# Patient Record
Sex: FEMALE | Race: WHITE | Employment: FULL TIME | URBAN - METROPOLITAN AREA
[De-identification: names, ages, dates, MRNs, and addresses within clinical notes are randomized per-mention and may not be internally consistent; named-entity substitution may affect disease eponyms.]

---

## 2017-12-13 ENCOUNTER — OFFICE VISIT (OUTPATIENT)
Dept: INTERNAL MEDICINE CLINIC | Facility: CLINIC | Age: 52
End: 2017-12-13

## 2017-12-13 ENCOUNTER — HOSPITAL ENCOUNTER (OUTPATIENT)
Dept: ULTRASOUND IMAGING | Age: 52
Discharge: HOME | End: 2017-12-13
Attending: INTERNAL MEDICINE
Payer: COMMERCIAL

## 2017-12-13 ENCOUNTER — LAB ENCOUNTER (OUTPATIENT)
Dept: LAB | Age: 52
End: 2017-12-13
Attending: INTERNAL MEDICINE
Payer: COMMERCIAL

## 2017-12-13 ENCOUNTER — TELEPHONE (OUTPATIENT)
Dept: GASTROENTEROLOGY | Facility: CLINIC | Age: 52
End: 2017-12-13

## 2017-12-13 VITALS
TEMPERATURE: 98 F | HEIGHT: 63 IN | OXYGEN SATURATION: 97 % | SYSTOLIC BLOOD PRESSURE: 134 MMHG | WEIGHT: 170.81 LBS | HEART RATE: 85 BPM | DIASTOLIC BLOOD PRESSURE: 85 MMHG | BODY MASS INDEX: 30.27 KG/M2 | RESPIRATION RATE: 20 BRPM

## 2017-12-13 DIAGNOSIS — Z12.11 ENCOUNTER FOR SCREENING COLONOSCOPY: ICD-10-CM

## 2017-12-13 DIAGNOSIS — K21.9 GASTROESOPHAGEAL REFLUX DISEASE, ESOPHAGITIS PRESENCE NOT SPECIFIED: Primary | ICD-10-CM

## 2017-12-13 DIAGNOSIS — R10.13 EPIGASTRIC ABDOMINAL PAIN: ICD-10-CM

## 2017-12-13 DIAGNOSIS — R10.9 ABDOMINAL PAIN, UNSPECIFIED ABDOMINAL LOCATION: ICD-10-CM

## 2017-12-13 DIAGNOSIS — R10.13 DYSPEPSIA: ICD-10-CM

## 2017-12-13 DIAGNOSIS — Z00.00 PHYSICAL EXAM: ICD-10-CM

## 2017-12-13 DIAGNOSIS — Z12.11 COLON CANCER SCREENING: ICD-10-CM

## 2017-12-13 DIAGNOSIS — Z12.31 SCREENING MAMMOGRAM, ENCOUNTER FOR: ICD-10-CM

## 2017-12-13 PROCEDURE — 80053 COMPREHEN METABOLIC PANEL: CPT

## 2017-12-13 PROCEDURE — 99396 PREV VISIT EST AGE 40-64: CPT | Performed by: INTERNAL MEDICINE

## 2017-12-13 PROCEDURE — 83690 ASSAY OF LIPASE: CPT

## 2017-12-13 PROCEDURE — 85025 COMPLETE CBC W/AUTO DIFF WBC: CPT

## 2017-12-13 PROCEDURE — 80061 LIPID PANEL: CPT

## 2017-12-13 PROCEDURE — 99213 OFFICE O/P EST LOW 20 MIN: CPT | Performed by: INTERNAL MEDICINE

## 2017-12-13 PROCEDURE — 36415 COLL VENOUS BLD VENIPUNCTURE: CPT

## 2017-12-13 PROCEDURE — 76705 ECHO EXAM OF ABDOMEN: CPT | Performed by: INTERNAL MEDICINE

## 2017-12-13 PROCEDURE — 82150 ASSAY OF AMYLASE: CPT

## 2017-12-13 PROCEDURE — 99212 OFFICE O/P EST SF 10 MIN: CPT | Performed by: INTERNAL MEDICINE

## 2017-12-13 PROCEDURE — 81015 MICROSCOPIC EXAM OF URINE: CPT

## 2017-12-13 PROCEDURE — 84443 ASSAY THYROID STIM HORMONE: CPT

## 2017-12-13 RX ORDER — OMEPRAZOLE 40 MG/1
40 CAPSULE, DELAYED RELEASE ORAL DAILY
Qty: 90 CAPSULE | Refills: 0 | Status: SHIPPED | OUTPATIENT
Start: 2017-12-13 | End: 2018-01-12

## 2017-12-13 NOTE — TELEPHONE ENCOUNTER
Pt calling to schedule CLN. Pt is aware of at least 72hr call back time.  Please call thank you [phone]

## 2017-12-13 NOTE — TELEPHONE ENCOUNTER
GI RNs–    It looks like we discussed but did not perform EGD examination when I saw her in 2015? If I have not previously performed EGD examination, we should proceed with both exams please.     Agree with abdominal ultrasound exam.    Agree with vale

## 2017-12-13 NOTE — TELEPHONE ENCOUNTER
Left detailed message for pt with below recommendations    Routed to surgery schedulers to contact pt to schedule both.

## 2017-12-13 NOTE — TELEPHONE ENCOUNTER
Last seen by Dr Ni Means 10/29/2015 for upper GI symptoms    Now 50years old and requesting screening colonoscopy    Saw PCP yesterday with ongoing GERD symptoms    I called patient for an update.   She was doing well prior to last week or so, was not taking

## 2017-12-13 NOTE — PROGRESS NOTES
HPI:    Patient ID: Yoselin Snowden is a 50year old female.   Patient patient presents today for heartburns some diarrhea - that  Resolved and  Some stomach pain  and also physical exam, states doing well otherwise, denies chest pain, shortness of breath, d Psychiatric/Behavioral: Negative for confusion. The patient is not nervous/anxious. Current Outpatient Prescriptions:  Omeprazole 40 MG Oral Capsule Delayed Release Take 1 capsule (40 mg total) by mouth daily.  Take 30-60 minutes before breakf Blood pressure 134/85, pulse 85, temperature 97.9 °F (36.6 °C), temperature source Oral, resp. rate 20, height 5' 3\" (1.6 m), weight 170 lb 12.8 oz (77.5 kg), SpO2 97 %, not currently breastfeeding.              ASSESSMENT/PLAN:   Gastroesophageal reflux d Omeprazole 40 MG Oral Capsule Delayed Release 90 capsule 0      Sig: Take 1 capsule (40 mg total) by mouth daily.  Take 30-60 minutes before breakfast           Imaging & Referrals:  GASTRO - INTERNAL  US ABDOMEN COMPLETE (CPT=76700)  CJ SCREENING BILAT (

## 2017-12-13 NOTE — TELEPHONE ENCOUNTER
Scheduled for:  Colonoscopy - 85488 & EGD - 02457  Provider Name:  Dr. Judi Jenkins  Date:  1/10/18  Location:  [facility]  Sedation:  MAC  Time:  1:45 pm (pt is aware to arrive at 12:45 pm)  Prep:  Suprep, Prep instructions were given to pt over the phone, pt ve

## 2017-12-13 NOTE — TELEPHONE ENCOUNTER
CBLM to schedule procedure. Please transfer to [facility] at ext [phone] or [phone] for scheduling. Or please transfer to [place] in GI if unavailable.

## 2017-12-14 ENCOUNTER — TELEPHONE (OUTPATIENT)
Dept: INTERNAL MEDICINE CLINIC | Facility: CLINIC | Age: 52
End: 2017-12-14

## 2017-12-15 NOTE — TELEPHONE ENCOUNTER
Please call patient     blood test results on all normal including   pancreatic enzymes liver and kidney function  Urine is normal  Sugars normal blood count is normal  Only triglycerides mildly elevated otherwise LDL is normal range-patient to lose weight

## 2017-12-15 NOTE — TELEPHONE ENCOUNTER
correction  -elevated sugar  - advice pt also  To decrease  Sugar /carb in diet         F/u  Next  Week in  Office

## 2017-12-15 NOTE — TELEPHONE ENCOUNTER
Spoke with patient and relayed US results and labs from 12/13/17. Patient requesting EV review and response, as she is still experiencing intermittent right back pain. Patient states Omeprazole has improved her GERD symptoms.

## 2017-12-16 NOTE — TELEPHONE ENCOUNTER
Advised patient on Dr. Juan Steinberg information and recommendations. Patient verbalized understanding.  Discussed reducing sugar and simple carbohydrates in the diet and beginning regular exercise, such as walking (indoor) or elliptical or  treadmill, neil

## 2017-12-26 ENCOUNTER — HOSPITAL ENCOUNTER (OUTPATIENT)
Dept: MAMMOGRAPHY | Age: 52
Discharge: HOME | End: 2017-12-26
Attending: INTERNAL MEDICINE
Payer: COMMERCIAL

## 2017-12-26 DIAGNOSIS — Z12.31 SCREENING MAMMOGRAM, ENCOUNTER FOR: ICD-10-CM

## 2017-12-26 PROCEDURE — 77067 SCR MAMMO BI INCL CAD: CPT | Performed by: INTERNAL MEDICINE

## 2018-01-10 ENCOUNTER — ANESTHESIA EVENT (OUTPATIENT)
Dept: ENDOSCOPY | Age: 53
End: 2018-01-10
Payer: COMMERCIAL

## 2018-01-10 ENCOUNTER — HOSPITAL ENCOUNTER (OUTPATIENT)
Age: 53
Setting detail: HOSPITAL OUTPATIENT SURGERY
Discharge: HOME | End: 2018-01-10
Attending: INTERNAL MEDICINE | Admitting: INTERNAL MEDICINE
Payer: COMMERCIAL

## 2018-01-10 ENCOUNTER — ANESTHESIA (OUTPATIENT)
Dept: ENDOSCOPY | Age: 53
End: 2018-01-10
Payer: COMMERCIAL

## 2018-01-10 ENCOUNTER — SURGERY (OUTPATIENT)
Age: 53
End: 2018-01-10

## 2018-01-10 VITALS
DIASTOLIC BLOOD PRESSURE: 90 MMHG | OXYGEN SATURATION: 99 % | WEIGHT: 160 LBS | SYSTOLIC BLOOD PRESSURE: 134 MMHG | RESPIRATION RATE: 11 BRPM | HEIGHT: 63 IN | HEART RATE: 78 BPM | BODY MASS INDEX: 28.35 KG/M2

## 2018-01-10 DIAGNOSIS — K21.9 GASTROESOPHAGEAL REFLUX DISEASE, ESOPHAGITIS PRESENCE NOT SPECIFIED: ICD-10-CM

## 2018-01-10 DIAGNOSIS — R10.13 DYSPEPSIA: ICD-10-CM

## 2018-01-10 DIAGNOSIS — Z12.11 COLON CANCER SCREENING: ICD-10-CM

## 2018-01-10 DIAGNOSIS — R10.9 ABDOMINAL PAIN, UNSPECIFIED ABDOMINAL LOCATION: ICD-10-CM

## 2018-01-10 PROCEDURE — 45385 COLONOSCOPY W/LESION REMOVAL: CPT | Performed by: INTERNAL MEDICINE

## 2018-01-10 PROCEDURE — 43251 EGD REMOVE LESION SNARE: CPT | Performed by: INTERNAL MEDICINE

## 2018-01-10 RX ORDER — SODIUM CHLORIDE, SODIUM LACTATE, POTASSIUM CHLORIDE, CALCIUM CHLORIDE 600; 310; 30; 20 MG/100ML; MG/100ML; MG/100ML; MG/100ML
INJECTION, SOLUTION INTRAVENOUS CONTINUOUS PRN
Status: DISCONTINUED | OUTPATIENT
Start: 2018-01-10 | End: 2018-01-10 | Stop reason: SURG

## 2018-01-10 RX ORDER — SODIUM CHLORIDE 9 MG/ML
INJECTION, SOLUTION INTRAVENOUS CONTINUOUS
Status: CANCELLED | OUTPATIENT
Start: 2018-01-10

## 2018-01-10 RX ORDER — MIDAZOLAM HYDROCHLORIDE 1 MG/ML
1 INJECTION INTRAMUSCULAR; INTRAVENOUS EVERY 5 MIN PRN
Status: DISCONTINUED | OUTPATIENT
Start: 2018-01-10 | End: 2018-01-10

## 2018-01-10 RX ORDER — SODIUM CHLORIDE, SODIUM LACTATE, POTASSIUM CHLORIDE, CALCIUM CHLORIDE 600; 310; 30; 20 MG/100ML; MG/100ML; MG/100ML; MG/100ML
INJECTION, SOLUTION INTRAVENOUS CONTINUOUS
Status: CANCELLED | OUTPATIENT
Start: 2018-01-10

## 2018-01-10 RX ORDER — SODIUM CHLORIDE 0.9 % (FLUSH) 0.9 %
10 SYRINGE (ML) INJECTION AS NEEDED
Status: CANCELLED | OUTPATIENT
Start: 2018-01-10

## 2018-01-10 RX ORDER — NALOXONE HYDROCHLORIDE 0.4 MG/ML
80 INJECTION, SOLUTION INTRAMUSCULAR; INTRAVENOUS; SUBCUTANEOUS AS NEEDED
Status: CANCELLED | OUTPATIENT
Start: 2018-01-10 | End: 2018-01-10

## 2018-01-10 RX ADMIN — SODIUM CHLORIDE, SODIUM LACTATE, POTASSIUM CHLORIDE, CALCIUM CHLORIDE: 600; 310; 30; 20 INJECTION, SOLUTION INTRAVENOUS at 13:45:00

## 2018-01-10 RX ADMIN — SODIUM CHLORIDE, SODIUM LACTATE, POTASSIUM CHLORIDE, CALCIUM CHLORIDE: 600; 310; 30; 20 INJECTION, SOLUTION INTRAVENOUS at 14:05:00

## 2018-01-10 NOTE — ANESTHESIA PREPROCEDURE EVALUATION
Anesthesia PreOp Note    HPI:     Fouzia Polanco is a 50year old female who presents for preoperative consultation requested by: Zain Raphael MD    Date of Surgery: 1/10/2018    Procedure(s):  COLONOSCOPY  ESOPHAGOGASTRODUODENOSCOPY (EGD)  In Grandmother      Cause of death   • Heart Disease Father      CAD   • Diabetes Father      Diabetes/CRF/CAD cause of death   • Other [OTHER] Father      CRF   • Heart Disease Mother      CAD   • Cancer Mother      Lung cancer and CAD cause of death   • Hea ability. The patient desires the anesthetic management as planned.   Talat Machado  1/10/2018 1:45 PM

## 2018-01-10 NOTE — ANESTHESIA POSTPROCEDURE EVALUATION
Patient:  Molly Bejarano    Procedure Summary     Date:  01/10/18 Room / Location:  [facility] ENDOSCOPY 01 / [facility] ENDO    Anesthesia Start:  8280 Anesthesia Stop:      Procedures:       COLONOSCOPY (N/A )      ESOPHAGOGASTRODUODENOSCOPY (EGD) (N/A ) Diagnosis:

## 2018-01-10 NOTE — ANESTHESIA PREPROCEDURE EVALUATION
Anesthesia PreOp Note    HPI:     Beth Schultz is a 50year old female who presents for preoperative consultation requested by: Aníbal Mchugh MD    Date of Surgery: 1/10/2018    Procedure(s):  COLONOSCOPY  ESOPHAGOGASTRODUODENOSCOPY (EGD)  In Grandmother      Cause of death   • Heart Disease Father      CAD   • Diabetes Father      Diabetes/CRF/CAD cause of death   • Other [OTHER] Father      CRF   • Heart Disease Mother      CAD   • Cancer Mother      Lung cancer and CAD cause of death   • Hea regular  Rate: normal    Neuro/Psych - negative ROS     GI/Hepatic/Renal    (+) GERD,     Endo/Other - negative ROS   Abdominal  - normal exam             Anesthesia Plan:   ASA:  1  Plan:   MAC  Post-op Pain Management: IV analgesics  Informed Consent Kushal

## 2018-01-10 NOTE — H&P
History & Physical Examination    Patient Name: Jacoby Ji  MRN: [id number]  [facility]: [id number]  YOB: 1965    Diagnosis: GERD symptoms with dysphagia, dyspepsia, screening colonoscopy examination    Present Illness: As above; GERD symptoms g Maternal Aunt 82        Smoking status: Former Smoker     Smokeless tobacco: Never Used    Alcohol use Yes  0.0 oz/week     Comment: Occasionally       SYSTEM Check if Review is Normal Check if Physical Exam is Normal If not normal, please explain:   HEENT

## 2018-01-10 NOTE — OPERATIVE REPORT
EGD AND COLONOSCOPY PROCEDURE REPORTS:    DATE OF PROCEDURE:  1/10/2018    PCP: Samuel Shore MD     PREOPERATIVE DIAGNOSIS:  GERD symptoms with dysphagia, dyspepsia, screening colonoscopy examination     POSTOPERATIVE DIAGNOSIS:  See impression. rectum. The quality of the prep was good. COLONOSCOPY FINDINGS:  · Sessile 6 mm polyp removed from the mid transverse colon by cold snare polypectomy. · Small internal hemorrhoids.

## 2018-01-25 ENCOUNTER — TELEPHONE (OUTPATIENT)
Dept: GASTROENTEROLOGY | Facility: CLINIC | Age: 53
End: 2018-01-25

## 2018-01-25 NOTE — TELEPHONE ENCOUNTER
I mailed out colonoscopy/EGD results letter to pt  Updated health maintenance  Entered into 5 yr recall  Recall colon in 5 years per. Colon done 1/10/18  GI RNs - 1.  Please print and mail this letter to patient; 2. Recall for colonoscopy exam in 5 years

## 2018-04-20 ENCOUNTER — TELEPHONE (OUTPATIENT)
Dept: INTERNAL MEDICINE CLINIC | Facility: CLINIC | Age: 53
End: 2018-04-20

## 2018-04-20 NOTE — TELEPHONE ENCOUNTER
Pt stts she need to be seen for her PAP   Pt stts she has her period just about daily and every time she makes her appt she has to cxl  Pt would like to talk to the pcp before she make her appt   Please advise

## 2018-04-20 NOTE — TELEPHONE ENCOUNTER
Pt  To schedule apt within  1  Month  -  If  Heavy    Bleed cano   Do pap  -  Very  Minor   spotting -   Will can  Still try -    Please  Schedule  Apt for pt

## 2018-04-20 NOTE — TELEPHONE ENCOUNTER
Please advise to the communication below. Thank you. Kymberly Marquez Please respond to pool: EM IM LMB LPN/CMA

## 2018-04-25 ENCOUNTER — OFFICE VISIT (OUTPATIENT)
Dept: INTERNAL MEDICINE CLINIC | Facility: CLINIC | Age: 53
End: 2018-04-25

## 2018-04-25 VITALS
HEART RATE: 68 BPM | RESPIRATION RATE: 20 BRPM | DIASTOLIC BLOOD PRESSURE: 72 MMHG | TEMPERATURE: 98 F | HEIGHT: 63 IN | SYSTOLIC BLOOD PRESSURE: 117 MMHG | BODY MASS INDEX: 27.94 KG/M2 | WEIGHT: 157.69 LBS

## 2018-04-25 DIAGNOSIS — Z82.49 FAMILY HISTORY OF EARLY CAD: ICD-10-CM

## 2018-04-25 DIAGNOSIS — N93.9 MENSTRUAL BLEEDING PROBLEM: ICD-10-CM

## 2018-04-25 DIAGNOSIS — R07.2 PRECORDIAL CHEST PAIN: ICD-10-CM

## 2018-04-25 DIAGNOSIS — Z00.00 PHYSICAL EXAM: ICD-10-CM

## 2018-04-25 DIAGNOSIS — Z01.419 ENCOUNTER FOR ANNUAL ROUTINE GYNECOLOGICAL EXAMINATION: Primary | ICD-10-CM

## 2018-04-25 PROCEDURE — 99396 PREV VISIT EST AGE 40-64: CPT | Performed by: INTERNAL MEDICINE

## 2018-04-25 PROCEDURE — 99212 OFFICE O/P EST SF 10 MIN: CPT | Performed by: INTERNAL MEDICINE

## 2018-04-25 PROCEDURE — 99213 OFFICE O/P EST LOW 20 MIN: CPT | Performed by: INTERNAL MEDICINE

## 2018-04-25 NOTE — PROGRESS NOTES
HPI:    Patient ID: Christofer Richardson is a 50year old female. Menstrual Problem   The patient's primary symptoms include vaginal bleeding (spating  for last  month   every  days  has period  ).  The patient's pertinent negatives include no genital itching, [Prochlor*      Sulfa Antibiotics       Hives   PHYSICAL EXAM:   Physical Exam   Constitutional: She appears well-developed and well-nourished. No distress. Obese    Neck: No JVD present. No thyromegaly present.    Pulmonary/Chest: Right breast exhibits n diagnosis)  Pap smear  Breast   Exam  , SBE  Education    sc Mammogram   -   Completed     Menstrual bleeding problem  labs   , Us  Pelvis  Pt educaiton   Stop  Soy   Food       Family history of early cad  Hx Precordial chest pain  Reordered  Tests  from

## 2018-05-14 ENCOUNTER — LAB ENCOUNTER (OUTPATIENT)
Dept: LAB | Facility: HOSPITAL | Age: 53
End: 2018-05-14
Attending: INTERNAL MEDICINE
Payer: COMMERCIAL

## 2018-05-14 DIAGNOSIS — Z82.49 FAMILY HISTORY OF EARLY CAD: ICD-10-CM

## 2018-05-14 DIAGNOSIS — R07.2 PRECORDIAL CHEST PAIN: ICD-10-CM

## 2018-05-14 DIAGNOSIS — Z00.00 PHYSICAL EXAM: ICD-10-CM

## 2018-05-14 DIAGNOSIS — N93.9 MENSTRUAL BLEEDING PROBLEM: ICD-10-CM

## 2018-05-14 PROCEDURE — 36415 COLL VENOUS BLD VENIPUNCTURE: CPT

## 2018-05-14 PROCEDURE — 80053 COMPREHEN METABOLIC PANEL: CPT

## 2018-05-14 PROCEDURE — 85025 COMPLETE CBC W/AUTO DIFF WBC: CPT

## 2018-05-14 PROCEDURE — 93005 ELECTROCARDIOGRAM TRACING: CPT

## 2018-05-14 PROCEDURE — 93010 ELECTROCARDIOGRAM REPORT: CPT | Performed by: INTERNAL MEDICINE

## 2018-05-19 ENCOUNTER — APPOINTMENT (OUTPATIENT)
Dept: LAB | Facility: HOSPITAL | Age: 53
End: 2018-05-19
Attending: INTERNAL MEDICINE
Payer: COMMERCIAL

## 2018-05-19 ENCOUNTER — HOSPITAL ENCOUNTER (OUTPATIENT)
Dept: CV DIAGNOSTICS | Facility: HOSPITAL | Age: 53
Discharge: HOME | End: 2018-05-19
Attending: INTERNAL MEDICINE
Payer: COMMERCIAL

## 2018-05-19 DIAGNOSIS — Z00.00 PHYSICAL EXAM: ICD-10-CM

## 2018-05-19 DIAGNOSIS — N93.9 MENSTRUAL BLEEDING PROBLEM: ICD-10-CM

## 2018-05-19 PROCEDURE — 36415 COLL VENOUS BLD VENIPUNCTURE: CPT

## 2018-05-19 PROCEDURE — 80061 LIPID PANEL: CPT

## 2018-05-29 ENCOUNTER — HOSPITAL ENCOUNTER (OUTPATIENT)
Dept: CV DIAGNOSTICS | Facility: HOSPITAL | Age: 53
Discharge: HOME | End: 2018-05-29
Attending: INTERNAL MEDICINE
Payer: COMMERCIAL

## 2018-05-29 DIAGNOSIS — Z82.49 FAMILY HISTORY OF EARLY CAD: ICD-10-CM

## 2018-05-29 DIAGNOSIS — R07.2 PRECORDIAL CHEST PAIN: ICD-10-CM

## 2018-05-29 PROCEDURE — 93306 TTE W/DOPPLER COMPLETE: CPT | Performed by: INTERNAL MEDICINE

## 2018-06-06 ENCOUNTER — OFFICE VISIT (OUTPATIENT)
Dept: INTERNAL MEDICINE CLINIC | Facility: CLINIC | Age: 53
End: 2018-06-06

## 2018-06-06 VITALS
HEIGHT: 63 IN | SYSTOLIC BLOOD PRESSURE: 122 MMHG | DIASTOLIC BLOOD PRESSURE: 79 MMHG | RESPIRATION RATE: 18 BRPM | BODY MASS INDEX: 27 KG/M2 | TEMPERATURE: 99 F | WEIGHT: 152.38 LBS | HEART RATE: 68 BPM

## 2018-06-06 DIAGNOSIS — Z82.49 FAMILY HISTORY OF EARLY CAD: ICD-10-CM

## 2018-06-06 DIAGNOSIS — H26.9 CATARACT OF BOTH EYES, UNSPECIFIED CATARACT TYPE: ICD-10-CM

## 2018-06-06 DIAGNOSIS — R00.2 PALPITATIONS: ICD-10-CM

## 2018-06-06 DIAGNOSIS — E78.00 PURE HYPERCHOLESTEROLEMIA: Primary | ICD-10-CM

## 2018-06-06 DIAGNOSIS — N93.9 ABNORMAL UTERINE BLEEDING (AUB): ICD-10-CM

## 2018-06-06 DIAGNOSIS — K21.9 GASTROESOPHAGEAL REFLUX DISEASE, ESOPHAGITIS PRESENCE NOT SPECIFIED: ICD-10-CM

## 2018-06-06 PROCEDURE — 99212 OFFICE O/P EST SF 10 MIN: CPT | Performed by: INTERNAL MEDICINE

## 2018-06-06 PROCEDURE — 99214 OFFICE O/P EST MOD 30 MIN: CPT | Performed by: INTERNAL MEDICINE

## 2018-06-06 NOTE — PROGRESS NOTES
HPI:    Patient ID: Beth Schultz is a 50year old female. Hyperlipidemia   This is a chronic (pt  staet   doing  well  ,  no complains   today  ) problem. The current episode started more than 1 year ago.  She has no history of chronic renal disease, h membrane, external ear and ear canal normal.   Left Ear: Tympanic membrane, external ear and ear canal normal.   Nose: Nose normal. Right sinus exhibits no maxillary sinus tenderness and no frontal sinus tenderness.  Left sinus exhibits no maxillary sinus t Exercise  3   X  Per  week  For  1  Hr   swiming  And   Weight  Lifting , squats  , planks  Yoga      Gastroesophageal reflux disease, esophagitis presence not specified  Patient advised to avoid acidic food, spicy food, coffee and caffeinated drinks and

## 2018-07-11 ENCOUNTER — OFFICE VISIT (OUTPATIENT)
Dept: CARDIOLOGY CLINIC | Facility: CLINIC | Age: 53
End: 2018-07-11

## 2018-07-11 VITALS
DIASTOLIC BLOOD PRESSURE: 70 MMHG | WEIGHT: 152 LBS | RESPIRATION RATE: 18 BRPM | HEART RATE: 68 BPM | SYSTOLIC BLOOD PRESSURE: 120 MMHG | BODY MASS INDEX: 27 KG/M2

## 2018-07-11 DIAGNOSIS — Z82.49 FAMILY HISTORY OF EARLY CAD: ICD-10-CM

## 2018-07-11 DIAGNOSIS — K21.00 GASTROESOPHAGEAL REFLUX DISEASE WITH ESOPHAGITIS: ICD-10-CM

## 2018-07-11 DIAGNOSIS — R07.9 CHEST PAIN AT REST: Primary | ICD-10-CM
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
## 2018-07-11 DIAGNOSIS — N93.9 ABNORMAL UTERINE BLEEDING (AUB): ICD-10-CM

## 2018-07-11 PROCEDURE — 99244 OFF/OP CNSLTJ NEW/EST MOD 40: CPT | Performed by: INTERNAL MEDICINE

## 2018-07-11 PROCEDURE — 99212 OFFICE O/P EST SF 10 MIN: CPT | Performed by: INTERNAL MEDICINE

## 2018-07-11 NOTE — PROGRESS NOTES
Crow Bravo is a 50year old female. HPI:   Patient is here for a new patient appointment. She is here because of family history of early coronary artery disease. Her brother passed away at age of 39 with massive MI. Both her parents had bypasses. tenderness  EXTREMITIES: no cyanosis, clubbing or edema      Assessment   ASSESSMENT AND PLAN:     1. Chest pain at rest  Chest pain which is atypical with significant family history.   Proceed with stress echocardiogram.  - CARD ECHO STRESS ECHO W CONTRAST

## 2018-07-11 NOTE — PATIENT INSTRUCTIONS
Proceed with stress echocardiogram within the next few days. Consider doing a calcium score prior to next visit. Also proceed with echo with bubble study as ordered. Follow-up with me in 4 weeks or sooner if needed.

## 2018-07-12 ENCOUNTER — HOSPITAL ENCOUNTER (OUTPATIENT)
Dept: CV DIAGNOSTICS | Facility: HOSPITAL | Age: 53
Discharge: HOME | End: 2018-07-12
Attending: INTERNAL MEDICINE
Payer: COMMERCIAL

## 2018-07-12 ENCOUNTER — TELEPHONE (OUTPATIENT)
Dept: CARDIOLOGY CLINIC | Facility: CLINIC | Age: 53
End: 2018-07-12
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
## 2018-07-12 PROCEDURE — 93308 TTE F-UP OR LMTD: CPT | Performed by: INTERNAL MEDICINE

## 2018-07-14 ENCOUNTER — HOSPITAL ENCOUNTER (OUTPATIENT)
Dept: CT IMAGING | Facility: HOSPITAL | Age: 53
Discharge: HOME | End: 2018-07-14
Attending: INTERNAL MEDICINE
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
## 2018-07-14 DIAGNOSIS — K21.00 GASTROESOPHAGEAL REFLUX DISEASE WITH ESOPHAGITIS: ICD-10-CM

## 2018-07-17 ENCOUNTER — TELEPHONE (OUTPATIENT)
Dept: CARDIOLOGY CLINIC | Facility: CLINIC | Age: 53
End: 2018-07-17

## 2018-07-17 NOTE — TELEPHONE ENCOUNTER
Call was placed to [facility] for prior authorization for 2D Echo, Stress Echo. Spoke to [place] and was informed that no prior [facility]. is needed. Ref.  # [id number]

## 2018-07-23 ENCOUNTER — HOSPITAL ENCOUNTER (OUTPATIENT)
Dept: CV DIAGNOSTICS | Facility: HOSPITAL | Age: 53
Discharge: HOME | End: 2018-07-23
Attending: INTERNAL MEDICINE
Payer: COMMERCIAL

## 2018-07-23 DIAGNOSIS — R07.9 CHEST PAIN AT REST: ICD-10-CM

## 2018-07-23 PROCEDURE — 93016 CV STRESS TEST SUPVJ ONLY: CPT | Performed by: INTERNAL MEDICINE

## 2018-07-23 PROCEDURE — 93018 CV STRESS TEST I&R ONLY: CPT | Performed by: INTERNAL MEDICINE

## 2018-07-23 PROCEDURE — 93350 STRESS TTE ONLY: CPT | Performed by: INTERNAL MEDICINE

## 2018-07-23 PROCEDURE — 93017 CV STRESS TEST TRACING ONLY: CPT | Performed by: INTERNAL MEDICINE

## 2018-12-27 ENCOUNTER — APPOINTMENT (OUTPATIENT)
Dept: LAB | Facility: HOSPITAL | Age: 53
End: 2018-12-27
Attending: CLINICAL NURSE SPECIALIST
Payer: COMMERCIAL

## 2018-12-27 ENCOUNTER — OFFICE VISIT (OUTPATIENT)
Dept: OBGYN CLINIC | Facility: CLINIC | Age: 53
End: 2018-12-27
Payer: COMMERCIAL

## 2018-12-27 VITALS
DIASTOLIC BLOOD PRESSURE: 84 MMHG | BODY MASS INDEX: 29 KG/M2 | SYSTOLIC BLOOD PRESSURE: 125 MMHG | HEART RATE: 71 BPM | WEIGHT: 166.19 LBS

## 2018-12-27 DIAGNOSIS — N88.9 CERVICAL LESION: ICD-10-CM

## 2018-12-27 DIAGNOSIS — R30.0 BURNING WITH URINATION: Primary | ICD-10-CM

## 2018-12-27 DIAGNOSIS — N89.8 VAGINAL ODOR: ICD-10-CM

## 2018-12-27 DIAGNOSIS — R30.0 BURNING WITH URINATION: ICD-10-CM

## 2018-12-27 PROCEDURE — 81003 URINALYSIS AUTO W/O SCOPE: CPT

## 2018-12-27 PROCEDURE — 87086 URINE CULTURE/COLONY COUNT: CPT

## 2018-12-27 PROCEDURE — 99213 OFFICE O/P EST LOW 20 MIN: CPT | Performed by: CLINICAL NURSE SPECIALIST

## 2018-12-27 NOTE — PROGRESS NOTES
Sam Chao is a 48year old female  No LMP recorded. Patient is perimenopausal. Patient presents with:  Gyn Problem: burning when urinating, pain on the sides at times  Last seen in 2016.  Was scheduled for ablation with Joaquin George but never returned ca death   • Other (Other) Father         CRF   • Heart Disease Mother         CAD   • Cancer Mother         Lung cancer and CAD cause of death   • Heart Disease Brother 39        Premature CAD   • Breast Cancer Maternal Aunt 80     Social History    Tobacco Bottle, Rfl: 0    ALLERGIES:    Aminoglycosides           Compazine [Prochlor*      Sulfa Antibiotics       HIVES      Review of Systems:  Constitutional:  Denies fatigue, night sweats, hot flashes  Cardiovascular:  denies chest pain or palpitations  Respi schedule pelvic US  RTO to see Physician to have cervical lesion evaluated

## 2018-12-28 ENCOUNTER — TELEPHONE (OUTPATIENT)
Dept: OBGYN CLINIC | Facility: CLINIC | Age: 53
End: 2018-12-28

## 2018-12-28 NOTE — TELEPHONE ENCOUNTER
Pt saw MAF on 12/27/18. Pt states that she was informed that she may have a cervical lesion. Pt states that she is having an ultrasound tomorrow. Pt states that she was to return to see physician to have cervical lesion evaluated.   Pt made an appt with

## 2018-12-29 ENCOUNTER — HOSPITAL ENCOUNTER (OUTPATIENT)
Dept: ULTRASOUND IMAGING | Age: 53
Discharge: HOME | End: 2018-12-29
Attending: INTERNAL MEDICINE
Payer: COMMERCIAL

## 2018-12-29 DIAGNOSIS — N93.9 MENSTRUAL BLEEDING PROBLEM: ICD-10-CM

## 2018-12-29 PROCEDURE — 76856 US EXAM PELVIC COMPLETE: CPT | Performed by: INTERNAL MEDICINE

## 2018-12-29 PROCEDURE — 76830 TRANSVAGINAL US NON-OB: CPT | Performed by: INTERNAL MEDICINE

## 2019-01-02 ENCOUNTER — OFFICE VISIT (OUTPATIENT)
Dept: OBGYN CLINIC | Facility: CLINIC | Age: 54
End: 2019-01-02
Payer: COMMERCIAL

## 2019-01-02 VITALS — HEART RATE: 71 BPM | SYSTOLIC BLOOD PRESSURE: 123 MMHG | DIASTOLIC BLOOD PRESSURE: 80 MMHG

## 2019-01-02 DIAGNOSIS — N84.1 MUCOUS POLYP OF CERVIX: Primary | ICD-10-CM

## 2019-01-02 DIAGNOSIS — N83.201 RIGHT OVARIAN CYST: ICD-10-CM

## 2019-01-02 PROCEDURE — 99213 OFFICE O/P EST LOW 20 MIN: CPT | Performed by: OBSTETRICS & GYNECOLOGY

## 2019-01-02 PROCEDURE — 57500 BIOPSY OF CERVIX: CPT | Performed by: OBSTETRICS & GYNECOLOGY

## 2019-01-02 NOTE — H&P
HPI:  The patient is a 49 yo F sent by [facility] for evaluation of cervical polyp. Patient saw [facility] on 12/27/18 for vaginal dc and dysuria. On exam was noted to have prolapsing polyp. Patient currently not sexually active. LMP April 2018.   Moderate flow--no l 1996    Diagnostic for endometriosis       SOCIAL HISTORY:  Social History    Socioeconomic History      Marital status:       Spouse name: Not on file      Number of children: Not on file      Years of education: Not on file      Highest education file.    ALLERGIES:    Aminoglycosides           Compazine [Prochlor*      Sulfa Antibiotics       HIVES      Review of Systems:  Constitutional:  Denies fevers and chills   Cardiovascular:  denies chest pain or palpitations  Respiratory:  denies shortness

## 2019-03-12 ENCOUNTER — MED REC SCAN ONLY (OUTPATIENT)
Dept: INTERNAL MEDICINE CLINIC | Facility: CLINIC | Age: 54
End: 2019-03-12

## 2019-03-13 ENCOUNTER — NURSE TRIAGE (OUTPATIENT)
Dept: OTHER | Age: 54
End: 2019-03-13

## 2019-03-13 ENCOUNTER — OFFICE VISIT (OUTPATIENT)
Dept: INTERNAL MEDICINE CLINIC | Facility: CLINIC | Age: 54
End: 2019-03-13
Payer: COMMERCIAL

## 2019-03-13 VITALS
RESPIRATION RATE: 20 BRPM | TEMPERATURE: 99 F | HEIGHT: 63 IN | OXYGEN SATURATION: 97 % | BODY MASS INDEX: 29.57 KG/M2 | WEIGHT: 166.88 LBS | SYSTOLIC BLOOD PRESSURE: 112 MMHG | HEART RATE: 78 BPM | DIASTOLIC BLOOD PRESSURE: 75 MMHG

## 2019-03-13 DIAGNOSIS — R05.9 COUGH: Primary | ICD-10-CM

## 2019-03-13 DIAGNOSIS — K21.9 GASTROESOPHAGEAL REFLUX DISEASE WITHOUT ESOPHAGITIS: ICD-10-CM

## 2019-03-13 DIAGNOSIS — J30.9 ALLERGIC RHINITIS, UNSPECIFIED SEASONALITY, UNSPECIFIED TRIGGER: ICD-10-CM

## 2019-03-13 PROCEDURE — 99214 OFFICE O/P EST MOD 30 MIN: CPT | Performed by: INTERNAL MEDICINE

## 2019-03-13 PROCEDURE — 99212 OFFICE O/P EST SF 10 MIN: CPT | Performed by: INTERNAL MEDICINE

## 2019-03-13 RX ORDER — LEVOCETIRIZINE DIHYDROCHLORIDE 5 MG/1
5 TABLET, FILM COATED ORAL EVERY EVENING
Qty: 30 TABLET | Refills: 1 | Status: SHIPPED | OUTPATIENT
Start: 2019-03-13 | End: 2020-01-02 | Stop reason: ALTCHOICE

## 2019-03-13 RX ORDER — FLUTICASONE PROPIONATE 50 MCG
2 SPRAY, SUSPENSION (ML) NASAL DAILY
Qty: 1 BOTTLE | Refills: 0 | Status: SHIPPED | OUTPATIENT
Start: 2019-03-13 | End: 2020-03-07

## 2019-03-13 RX ORDER — OMEPRAZOLE 40 MG/1
40 CAPSULE, DELAYED RELEASE ORAL DAILY
Qty: 30 CAPSULE | Refills: 1 | Status: SHIPPED | OUTPATIENT
Start: 2019-03-13 | End: 2019-04-11

## 2019-03-13 RX ORDER — AMOXICILLIN AND CLAVULANATE POTASSIUM 875; 125 MG/1; MG/1
1 TABLET, FILM COATED ORAL 2 TIMES DAILY
Refills: 0 | COMMUNITY
Start: 2019-03-10 | End: 2019-04-09 | Stop reason: ALTCHOICE

## 2019-03-13 RX ORDER — SACCHAROMYCES BOULARDII 250 MG
250 CAPSULE ORAL 2 TIMES DAILY
Qty: 20 CAPSULE | Refills: 0 | Status: SHIPPED | OUTPATIENT
Start: 2019-03-13 | End: 2020-08-24 | Stop reason: ALTCHOICE

## 2019-03-13 NOTE — TELEPHONE ENCOUNTER
Action Requested: Summary for Provider     []  Critical Lab, Recommendations Needed  [] Need Additional Advice  []   FYI    []   Need Orders  [] Need Medications Sent to Pharmacy  []  Other     SUMMARY:    Appointment made for today 3/13/19    The patient

## 2019-03-13 NOTE — PROGRESS NOTES
HPI:    Patient ID: Junito Richardson is a 48year old female. Patient presents with:  Cough: Pt state that she has been sick off an on for the past 3 weeks and now  for  last  week -she has a cough with chest pain and cold.  Pt states that she thhink that sh is not nervous/anxious. Current Outpatient Medications:  Amoxicillin-Pot Clavulanate 875-125 MG Oral Tab Take 1 tablet by mouth 2 (two) times daily.    Disp:  Rfl: 0   Levocetirizine Dihydrochloride (XYZAL) 5 MG Oral Tab Take 1 tablet (5 mg to thyromegaly present. Cardiovascular: Normal rate, regular rhythm and normal heart sounds. No murmur heard. Pulmonary/Chest: Effort normal and breath sounds normal. No respiratory distress. She has no wheezes. She has no rales. Abdominal: Soft.  She e avoid wearing  tight clothes   Advised to elevate the head of the bed   Avoid eating at least 3 hours before bedtime   Counseling on ideal weight/BMI  Take PPIs qd in the morning 30-60 minutes before breakfast always on empty stomach Side effects and direc

## 2019-03-18 ENCOUNTER — HOSPITAL ENCOUNTER (OUTPATIENT)
Dept: GENERAL RADIOLOGY | Age: 54
Discharge: HOME | End: 2019-03-18
Attending: INTERNAL MEDICINE
Payer: COMMERCIAL

## 2019-03-18 ENCOUNTER — NURSE TRIAGE (OUTPATIENT)
Dept: INTERNAL MEDICINE CLINIC | Facility: CLINIC | Age: 54
End: 2019-03-18

## 2019-03-18 ENCOUNTER — OFFICE VISIT (OUTPATIENT)
Dept: INTERNAL MEDICINE CLINIC | Facility: CLINIC | Age: 54
End: 2019-03-18
Payer: COMMERCIAL

## 2019-03-18 VITALS
WEIGHT: 171 LBS | DIASTOLIC BLOOD PRESSURE: 85 MMHG | HEART RATE: 73 BPM | RESPIRATION RATE: 18 BRPM | SYSTOLIC BLOOD PRESSURE: 140 MMHG | TEMPERATURE: 98 F | OXYGEN SATURATION: 96 % | BODY MASS INDEX: 30 KG/M2

## 2019-03-18 DIAGNOSIS — R07.81 PLEURITIC CHEST PAIN: ICD-10-CM

## 2019-03-18 DIAGNOSIS — R07.81 PLEURITIC CHEST PAIN: Primary | ICD-10-CM

## 2019-03-18 PROCEDURE — 71046 X-RAY EXAM CHEST 2 VIEWS: CPT | Performed by: INTERNAL MEDICINE

## 2019-03-18 PROCEDURE — 99214 OFFICE O/P EST MOD 30 MIN: CPT | Performed by: INTERNAL MEDICINE

## 2019-03-18 PROCEDURE — 99212 OFFICE O/P EST SF 10 MIN: CPT | Performed by: INTERNAL MEDICINE

## 2019-03-18 PROCEDURE — 71100 X-RAY EXAM RIBS UNI 2 VIEWS: CPT | Performed by: INTERNAL MEDICINE

## 2019-03-18 RX ORDER — BENZONATATE 200 MG/1
200 CAPSULE ORAL 3 TIMES DAILY PRN
Qty: 30 CAPSULE | Refills: 0 | Status: SHIPPED | OUTPATIENT
Start: 2019-03-18 | End: 2019-12-06 | Stop reason: ALTCHOICE

## 2019-03-18 RX ORDER — HYDROCODONE BITARTRATE AND ACETAMINOPHEN 5; 325 MG/1; MG/1
1 TABLET ORAL EVERY 6 HOURS PRN
Qty: 20 TABLET | Refills: 0 | Status: SHIPPED | OUTPATIENT
Start: 2019-03-18 | End: 2019-12-06 | Stop reason: ALTCHOICE

## 2019-03-18 NOTE — TELEPHONE ENCOUNTER
Action Requested: Summary for Provider     []  Critical Lab, Recommendations Needed  [x] Need Additional Advice  []   FYI    []   Need Orders  [] Need Medications Sent to Pharmacy  []  Other     SUMMARY: Dr Lee Castillo for Dr Alissa Aviles, patient seen in office

## 2019-03-18 NOTE — PROGRESS NOTES
HPI:    Patient ID: Molly Bejarano is a 48year old female.     HPI  Patient reports that about a week ago she developed cold symptoms nasal congestion with cough, initially she had several episodes of vomiting, she was seen by Dr. Rosamaria Talavera and treated f capsule Rfl: 1   saccharomyces boulardii (FLORASTOR) 250 MG Oral Cap Take 1 capsule (250 mg total) by mouth 2 (two) times daily.  Disp: 20 capsule Rfl: 0     Allergies:  Aminoglycosides           Compazine [Prochlor*      Sulfa Antibiotics       HIVES   BP cough suppression, Cheratussin AC 5 mL at bedtime as needed, patient to report in 7-10 days if not improving  No orders of the defined types were placed in this encounter.       Meds This Visit:  Requested Prescriptions     Signed Prescriptions Disp Refills

## 2019-03-18 NOTE — TELEPHONE ENCOUNTER
Advised patient on Dr. Mac Dodge information and recommendations. Patient verbalized understanding. Scheduled to see Dr Lee Castillo today 3/18/19 at 11:40 am [place].

## 2019-04-09 ENCOUNTER — OFFICE VISIT (OUTPATIENT)
Dept: INTERNAL MEDICINE CLINIC | Facility: CLINIC | Age: 54
End: 2019-04-09
Payer: COMMERCIAL

## 2019-04-09 ENCOUNTER — LAB ENCOUNTER (OUTPATIENT)
Dept: LAB | Age: 54
End: 2019-04-09
Attending: INTERNAL MEDICINE
Payer: COMMERCIAL

## 2019-04-09 VITALS
TEMPERATURE: 99 F | HEART RATE: 67 BPM | SYSTOLIC BLOOD PRESSURE: 125 MMHG | BODY MASS INDEX: 30.05 KG/M2 | HEIGHT: 63 IN | RESPIRATION RATE: 18 BRPM | WEIGHT: 169.63 LBS | DIASTOLIC BLOOD PRESSURE: 79 MMHG

## 2019-04-09 DIAGNOSIS — Z00.00 PE (PHYSICAL EXAM), ROUTINE: Primary | ICD-10-CM

## 2019-04-09 DIAGNOSIS — J30.9 ALLERGIC RHINITIS, UNSPECIFIED SEASONALITY, UNSPECIFIED TRIGGER: ICD-10-CM

## 2019-04-09 DIAGNOSIS — M54.6 RIGHT-SIDED THORACIC BACK PAIN, UNSPECIFIED CHRONICITY: ICD-10-CM

## 2019-04-09 DIAGNOSIS — E66.9 CLASS 1 OBESITY WITHOUT SERIOUS COMORBIDITY WITH BODY MASS INDEX (BMI) OF 30.0 TO 30.9 IN ADULT, UNSPECIFIED OBESITY TYPE: ICD-10-CM

## 2019-04-09 DIAGNOSIS — Z12.31 SCREENING MAMMOGRAM, ENCOUNTER FOR: ICD-10-CM

## 2019-04-09 DIAGNOSIS — Z00.00 PE (PHYSICAL EXAM), ROUTINE: ICD-10-CM

## 2019-04-09 DIAGNOSIS — K21.9 GASTROESOPHAGEAL REFLUX DISEASE WITHOUT ESOPHAGITIS: ICD-10-CM

## 2019-04-09 DIAGNOSIS — L98.9 SKIN LESIONS: ICD-10-CM

## 2019-04-09 PROCEDURE — 99213 OFFICE O/P EST LOW 20 MIN: CPT | Performed by: INTERNAL MEDICINE

## 2019-04-09 PROCEDURE — 84443 ASSAY THYROID STIM HORMONE: CPT

## 2019-04-09 PROCEDURE — 80061 LIPID PANEL: CPT

## 2019-04-09 PROCEDURE — 99212 OFFICE O/P EST SF 10 MIN: CPT | Performed by: INTERNAL MEDICINE

## 2019-04-09 PROCEDURE — 99396 PREV VISIT EST AGE 40-64: CPT | Performed by: INTERNAL MEDICINE

## 2019-04-09 PROCEDURE — 81001 URINALYSIS AUTO W/SCOPE: CPT

## 2019-04-09 PROCEDURE — 80053 COMPREHEN METABOLIC PANEL: CPT

## 2019-04-09 PROCEDURE — 85025 COMPLETE CBC W/AUTO DIFF WBC: CPT

## 2019-04-09 PROCEDURE — 36415 COLL VENOUS BLD VENIPUNCTURE: CPT

## 2019-04-09 RX ORDER — TOPIRAMATE 25 MG/1
25 TABLET ORAL 2 TIMES DAILY
Qty: 60 TABLET | Refills: 0 | Status: SHIPPED | OUTPATIENT
Start: 2019-04-09 | End: 2020-02-02

## 2019-04-09 NOTE — PROGRESS NOTES
HPI:    Patient ID: Lizette Crews is a 48year old female.   Patient presents with:  Physical: Pt is presenting today for a physical  Patient presents today for physical exam, states doing well otherwise, denies chest pain, shortness of breath, dyspnea on Gastrointestinal: Negative for abdominal pain, anal bleeding, blood in stool, constipation, diarrhea, nausea and vomiting. Genitourinary: Negative for dysuria and frequency.    Musculoskeletal: Positive for back pain (mid  backradiating   to left abdomen Left Ear: Hearing, tympanic membrane, external ear and ear canal normal.   Nose: Nose normal. Right sinus exhibits no maxillary sinus tenderness and no frontal sinus tenderness.  Left sinus exhibits no maxillary sinus tenderness and no frontal sinus tendern ASSESSMENT/PLAN:     1.  PE (physical exam), routine  Maintain a healthy low saturated fat and sugar diet, keep good hydration  Maintain a regular exercise/walking as tolerated   Complete labs as ordered, preventative health maintenance testing discuss Reduce salt, fat, sweets and pure sugar in diet   Include in your diet:  fruits, vegetables, nuts, whole grains, low-fat diet (chicken, turkey, and fish)  Exercise/walk regularly as tolerated  The risks and benefits of the treatment plan have been discusse

## 2019-04-11 DIAGNOSIS — R82.90 URINE FINDINGS ABNORMAL: Primary | ICD-10-CM

## 2019-04-12 RX ORDER — OMEPRAZOLE 40 MG/1
CAPSULE, DELAYED RELEASE ORAL
Qty: 90 CAPSULE | Refills: 1 | Status: SHIPPED | OUTPATIENT
Start: 2019-04-12 | End: 2019-05-15

## 2019-04-22 ENCOUNTER — TELEPHONE (OUTPATIENT)
Dept: OTHER | Age: 54
End: 2019-04-22

## 2019-04-22 NOTE — TELEPHONE ENCOUNTER
LMTCB  Transfer to triage    Written by Everton Adams MD on 4/13/2019  1:58 PM   Dear Sofie Contreras,     Your blood test results are within normal limits/stable except mildly elevated sugars and cholesterol LDL   I recommend you to decrease sugars and carbs i

## 2019-04-24 NOTE — TELEPHONE ENCOUNTER
LMTCB=transfer to triage, 3rd attempt, will send NO PHONE RESPONSE :LETTER today. MyChart message sent.

## 2019-04-25 NOTE — TELEPHONE ENCOUNTER
I sent a lab letter in the mail with the information below for the patient has not looked at [place].

## 2019-05-13 ENCOUNTER — OFFICE VISIT (OUTPATIENT)
Dept: DERMATOLOGY CLINIC | Facility: CLINIC | Age: 54
End: 2019-05-13
Payer: COMMERCIAL

## 2019-05-13 DIAGNOSIS — D23.5 BENIGN NEOPLASM OF SKIN OF TRUNK, EXCEPT SCROTUM: ICD-10-CM

## 2019-05-13 DIAGNOSIS — D23.70 BENIGN NEOPLASM OF SKIN OF LOWER LIMB, INCLUDING HIP, UNSPECIFIED LATERALITY: ICD-10-CM

## 2019-05-13 DIAGNOSIS — L82.0 INFLAMED SEBORRHEIC KERATOSIS: ICD-10-CM

## 2019-05-13 DIAGNOSIS — D23.4 BENIGN NEOPLASM OF SCALP AND SKIN OF NECK: ICD-10-CM

## 2019-05-13 DIAGNOSIS — D23.60 BENIGN NEOPLASM OF SKIN OF UPPER LIMB, INCLUDING SHOULDER, UNSPECIFIED LATERALITY: ICD-10-CM

## 2019-05-13 DIAGNOSIS — L71.9 ROSACEA: ICD-10-CM

## 2019-05-13 DIAGNOSIS — D22.9 MULTIPLE NEVI: Primary | ICD-10-CM

## 2019-05-13 DIAGNOSIS — D23.30 BENIGN NEOPLASM OF SKIN OF FACE: ICD-10-CM

## 2019-05-13 PROCEDURE — 99212 OFFICE O/P EST SF 10 MIN: CPT | Performed by: DERMATOLOGY

## 2019-05-13 PROCEDURE — 99203 OFFICE O/P NEW LOW 30 MIN: CPT | Performed by: DERMATOLOGY

## 2019-05-13 RX ORDER — OMEPRAZOLE 40 MG/1
CAPSULE, DELAYED RELEASE ORAL
Qty: 30 CAPSULE | Refills: 0 | OUTPATIENT
Start: 2019-05-13

## 2019-05-13 NOTE — TELEPHONE ENCOUNTER
Refill request receivd for omeprazole 40mg. Per med module script apptoved on 4/12/19 for 6 month supply. Duplicate request denied at this time.     Requested Prescriptions   Pending Prescriptions Disp Refills   • OMEPRAZOLE 40 MG Oral Capsule Delayed Relea

## 2019-05-15 RX ORDER — OMEPRAZOLE 40 MG/1
CAPSULE, DELAYED RELEASE ORAL
Qty: 90 CAPSULE | Refills: 1 | Status: SHIPPED | OUTPATIENT
Start: 2019-05-15 | End: 2019-11-08

## 2019-05-15 NOTE — TELEPHONE ENCOUNTER
Refill passed per [address] protocol.   Refill Protocol Appointment Criteria  · Appointment scheduled in the past 12 months or in the next 3 months  Recent Outpatient Visits            2 days ago     [facility] SPECIALTY \[facility]\"" - [facility]

## 2019-05-15 NOTE — TELEPHONE ENCOUNTER
Last filled 4/12/2019 90/1 refill, called pharmacy and cancelled that rx (pt had not used it yet and pharmacist Dennise Braun advised they sent to us in error) and will use rx sent today.

## 2019-05-26 NOTE — PROGRESS NOTES
Zion Guzman is a 48year old female. HPI:     CC:  Patient presents with:  Full Skin Exam: LOV 9/4/15(Pedro) New pt. pt presenting today with full body skin check. pt concerend about lesion to rt side of head. pt states lesion has changed in color. mouth every 6 (six) hours as needed for Pain. Disp: 20 tablet Rfl: 0   Levocetirizine Dihydrochloride (XYZAL) 5 MG Oral Tab Take 1 tablet (5 mg total) by mouth every evening.  For 2  Weeks than as needed Disp: 30 tablet Rfl: 1   Fluticasone Propionate 50 [facility] Medical: Not on file        Non-medical: Not on file    Tobacco Use      Smoking status: Former Smoker      Smokeless tobacco: Never Used    Substance and Sexual Activity      Alcohol use:  Yes        Alcohol/week: 0.0 oz        Comment: Occasionally Diabetes/CRF/CAD cause of death   • Other (Other) Father         CRF   • Heart Disease Mother         CAD   • Cancer Mother         Lung cancer and CAD cause of death   • Heart Disease Brother 39        Premature CAD   • Breast Cancer Maternal Aunt 80 papules scattered, cherry-red vascular papules scattered. See map today's date for lesions noted . Otherwise remarkable for lesions as noted on map.   See details of examination  See Assessment /Plan for additional history and physical exam also: recognition software. Please contact me regarding any confusion resulting from errors in recognition.

## 2019-06-27 ENCOUNTER — OFFICE VISIT (OUTPATIENT)
Dept: PODIATRY CLINIC | Facility: CLINIC | Age: 54
End: 2019-06-27
Payer: COMMERCIAL

## 2019-06-27 DIAGNOSIS — M72.2 PLANTAR FASCIITIS OF LEFT FOOT: Primary | ICD-10-CM

## 2019-06-27 PROCEDURE — 99203 OFFICE O/P NEW LOW 30 MIN: CPT | Performed by: PODIATRIST

## 2019-06-27 NOTE — PROGRESS NOTES
HPI:    Patient ID: Kym Shah is a 48year old female. Pleasant 51-year-old female presents as a new patient to me and states that she is self-referred. The reason for this visit is left heel pain.   She has had some frustrations with both feet over There is no clinical evidence of edema nor erythema. Pain is caused by direct palpation of the fascial insertion. She has a moderately high but flexible arch position. She has put an insole in her shoes that is made some difference.   Is my impression th

## 2019-08-02 ENCOUNTER — TELEPHONE (OUTPATIENT)
Dept: PODIATRY CLINIC | Facility: CLINIC | Age: 54
End: 2019-08-02

## 2019-08-02 NOTE — TELEPHONE ENCOUNTER
Pt called stating pt had appt on 6-27-19. Viewed info in [facility]. Pt is requesting note. Doctor wanted pt need to wear gym shoes with inserta for 2 weeks. Put note on mychart.   Call pt to advise

## 2019-08-03 ENCOUNTER — HOSPITAL ENCOUNTER (OUTPATIENT)
Dept: MAMMOGRAPHY | Age: 54
Discharge: HOME | End: 2019-08-03
Attending: INTERNAL MEDICINE
Payer: COMMERCIAL

## 2019-08-03 DIAGNOSIS — Z12.31 SCREENING MAMMOGRAM, ENCOUNTER FOR: ICD-10-CM

## 2019-08-03 PROCEDURE — 77067 SCR MAMMO BI INCL CAD: CPT | Performed by: INTERNAL MEDICINE

## 2019-08-03 PROCEDURE — 77063 BREAST TOMOSYNTHESIS BI: CPT | Performed by: INTERNAL MEDICINE

## 2019-08-06 ENCOUNTER — OFFICE VISIT (OUTPATIENT)
Dept: PODIATRY CLINIC | Facility: CLINIC | Age: 54
End: 2019-08-06
Payer: COMMERCIAL

## 2019-08-06 DIAGNOSIS — M72.2 PLANTAR FASCIITIS OF LEFT FOOT: Primary | ICD-10-CM

## 2019-08-06 PROCEDURE — 99213 OFFICE O/P EST LOW 20 MIN: CPT | Performed by: PODIATRIST

## 2019-08-06 RX ORDER — MELOXICAM 15 MG/1
15 TABLET ORAL DAILY
Qty: 30 TABLET | Refills: 1 | Status: SHIPPED | OUTPATIENT
Start: 2019-08-06 | End: 2019-08-06

## 2019-08-06 RX ORDER — MELOXICAM 15 MG/1
TABLET ORAL
Qty: 90 TABLET | Refills: 1 | Status: SHIPPED | OUTPATIENT
Start: 2019-08-06 | End: 2019-12-06 | Stop reason: ALTCHOICE

## 2019-08-06 NOTE — PROGRESS NOTES
HPI:    Patient ID: Lula Robertson is a 48year old female. This 82-year-old female presents for follow-up in reference to left heel pain. She states that the right heel is bothering her as well.   She has benefited from support and is struggling to take although really not significant today. I again found it necessary to remind this patient of the etiology, progression, and options of treatment. Today I modified both insoles and added more medial arch control.   I encouraged shoes at all times meaning no

## 2019-08-13 ENCOUNTER — TELEPHONE (OUTPATIENT)
Dept: OTHER | Age: 54
End: 2019-08-13

## 2019-08-13 NOTE — TELEPHONE ENCOUNTER
A letter was sent in the mail to the patient. Attempted to contact the patient by phone but the phone went to a busy signal.    The patient does not look at [facility].     Written by Evelio Membreno MD on 8/5/2019  8:19 AM   Dear Hoa Pritchett

## 2019-10-19 RX ORDER — OMEPRAZOLE 40 MG/1
CAPSULE, DELAYED RELEASE ORAL
Qty: 30 CAPSULE | Refills: 0 | OUTPATIENT
Start: 2019-10-19

## 2019-11-08 RX ORDER — OMEPRAZOLE 40 MG/1
CAPSULE, DELAYED RELEASE ORAL
Qty: 90 CAPSULE | Refills: 0 | Status: SHIPPED | OUTPATIENT
Start: 2019-11-08 | End: 2020-02-02

## 2019-11-08 RX ORDER — OMEPRAZOLE 40 MG/1
CAPSULE, DELAYED RELEASE ORAL
Qty: 30 CAPSULE | Refills: 0 | Status: SHIPPED | OUTPATIENT
Start: 2019-11-08 | End: 2019-11-08

## 2019-12-03 ENCOUNTER — TELEPHONE (OUTPATIENT)
Dept: INTERNAL MEDICINE CLINIC | Facility: CLINIC | Age: 54
End: 2019-12-03

## 2019-12-03 ENCOUNTER — APPOINTMENT (OUTPATIENT)
Dept: LAB | Facility: HOSPITAL | Age: 54
End: 2019-12-03
Attending: INTERNAL MEDICINE
Payer: COMMERCIAL

## 2019-12-03 DIAGNOSIS — R73.9 ELEVATED BLOOD SUGAR LEVEL: ICD-10-CM

## 2019-12-03 DIAGNOSIS — E78.00 PURE HYPERCHOLESTEROLEMIA: Primary | ICD-10-CM

## 2019-12-03 DIAGNOSIS — E78.00 PURE HYPERCHOLESTEROLEMIA: ICD-10-CM

## 2019-12-03 LAB
BACTERIA UR QL AUTO: NEGATIVE /HPF
BILIRUB UR QL: NEGATIVE
CLARITY UR: CLEAR
COLOR UR: YELLOW
GLUCOSE UR-MCNC: NEGATIVE MG/DL
HGB UR QL STRIP.AUTO: NEGATIVE
KETONES UR-MCNC: NEGATIVE MG/DL
NITRITE UR QL STRIP.AUTO: NEGATIVE
PH UR: 5 [PH] (ref 5–8)
PROT UR-MCNC: NEGATIVE MG/DL
RBC #/AREA URNS AUTO: 1 /HPF
SP GR UR STRIP: 1.02 (ref 1–1.03)
UROBILINOGEN UR STRIP-ACNC: <2
WBC #/AREA URNS AUTO: 2 /HPF

## 2019-12-03 PROCEDURE — 36415 COLL VENOUS BLD VENIPUNCTURE: CPT

## 2019-12-03 PROCEDURE — 83036 HEMOGLOBIN GLYCOSYLATED A1C: CPT

## 2019-12-03 PROCEDURE — 81001 URINALYSIS AUTO W/SCOPE: CPT | Performed by: INTERNAL MEDICINE

## 2019-12-03 PROCEDURE — 80053 COMPREHEN METABOLIC PANEL: CPT

## 2019-12-03 PROCEDURE — 80061 LIPID PANEL: CPT

## 2019-12-06 ENCOUNTER — OFFICE VISIT (OUTPATIENT)
Dept: INTERNAL MEDICINE CLINIC | Facility: CLINIC | Age: 54
End: 2019-12-06
Payer: COMMERCIAL

## 2019-12-06 ENCOUNTER — APPOINTMENT (OUTPATIENT)
Dept: LAB | Age: 54
End: 2019-12-06
Attending: INTERNAL MEDICINE
Payer: COMMERCIAL

## 2019-12-06 VITALS
WEIGHT: 170 LBS | RESPIRATION RATE: 18 BRPM | SYSTOLIC BLOOD PRESSURE: 137 MMHG | BODY MASS INDEX: 30.12 KG/M2 | HEART RATE: 72 BPM | TEMPERATURE: 99 F | HEIGHT: 63 IN | DIASTOLIC BLOOD PRESSURE: 84 MMHG

## 2019-12-06 DIAGNOSIS — E78.00 PURE HYPERCHOLESTEROLEMIA: ICD-10-CM

## 2019-12-06 DIAGNOSIS — E66.9 OBESITY (BMI 30-39.9): Primary | ICD-10-CM

## 2019-12-06 DIAGNOSIS — M25.50 ARTHRALGIA, UNSPECIFIED JOINT: ICD-10-CM

## 2019-12-06 DIAGNOSIS — K21.9 GASTROESOPHAGEAL REFLUX DISEASE WITHOUT ESOPHAGITIS: ICD-10-CM

## 2019-12-06 DIAGNOSIS — R73.9 ELEVATED BLOOD SUGAR: ICD-10-CM

## 2019-12-06 PROCEDURE — 86431 RHEUMATOID FACTOR QUANT: CPT

## 2019-12-06 PROCEDURE — 84550 ASSAY OF BLOOD/URIC ACID: CPT

## 2019-12-06 PROCEDURE — 86141 C-REACTIVE PROTEIN HS: CPT

## 2019-12-06 PROCEDURE — 85652 RBC SED RATE AUTOMATED: CPT

## 2019-12-06 PROCEDURE — 99214 OFFICE O/P EST MOD 30 MIN: CPT | Performed by: INTERNAL MEDICINE

## 2019-12-06 PROCEDURE — 86038 ANTINUCLEAR ANTIBODIES: CPT

## 2019-12-06 PROCEDURE — 36415 COLL VENOUS BLD VENIPUNCTURE: CPT

## 2019-12-06 PROCEDURE — 86140 C-REACTIVE PROTEIN: CPT

## 2019-12-06 PROCEDURE — 86200 CCP ANTIBODY: CPT

## 2019-12-06 NOTE — PROGRESS NOTES
HPI:    Patient ID: Junito Richardson is a 48year old female. Patient presents with:  Blood Sugar: Pt state that she is f/u with elevated sugar levels and had recent test through [facility] to compare      Blood Sugar   This is a new problem.  The current episode two puffs in each nostril once daily for 1-2 weeks then as needed 1 Bottle 0   • topiramate (TOPAMAX) 25 MG Oral Tab Take 1 tablet (25 mg total) by mouth 2 (two) times daily.  (Patient not taking: Reported on 12/6/2019 ) 60 tablet 0   • saccharomyces boular dry.   Psychiatric: She has a normal mood and affect. Her behavior is normal.   Nursing note and vitals reviewed. Blood pressure 137/84, pulse 72, temperature 98.6 °F (37 °C), temperature source Oral, resp.  rate 18, height 5' 3\" (1.6 m), weight 170 l family history of rheumatoid arthritis-grandmother  Orders Placed This Encounter      Comp Metabolic Panel (14) [E]      Lipid Panel [E]      Hemoglobin A1C [E]      Microalb/Creat Ratio, Random Urine      ACOSTA, Direct Screen [E]      C-RP/High Sensitivity

## 2020-01-02 ENCOUNTER — OFFICE VISIT (OUTPATIENT)
Dept: INTERNAL MEDICINE CLINIC | Facility: CLINIC | Age: 55
End: 2020-01-02
Payer: COMMERCIAL

## 2020-01-02 VITALS
TEMPERATURE: 98 F | HEIGHT: 63 IN | SYSTOLIC BLOOD PRESSURE: 123 MMHG | DIASTOLIC BLOOD PRESSURE: 82 MMHG | OXYGEN SATURATION: 95 % | WEIGHT: 161.63 LBS | HEART RATE: 66 BPM | RESPIRATION RATE: 20 BRPM | BODY MASS INDEX: 28.64 KG/M2

## 2020-01-02 DIAGNOSIS — R73.9 ELEVATED BLOOD SUGAR: Primary | ICD-10-CM

## 2020-01-02 PROCEDURE — 99214 OFFICE O/P EST MOD 30 MIN: CPT | Performed by: INTERNAL MEDICINE

## 2020-01-02 RX ORDER — AZITHROMYCIN 250 MG/1
TABLET, FILM COATED ORAL
Qty: 6 TABLET | Refills: 0 | Status: SHIPPED | OUTPATIENT
Start: 2020-01-02 | End: 2020-02-17 | Stop reason: ALTCHOICE

## 2020-01-02 RX ORDER — FEXOFENADINE HCL 180 MG/1
180 TABLET ORAL DAILY
Qty: 30 TABLET | Refills: 0 | Status: SHIPPED | OUTPATIENT
Start: 2020-01-02 | End: 2020-02-10

## 2020-01-02 NOTE — PROGRESS NOTES
HPI:    Patient ID: Christofer Richardson is a 48year old female.   Patient presents with:  Joint Pain: Pt is f/u from last visit and that she is having a lot of joint pain  Cough: Pt state that she has a cough since before [place]   Patient in office today for Positive for arthralgias (fingers ,  feet  ,  elbows knees -   stifness in  am  15-20 min    worse  but    last   thrue  out  the   day ) and joint pain. Skin: Negative for pallor. Neurological: Negative for dizziness and headaches.    Psychiatric/Behav canal normal.   Left Ear: Tympanic membrane, external ear and ear canal normal.   Nose: Nose normal. Right sinus exhibits no maxillary sinus tenderness and no frontal sinus tenderness.  Left sinus exhibits no maxillary sinus tenderness and no frontal sinus the patient  Pt  loosing  Weight - with  better  diet   Referred to Dr Lakia Rose      Elevated blood sugar-prediabetic range  Keep sugars glycemic control to prevent complications of DM2  Keep 1800 therese ADA- Diabetic diet   diet/exercise   Pt verbalized unders by mouth daily. Take 1 tablet once daily for 1-2 weeks and then as needed.        Imaging & Referrals:  DIETITIAN EDUCATION INITIAL, DIET (INTERNAL)       PB#[id number]

## 2020-01-21 ENCOUNTER — OFFICE VISIT (OUTPATIENT)
Dept: SURGERY | Facility: CLINIC | Age: 55
End: 2020-01-21
Payer: COMMERCIAL

## 2020-01-21 ENCOUNTER — APPOINTMENT (OUTPATIENT)
Dept: LAB | Facility: HOSPITAL | Age: 55
End: 2020-01-21
Attending: INTERNAL MEDICINE
Payer: COMMERCIAL

## 2020-01-21 VITALS
BODY MASS INDEX: 27.46 KG/M2 | WEIGHT: 155 LBS | DIASTOLIC BLOOD PRESSURE: 80 MMHG | SYSTOLIC BLOOD PRESSURE: 137 MMHG | OXYGEN SATURATION: 96 % | HEART RATE: 96 BPM | HEIGHT: 63 IN

## 2020-01-21 DIAGNOSIS — K21.9 GASTROESOPHAGEAL REFLUX DISEASE WITHOUT ESOPHAGITIS: Primary | ICD-10-CM

## 2020-01-21 DIAGNOSIS — E66.3 OVERWEIGHT (BMI 25.0-29.9): ICD-10-CM

## 2020-01-21 DIAGNOSIS — K21.9 GASTROESOPHAGEAL REFLUX DISEASE WITHOUT ESOPHAGITIS: ICD-10-CM

## 2020-01-21 DIAGNOSIS — E11.9 TYPE 2 DIABETES MELLITUS WITHOUT COMPLICATION, WITHOUT LONG-TERM CURRENT USE OF INSULIN (HCC): ICD-10-CM

## 2020-01-21 DIAGNOSIS — M25.50 POLYARTHRALGIA: ICD-10-CM

## 2020-01-21 DIAGNOSIS — E55.9 VITAMIN D DEFICIENCY: ICD-10-CM

## 2020-01-21 DIAGNOSIS — R63.5 WEIGHT GAIN: ICD-10-CM

## 2020-01-21 LAB — VIT B12 SERPL-MCNC: 558 PG/ML (ref 193–986)

## 2020-01-21 PROCEDURE — 82306 VITAMIN D 25 HYDROXY: CPT

## 2020-01-21 PROCEDURE — 82397 CHEMILUMINESCENT ASSAY: CPT

## 2020-01-21 PROCEDURE — 82607 VITAMIN B-12: CPT

## 2020-01-21 PROCEDURE — 82085 ASSAY OF ALDOLASE: CPT

## 2020-01-21 PROCEDURE — 99204 OFFICE O/P NEW MOD 45 MIN: CPT | Performed by: INTERNAL MEDICINE

## 2020-01-21 PROCEDURE — 36415 COLL VENOUS BLD VENIPUNCTURE: CPT

## 2020-01-21 NOTE — PROGRESS NOTES
[facility] Consultation Note       Patient:   Arie Srivastava  :      10/7/1965  MRN:      [id number]    Referring Provider: Dr. Mario Manley       Chief Complaint:  Patient presents with:  Consult  Weight Management      SUBJECTIVE then one daily until completed for 5 days.  6 tablet 0   • Blood Glucose Monitoring Suppl (ONETOUCH ULTRA MINI) w/Device Does not apply Kit Check blood sugars two times daily 1 kit 0   • ONETOUCH ULTRASOFT LANCETS Does not apply Misc Check blood sugars twic file        Active member of club or organization: Not on file        Attends meetings of clubs or organizations: Not on file        Relationship status: Not on file      Intimate partner violence:        Fear of current or ex partner: Not on file        E Aunt 82           Typical Dietary Intake:  Breakfast AM Snack Lunch PM [employer] protein with two shots of coffee, bullet proof coffee Blue berries Leann, meat, chicken, pickles, water Sugar free, fruit Chicken, potatoes, salads, peppers, pork organomegaly  Extremities: extremities normal, atraumatic, no cyanosis or edema  Pulses: 2+ and symmetric  Skin: Skin color, texture, turgor normal. No rashes or lesions  Neurologic: Grossly normal    ASSESSMENT     GERD:  The patient states her acid reflu

## 2020-01-22 LAB — 25(OH)D3 SERPL-MCNC: 28.3 NG/ML (ref 30–100)

## 2020-01-23 ENCOUNTER — OFFICE VISIT (OUTPATIENT)
Dept: RHEUMATOLOGY | Facility: CLINIC | Age: 55
End: 2020-01-23
Payer: COMMERCIAL

## 2020-01-23 ENCOUNTER — HOSPITAL ENCOUNTER (OUTPATIENT)
Dept: GENERAL RADIOLOGY | Age: 55
Discharge: HOME | End: 2020-01-23
Attending: INTERNAL MEDICINE
Payer: COMMERCIAL

## 2020-01-23 ENCOUNTER — APPOINTMENT (OUTPATIENT)
Dept: LAB | Age: 55
End: 2020-01-23
Attending: INTERNAL MEDICINE
Payer: COMMERCIAL

## 2020-01-23 VITALS
HEART RATE: 86 BPM | SYSTOLIC BLOOD PRESSURE: 137 MMHG | WEIGHT: 156.19 LBS | HEIGHT: 63 IN | DIASTOLIC BLOOD PRESSURE: 78 MMHG | BODY MASS INDEX: 27.68 KG/M2

## 2020-01-23 DIAGNOSIS — M54.89 INFLAMMATORY BACK PAIN: ICD-10-CM

## 2020-01-23 DIAGNOSIS — M25.50 POLYARTHRALGIA: ICD-10-CM

## 2020-01-23 DIAGNOSIS — M25.50 POLYARTHRALGIA: Primary | ICD-10-CM

## 2020-01-23 DIAGNOSIS — M79.10 MYALGIA: ICD-10-CM

## 2020-01-23 LAB
CK SERPL-CCNC: 46 U/L (ref 26–192)
ERYTHROCYTE [SEDIMENTATION RATE] IN BLOOD: 29 MM/HR (ref 0–30)
HBV CORE AB SERPL QL IA: NONREACTIVE
HBV SURFACE AG SER-ACNC: <0.1 [IU]/L
HBV SURFACE AG SERPL QL IA: NONREACTIVE
HCV AB SERPL QL IA: NONREACTIVE
LEPTIN: 21 NG/ML

## 2020-01-23 PROCEDURE — 85652 RBC SED RATE AUTOMATED: CPT

## 2020-01-23 PROCEDURE — 73130 X-RAY EXAM OF HAND: CPT | Performed by: INTERNAL MEDICINE

## 2020-01-23 PROCEDURE — 99244 OFF/OP CNSLTJ NEW/EST MOD 40: CPT | Performed by: INTERNAL MEDICINE

## 2020-01-23 PROCEDURE — 86812 HLA TYPING A B OR C: CPT

## 2020-01-23 PROCEDURE — 86803 HEPATITIS C AB TEST: CPT

## 2020-01-23 PROCEDURE — 36415 COLL VENOUS BLD VENIPUNCTURE: CPT

## 2020-01-23 PROCEDURE — 87340 HEPATITIS B SURFACE AG IA: CPT | Performed by: INTERNAL MEDICINE

## 2020-01-23 PROCEDURE — 82550 ASSAY OF CK (CPK): CPT

## 2020-01-23 PROCEDURE — 72202 X-RAY EXAM SI JOINTS 3/> VWS: CPT | Performed by: INTERNAL MEDICINE

## 2020-01-23 PROCEDURE — 86704 HEP B CORE ANTIBODY TOTAL: CPT

## 2020-01-23 NOTE — PATIENT INSTRUCTIONS
1. Check labs   2. Check xrays   3. Return to clinic in 2 weeks   4.  Take over the counter advil - 200mg 2 times a day - with food -

## 2020-01-23 NOTE — PROGRESS NOTES
Des Fuentes is a 48year old female who presents for Patient presents with:  Hip Pain  Knee Pain  Elbow Pain  Back Pain: lower  . HPI:     I had the pleasure of seeing Des Fuentes on 1/23/2020 for evaluation. She is referred by Dr. Peewee Espinoza. 250 MG Oral Tab Take two tablets by mouth today, then one daily until completed for 5 days.  (Patient not taking: Reported on 1/23/2020 ) 6 tablet 0   • Blood Glucose Monitoring Suppl (ONETOUCH ULTRA MINI) w/Device Does not apply Kit Check blood sugars two Premature CAD   • Breast Cancer Maternal Aunt 80      Social History:  Social History    Tobacco Use      Smoking status: Former Smoker      Smokeless tobacco: Never Used    Alcohol use:  Yes      Alcohol/week: 0.0 standard drinks      Comment: Corrinne Corti pleasant, no acute distress, no CAD, no neck tendnerness, good ROM,   No rashes  CVS: RRR, no murmurs  RS: CTAB, no crackles, no rhonchi  ABD: Soft Non tender, no HSM felt, BS positive  Joint exam:   No tendnress in mcps or pips - no sywnoviits   No tendre 10.0 - 20.0 19.4     CALCIUM      8.5 - 10.1 mg/dL 9.5     CALCULATED OSMOLALITY      275 - 295 mOsm/kg 294     eGFR NON-AFR.  AMERICAN      >=60 67     eGFR       >=60 77     ALT (SGPT)      13 - 56 U/L 25     AST (SGOT)      15 - 37 U/L 10 B12      193 - 986 pg/mL 558      3/18/2019 - chest xray   CONCLUSION: Normal examination. ASSESSMENT AND PLAN:   Vi Cheema is a 48year old female who presents for Patient presents with:  Hip Pain  Knee Pain  Elbow Pain  Back Pain: lower      1.  P

## 2020-01-24 LAB
ALDOLASE, SERUM: 5.2 U/L
HLA-B27: NEGATIVE

## 2020-02-03 RX ORDER — OMEPRAZOLE 40 MG/1
CAPSULE, DELAYED RELEASE ORAL
Qty: 90 CAPSULE | Refills: 1 | Status: SHIPPED | OUTPATIENT
Start: 2020-02-03 | End: 2020-08-21

## 2020-02-03 RX ORDER — TOPIRAMATE 25 MG/1
25 TABLET ORAL 2 TIMES DAILY
Qty: 60 TABLET | Refills: 0 | Status: SHIPPED | OUTPATIENT
Start: 2020-02-03 | End: 2020-03-13

## 2020-02-04 RX ORDER — TOPIRAMATE 25 MG/1
TABLET ORAL
Qty: 180 TABLET | Refills: 0 | OUTPATIENT
Start: 2020-02-04

## 2020-02-04 NOTE — TELEPHONE ENCOUNTER
Refill Protocol Appointment Criteria  · Appointment scheduled in the past 6 months or in the next 3 months  Recent Outpatient Visits            1 week ago Tucker Mauricio MD    Office Visit    2

## 2020-02-04 NOTE — TELEPHONE ENCOUNTER
Refill passed per [facility], [facility] protocol.   Refill Protocol Appointment Criteria  · Appointment scheduled in the past 6 months or in the next 3 months  Recent Outpatient Visits            1 week ago 1500 [facility], [address] [place] [address] Vamsi Edwards

## 2020-02-04 NOTE — TELEPHONE ENCOUNTER
Refill passed per [facility], [facility] protocol.   Refill Protocol Appointment Criteria  · Appointment scheduled in the past 12 months or in the next 3 months  Recent Outpatient Visits            1 week ago 1500 [facility], [address] [place] [address] Vamsi Edwards

## 2020-02-10 RX ORDER — FEXOFENADINE HCL 180 MG/1
180 TABLET ORAL DAILY
Qty: 30 TABLET | Refills: 1 | Status: SHIPPED | OUTPATIENT
Start: 2020-02-10 | End: 2020-04-22

## 2020-02-11 NOTE — TELEPHONE ENCOUNTER
Refill passed per [facility], [facility] protocol.     Refill Protocol Appointment Criteria  · Appointment scheduled in the past 12 months or in the next 3 months  Recent Outpatient Visits            2 weeks ago 1500 [facility], [address] [place] [address]

## 2020-02-15 RX ORDER — FEXOFENADINE HCL 180 MG/1
180 TABLET ORAL DAILY
Qty: 30 TABLET | Refills: 1 | OUTPATIENT
Start: 2020-02-15

## 2020-02-15 NOTE — TELEPHONE ENCOUNTER
Duplicate request, previously addressed. Requested Prescriptions   Pending Prescriptions Disp Refills   • Fexofenadine HCl (ALLEGRA ALLERGY) 180 MG Oral Tab 30 tablet 1     Sig: Take 1 tablet (180 mg total) by mouth daily.  Take 1 tablet once daily for

## 2020-02-17 ENCOUNTER — OFFICE VISIT (OUTPATIENT)
Dept: RHEUMATOLOGY | Facility: CLINIC | Age: 55
End: 2020-02-17
Payer: COMMERCIAL

## 2020-02-17 ENCOUNTER — APPOINTMENT (OUTPATIENT)
Dept: LAB | Facility: HOSPITAL | Age: 55
End: 2020-02-17
Attending: INTERNAL MEDICINE
Payer: COMMERCIAL

## 2020-02-17 VITALS
BODY MASS INDEX: 27.64 KG/M2 | RESPIRATION RATE: 16 BRPM | HEIGHT: 63 IN | HEART RATE: 69 BPM | WEIGHT: 156 LBS | DIASTOLIC BLOOD PRESSURE: 77 MMHG | SYSTOLIC BLOOD PRESSURE: 125 MMHG

## 2020-02-17 DIAGNOSIS — M54.89 INFLAMMATORY BACK PAIN: ICD-10-CM

## 2020-02-17 DIAGNOSIS — M25.50 POLYARTHRALGIA: Primary | ICD-10-CM

## 2020-02-17 DIAGNOSIS — M25.50 POLYARTHRALGIA: ICD-10-CM

## 2020-02-17 LAB — B BURGDOR IGG+IGM SER QL: NEGATIVE

## 2020-02-17 PROCEDURE — 36415 COLL VENOUS BLD VENIPUNCTURE: CPT

## 2020-02-17 PROCEDURE — 86618 LYME DISEASE ANTIBODY: CPT

## 2020-02-17 PROCEDURE — 99244 OFF/OP CNSLTJ NEW/EST MOD 40: CPT | Performed by: INTERNAL MEDICINE

## 2020-02-17 NOTE — PROGRESS NOTES
Becki Hameed is a 48year old female who presents for Patient presents with: Follow - Up: Polyarthralgia  Lab Results: lyme disease test  .   HPI:     I had the pleasure of seeing Becki Hameed on 1/23/2020 for evaluation.      She is referred by Dr. Christina Lancaster Medications   Medication Sig Dispense Refill   • Fexofenadine HCl (ALLEGRA ALLERGY) 180 MG Oral Tab Take 1 tablet (180 mg total) by mouth daily. Take 1 tablet once daily for 1-2 weeks and then as needed.  30 tablet 1   • topiramate (TOPAMAX) 25 MG Oral Tab Cancer Paternal Grandmother         Cause of death   • Heart Disease Father         CAD   • Diabetes Father         Diabetes/CRF/CAD cause of death   • Other (Other) Father         CRF   • Heart Disease Mother         CAD   • Cancer Mother         Lung can DM  Joint/Muscluskeltal: see HPI,   All other ROS are negative.      EXAM:   /77 (BP Location: Left arm, Patient Position: Sitting, Cuff Size: adult)   Pulse 69   Resp 16   Ht 5' 3\" (1.6 m)   Wt 156 lb (70.8 kg)   BMI 27.63 kg/m²   HEENT: Clear oroph Glucose      70 - 99 mg/dL 134 (H)     Sodium      136 - 145 mmol/L 140     Potassium      3.5 - 5.1 mmol/L 4.0     Chloride      98 - 112 mmol/L 108     Carbon Dioxide, Total      21.0 - 32.0 mmol/L 27.0     ANION GAP      0 - 18 mmol/L 5     BUN      7 Negative  Negative   HSCRP      <3.00 mg/L  6.09 (H)   URIC ACID      2.6 - 6.0 mg/dL  5.5   RHEUMATOID FACTOR      <15 IU/mL  <10   SED RATE      0 - 30 mm/Hr  42 (H)   C-REACTIVE PROTEIN      <0.30 mg/dL  0.65 (H)   C-Citrullinated Peptide IgG AB      0.

## 2020-02-17 NOTE — PATIENT INSTRUCTIONS
1. Check labs   2. Infectious disease -can help interpret   3.  Return to clinic in 6 months- 12 months -

## 2020-03-05 ENCOUNTER — APPOINTMENT (OUTPATIENT)
Dept: LAB | Facility: HOSPITAL | Age: 55
End: 2020-03-05
Attending: INTERNAL MEDICINE
Payer: COMMERCIAL

## 2020-03-05 DIAGNOSIS — E78.00 PURE HYPERCHOLESTEROLEMIA: ICD-10-CM

## 2020-03-05 DIAGNOSIS — R73.9 ELEVATED BLOOD SUGAR: ICD-10-CM

## 2020-03-05 LAB
ALBUMIN SERPL-MCNC: 4 G/DL (ref 3.4–5)
ALBUMIN/GLOB SERPL: 1.1 {RATIO} (ref 1–2)
ALP LIVER SERPL-CCNC: 100 U/L (ref 41–108)
ALT SERPL-CCNC: 23 U/L (ref 13–56)
ANION GAP SERPL CALC-SCNC: 5 MMOL/L (ref 0–18)
AST SERPL-CCNC: 10 U/L (ref 15–37)
BILIRUB SERPL-MCNC: 0.3 MG/DL (ref 0.1–2)
BUN BLD-MCNC: 26 MG/DL (ref 7–18)
BUN/CREAT SERPL: 25 (ref 10–20)
CALCIUM BLD-MCNC: 9.6 MG/DL (ref 8.5–10.1)
CHLORIDE SERPL-SCNC: 112 MMOL/L (ref 98–112)
CHOLEST SMN-MCNC: 197 MG/DL (ref ?–200)
CO2 SERPL-SCNC: 28 MMOL/L (ref 21–32)
CREAT BLD-MCNC: 1.04 MG/DL (ref 0.55–1.02)
CREAT UR-SCNC: 130 MG/DL
EST. AVERAGE GLUCOSE BLD GHB EST-MCNC: 123 MG/DL (ref 68–126)
GLOBULIN PLAS-MCNC: 3.5 G/DL (ref 2.8–4.4)
GLUCOSE BLD-MCNC: 120 MG/DL (ref 70–99)
HBA1C MFR BLD HPLC: 5.9 % (ref ?–5.7)
HDLC SERPL-MCNC: 53 MG/DL (ref 40–59)
LDLC SERPL CALC-MCNC: 121 MG/DL (ref ?–100)
M PROTEIN MFR SERPL ELPH: 7.5 G/DL (ref 6.4–8.2)
MICROALBUMIN UR-MCNC: 0.94 MG/DL
MICROALBUMIN/CREAT 24H UR-RTO: 7.2 UG/MG (ref ?–30)
NONHDLC SERPL-MCNC: 144 MG/DL (ref ?–130)
OSMOLALITY SERPL CALC.SUM OF ELEC: 306 MOSM/KG (ref 275–295)
PATIENT FASTING Y/N/NP: YES
PATIENT FASTING Y/N/NP: YES
POTASSIUM SERPL-SCNC: 4.3 MMOL/L (ref 3.5–5.1)
SODIUM SERPL-SCNC: 145 MMOL/L (ref 136–145)
TRIGL SERPL-MCNC: 115 MG/DL (ref 30–149)
VLDLC SERPL CALC-MCNC: 23 MG/DL (ref 0–30)

## 2020-03-05 PROCEDURE — 83036 HEMOGLOBIN GLYCOSYLATED A1C: CPT

## 2020-03-05 PROCEDURE — 36415 COLL VENOUS BLD VENIPUNCTURE: CPT

## 2020-03-05 PROCEDURE — 82570 ASSAY OF URINE CREATININE: CPT

## 2020-03-05 PROCEDURE — 80053 COMPREHEN METABOLIC PANEL: CPT

## 2020-03-05 PROCEDURE — 80061 LIPID PANEL: CPT

## 2020-03-05 PROCEDURE — 82043 UR ALBUMIN QUANTITATIVE: CPT

## 2020-03-13 ENCOUNTER — OFFICE VISIT (OUTPATIENT)
Dept: SURGERY | Facility: CLINIC | Age: 55
End: 2020-03-13
Payer: COMMERCIAL

## 2020-03-13 VITALS
DIASTOLIC BLOOD PRESSURE: 75 MMHG | HEIGHT: 63 IN | WEIGHT: 155 LBS | SYSTOLIC BLOOD PRESSURE: 125 MMHG | HEART RATE: 80 BPM | BODY MASS INDEX: 27.46 KG/M2

## 2020-03-13 DIAGNOSIS — K21.9 GASTROESOPHAGEAL REFLUX DISEASE WITHOUT ESOPHAGITIS: ICD-10-CM

## 2020-03-13 DIAGNOSIS — E11.9 TYPE 2 DIABETES MELLITUS WITHOUT COMPLICATION, WITHOUT LONG-TERM CURRENT USE OF INSULIN (HCC): Primary | ICD-10-CM

## 2020-03-13 DIAGNOSIS — R53.82 CHRONIC FATIGUE: ICD-10-CM

## 2020-03-13 DIAGNOSIS — E66.3 OVERWEIGHT FOR HEIGHT: ICD-10-CM

## 2020-03-13 PROCEDURE — 99214 OFFICE O/P EST MOD 30 MIN: CPT | Performed by: INTERNAL MEDICINE

## 2020-03-13 RX ORDER — TOPIRAMATE 25 MG/1
25 TABLET ORAL 2 TIMES DAILY
Qty: 60 TABLET | Refills: 2 | Status: SHIPPED | OUTPATIENT
Start: 2020-03-13 | End: 2020-08-28

## 2020-03-13 NOTE — PROGRESS NOTES
[address]  Dept: [phone]       Patient:   Christofer Richardson  :      10/7/1965  MRN:      [id number]    Chief Complaint:  Patient presen each 3   • Glucose Blood (ONETOUCH ULTRA BLUE) In Vitro Strip Test twice a day 200 strip 3   • saccharomyces boulardii (FLORASTOR) 250 MG Oral Cap Take 1 capsule (250 mg total) by mouth 2 (two) times daily.  (Patient not taking: Reported on 1/23/2020 ) 20 c No        Occupational Exposure: Not Asked        Hobby Hazards: Not Asked        Sleep Concern: Not Asked        Stress Concern: Not Asked        Weight Concern: Not Asked        Special Diet: Not Asked        Back Care: Not Asked        Exercise: Not Ask day:  24  · Drinking between meals only:  yes  · Toughest challenge:  Exercise     Nutritional Goals  Limit carbohydrates to 100 gms per day, Eat 100-200 calories within 1 hour of waking  and Eat 3-4 cups of fresh fruits or vegetables daily    Behavior Mod ulcers. GERD:  The patient states her acid reflux has been well controlled with her current medication. No symptoms of heartburn or indigestion.     Encounter Diagnosis(ses):   Type 2 diabetes mellitus without complication, without long-term current use

## 2020-03-14 ENCOUNTER — PATIENT MESSAGE (OUTPATIENT)
Dept: INTERNAL MEDICINE CLINIC | Facility: CLINIC | Age: 55
End: 2020-03-14

## 2020-03-14 DIAGNOSIS — E78.00 PURE HYPERCHOLESTEROLEMIA: ICD-10-CM

## 2020-03-14 DIAGNOSIS — R73.9 ELEVATED BLOOD SUGAR: Primary | ICD-10-CM

## 2020-03-14 NOTE — TELEPHONE ENCOUNTER
Please see My Chart message and advise. Thank you. Please advise as last was 3/5/20.   When should pt retest?  Lab pended

## 2020-03-14 NOTE — TELEPHONE ENCOUNTER
From: Zion Guzman  To: Fabby Fernandez MD  Sent: 3/14/2020 8:06 AM CDT  Subject: Test Results Question    Please place a lab order for me to check my a1c again on June 22, 2020  My goal is to reduce my a1c below the prediabetic min (3 points)  I fol

## 2020-04-22 RX ORDER — FEXOFENADINE HCL 180 MG/1
180 TABLET ORAL DAILY
Qty: 30 TABLET | Refills: 1 | Status: SHIPPED | OUTPATIENT
Start: 2020-04-22 | End: 2020-05-16

## 2020-05-07 ENCOUNTER — TELEMEDICINE (OUTPATIENT)
Dept: SURGERY | Facility: CLINIC | Age: 55
End: 2020-05-07

## 2020-05-07 VITALS — WEIGHT: 144 LBS | BODY MASS INDEX: 26 KG/M2

## 2020-05-07 DIAGNOSIS — R73.03 PREDIABETES: Primary | ICD-10-CM

## 2020-05-07 DIAGNOSIS — Z51.81 ENCOUNTER FOR THERAPEUTIC DRUG MONITORING: ICD-10-CM

## 2020-05-07 DIAGNOSIS — E66.3 OVERWEIGHT FOR HEIGHT: ICD-10-CM

## 2020-05-07 DIAGNOSIS — K21.9 GASTROESOPHAGEAL REFLUX DISEASE WITHOUT ESOPHAGITIS: ICD-10-CM

## 2020-05-07 PROCEDURE — 99214 OFFICE O/P EST MOD 30 MIN: CPT | Performed by: NURSE PRACTITIONER

## 2020-05-07 NOTE — PROGRESS NOTES
Virtual Video/Telephone Check-In    Junito Debra verbally consents to a Virtual Video/Telephone Check-In visit on 05/07/20.     Patient understands and accepts financial responsibility for any deductible, co-insurance and/or co-pays associated with this s carbohydrates, refined grains, flour, sugar. RTC with Dr. Luis Alberto Velasquez as planned. Visit Code: 12585.       LESVIA Small

## 2020-05-07 NOTE — PATIENT INSTRUCTIONS
Take out splenda. Aim for 64 oz of water/day. Goals:   Nutrition:  Create a more structured high fiber ketogenic/low glycemic index dietary pattern.   Keysha Fenton    Aim for 40 grams of fiber a day-   1 cup of cooked physically active. Aim for 150 minutes of moderate/vigorous activity per week. -Aim to sleep 7-8 hours per night.    -Stress less. Meditate.   -Connect with others, loneliness can contribute to disease.   Meditation:  5 minutes at night  \"I am\" (breathe

## 2020-06-28 ENCOUNTER — APPOINTMENT (OUTPATIENT)
Dept: LAB | Facility: HOSPITAL | Age: 55
End: 2020-06-28
Attending: INTERNAL MEDICINE
Payer: COMMERCIAL

## 2020-06-28 DIAGNOSIS — R73.9 ELEVATED BLOOD SUGAR: ICD-10-CM

## 2020-06-28 DIAGNOSIS — E78.00 PURE HYPERCHOLESTEROLEMIA: ICD-10-CM

## 2020-06-28 LAB
ALBUMIN SERPL-MCNC: 4 G/DL (ref 3.4–5)
ALBUMIN/GLOB SERPL: 1.1 {RATIO} (ref 1–2)
ALP LIVER SERPL-CCNC: 92 U/L (ref 41–108)
ALT SERPL-CCNC: 20 U/L (ref 13–56)
ANION GAP SERPL CALC-SCNC: 5 MMOL/L (ref 0–18)
AST SERPL-CCNC: 12 U/L (ref 15–37)
BILIRUB SERPL-MCNC: 0.4 MG/DL (ref 0.1–2)
BUN BLD-MCNC: 30 MG/DL (ref 7–18)
BUN/CREAT SERPL: 28 (ref 10–20)
CALCIUM BLD-MCNC: 9.3 MG/DL (ref 8.5–10.1)
CHLORIDE SERPL-SCNC: 111 MMOL/L (ref 98–112)
CHOLEST SMN-MCNC: 203 MG/DL (ref ?–200)
CO2 SERPL-SCNC: 28 MMOL/L (ref 21–32)
CREAT BLD-MCNC: 1.07 MG/DL (ref 0.55–1.02)
EST. AVERAGE GLUCOSE BLD GHB EST-MCNC: 120 MG/DL (ref 68–126)
GLOBULIN PLAS-MCNC: 3.7 G/DL (ref 2.8–4.4)
GLUCOSE BLD-MCNC: 99 MG/DL (ref 70–99)
HBA1C MFR BLD HPLC: 5.8 % (ref ?–5.7)
HDLC SERPL-MCNC: 59 MG/DL (ref 40–59)
LDLC SERPL CALC-MCNC: 119 MG/DL (ref ?–100)
M PROTEIN MFR SERPL ELPH: 7.7 G/DL (ref 6.4–8.2)
NONHDLC SERPL-MCNC: 144 MG/DL (ref ?–130)
OSMOLALITY SERPL CALC.SUM OF ELEC: 304 MOSM/KG (ref 275–295)
PATIENT FASTING Y/N/NP: YES
PATIENT FASTING Y/N/NP: YES
POTASSIUM SERPL-SCNC: 4.8 MMOL/L (ref 3.5–5.1)
SODIUM SERPL-SCNC: 144 MMOL/L (ref 136–145)
TRIGL SERPL-MCNC: 126 MG/DL (ref 30–149)
VLDLC SERPL CALC-MCNC: 25 MG/DL (ref 0–30)

## 2020-06-28 PROCEDURE — 83036 HEMOGLOBIN GLYCOSYLATED A1C: CPT

## 2020-06-28 PROCEDURE — 36415 COLL VENOUS BLD VENIPUNCTURE: CPT

## 2020-06-28 PROCEDURE — 80061 LIPID PANEL: CPT

## 2020-06-28 PROCEDURE — 80053 COMPREHEN METABOLIC PANEL: CPT

## 2020-07-26 RX ORDER — LORATADINE 10 MG/1
TABLET ORAL
Qty: 30 TABLET | Refills: 1 | Status: SHIPPED | OUTPATIENT
Start: 2020-07-26 | End: 2020-10-05

## 2020-08-03 ENCOUNTER — OFFICE VISIT (OUTPATIENT)
Dept: SURGERY | Facility: CLINIC | Age: 55
End: 2020-08-03
Payer: COMMERCIAL

## 2020-08-03 VITALS
SYSTOLIC BLOOD PRESSURE: 130 MMHG | BODY MASS INDEX: 25.02 KG/M2 | HEART RATE: 74 BPM | DIASTOLIC BLOOD PRESSURE: 80 MMHG | WEIGHT: 141.19 LBS | OXYGEN SATURATION: 98 % | HEIGHT: 63 IN

## 2020-08-03 DIAGNOSIS — R73.03 PREDIABETES: ICD-10-CM

## 2020-08-03 DIAGNOSIS — E66.3 OVERWEIGHT FOR HEIGHT: ICD-10-CM

## 2020-08-03 DIAGNOSIS — Z51.81 ENCOUNTER FOR THERAPEUTIC DRUG MONITORING: ICD-10-CM

## 2020-08-03 DIAGNOSIS — E11.9 TYPE 2 DIABETES MELLITUS WITHOUT COMPLICATION, WITHOUT LONG-TERM CURRENT USE OF INSULIN (HCC): Primary | ICD-10-CM

## 2020-08-03 DIAGNOSIS — K21.9 GASTROESOPHAGEAL REFLUX DISEASE WITHOUT ESOPHAGITIS: ICD-10-CM

## 2020-08-03 PROCEDURE — 3079F DIAST BP 80-89 MM HG: CPT | Performed by: INTERNAL MEDICINE

## 2020-08-03 PROCEDURE — 3075F SYST BP GE 130 - 139MM HG: CPT | Performed by: INTERNAL MEDICINE

## 2020-08-03 PROCEDURE — 99214 OFFICE O/P EST MOD 30 MIN: CPT | Performed by: INTERNAL MEDICINE

## 2020-08-03 PROCEDURE — 3008F BODY MASS INDEX DOCD: CPT | Performed by: INTERNAL MEDICINE

## 2020-08-03 NOTE — PROGRESS NOTES
[address]  Dept: [phone]       Patient:   Wynette Felty  :      10/7/1965  MRN:      [id number]    Chief Complaint:  Patient presen Kit Check blood sugars two times daily 1 kit 0   • ONETOUCH ULTRASOFT LANCETS Does not apply Misc Check blood sugars twice a day 200 each 3   • Glucose Blood (ONETOUCH ULTRA BLUE) In Vitro Strip Test twice a day 200 strip 3   • saccharomyces boulardii (KERRY file    Other Topics      Concerns:         Service: Not Asked        Blood Transfusions: Not Asked        Caffeine Concern: No        Occupational Exposure: Not Asked        Hobby Hazards: Not Asked        Sleep Concern: Not Asked        Stress Co meal:  20  · # of snacks per day: 1 Type of snacks:  fruit  · Amount of soda consumption per day:    · Amount of water (in ounces) per day:  24  · Drinking between meals only:  yes  · Toughest challenge:  Exercise     Nutritional Goals  Limit carbohydrates 110-130's. The patient denies any episodes of hypoglycemia since her last clinic visit. she denies any lower extremity skin breakdown or foot ulcers. GERD:  The patient states her acid reflux has been well controlled with her current medication.   No s

## 2020-08-20 ENCOUNTER — TELEPHONE (OUTPATIENT)
Dept: INTERNAL MEDICINE CLINIC | Facility: CLINIC | Age: 55
End: 2020-08-20

## 2020-08-20 ENCOUNTER — E-VISIT (OUTPATIENT)
Dept: FAMILY MEDICINE CLINIC | Facility: CLINIC | Age: 55
End: 2020-08-20

## 2020-08-20 DIAGNOSIS — K21.9 GASTROESOPHAGEAL REFLUX DISEASE, ESOPHAGITIS PRESENCE NOT SPECIFIED: Primary | ICD-10-CM

## 2020-08-20 NOTE — TELEPHONE ENCOUNTER
Left message to call back, transfer to triage    Patient had E-visit [facility] today for GERD

## 2020-08-20 NOTE — PATIENT INSTRUCTIONS
Lifestyle Changes for Controlling GERD  When you have GERD, stomach acid feels as if it’s backing up toward your mouth. Making lifestyle changes can often improve your symptoms. This is true if you take medicine to control your GERD or not.  Talk with you © 5560-9743 [address]. [address] [facility] [place], [facility] [address]. All rights reserved. This information is not intended as a substitute for professional medical care. Always follow your healthcare professional's instructions.

## 2020-08-20 NOTE — TELEPHONE ENCOUNTER
[facility]     Appointment For: Jacoby Ji ([id number])   Visit Type: Florestine Primer (0767)      8/24/2020   10:20 AM  20 mins. Taniya Garcia MD    [facility]      Patient Comments:   acid reflux heart burn, cough; clear thr

## 2020-08-20 NOTE — PROGRESS NOTES
Patient requested an E-Visit. After reviewing symptoms and history, it was determined that patient under care for GI physician and to contact their office for guidance. NO CHARGES ASSESSED FOR THIS VISIT. Please see E-Visit for further information.

## 2020-08-21 ENCOUNTER — NURSE TRIAGE (OUTPATIENT)
Dept: INTERNAL MEDICINE CLINIC | Facility: CLINIC | Age: 55
End: 2020-08-21

## 2020-08-21 RX ORDER — OMEPRAZOLE 40 MG/1
40 CAPSULE, DELAYED RELEASE ORAL 2 TIMES DAILY
Qty: 180 CAPSULE | Refills: 1 | Status: SHIPPED | OUTPATIENT
Start: 2020-08-21 | End: 2020-09-21

## 2020-08-21 NOTE — TELEPHONE ENCOUNTER
Refill sent to the pharmacy    If the burning is caused by the acid heartburns the omeprazole will help the most should be better in few days    Patient to avoid acidy , citric food also alcohol. . Likely it is due to something she is eating maybe not even

## 2020-08-21 NOTE — TELEPHONE ENCOUNTER
Advised patient of Dr. Asif Paniagua note. Patient verbalized understanding    Advised patient to call back on Monday if she has sore throat and cough still so office visit can be changed to a virtual visit per Dr. Jeremi Butterfield.

## 2020-08-21 NOTE — TELEPHONE ENCOUNTER
Advised patient of Dr. Hedrick Share note. Patient verbalized understanding  Patient states she needs a refill of her omeprazole. Patient also wants  to know what she can do to help soothe her throat. Patient states her throat is burning.      Dr. King Danger

## 2020-08-21 NOTE — TELEPHONE ENCOUNTER
I recommend patient to take omeprazole 40 mg twice daily 30- 60 minutes before breakfast and  also at bedtime. Elevate head of the bed    Avoid any ibuprofen Aleve Advil aspirin-based medications. .  Should have small frequent meals  Avoid big meals  Kobe

## 2020-08-21 NOTE — TELEPHONE ENCOUNTER
Action Requested: Summary for Provider     []  Critical Lab, Recommendations Needed  [x] Need Additional Advice  []   FYI    []   Need Orders  [] Need Medications Sent to Pharmacy  []  Other     SUMMARY: Patient calling with complaint of reflux and heartbu

## 2020-08-22 NOTE — TELEPHONE ENCOUNTER
Postponed to Monday 8/24/20. Follow-up nurse to confirm if the patient still has a sore throat and cough.  Patient has an appointment on 8/24/20 at 10:20am.

## 2020-08-24 ENCOUNTER — OFFICE VISIT (OUTPATIENT)
Dept: INTERNAL MEDICINE CLINIC | Facility: CLINIC | Age: 55
End: 2020-08-24
Payer: COMMERCIAL

## 2020-08-24 ENCOUNTER — HOSPITAL ENCOUNTER (OUTPATIENT)
Dept: GENERAL RADIOLOGY | Age: 55
Discharge: HOME | End: 2020-08-24
Attending: INTERNAL MEDICINE
Payer: COMMERCIAL

## 2020-08-24 VITALS
WEIGHT: 139.63 LBS | BODY MASS INDEX: 25.05 KG/M2 | SYSTOLIC BLOOD PRESSURE: 120 MMHG | DIASTOLIC BLOOD PRESSURE: 82 MMHG | TEMPERATURE: 99 F | HEART RATE: 76 BPM | HEIGHT: 62.5 IN

## 2020-08-24 DIAGNOSIS — S69.92XA INJURY OF FINGER OF LEFT HAND, INITIAL ENCOUNTER: Primary | ICD-10-CM

## 2020-08-24 DIAGNOSIS — S69.92XA INJURY OF FINGER OF LEFT HAND, INITIAL ENCOUNTER: ICD-10-CM

## 2020-08-24 DIAGNOSIS — K21.9 GASTROESOPHAGEAL REFLUX DISEASE, ESOPHAGITIS PRESENCE NOT SPECIFIED: ICD-10-CM

## 2020-08-24 DIAGNOSIS — R73.03 PREDIABETES: ICD-10-CM

## 2020-08-24 DIAGNOSIS — J30.89 ALLERGIC RHINITIS DUE TO OTHER ALLERGIC TRIGGER, UNSPECIFIED SEASONALITY: ICD-10-CM

## 2020-08-24 PROCEDURE — 73140 X-RAY EXAM OF FINGER(S): CPT | Performed by: INTERNAL MEDICINE

## 2020-08-24 PROCEDURE — 99214 OFFICE O/P EST MOD 30 MIN: CPT | Performed by: INTERNAL MEDICINE

## 2020-08-24 PROCEDURE — 3074F SYST BP LT 130 MM HG: CPT | Performed by: INTERNAL MEDICINE

## 2020-08-24 PROCEDURE — 3079F DIAST BP 80-89 MM HG: CPT | Performed by: INTERNAL MEDICINE

## 2020-08-24 PROCEDURE — 3008F BODY MASS INDEX DOCD: CPT | Performed by: INTERNAL MEDICINE

## 2020-08-24 NOTE — PROGRESS NOTES
HPI:    Patient ID: Jb Singer is a 48year old female. Patient presents with:  Heartburn: Is taking omeprazole and it helps, also stopped eating late at night. Takes it every AM. Started drinking coffee again and heartburn getting worse.    Sore Throa patient is not nervous/anxious. Current Outpatient Medications   Medication Sig Dispense Refill   • Omeprazole 40 MG Oral Capsule Delayed Release Take 1 capsule (40 mg total) by mouth 2 (two) times a day.  Take one capsule by mouth twice daily 3 regular rhythm and normal heart sounds. No murmur heard. Pulmonary/Chest: Effort normal and breath sounds normal. No respiratory distress. She has no wheezes. She has no rales. Abdominal: Soft. She exhibits no mass. There is no hepatosplenomegaly.  The diet exercise  Continue present management      Gastroesophageal reflux disease without esophagitis  Patient advised to avoid spicy food, coffee-especially coffee patient states she drinks at least 6 coffee per day, tea, caffeinated drinks, alcohol, acidic

## 2020-08-24 NOTE — TELEPHONE ENCOUNTER
Patient feels better today ,calling to see if she still needs to keep today's appt.  She increased omeprazole to BID for the last 2 days, less burning in the throat, throat feels dry , she is hoarse, post nasal drip, she is concerned about Néstor's esophagu

## 2020-08-25 ENCOUNTER — OFFICE VISIT (OUTPATIENT)
Dept: ORTHOPEDICS CLINIC | Facility: CLINIC | Age: 55
End: 2020-08-25
Payer: COMMERCIAL

## 2020-08-25 DIAGNOSIS — S62.655A NONDISPLACED FRACTURE OF MIDDLE PHALANX OF LEFT RING FINGER, INITIAL ENCOUNTER FOR CLOSED FRACTURE: Primary | ICD-10-CM

## 2020-08-25 PROCEDURE — 99243 OFF/OP CNSLTJ NEW/EST LOW 30: CPT | Performed by: ORTHOPAEDIC SURGERY

## 2020-08-25 PROCEDURE — 26740 TREAT FINGER FRACTURE EACH: CPT | Performed by: ORTHOPAEDIC SURGERY

## 2020-08-25 NOTE — H&P
NURSING INTAKE COMMENTS: Patient presents with:  Consult: Stts that she fractured her left 4th finger while giving a bath to her dog on Saturday. Seen by PCP yesterday where an xray was completed. C/o pain but no numbness or tingling.       HPI: This 48 y taking differently: Take 25 mg by mouth daily.  ) 60 tablet 2   • Blood Glucose Monitoring Suppl (ONETOUCH ULTRA MINI) w/Device Does not apply Kit Check blood sugars two times daily 1 kit 0   • ONETOUCH ULTRASOFT LANCETS Does not apply Misc Check blood sug for this visit.   General appearance: alert and oriented, not in acute distress  Head and neck: Atraumatic, normocephalic, PERRLA  Respiratory: Regular, unlabored breathing  GI: Abdomen soft and nondistended  Vascular: 2+ and symmetric pulses  Skin: intact WBC 5.8 04/09/2019    HGB 12.9 04/09/2019    .0 04/09/2019      Lab Results   Component Value Date    GLU 99 06/28/2020    BUN 30 (H) 06/28/2020    CREATSERUM 1.07 (H) 06/28/2020    GFRNAA 60 06/28/2020    GFRAA 69 06/28/2020        Assessment and P

## 2020-08-28 RX ORDER — TOPIRAMATE 25 MG/1
25 TABLET ORAL DAILY
Qty: 90 TABLET | Refills: 1 | Status: SHIPPED | OUTPATIENT
Start: 2020-08-28 | End: 2020-11-26

## 2020-09-17 NOTE — PROGRESS NOTES
[address] [facility] [address] Gastroenterology                                                                                                               Reason for consult: acid reflux    Requesting physician or provider: Priti You Readings from Last 6 Encounters:  09/21/20 : 137 lb (62.1 kg)  08/24/20 : 139 lb 9.6 oz (63.3 kg)  08/03/20 : 141 lb 3.2 oz (64 kg)  05/07/20 : 144 lb (65.3 kg)  03/13/20 : 155 lb (70.3 kg)  02/17/20 : 156 lb (70.8 kg)       History, Medications, Allergies mouth daily.  90 tablet 1   • LORATADINE 10 MG Oral Tab TAKE 1 TABLET BY MOUTH DAILY FOR 3 TO 4 WEEKS, THEN AS NEEDED (Patient taking differently: Take by mouth every other day.  ) 30 tablet 1   • Blood Glucose Monitoring Suppl (ONETOUCH ULTRA MINI) w/Devic weeks.  · Healthy stomach, 2 benign gastric polyps removed today by cold snare polypectomy as above. · Single small benign colon polyp on a good prep colonoscopy examination. RECOMMENDATIONS:  · Followup above gastric polyp pathology results.   · Luis Danielo A complete set of results can be found in Results Review.      Component   Ref Range & Units 4/9/19  9:06 AM   TSH   0.358 - 3.740 mIU/mL 1.690        ASSESSMENT/PLAN:   Arie Srivastava is a 48year old year-old female with history of endometriosis, anemia, tablet (40 mg total) by mouth 2 (two) times daily. Imaging & Referrals:  ENT - INTERNAL    Sherice Rivera, APRN   9/21/2020        This note was partially prepared using [employer] voice recognition dictation software.  As a result, errors ma

## 2020-09-21 ENCOUNTER — OFFICE VISIT (OUTPATIENT)
Dept: GASTROENTEROLOGY | Facility: CLINIC | Age: 55
End: 2020-09-21
Payer: COMMERCIAL

## 2020-09-21 VITALS
BODY MASS INDEX: 24.58 KG/M2 | SYSTOLIC BLOOD PRESSURE: 116 MMHG | DIASTOLIC BLOOD PRESSURE: 69 MMHG | HEIGHT: 62.5 IN | HEART RATE: 76 BPM | WEIGHT: 137 LBS

## 2020-09-21 DIAGNOSIS — K59.00 CONSTIPATION, UNSPECIFIED CONSTIPATION TYPE: ICD-10-CM

## 2020-09-21 DIAGNOSIS — Z86.010 HISTORY OF COLON POLYPS: ICD-10-CM

## 2020-09-21 DIAGNOSIS — K21.9 GASTROESOPHAGEAL REFLUX DISEASE, ESOPHAGITIS PRESENCE NOT SPECIFIED: ICD-10-CM

## 2020-09-21 DIAGNOSIS — R68.89 THROAT CLEARING: ICD-10-CM

## 2020-09-21 DIAGNOSIS — R49.0 HOARSENESS OF VOICE: Primary | ICD-10-CM

## 2020-09-21 PROCEDURE — 3078F DIAST BP <80 MM HG: CPT | Performed by: NURSE PRACTITIONER

## 2020-09-21 PROCEDURE — 99244 OFF/OP CNSLTJ NEW/EST MOD 40: CPT | Performed by: NURSE PRACTITIONER

## 2020-09-21 PROCEDURE — 3074F SYST BP LT 130 MM HG: CPT | Performed by: NURSE PRACTITIONER

## 2020-09-21 PROCEDURE — 3008F BODY MASS INDEX DOCD: CPT | Performed by: NURSE PRACTITIONER

## 2020-09-21 RX ORDER — PANTOPRAZOLE SODIUM 40 MG/1
40 TABLET, DELAYED RELEASE ORAL 2 TIMES DAILY
Qty: 60 TABLET | Refills: 1 | Status: SHIPPED | OUTPATIENT
Start: 2020-09-21 | End: 2021-03-08

## 2020-09-21 NOTE — PATIENT INSTRUCTIONS
-miralax once daily with squatty potty  2-3 liters of water/daily  -30 g dietary fiber/daily  -30 min physical activity/daily  -see ent  -stop omeprazole and switch to pantoprazole 40 mg twice daily  -repeat colonoscopy 2023 unless otherwise indicated    R

## 2020-09-22 ENCOUNTER — OFFICE VISIT (OUTPATIENT)
Dept: OTOLARYNGOLOGY | Facility: CLINIC | Age: 55
End: 2020-09-22
Payer: COMMERCIAL

## 2020-09-22 VITALS
TEMPERATURE: 98 F | WEIGHT: 137 LBS | DIASTOLIC BLOOD PRESSURE: 80 MMHG | HEIGHT: 62.5 IN | SYSTOLIC BLOOD PRESSURE: 130 MMHG | BODY MASS INDEX: 24.58 KG/M2

## 2020-09-22 DIAGNOSIS — R49.0 HOARSENESS: Primary | ICD-10-CM

## 2020-09-22 PROCEDURE — 3008F BODY MASS INDEX DOCD: CPT | Performed by: OTOLARYNGOLOGY

## 2020-09-22 PROCEDURE — 3079F DIAST BP 80-89 MM HG: CPT | Performed by: OTOLARYNGOLOGY

## 2020-09-22 PROCEDURE — 99243 OFF/OP CNSLTJ NEW/EST LOW 30: CPT | Performed by: OTOLARYNGOLOGY

## 2020-09-22 PROCEDURE — 3075F SYST BP GE 130 - 139MM HG: CPT | Performed by: OTOLARYNGOLOGY

## 2020-09-22 PROCEDURE — 31575 DIAGNOSTIC LARYNGOSCOPY: CPT | Performed by: OTOLARYNGOLOGY

## 2020-09-23 NOTE — PROGRESS NOTES
Javi Zuniga is a 48year old female.  Patient presents with:  Throat Problem: Patient Presents with Throat discomfort, Hoarseness, dryness in throat, per pt has the need to clear throat, concern if allergy medication is causing symptoms   Ear Problem: fe No       REVIEW OF SYSTEMS:   GENERAL HEALTH: feels well otherwise  GENERAL : denies fever, chills, sweats, weight loss, weight gain  SKIN: denies any unusual skin lesions or rashes  RESPIRATORY: denies shortness of breath with exertion  NEURO: denies head performed. The flexible fiberoptic laryngoscope was placed into the nose or mouth and advanced  into the interior of the larynx. A thorough examination of the interior of the larynx was performed. Findings were as follows.        Hypopharynx/Larynx:  Epig

## 2020-09-25 RX ORDER — PANTOPRAZOLE SODIUM 40 MG/1
40 TABLET, DELAYED RELEASE ORAL
Qty: 180 TABLET | Refills: 3 | Status: SHIPPED | OUTPATIENT
Start: 2020-09-25 | End: 2021-12-08

## 2020-09-25 NOTE — TELEPHONE ENCOUNTER
Current Outpatient Medications   Medication Sig Dispense Refill   • Pantoprazole Sodium 40 MG Oral Tab EC Take 1 tablet (40 mg total) by mouth 2 (two) times daily.  60 tablet 1     90 days

## 2020-09-25 NOTE — TELEPHONE ENCOUNTER
CI APRN-    Last OV-9/21/2020  Last refill on pantoprazole BID-9/21/2020    Pt is requesting a 90-day supply at a time. If appropriate, please review and sign pended order, thank you.

## 2020-10-02 DIAGNOSIS — Z12.31 SCREENING MAMMOGRAM, ENCOUNTER FOR: Primary | ICD-10-CM

## 2020-10-02 NOTE — TELEPHONE ENCOUNTER
Current Outpatient Medications:     •  LORATADINE 10 MG Oral Tab, TAKE 1 TABLET BY MOUTH DAILY FOR 3 TO 4 WEEKS, THEN AS NEEDED (Patient taking differently: Take by mouth every other day.  ), Disp: 30 tablet, Rfl: 1

## 2020-10-05 NOTE — TELEPHONE ENCOUNTER
-  Please see message below. LOV 8-24-20,  Medication last filled 7- #30, 1 refill.   I have pended medication for your approval.    Please advise

## 2020-10-06 ENCOUNTER — HOSPITAL ENCOUNTER (OUTPATIENT)
Dept: GENERAL RADIOLOGY | Facility: HOSPITAL | Age: 55
Discharge: HOME | End: 2020-10-06
Attending: ORTHOPAEDIC SURGERY
Payer: COMMERCIAL

## 2020-10-06 ENCOUNTER — TELEPHONE (OUTPATIENT)
Dept: INTERNAL MEDICINE CLINIC | Facility: CLINIC | Age: 55
End: 2020-10-06

## 2020-10-06 ENCOUNTER — OFFICE VISIT (OUTPATIENT)
Dept: ORTHOPEDICS CLINIC | Facility: CLINIC | Age: 55
End: 2020-10-06
Payer: COMMERCIAL

## 2020-10-06 VITALS — BODY MASS INDEX: 24.58 KG/M2 | HEIGHT: 62.5 IN | WEIGHT: 137 LBS

## 2020-10-06 DIAGNOSIS — Z47.89 ORTHOPEDIC AFTERCARE: ICD-10-CM

## 2020-10-06 DIAGNOSIS — S62.655D: Primary | ICD-10-CM

## 2020-10-06 DIAGNOSIS — Z12.31 SCREENING MAMMOGRAM, ENCOUNTER FOR: Primary | ICD-10-CM

## 2020-10-06 PROCEDURE — 73140 X-RAY EXAM OF FINGER(S): CPT | Performed by: ORTHOPAEDIC SURGERY

## 2020-10-06 PROCEDURE — 3008F BODY MASS INDEX DOCD: CPT | Performed by: ORTHOPAEDIC SURGERY

## 2020-10-06 PROCEDURE — 99024 POSTOP FOLLOW-UP VISIT: CPT | Performed by: ORTHOPAEDIC SURGERY

## 2020-10-06 RX ORDER — LORATADINE 10 MG/1
10 TABLET ORAL EVERY OTHER DAY
Qty: 90 TABLET | Refills: 0 | Status: SHIPPED | OUTPATIENT
Start: 2020-10-06 | End: 2020-12-28

## 2020-10-06 NOTE — PROGRESS NOTES
NURSING INTAKE COMMENTS: Patient presents with: Follow - Up: pt in for f/u on fracture on 4th finger, pain at a 1/10 today      HPI: This 48year old female presents today with complaints of follow-up left ring finger injury.   Patient says her recovery to day 200 each 3   • Glucose Blood (ONETOUCH ULTRA BLUE) In Vitro Strip Test twice a day 200 strip 3       Aminoglycosides         UNKNOWN  Compazine [Prochlor*      Sulfa Antibiotics       HIVES  Family History   Problem Relation Age of Onset   • Heart Pleas Leming 5.8 04/09/2019    HGB 12.9 04/09/2019    .0 04/09/2019      Lab Results   Component Value Date    GLU 99 06/28/2020    BUN 30 (H) 06/28/2020    CREATSERUM 1.07 (H) 06/28/2020    GFRNAA 60 06/28/2020    GFRAA 69 06/28/2020        Assessment and Plan:

## 2020-10-06 NOTE — TELEPHONE ENCOUNTER
Sangeeta Awad pt is calling to know the status of her refill request. Pt stated that she is out. Pt wants to know if you could give her a #90days supply or is there a reason you only give her #30? Pt stated that she takes it for her allergies.      Also pt

## 2020-10-06 NOTE — TELEPHONE ENCOUNTER
Pt was called and informed her medication was send to the pharmacy and that her order was in the system pt verbalized understanding.  Thanks

## 2020-10-21 ENCOUNTER — TELEPHONE (OUTPATIENT)
Dept: GASTROENTEROLOGY | Facility: CLINIC | Age: 55
End: 2020-10-21

## 2020-10-21 NOTE — TELEPHONE ENCOUNTER
•  Pantoprazole Sodium 40 MG Oral Tab EC, Take 1 tablet (40 mg total) by mouth 2 (two) times daily before meals. , Disp: 180 tablet, Rfl: 3

## 2020-10-22 NOTE — TELEPHONE ENCOUNTER
I spoke to Jessica Stout at [facility]. There is still one refill from previous pantoprazole rx, with #60 to take one BID. She will fill that rx.  Patient's plan does not allow 3 months at a time, so next time will just use new rx and only give #60 at a time for the

## 2020-10-26 ENCOUNTER — OFFICE VISIT (OUTPATIENT)
Dept: SURGERY | Facility: CLINIC | Age: 55
End: 2020-10-26
Payer: COMMERCIAL

## 2020-10-26 ENCOUNTER — HOSPITAL ENCOUNTER (OUTPATIENT)
Dept: GENERAL RADIOLOGY | Facility: HOSPITAL | Age: 55
Discharge: HOME | End: 2020-10-26
Attending: ORTHOPAEDIC SURGERY
Payer: COMMERCIAL

## 2020-10-26 ENCOUNTER — OFFICE VISIT (OUTPATIENT)
Dept: ORTHOPEDICS CLINIC | Facility: CLINIC | Age: 55
End: 2020-10-26
Payer: COMMERCIAL

## 2020-10-26 VITALS
HEART RATE: 73 BPM | HEIGHT: 63 IN | WEIGHT: 136.19 LBS | DIASTOLIC BLOOD PRESSURE: 73 MMHG | SYSTOLIC BLOOD PRESSURE: 124 MMHG | BODY MASS INDEX: 24.13 KG/M2

## 2020-10-26 VITALS — WEIGHT: 135 LBS | BODY MASS INDEX: 23.92 KG/M2 | HEIGHT: 63 IN

## 2020-10-26 DIAGNOSIS — M53.3 CHRONIC RIGHT SI JOINT PAIN: Primary | ICD-10-CM

## 2020-10-26 DIAGNOSIS — E11.9 TYPE 2 DIABETES MELLITUS WITHOUT COMPLICATION, WITHOUT LONG-TERM CURRENT USE OF INSULIN (HCC): Primary | ICD-10-CM

## 2020-10-26 DIAGNOSIS — Z51.81 ENCOUNTER FOR THERAPEUTIC DRUG MONITORING: ICD-10-CM

## 2020-10-26 DIAGNOSIS — G89.29 CHRONIC RIGHT SI JOINT PAIN: Primary | ICD-10-CM

## 2020-10-26 DIAGNOSIS — R52 PAIN: ICD-10-CM

## 2020-10-26 DIAGNOSIS — K21.9 GASTROESOPHAGEAL REFLUX DISEASE WITHOUT ESOPHAGITIS: ICD-10-CM

## 2020-10-26 PROCEDURE — 3008F BODY MASS INDEX DOCD: CPT | Performed by: ORTHOPAEDIC SURGERY

## 2020-10-26 PROCEDURE — 3008F BODY MASS INDEX DOCD: CPT | Performed by: INTERNAL MEDICINE

## 2020-10-26 PROCEDURE — 99024 POSTOP FOLLOW-UP VISIT: CPT | Performed by: ORTHOPAEDIC SURGERY

## 2020-10-26 PROCEDURE — 3078F DIAST BP <80 MM HG: CPT | Performed by: INTERNAL MEDICINE

## 2020-10-26 PROCEDURE — 72100 X-RAY EXAM L-S SPINE 2/3 VWS: CPT | Performed by: ORTHOPAEDIC SURGERY

## 2020-10-26 PROCEDURE — 99214 OFFICE O/P EST MOD 30 MIN: CPT | Performed by: INTERNAL MEDICINE

## 2020-10-26 PROCEDURE — 3074F SYST BP LT 130 MM HG: CPT | Performed by: INTERNAL MEDICINE

## 2020-10-26 RX ORDER — MELOXICAM 15 MG/1
15 TABLET ORAL DAILY
Qty: 30 TABLET | Refills: 0 | Status: SHIPPED | OUTPATIENT
Start: 2020-10-26

## 2020-10-26 RX ORDER — MELOXICAM 15 MG/1
TABLET ORAL
Qty: 90 TABLET | Refills: 0 | OUTPATIENT
Start: 2020-10-26

## 2020-10-26 NOTE — PROGRESS NOTES
NURSING INTAKE COMMENTS: Patient presents with:  Back Pain: c/o low back pain and pain and right hip pain since beginning of the year. Did see chiropractor and was told pelvis was tilted. Has also tried PT once weekly.       HPI: This 50year old female pre mouth 2 (two) times daily. 60 tablet 1   • topiramate 25 MG Oral Tab Take 1 tablet (25 mg total) by mouth daily.  90 tablet 1   • Blood Glucose Monitoring Suppl (ONETOUCH ULTRA MINI) w/Device Does not apply Kit Check blood sugars two times daily 1 kit 0   • sensation intact to light touch and motor strength intact grossly  Musculoskeletal: Lumbar back exam demonstrates no warmth, no erythema, no palpable step-off or defect and no midline tenderness palpation.   There is tenderness palpation over the right SI j PHYSICAL THERAPY - INTERNAL    Pain  -     XR LUMBAR SPINE (MIN 2 VIEWS) (CPT=91100); Future    Other orders  -     Meloxicam 15 MG Oral Tab; Take 1 tablet (15 mg total) by mouth daily.         Assessment: Right sided buttock and low back pain most cons

## 2020-10-26 NOTE — PROGRESS NOTES
[address]  Dept: [phone]       Patient:   Sofy Carias  :      10/7/1965  MRN:      [id number]    Chief Complaint:  Patient presen Does not apply Kit Check blood sugars two times daily 1 kit 0   • ONETOUCH ULTRASOFT LANCETS Does not apply Misc Check blood sugars twice a day 200 each 3   • Glucose Blood (ONETOUCH ULTRA BLUE) In Vitro Strip Test twice a day 200 strip 3     Allergies:  A Exposure: Not Asked        Hobby Hazards: Not Asked        Sleep Concern: Not Asked        Stress Concern: Not Asked        Weight Concern: Not Asked        Special Diet: Not Asked        Back Care: Not Asked        Exercise: Not Asked        Bike Helmet: between meals only:  yes  · Toughest challenge:  Exercise     Nutritional Goals  Limit carbohydrates to 100 gms per day, Eat 100-200 calories within 1 hour of waking  and Eat 3-4 cups of fresh fruits or vegetables daily    Behavior Modifications Reviewed a patient states her acid reflux has been well controlled with her current medication. No symptoms of heartburn or indigestion.     Encounter Diagnosis(ses):   Type 2 diabetes mellitus without complication, without long-term current use of insulin (hcc)  (pr

## 2020-10-28 ENCOUNTER — TELEMEDICINE (OUTPATIENT)
Dept: TELEHEALTH | Age: 55
End: 2020-10-28

## 2020-10-28 ENCOUNTER — PATIENT MESSAGE (OUTPATIENT)
Dept: FAMILY MEDICINE CLINIC | Facility: CLINIC | Age: 55
End: 2020-10-28

## 2020-10-28 DIAGNOSIS — Z20.822 ENCOUNTER BY TELEHEALTH FOR SUSPECTED COVID-19: Primary | ICD-10-CM

## 2020-10-28 PROCEDURE — 99998 NO SHOW: CPT | Performed by: NURSE PRACTITIONER

## 2020-10-28 PROCEDURE — 99213 OFFICE O/P EST LOW 20 MIN: CPT | Performed by: NURSE PRACTITIONER

## 2020-10-29 ENCOUNTER — APPOINTMENT (OUTPATIENT)
Dept: LAB | Facility: HOSPITAL | Age: 55
End: 2020-10-29
Attending: NURSE PRACTITIONER
Payer: COMMERCIAL

## 2020-10-29 DIAGNOSIS — Z20.822 ENCOUNTER BY TELEHEALTH FOR SUSPECTED COVID-19: ICD-10-CM

## 2020-10-29 NOTE — TELEPHONE ENCOUNTER
From: LESVIA Holt  To: Molly Bejarano  Sent: 10/28/2020 10:59 AM CDT  Subject: Video Visit    Alejandro Hamm can reach me by sending a message to me up until I sign off at 8:00pm.    Please let me know if you are still waiting to be scheduled fo

## 2020-11-25 ENCOUNTER — TELEMEDICINE (OUTPATIENT)
Dept: TELEHEALTH | Age: 55
End: 2020-11-25

## 2020-11-25 ENCOUNTER — APPOINTMENT (OUTPATIENT)
Dept: LAB | Facility: HOSPITAL | Age: 55
End: 2020-11-25
Attending: NURSE PRACTITIONER
Payer: COMMERCIAL

## 2020-11-25 DIAGNOSIS — Z20.822 SUSPECTED COVID-19 VIRUS INFECTION: ICD-10-CM

## 2020-11-25 DIAGNOSIS — Z20.822 SUSPECTED COVID-19 VIRUS INFECTION: Primary | ICD-10-CM

## 2020-11-25 PROCEDURE — 99213 OFFICE O/P EST LOW 20 MIN: CPT | Performed by: NURSE PRACTITIONER

## 2020-11-25 NOTE — PROGRESS NOTES
This is a telemedicine visit with live, interactive video and audio. Patient understands and accepts financial responsibility for any deductible, co-insurance and/or co-pays associated with this service.     SUBJECTIVE  \"I need a Covid test\"    HISTOR day. (Patient taking differently: Take 10 mg by mouth daily.  ) 90 tablet 0   • Pantoprazole Sodium 40 MG Oral Tab EC Take 1 tablet (40 mg total) by mouth 2 (two) times daily before meals.  180 tablet 3   • Pantoprazole Sodium 40 MG Oral Tab EC Take 1 table

## 2020-11-25 NOTE — PATIENT INSTRUCTIONS
Dear Dalton Millard    Thank you for your e-visit. All e-visits are answered by nurse practitioners and physician assistants from the walk-in clinic. The COVID-19 can cause mild to severe symptoms. Typically with fever, cough, and shortness of breath.  Our per day for sore throat    May consider OTC Cepacol as needed and directed on packing for sore throat    May consider OTC tylenol as needed and directed on packaging for pain or fever relief.     May consider OTC guaifenesin as needed and directed on packag

## 2020-12-28 NOTE — TELEPHONE ENCOUNTER
New Rx required. Current Outpatient Medications:   •  loratadine 10 MG Oral Tab, Take 1 tablet (10 mg total) by mouth every other day.  (Patient taking differently: Take 10 mg by mouth daily.  ), Disp: 90 tablet, Rfl: 0

## 2020-12-29 RX ORDER — LORATADINE 10 MG/1
10 TABLET ORAL EVERY OTHER DAY
Qty: 90 TABLET | Refills: 0 | Status: SHIPPED | OUTPATIENT
Start: 2020-12-29 | End: 2021-01-26

## 2021-01-25 ENCOUNTER — PATIENT MESSAGE (OUTPATIENT)
Dept: INTERNAL MEDICINE CLINIC | Facility: CLINIC | Age: 56
End: 2021-01-25

## 2021-01-26 NOTE — TELEPHONE ENCOUNTER
Dr Peewee Espinoza    Please see patient's email and advise. Script created. Requested Prescriptions     Pending Prescriptions Disp Refills   • loratadine 10 MG Oral Tab 90 tablet 0     Sig: Take 1 tablet (10 mg total) by mouth daily.      Recent Visits  Marin

## 2021-01-26 NOTE — TELEPHONE ENCOUNTER
From: Rachana Kern  To: Emma Kirby MD  Sent: 1/25/2021 8:49 PM CST  Subject: Prescription Question    Please rectify a problem with my current prescription LORATADINE 10 mg. I take the following prescription EVERYDAY.  However, the prescription is

## 2021-01-27 RX ORDER — LORATADINE 10 MG/1
10 TABLET ORAL DAILY
Qty: 90 TABLET | Refills: 1 | Status: SHIPPED | OUTPATIENT
Start: 2021-01-27 | End: 2021-10-05

## 2021-01-30 ENCOUNTER — HOSPITAL ENCOUNTER (OUTPATIENT)
Dept: MAMMOGRAPHY | Facility: HOSPITAL | Age: 56
Discharge: HOME | End: 2021-01-30
Attending: INTERNAL MEDICINE
Payer: COMMERCIAL

## 2021-01-30 DIAGNOSIS — Z12.31 SCREENING MAMMOGRAM, ENCOUNTER FOR: ICD-10-CM

## 2021-01-30 PROCEDURE — 77067 SCR MAMMO BI INCL CAD: CPT | Performed by: INTERNAL MEDICINE

## 2021-01-30 PROCEDURE — 77063 BREAST TOMOSYNTHESIS BI: CPT | Performed by: INTERNAL MEDICINE

## 2021-01-31 NOTE — PROGRESS NOTES
Patient did not enter the Video Visit, so I called her. Pt with COVID samanda managing well at home. Requests COVID testing. COVID test ordered. Patient to await call from [employer]. COVID self-care and quarantine recommendations sent.

## 2021-02-15 NOTE — PROGRESS NOTES
Spoke with patient (verified name and ), advised Dr Eze Crockett note and verbalized understanding.     Written by Evelio Membreno MD on 2021 11:07 AM  Dear Tres Schmitz,     Your breast mammogram is normal /stable, recommend routine f/u mammogram in 1

## 2021-03-08 ENCOUNTER — OFFICE VISIT (OUTPATIENT)
Dept: INTERNAL MEDICINE CLINIC | Facility: CLINIC | Age: 56
End: 2021-03-08
Payer: COMMERCIAL

## 2021-03-08 ENCOUNTER — OFFICE VISIT (OUTPATIENT)
Dept: OBGYN CLINIC | Facility: CLINIC | Age: 56
End: 2021-03-08
Payer: COMMERCIAL

## 2021-03-08 VITALS
BODY MASS INDEX: 25.34 KG/M2 | HEART RATE: 75 BPM | DIASTOLIC BLOOD PRESSURE: 83 MMHG | WEIGHT: 143 LBS | SYSTOLIC BLOOD PRESSURE: 122 MMHG | HEIGHT: 63 IN

## 2021-03-08 VITALS
BODY MASS INDEX: 25 KG/M2 | DIASTOLIC BLOOD PRESSURE: 82 MMHG | WEIGHT: 140.19 LBS | SYSTOLIC BLOOD PRESSURE: 113 MMHG | HEART RATE: 72 BPM

## 2021-03-08 DIAGNOSIS — E78.00 PURE HYPERCHOLESTEROLEMIA: ICD-10-CM

## 2021-03-08 DIAGNOSIS — N89.8 VAGINAL IRRITATION: Primary | ICD-10-CM

## 2021-03-08 DIAGNOSIS — R10.11 RUQ PAIN: Primary | ICD-10-CM

## 2021-03-08 DIAGNOSIS — R73.03 PREDIABETES: ICD-10-CM

## 2021-03-08 PROCEDURE — 3074F SYST BP LT 130 MM HG: CPT | Performed by: OBSTETRICS & GYNECOLOGY

## 2021-03-08 PROCEDURE — 99213 OFFICE O/P EST LOW 20 MIN: CPT | Performed by: OBSTETRICS & GYNECOLOGY

## 2021-03-08 PROCEDURE — 99214 OFFICE O/P EST MOD 30 MIN: CPT | Performed by: INTERNAL MEDICINE

## 2021-03-08 PROCEDURE — 3079F DIAST BP 80-89 MM HG: CPT | Performed by: INTERNAL MEDICINE

## 2021-03-08 PROCEDURE — 3079F DIAST BP 80-89 MM HG: CPT | Performed by: OBSTETRICS & GYNECOLOGY

## 2021-03-08 PROCEDURE — 3074F SYST BP LT 130 MM HG: CPT | Performed by: INTERNAL MEDICINE

## 2021-03-08 PROCEDURE — 3008F BODY MASS INDEX DOCD: CPT | Performed by: INTERNAL MEDICINE

## 2021-03-08 RX ORDER — TOPIRAMATE 25 MG/1
25 TABLET ORAL DAILY
COMMUNITY
Start: 2020-12-26 | End: 2021-03-31

## 2021-03-08 RX ORDER — CONJUGATED ESTROGENS 0.62 MG/G
CREAM VAGINAL
Qty: 42.5 G | Refills: 0 | Status: SHIPPED | OUTPATIENT
Start: 2021-03-08

## 2021-03-08 NOTE — PROGRESS NOTES
HPI/Subjective:   Patient ID: Christofer Richardson is a 50year old female. Patient presents with:  Pain: C/o pain in her stomach, right area of back .   Stts she's gone to PT and a chiropractor with no relief      HPI  Patient in office today for pains only wit (PREMARIN) 0.625 MG/GM Vaginal Cream Apply twice weekly to vagina for vaginal burning 42.5 g 0   • loratadine 10 MG Oral Tab Take 1 tablet (10 mg total) by mouth daily. 90 tablet 1   • Meloxicam 15 MG Oral Tab Take 1 tablet (15 mg total) by mouth daily.  27 left CVA tenderness. Comments: No hepatomegaly, no splenomegaly   Musculoskeletal:      Cervical back: Neck supple. Right lower leg: No edema. Left lower leg: No edema. Lymphadenopathy:      Cervical: No cervical adenopathy.    Skin:     Ge

## 2021-03-09 ENCOUNTER — LAB ENCOUNTER (OUTPATIENT)
Dept: LAB | Facility: HOSPITAL | Age: 56
End: 2021-03-09
Attending: INTERNAL MEDICINE
Payer: COMMERCIAL

## 2021-03-09 ENCOUNTER — PATIENT MESSAGE (OUTPATIENT)
Dept: OBGYN CLINIC | Facility: CLINIC | Age: 56
End: 2021-03-09

## 2021-03-09 ENCOUNTER — PATIENT MESSAGE (OUTPATIENT)
Dept: INTERNAL MEDICINE CLINIC | Facility: CLINIC | Age: 56
End: 2021-03-09

## 2021-03-09 DIAGNOSIS — E78.00 PURE HYPERCHOLESTEROLEMIA: ICD-10-CM

## 2021-03-09 DIAGNOSIS — R73.03 PREDIABETES: ICD-10-CM

## 2021-03-09 DIAGNOSIS — R10.11 RUQ PAIN: ICD-10-CM

## 2021-03-09 LAB
ALBUMIN SERPL-MCNC: 4 G/DL (ref 3.4–5)
ALBUMIN/GLOB SERPL: 1.3 {RATIO} (ref 1–2)
ALP LIVER SERPL-CCNC: 77 U/L
ALT SERPL-CCNC: 18 U/L
ANION GAP SERPL CALC-SCNC: 4 MMOL/L (ref 0–18)
AST SERPL-CCNC: 8 U/L (ref 15–37)
BASOPHILS # BLD AUTO: 0.04 X10(3) UL (ref 0–0.2)
BASOPHILS NFR BLD AUTO: 0.6 %
BILIRUB SERPL-MCNC: 0.6 MG/DL (ref 0.1–2)
BILIRUB UR QL: NEGATIVE
BUN BLD-MCNC: 23 MG/DL (ref 7–18)
BUN/CREAT SERPL: 24.5 (ref 10–20)
CALCIUM BLD-MCNC: 9 MG/DL (ref 8.5–10.1)
CHLORIDE SERPL-SCNC: 110 MMOL/L (ref 98–112)
CHOLEST SMN-MCNC: 206 MG/DL (ref ?–200)
CO2 SERPL-SCNC: 28 MMOL/L (ref 21–32)
COLOR UR: YELLOW
CREAT BLD-MCNC: 0.94 MG/DL
CREAT UR-SCNC: 97.8 MG/DL
DEPRECATED RDW RBC AUTO: 39.4 FL (ref 35.1–46.3)
EOSINOPHIL # BLD AUTO: 0.17 X10(3) UL (ref 0–0.7)
EOSINOPHIL NFR BLD AUTO: 2.7 %
ERYTHROCYTE [DISTWIDTH] IN BLOOD BY AUTOMATED COUNT: 11.9 % (ref 11–15)
EST. AVERAGE GLUCOSE BLD GHB EST-MCNC: 114 MG/DL (ref 68–126)
GLOBULIN PLAS-MCNC: 3.2 G/DL (ref 2.8–4.4)
GLUCOSE BLD-MCNC: 95 MG/DL (ref 70–99)
GLUCOSE UR-MCNC: NEGATIVE MG/DL
HBA1C MFR BLD HPLC: 5.6 % (ref ?–5.7)
HCT VFR BLD AUTO: 39.3 %
HDLC SERPL-MCNC: 68 MG/DL (ref 40–59)
HGB BLD-MCNC: 12.9 G/DL
HGB UR QL STRIP.AUTO: NEGATIVE
IMM GRANULOCYTES # BLD AUTO: 0.01 X10(3) UL (ref 0–1)
IMM GRANULOCYTES NFR BLD: 0.2 %
KETONES UR-MCNC: NEGATIVE MG/DL
LDLC SERPL CALC-MCNC: 121 MG/DL (ref ?–100)
LIPASE SERPL-CCNC: 145 U/L (ref 73–393)
LYMPHOCYTES # BLD AUTO: 2.55 X10(3) UL (ref 1–4)
LYMPHOCYTES NFR BLD AUTO: 41.1 %
M PROTEIN MFR SERPL ELPH: 7.2 G/DL (ref 6.4–8.2)
MCH RBC QN AUTO: 29.7 PG (ref 26–34)
MCHC RBC AUTO-ENTMCNC: 32.8 G/DL (ref 31–37)
MCV RBC AUTO: 90.6 FL
MICROALBUMIN UR-MCNC: 0.85 MG/DL
MICROALBUMIN/CREAT 24H UR-RTO: 8.7 UG/MG (ref ?–30)
MONOCYTES # BLD AUTO: 0.49 X10(3) UL (ref 0.1–1)
MONOCYTES NFR BLD AUTO: 7.9 %
NEUTROPHILS # BLD AUTO: 2.94 X10 (3) UL (ref 1.5–7.7)
NEUTROPHILS # BLD AUTO: 2.94 X10(3) UL (ref 1.5–7.7)
NEUTROPHILS NFR BLD AUTO: 47.5 %
NITRITE UR QL STRIP.AUTO: NEGATIVE
NONHDLC SERPL-MCNC: 138 MG/DL (ref ?–130)
OSMOLALITY SERPL CALC.SUM OF ELEC: 297 MOSM/KG (ref 275–295)
PATIENT FASTING Y/N/NP: YES
PATIENT FASTING Y/N/NP: YES
PH UR: 7 [PH] (ref 5–8)
PLATELET # BLD AUTO: 230 10(3)UL (ref 150–450)
POTASSIUM SERPL-SCNC: 4.1 MMOL/L (ref 3.5–5.1)
PROT UR-MCNC: NEGATIVE MG/DL
RBC # BLD AUTO: 4.34 X10(6)UL
SODIUM SERPL-SCNC: 142 MMOL/L (ref 136–145)
SP GR UR STRIP: 1.02 (ref 1–1.03)
TRIGL SERPL-MCNC: 84 MG/DL (ref 30–149)
TSI SER-ACNC: 1.73 MIU/ML (ref 0.36–3.74)
UROBILINOGEN UR STRIP-ACNC: <2
VLDLC SERPL CALC-MCNC: 17 MG/DL (ref 0–30)
WBC # BLD AUTO: 6.2 X10(3) UL (ref 4–11)

## 2021-03-09 PROCEDURE — 84443 ASSAY THYROID STIM HORMONE: CPT

## 2021-03-09 PROCEDURE — 3044F HG A1C LEVEL LT 7.0%: CPT | Performed by: PHYSICIAN ASSISTANT

## 2021-03-09 PROCEDURE — 36415 COLL VENOUS BLD VENIPUNCTURE: CPT

## 2021-03-09 PROCEDURE — 3061F NEG MICROALBUMINURIA REV: CPT | Performed by: PHYSICIAN ASSISTANT

## 2021-03-09 PROCEDURE — 83690 ASSAY OF LIPASE: CPT

## 2021-03-09 PROCEDURE — 85025 COMPLETE CBC W/AUTO DIFF WBC: CPT

## 2021-03-09 PROCEDURE — 82043 UR ALBUMIN QUANTITATIVE: CPT

## 2021-03-09 PROCEDURE — 80061 LIPID PANEL: CPT

## 2021-03-09 PROCEDURE — 82570 ASSAY OF URINE CREATININE: CPT

## 2021-03-09 PROCEDURE — 83036 HEMOGLOBIN GLYCOSYLATED A1C: CPT

## 2021-03-09 PROCEDURE — 80053 COMPREHEN METABOLIC PANEL: CPT

## 2021-03-09 PROCEDURE — 81001 URINALYSIS AUTO W/SCOPE: CPT | Performed by: INTERNAL MEDICINE

## 2021-03-09 NOTE — TELEPHONE ENCOUNTER
From: Rachana Pass  To: Ryley Stein DO  Sent: 3/9/2021 7:54 AM CST  Subject: Prescription Question    I am not clear on how much Premarin to put in the applicator for each dose.   The applicator has   0.5  1.0  1.5  2.0  I chose 0.5, please confirm

## 2021-03-09 NOTE — TELEPHONE ENCOUNTER
Pts premarin was prescribed as 0.625mg/gm. Should pt just fill applicator between the 0.5 and 1.0 min?

## 2021-03-09 NOTE — TELEPHONE ENCOUNTER
Dr Phillip Boyce, see patient's message and comment:  Please reply to pool: EM RN TRIAGE      From: [facility]  To: Chan Haddad MD  Sent: 3/9/2021  7:50 AM CST  Subject: Non-Urgent Medical Question    Ultrasound of my abdomen is scheduled for [facility]

## 2021-03-10 LAB
GENITAL VAGINOSIS SCREEN: NEGATIVE
TRICHOMONAS SCREEN: NEGATIVE

## 2021-03-21 NOTE — H&P
HPI:  The patient is a 49 YO F c/o vaginal dryess and burning sensation in vagina. Hurts to be intimate. No dc but occasional odor. Has used water based lube and crisco w/o success. Monogamous. No menses > 1 year. NO PMB. No PCB.        LPS: 4/25/18 control/protection: Condom    Other Topics      Concerns:         Service: Not Asked        Blood Transfusions: Not Asked        Caffeine Concern: No        Occupational Exposure: Not Asked        Hobby Hazards: Not Asked        Sleep Concern: Not Mother         CAD   • Cancer Mother         Lung cancer and CAD cause of death   • Heart Disease Brother 39        Premature CAD   • Breast Cancer Maternal Aunt 80       MEDICATIONS:    Current Outpatient Medications:   •  topiramate 25 MG Oral Tab, Take lesions  Urethral Meatus:  normal in size, location, without lesions and prolapse  Bladder:  No fullness, masses or tenderness  Vagina:  Normal appearance without lesions, no abnormal discharge  Cervix:  Normal appearing  Perineum: normal    Assessment/Kushal

## 2021-03-26 ENCOUNTER — HOSPITAL ENCOUNTER (OUTPATIENT)
Dept: ULTRASOUND IMAGING | Age: 56
Discharge: HOME | End: 2021-03-26
Attending: INTERNAL MEDICINE
Payer: COMMERCIAL

## 2021-03-26 DIAGNOSIS — R10.11 RUQ PAIN: ICD-10-CM

## 2021-03-26 PROCEDURE — 76700 US EXAM ABDOM COMPLETE: CPT | Performed by: INTERNAL MEDICINE

## 2021-03-29 ENCOUNTER — PATIENT MESSAGE (OUTPATIENT)
Dept: INTERNAL MEDICINE CLINIC | Facility: CLINIC | Age: 56
End: 2021-03-29

## 2021-03-29 RX ORDER — TOPIRAMATE 25 MG/1
TABLET ORAL
Qty: 90 TABLET | Refills: 0 | OUTPATIENT
Start: 2021-03-29

## 2021-03-30 NOTE — TELEPHONE ENCOUNTER
From: Roger Shahid  To: Sun Coley MD  Sent: 3/29/2021 11:01 PM CDT  Subject: Prescription Question    Why am I unable to refill Topiramate?

## 2021-03-31 ENCOUNTER — PATIENT MESSAGE (OUTPATIENT)
Dept: INTERNAL MEDICINE CLINIC | Facility: CLINIC | Age: 56
End: 2021-03-31

## 2021-03-31 RX ORDER — TOPIRAMATE 25 MG/1
25 TABLET ORAL DAILY
Qty: 90 TABLET | Refills: 1 | Status: SHIPPED | OUTPATIENT
Start: 2021-03-31 | End: 2021-10-05

## 2021-03-31 NOTE — TELEPHONE ENCOUNTER
Dr. Frankey Bowl, please advise, I do see former refills were given by you as pt reports. Pleas review pended med.

## 2021-04-01 ENCOUNTER — PATIENT MESSAGE (OUTPATIENT)
Dept: INTERNAL MEDICINE CLINIC | Facility: CLINIC | Age: 56
End: 2021-04-01

## 2021-04-01 NOTE — TELEPHONE ENCOUNTER
From: Lincoln Islas  To: Hannah Blunt MD  Sent: 3/31/2021 9:42 PM CDT  Subject: Prescription Question    Dr Howard Pendleton   Please refill the prescription topiramate that you originally prescribed for me.   I am no longer under Dr. Mike chou and he

## 2021-04-01 NOTE — TELEPHONE ENCOUNTER
Meshfire message with recommendation sent to pt.        Future Appointments   Date Time Provider Abiola Varner   4/26/2021  8:20 AM DO LORA Mcclain [facility]

## 2021-04-02 NOTE — TELEPHONE ENCOUNTER
Secoo message with recommendation sent to pt.        Future Appointments   Date Time Provider Abiola Varner   4/26/2021  8:20 AM DO LORA Gonzalez [facility]

## 2021-04-02 NOTE — TELEPHONE ENCOUNTER
From: Des Fuentes  To: Alida Stauffer MD  Sent: 4/1/2021 6:02 PM CDT  Subject: Prescription Question    Hello,     Thank you for refilling my prescription. I do not see any further instructions regarding future refills.      With only having 1 refill

## 2021-04-09 RX ORDER — BLOOD SUGAR DIAGNOSTIC
STRIP MISCELLANEOUS
Qty: 200 EACH | Refills: 0 | Status: SHIPPED | OUTPATIENT
Start: 2021-04-09 | End: 2021-08-03

## 2021-04-09 NOTE — TELEPHONE ENCOUNTER
REFILL REQUEST ON THE FOLLOWING.     Current Outpatient Medications   Medication Sig Dispense Refill   •       •       •       •       •       •       •       • Glucose Blood (ONETOUCH ULTRA BLUE) In Vitro Strip Test twice a day 200 strip 3

## 2021-04-09 NOTE — TELEPHONE ENCOUNTER
Dr Teodora Sotelo, pharmacy requesting 90 day supply of Meloxicam for pt. Pt seen today.  Please sign if you approve 90 day supply
no

## 2021-08-03 ENCOUNTER — OFFICE VISIT (OUTPATIENT)
Dept: FAMILY MEDICINE CLINIC | Facility: CLINIC | Age: 56
End: 2021-08-03
Payer: COMMERCIAL

## 2021-08-03 VITALS
RESPIRATION RATE: 16 BRPM | HEIGHT: 63 IN | TEMPERATURE: 98 F | WEIGHT: 140 LBS | DIASTOLIC BLOOD PRESSURE: 64 MMHG | HEART RATE: 72 BPM | OXYGEN SATURATION: 97 % | BODY MASS INDEX: 24.8 KG/M2 | SYSTOLIC BLOOD PRESSURE: 108 MMHG

## 2021-08-03 DIAGNOSIS — E11.9 DIET-CONTROLLED DIABETES MELLITUS (HCC): ICD-10-CM

## 2021-08-03 DIAGNOSIS — Z11.52 ENCOUNTER FOR SCREENING FOR COVID-19: ICD-10-CM

## 2021-08-03 DIAGNOSIS — J01.00 ACUTE NON-RECURRENT MAXILLARY SINUSITIS: Primary | ICD-10-CM

## 2021-08-03 PROCEDURE — 3078F DIAST BP <80 MM HG: CPT | Performed by: PHYSICIAN ASSISTANT

## 2021-08-03 PROCEDURE — 3074F SYST BP LT 130 MM HG: CPT | Performed by: PHYSICIAN ASSISTANT

## 2021-08-03 PROCEDURE — 3008F BODY MASS INDEX DOCD: CPT | Performed by: PHYSICIAN ASSISTANT

## 2021-08-03 PROCEDURE — 99213 OFFICE O/P EST LOW 20 MIN: CPT | Performed by: PHYSICIAN ASSISTANT

## 2021-08-03 RX ORDER — AMOXICILLIN AND CLAVULANATE POTASSIUM 875; 125 MG/1; MG/1
1 TABLET, FILM COATED ORAL 2 TIMES DAILY
Qty: 14 TABLET | Refills: 0 | Status: SHIPPED | OUTPATIENT
Start: 2021-08-03 | End: 2021-08-10

## 2021-08-03 NOTE — PROGRESS NOTES
CHIEF COMPLAINT:   Patient presents with:  Sinus Problem: since Friday, cough, congestion, sinus pressure, dental pain      HPI:   Roger Shahid is a 50year old female who presents with symptoms as described below for 4-5 days.    Sx onset 7/30    • Fever (two) times daily before meals.  180 tablet 3   • Blood Glucose Monitoring Suppl (ONETOUCH ULTRA MINI) w/Device Does not apply Kit Check blood sugars two times daily 1 kit 0      Past Medical History:   Diagnosis Date   • Anemia     recently dx-taking FeSO4 Supple, non-tender  LUNGS: clear to auscultation bilaterally, no wheezes or rhonchi. Breathing is non labored.  Speaking in full sentences without hesitation  CARDIO: RRR without murmur  GI: active BS's x4,no masses, hepatosplenomegaly, mild diffuse tendern SARS CoV2 virus. The treatment works by infusion of synthesized antibodies that bind to the SARS CoV2 virus, and prevents the virus from entering your cells to replicate.  The treatment has been shown in preliminary studies to significantly reduce the need approved, you will be offered available appointment times as soon as possible. A referral nor an appointment guarantees treatment.  Your condition will be assessed first by a nurse or provider during your visit to determine if treatment is possible or i death. While information so far suggests that most  COVID-19 illness is mild, serious illness can occur and may cause some of your other medical conditions to become worse.  People of all ages with severe, long-lasting (chronic) medical conditions like hear casirivimab and imdevimab, given together as a single intravenous infusion (through a vein). • You will receive one dose of REGEN-COV by intravenous infusion. The infusion will take 20 to 52 minutes or longer.  Your healthcare provider will determine the d SARS-CoV-2. Similarly, REGEN-COV may reduce your body's immune response to a vaccine for SARS-CoV-2. Specific studies have not been conducted to address these possible risks. Talk to your healthcare provider if you have any questions.     WHAT OTHER TREATME declaration that circumstances exist to justify the emergency use of drugs and biological products during the COVID-19 pandemic. REGEN-COV has not undergone the same type of review as an FDA-approved or cleared product.  The FDA may issue an EUA when cer water. Or  the shower and direct the warm spray on your face. Using a vaporizer along with a menthol rub at night may also help soothe symptoms.   · An expectorant with guaifenesin may help thin nasal mucus and help your sinuses drain fluids.  If yo fluid draining from your nose or into your throat  · Facial pain or headache that gets worse  · Stiff neck  · Abnormal drowsiness or confusion  · Swelling of your forehead or eyelids  · Vision problems, such as blurred or double vision  · Fever of 100. 4ºF

## 2021-08-03 NOTE — PATIENT INSTRUCTIONS
1. Have you been to the ER, urgent care clinic since your last visit? Hospitalized since your last visit? No    2. Have you seen or consulted any other health care providers outside of the [address] [place] [address] since your last visit? Include any pap smears or colon screening.  No ••••••••Based on the clinical information, you are being considered for a COVID-19 monoclonal antibody treatment called REGEN-COV (casirivimab with imdevimab), which is an antibody product designed specifically targeted towards the SARS CoV2 virus.  The tangela you will need to call the [facility] AS SOON AS POSSIBLE at [phone] during opening hours (M-F 8a-8p, -Sun 8a-4p) to speak with the triage nurse to review eligibility and schedule the treatment session.      Once approved, you will be off called a coronavirus. People can get COVID-19 through contact with another person who has the virus. COVID-19 illnesses have ranged from very mild (including some with no reported symptoms) to severe, including illness resulting in death.  While informat serious illnesses  Are taking any medications (prescription, over-the-counter, vitamins, and herbal products)    HOW WILL I RECEIVE REGEN-COV (casirivimab with imdevimab)?   • REGEN-COV consists of two investigational medicines, casirivimab and imdevimab, g may happen. REGEN-COV is still being studied so it is possible that all of the risks are not known at this time. It is possible that REGEN-COV could interfere with your body's own ability to fight off a future infection of SARS-CoV-2.  Similarly, REGEN-C USE AUTHORIZATION (EUA)? The [place] FDA has made REGEN-COV (casirivimab with imdevimab) available under an emergency access mechanism called an EUA.  The EUA is supported by a [place] of Health and Human Service (HHS) declaration that circumstance water, hot tea, and other liquids, as directed by the healthcare provider. This may help thin nasal mucus. It also may help your sinuses drain fluids. · Heat may help soothe painful parts of your face. Use a towel soaked in hot water.  Or  the show smoke. This can make symptoms worse. Follow-up care  Follow up with your healthcare provider if you are not better in 1 week.    When to get medical advice  Call your healthcare provider if any of these occur:   · Green or yellow fluid draining from your

## 2021-08-04 LAB — SARS-COV-2 RNA RESP QL NAA+PROBE: NOT DETECTED

## 2021-09-13 ENCOUNTER — TELEMEDICINE (OUTPATIENT)
Dept: INTERNAL MEDICINE CLINIC | Facility: CLINIC | Age: 56
End: 2021-09-13
Payer: COMMERCIAL

## 2021-09-13 ENCOUNTER — TELEMEDICINE (OUTPATIENT)
Dept: TELEHEALTH | Age: 56
End: 2021-09-13
Payer: COMMERCIAL

## 2021-09-13 DIAGNOSIS — Z02.9 ADMINISTRATIVE ENCOUNTER: Primary | ICD-10-CM

## 2021-09-13 DIAGNOSIS — M54.50 CHRONIC RIGHT-SIDED LOW BACK PAIN, UNSPECIFIED WHETHER SCIATICA PRESENT: Primary | ICD-10-CM

## 2021-09-13 DIAGNOSIS — G89.29 CHRONIC RIGHT-SIDED LOW BACK PAIN, UNSPECIFIED WHETHER SCIATICA PRESENT: Primary | ICD-10-CM

## 2021-09-13 PROCEDURE — 99213 OFFICE O/P EST LOW 20 MIN: CPT | Performed by: INTERNAL MEDICINE

## 2021-09-13 PROCEDURE — 99499 UNLISTED E&M SERVICE: CPT | Performed by: NURSE PRACTITIONER

## 2021-09-13 NOTE — PROGRESS NOTES
Patient visited with Dr Nilesh Chery on [address] [facility].   No Charges assessed by this practitioners

## 2021-09-13 NOTE — PROGRESS NOTES
Telemedicine Note    Virtual Video Check-In    Jacoby Ji verbally consents to a Virtual Video Check-In service on 09/13/21.  Patient understands and accepts financial responsibility for any deductible, co-insurance and/or co-pays associated with this s Cancer Mother         Lung cancer and CAD cause of death   • Heart Disease Brother 39        Premature CAD   • Breast Cancer Maternal Aunt 80   •    • Patient Active Problem List:  •    Allergic rhinitis  •    Pharyngitis  •    Fatigue  •    Anemia  •    F dizziness  PSYCHE: negative for depression or anxiety symptoms    ALL/ASTHMA: negative for allergy symptoms     Physical Exam  Patient alert and oriented x3  Patient does not appear in any respiratory distress during the conversation  No labored breathing acute distress.    Joslyn Corley MD

## 2021-09-28 ENCOUNTER — TELEPHONE (OUTPATIENT)
Dept: NEUROLOGY | Facility: CLINIC | Age: 56
End: 2021-09-28

## 2021-09-28 ENCOUNTER — OFFICE VISIT (OUTPATIENT)
Dept: PHYSICAL MEDICINE AND REHAB | Facility: CLINIC | Age: 56
End: 2021-09-28
Payer: COMMERCIAL

## 2021-09-28 VITALS — HEIGHT: 63 IN | BODY MASS INDEX: 24.8 KG/M2 | WEIGHT: 140 LBS

## 2021-09-28 DIAGNOSIS — M54.16 CHRONIC RIGHT-SIDED LUMBAR RADICULOPATHY: ICD-10-CM

## 2021-09-28 DIAGNOSIS — M47.816 FACET ARTHROPATHY, LUMBAR: Primary | ICD-10-CM

## 2021-09-28 PROCEDURE — 99244 OFF/OP CNSLTJ NEW/EST MOD 40: CPT | Performed by: PHYSICAL MEDICINE & REHABILITATION

## 2021-09-28 PROCEDURE — 3008F BODY MASS INDEX DOCD: CPT | Performed by: PHYSICAL MEDICINE & REHABILITATION

## 2021-09-28 RX ORDER — MELOXICAM 15 MG/1
15 TABLET ORAL DAILY
Qty: 14 TABLET | Refills: 0 | Status: SHIPPED | OUTPATIENT
Start: 2021-09-28 | End: 2021-10-12

## 2021-09-28 NOTE — PATIENT INSTRUCTIONS
-Start physical therapy and home exercises  -Mobic daily for the next 7-10 days and then as needed  -Ice/Heat as tolerated  -Please stop the medication if you have any side effects and call the office if you have any questions or concerns  -MRI of the lumb

## 2021-09-28 NOTE — TELEPHONE ENCOUNTER
[employer] for authorization of approval for MRI L-spine wo cpt code 57465. Authorization # [id number] effective 09/28/2021 - 11/26/2021. Pt. informed. Transferred call to scheduling for appt.

## 2021-09-29 NOTE — PROGRESS NOTES
[address] [facility]  NEW PATIENT EVALUATION    Consultation as a request of Dr. Frankey Bowl    Chief Complaint: back pain.     HISTORY OF PRESENT ILLNESS:   Patient presents with:  Low Back Pain: Patient presents toda per NG         PAST SURGICAL HISTORY:     Past Surgical History:   Procedure Laterality Date   • BIOPSY OF UTERUS LINING  6/2011    neg embx   • COLONOSCOPY N/A 1/10/2018    Procedure: COLONOSCOPY;  Surgeon: Landy Velasquez MD;  Location: [address] [place] [address] Alcohol use:  Yes      Alcohol/week: 0.0 standard drinks      Comment: rare    Drug use: No         REVIEW OF SYSTEMS:   Patient-reported ROS  Constitutional  Sleep Disturbance: admits  Chills: denies  Fever: denies  Weight Gain: denies  Weight Loss: denies swelling, or obvious deformity. Their iliac crest and shoulder heights are symmetrical.     Palpation: Non tender to palpation of the spinous process.  TTP of right lower lumbar paraspinal muscles, and non tender SI joint  ROM: FAROM but pain with extensio lumbar    2. Chronic right-sided lumbar radiculopathy        Yoselin Snowden is a pleasant 60-year-old female who presents today for evaluation of chronic right-sided low back pain with radiation of the right buttock and right lumbar radiculopathy.   I belie

## 2021-10-05 RX ORDER — TOPIRAMATE 25 MG/1
TABLET ORAL
Qty: 90 TABLET | Refills: 1 | Status: SHIPPED | OUTPATIENT
Start: 2021-10-05

## 2021-10-05 RX ORDER — LORATADINE 10 MG/1
TABLET ORAL
Qty: 90 TABLET | Refills: 1 | Status: SHIPPED | OUTPATIENT
Start: 2021-10-05

## 2021-10-13 ENCOUNTER — HOSPITAL ENCOUNTER (OUTPATIENT)
Dept: MRI IMAGING | Age: 56
Discharge: HOME | End: 2021-10-13
Attending: PHYSICAL MEDICINE & REHABILITATION
Payer: COMMERCIAL

## 2021-10-13 ENCOUNTER — TELEPHONE (OUTPATIENT)
Dept: PHYSICAL MEDICINE AND REHAB | Facility: CLINIC | Age: 56
End: 2021-10-13

## 2021-10-13 DIAGNOSIS — M54.16 CHRONIC RIGHT-SIDED LUMBAR RADICULOPATHY: ICD-10-CM

## 2021-10-13 DIAGNOSIS — M47.816 FACET ARTHROPATHY, LUMBAR: ICD-10-CM

## 2021-10-13 PROCEDURE — 72148 MRI LUMBAR SPINE W/O DYE: CPT | Performed by: PHYSICAL MEDICINE & REHABILITATION

## 2021-10-13 NOTE — TELEPHONE ENCOUNTER
DO CIRO Cool  MRI shows 2 disc herniations at L4-5 and L5-S1.  Please have her follow-up with a video visit today or tomorrow           Left detailed message to r/s appt earlier if she'd like

## 2021-10-15 ENCOUNTER — TELEMEDICINE (OUTPATIENT)
Dept: PHYSICAL MEDICINE AND REHAB | Facility: CLINIC | Age: 56
End: 2021-10-15
Payer: COMMERCIAL

## 2021-10-15 ENCOUNTER — TELEPHONE (OUTPATIENT)
Dept: NEUROLOGY | Facility: CLINIC | Age: 56
End: 2021-10-15

## 2021-10-15 DIAGNOSIS — M54.16 RIGHT LUMBAR RADICULOPATHY: ICD-10-CM

## 2021-10-15 DIAGNOSIS — M47.816 FACET ARTHROPATHY, LUMBAR: Primary | ICD-10-CM

## 2021-10-15 PROCEDURE — 99214 OFFICE O/P EST MOD 30 MIN: CPT | Performed by: PHYSICAL MEDICINE & REHABILITATION

## 2021-10-15 NOTE — PROGRESS NOTES
[address]    Telemedicine Visit - Follow Up Evaluation    Telehealth Verbal Consent   I conducted a telehealth visit with Vi Cheema today, 10/15/21, which was completed using two-way, real-time interac occurred recently. She has completed PT and continues her home exercises but did not notice any significant improvement with this. She did take Mobic medication but has not noted any significant improvement.   Pain is still worsened with increased activit • Heart Attack Brother         MI-Cause of death   • Colon Cancer Paternal Grandmother         Cause of death   • Heart Disease Father         CAD   • Diabetes Father         Diabetes/CRF/CAD cause of death   • Other (Other) Father         CRF   • Heart 03/09/2021    AST 8 (L) 03/09/2021    ALT 18 03/09/2021    ALKPHOS 68 05/29/2015    BILT 0.6 03/09/2021    TP 7.2 03/09/2021    ALB 4.0 03/09/2021    GLOBULIN 3.2 03/09/2021    AGRATIO 1.3 05/29/2015     03/09/2021    K 4.1 03/09/2021     03/09 this plan, we will consider an epidural injection in the near future. I have also recommend that she start dedicated PT program after the facet joint injections to see if we can have further improvement.     Follow-up:  For injection    We discussed that a

## 2021-10-15 NOTE — TELEPHONE ENCOUNTER
[employer] for authorization of approval for Right L3-4, L4-5 and L5-S1 Facet joint injection under fluoroscopy cpt codes 05539-GK, 88249-FC, 82084-IJ. New Authorization # [id number] effective 10/18/2021 - 11/16/2021. Will inform  Nursing.

## 2021-10-15 NOTE — TELEPHONE ENCOUNTER
[employer] for authorization of approval for Right L4-5 and L5-S1 Facet joint injection under fluoroscopy cpt codes 05516-MH, 05927-PK. Authorization # [id number] effective 10/18/2021 - 11/16/2021. Will inform Nursing.

## 2021-10-19 NOTE — TELEPHONE ENCOUNTER
Patient has been scheduled for Right L3-4, L4-5 and L5-S1 Facet joint injection under fluoroscopy on 10/25/21 at the [facility] with Dr. Cherylene League.   -Anesthesia type:  Local.  -If receiving MAC or IVC sedation patient will need to get COVID tested 3 days prior even

## 2021-10-25 ENCOUNTER — PATIENT MESSAGE (OUTPATIENT)
Dept: INTERNAL MEDICINE CLINIC | Facility: CLINIC | Age: 56
End: 2021-10-25

## 2021-10-25 ENCOUNTER — NURSE TRIAGE (OUTPATIENT)
Dept: INTERNAL MEDICINE CLINIC | Facility: CLINIC | Age: 56
End: 2021-10-25

## 2021-10-25 ENCOUNTER — OFFICE VISIT (OUTPATIENT)
Dept: SURGERY | Facility: CLINIC | Age: 56
End: 2021-10-25

## 2021-10-25 DIAGNOSIS — R73.03 PREDIABETES: ICD-10-CM

## 2021-10-25 DIAGNOSIS — K21.9 GASTROESOPHAGEAL REFLUX DISEASE, UNSPECIFIED WHETHER ESOPHAGITIS PRESENT: ICD-10-CM

## 2021-10-25 DIAGNOSIS — Z82.49 FAMILY HISTORY OF EARLY CAD: ICD-10-CM

## 2021-10-25 DIAGNOSIS — E11.9 TYPE 2 DIABETES MELLITUS WITHOUT COMPLICATION, WITHOUT LONG-TERM CURRENT USE OF INSULIN (HCC): Primary | ICD-10-CM

## 2021-10-25 PROCEDURE — 64494 INJ PARAVERT F JNT L/S 2 LEV: CPT | Performed by: PHYSICAL MEDICINE & REHABILITATION

## 2021-10-25 PROCEDURE — 64495 INJ PARAVERT F JNT L/S 3 LEV: CPT | Performed by: PHYSICAL MEDICINE & REHABILITATION

## 2021-10-25 PROCEDURE — 64493 INJ PARAVERT F JNT L/S 1 LEV: CPT | Performed by: PHYSICAL MEDICINE & REHABILITATION

## 2021-10-25 NOTE — TELEPHONE ENCOUNTER
see acute encounter 10/25/21. From: Magdalena Berg  To: Chan Haddad MD  Sent: 10/25/2021  9:36 AM CDT  Subject: Anxiety/blood pressure     My anxiety needs to be addressed.  I am finding myself irritable more often and anxious and it’s concerni

## 2021-10-25 NOTE — PROCEDURES
[facility] Z-JOINT/FACET INJECTIONS  NAME:  Sofy Carias    MR #:    [id number] :  10/7/1965     PHYSICIAN:  Tamia Navarro. Miles Devine DO        Operative Report    DATE OF PROCEDURE: 10/25/2021   PREOPERATIVE DIAGNOSES: 1.  Typ joint was injected with a 1 cc solution of 1/2 cc of 40 mg/cc of Kenalog and 1/2 cc of 1% PF lidocaine without epinephrine. After this, the needles were removed. The patient's skin was cleaned. Band-Aids were applied.   The patient was transferred to the

## 2021-10-25 NOTE — TELEPHONE ENCOUNTER
Action Requested: Summary for Provider     []  Critical Lab, Recommendations Needed  [] Need Additional Advice  []   FYI    []   Need Orders  [] Need Medications Sent to Pharmacy  []  Other     SUMMARY:appt made, c/c anxiety, stated might be depress, her s

## 2021-10-25 NOTE — TELEPHONE ENCOUNTER
----- Message from Hans Mendiola sent at 10/25/2021  9:36 AM CDT -----  Regarding: Anxiety/blood pressure   My anxiety needs to be addressed. I am finding myself irritable more often and anxious and it’s concerning me.   I had a cortisone procedure today

## 2021-10-27 ENCOUNTER — TELEPHONE (OUTPATIENT)
Dept: PHYSICAL MEDICINE AND REHAB | Facility: CLINIC | Age: 56
End: 2021-10-27

## 2021-10-27 NOTE — PROGRESS NOTES
Subjective:   Patient ID: Laura Orellana is a 49year old female. Patient presents with: Anxiety: Stts that her blood pressure has been high due to her anxiety.       Pt state she had 3 steroid injections lower back  few days ago and feels better in her l twice weekly to vagina for vaginal burning 42.5 g 0   • Meloxicam 15 MG Oral Tab Take 1 tablet (15 mg total) by mouth daily. 30 tablet 0   • Pantoprazole Sodium 40 MG Oral Tab EC Take 1 tablet (40 mg total) by mouth 2 (two) times daily before meals.  180 ta and time. Psychiatric:         Behavior: Behavior normal.     Blood pressure 148/80, pulse 67, height 5' 3\" (1.6 m), weight 147 lb (66.7 kg), last menstrual period 12/26/2017, SpO2 99 %, not currently breastfeeding. Assessment & Plan:   1.  Josh Shoemaker this encounter       Imaging & Referrals:  None

## 2021-11-28 ENCOUNTER — APPOINTMENT (OUTPATIENT)
Dept: GENERAL RADIOLOGY | Facility: HOSPITAL | Age: 56
End: 2021-11-28
Attending: EMERGENCY MEDICINE
Payer: COMMERCIAL

## 2021-11-28 ENCOUNTER — APPOINTMENT (OUTPATIENT)
Dept: CT IMAGING | Facility: HOSPITAL | Age: 56
End: 2021-11-28
Attending: EMERGENCY MEDICINE
Payer: COMMERCIAL

## 2021-11-28 ENCOUNTER — HOSPITAL ENCOUNTER (EMERGENCY)
Facility: HOSPITAL | Age: 56
Discharge: HOME | End: 2021-11-28
Attending: EMERGENCY MEDICINE
Payer: COMMERCIAL

## 2021-11-28 VITALS
SYSTOLIC BLOOD PRESSURE: 125 MMHG | WEIGHT: 142 LBS | DIASTOLIC BLOOD PRESSURE: 77 MMHG | BODY MASS INDEX: 25.16 KG/M2 | HEART RATE: 66 BPM | TEMPERATURE: 98 F | RESPIRATION RATE: 12 BRPM | OXYGEN SATURATION: 98 % | HEIGHT: 63 IN

## 2021-11-28 DIAGNOSIS — F41.9 ANXIETY: ICD-10-CM

## 2021-11-28 DIAGNOSIS — I10 HYPERTENSION, UNSPECIFIED TYPE: Primary | ICD-10-CM

## 2021-11-28 PROCEDURE — 71260 CT THORAX DX C+: CPT | Performed by: EMERGENCY MEDICINE

## 2021-11-28 PROCEDURE — 80048 BASIC METABOLIC PNL TOTAL CA: CPT | Performed by: EMERGENCY MEDICINE

## 2021-11-28 PROCEDURE — 85025 COMPLETE CBC W/AUTO DIFF WBC: CPT | Performed by: EMERGENCY MEDICINE

## 2021-11-28 PROCEDURE — 71045 X-RAY EXAM CHEST 1 VIEW: CPT | Performed by: EMERGENCY MEDICINE

## 2021-11-28 PROCEDURE — 99285 EMERGENCY DEPT VISIT HI MDM: CPT

## 2021-11-28 PROCEDURE — 85379 FIBRIN DEGRADATION QUANT: CPT | Performed by: EMERGENCY MEDICINE

## 2021-11-28 PROCEDURE — 96360 HYDRATION IV INFUSION INIT: CPT

## 2021-11-28 PROCEDURE — 84484 ASSAY OF TROPONIN QUANT: CPT | Performed by: EMERGENCY MEDICINE

## 2021-11-28 PROCEDURE — 93005 ELECTROCARDIOGRAM TRACING: CPT

## 2021-11-28 PROCEDURE — 93010 ELECTROCARDIOGRAM REPORT: CPT | Performed by: EMERGENCY MEDICINE

## 2021-11-28 RX ORDER — ALPRAZOLAM 0.25 MG/1
0.25 TABLET ORAL ONCE
Status: COMPLETED | OUTPATIENT
Start: 2021-11-28 | End: 2021-11-28

## 2021-11-28 RX ORDER — LISINOPRIL 5 MG/1
5 TABLET ORAL DAILY
Qty: 30 TABLET | Refills: 0 | Status: SHIPPED | OUTPATIENT
Start: 2021-11-28 | End: 2021-12-28

## 2021-11-28 RX ORDER — ALPRAZOLAM 0.25 MG/1
0.25 TABLET ORAL
Qty: 15 TABLET | Refills: 0 | Status: SHIPPED | OUTPATIENT
Start: 2021-11-28

## 2021-11-28 NOTE — ED INITIAL ASSESSMENT (HPI)
headache onset last week. Substernal and left chest pain onset yesterday with pain radiating to left arm. Headache also returned yesterday. Also states her bp has been running high. No history of htn.

## 2021-11-28 NOTE — ED PROVIDER NOTES
Patient Seen in: [facility] AND [facility] Emergency Department      History   Patient presents with:  Chest Pain Angina    Stated Complaint: chest pain,headache, nose bleed    Subjective:   HPI    The patient is a 61-year-old female with a history of diet-contr Drug use: No           Family history: Brother  at age 39 of MI with hypertension    Review of Systems    Positive for stated complaint: chest pain,headache, nose bleed  Other systems are as noted in HPI. Constitutional and vital signs reviewed. and time. Deep Tendon Reflexes: Reflexes are normal and symmetric. Psychiatric:         Mood and Affect: Mood is anxious.          Judgment: Judgment normal.       Differential diagnosis includes anxiety, hypertension, acute coronary syndrome 68  , sinus, normal    Radiology findings: XR CHEST AP PORTABLE  (CPT=71045)    Result Date: 11/28/2021  CONCLUSION: Normal examination.      Dictated by (CST): Brianne Lisa MD on 11/28/2021 at 11:57 AM     Finalized by (CST): Brianne Lisa MD on 11/28/2

## 2021-12-02 NOTE — PROGRESS NOTES
Subjective:   Patient ID: Keyona Garcia is a 49year old female. Patient presents with:  ER F/U: Stts she was seen at [address] [place] [address] ER on 11/28/21 due to anxiety, stress, and high blood pressure.           Patient states she was in emergency room due to high stress stool, constipation, diarrhea, nausea, rectal pain and vomiting. Genitourinary: Negative for dysuria and frequency. Skin: Negative for pallor. Neurological: Negative for dizziness and headaches. Psychiatric/Behavioral: Negative for confusion.  The p Vascular: No JVD. Cardiovascular:      Rate and Rhythm: Normal rate and regular rhythm. Heart sounds: Normal heart sounds. No murmur heard. Pulmonary:      Effort: Pulmonary effort is normal. No respiratory distress.       Breath sounds: No PANEL (14); Future  - HEMOGLOBIN A1C; Future  - LIPID PANEL; Future  - URINALYSIS WITH CULTURE REFLEX; Future  - TSH W REFLEX TO FREE T4; Future    3.  Prediabetes  Keep with low sugar and carbohydrate diet walk as tolerated    - COMP METABOLIC PANEL (14);

## 2021-12-03 PROBLEM — F41.9 ANXIETY: Status: ACTIVE | Noted: 2021-12-03

## 2021-12-08 ENCOUNTER — TELEPHONE (OUTPATIENT)
Dept: INTERNAL MEDICINE CLINIC | Facility: CLINIC | Age: 56
End: 2021-12-08

## 2021-12-08 RX ORDER — PANTOPRAZOLE SODIUM 40 MG/1
40 TABLET, DELAYED RELEASE ORAL
Qty: 180 TABLET | Refills: 3 | Status: SHIPPED | OUTPATIENT
Start: 2021-12-08

## 2021-12-08 NOTE — TELEPHONE ENCOUNTER
Patient is requesting a refill for Pantoprazole Sodium 40 MG Oral Tab EC. Please advise. Patient is out of medication.

## 2021-12-08 NOTE — TELEPHONE ENCOUNTER
Dr. Mario Manley      Most recent RX is for Famotidine , patient is requesting Pantoprazole     Please advise and thank you.       Medication Detail    Medication Quantity Refills Start End   famotidine 20 MG Oral Tab 90 tablet 0 12/2/2021    Sig:   Take 1

## 2021-12-23 ENCOUNTER — TELEPHONE (OUTPATIENT)
Dept: INTERNAL MEDICINE CLINIC | Facility: CLINIC | Age: 56
End: 2021-12-23

## 2021-12-23 NOTE — TELEPHONE ENCOUNTER
Pt asking if she should take Pantoprazole 40 mg or Famotidine 20 mg. States she was given prescription for Famotidine and also has prescription for Pantoprazole.      Pt states Famotidine was prescribed at 12/2/21 OV when the Sertraline 25 mg was prescribed

## 2021-12-23 NOTE — TELEPHONE ENCOUNTER
Doctors recommendations sent via MyChart as requested by patient below. No further action required at this time.

## 2021-12-23 NOTE — TELEPHONE ENCOUNTER
Patient is on pantoprazole twice daily    famotidine should be used only if she has still heartburns or indigestion  on top pf  pantoprazole      Otherwise if she does not have any heartburns or  indigestion patient use   Only pantoprazole twice daily .

## 2022-01-05 NOTE — TELEPHONE ENCOUNTER
Rx request for Sertraline 25 mg, PASSED protocol. Noted per LOV patient was to follow up in 4 weeks to discuss anxiety. Please review and sign off if appropriate. Thank you.      Refill Protocol Appointment Criteria  · Appointment scheduled in the past 6 mo

## 2022-01-07 ENCOUNTER — NURSE ONLY (OUTPATIENT)
Dept: LAB | Facility: HOSPITAL | Age: 57
End: 2022-01-07
Attending: INTERNAL MEDICINE
Payer: COMMERCIAL

## 2022-01-07 ENCOUNTER — TELEPHONE (OUTPATIENT)
Dept: INTERNAL MEDICINE CLINIC | Facility: CLINIC | Age: 57
End: 2022-01-07

## 2022-01-07 DIAGNOSIS — Z11.52 ENCOUNTER FOR SCREENING FOR COVID-19: Primary | ICD-10-CM

## 2022-01-07 DIAGNOSIS — Z11.52 ENCOUNTER FOR SCREENING FOR COVID-19: ICD-10-CM

## 2022-01-07 NOTE — TELEPHONE ENCOUNTER
Patient has several coworkers who have come back positive for Covid all week. She has a headache, body aches, congestion. Placed Covid order per protocol.

## 2022-01-10 LAB — SARS-COV-2 RNA RESP QL NAA+PROBE: DETECTED

## 2022-01-13 RX ORDER — SERTRALINE HYDROCHLORIDE 25 MG/1
25 TABLET, FILM COATED ORAL DAILY
Qty: 30 TABLET | Refills: 0 | Status: SHIPPED | OUTPATIENT
Start: 2022-01-13

## 2022-01-13 RX ORDER — SERTRALINE HYDROCHLORIDE 25 MG/1
TABLET, FILM COATED ORAL
Qty: 30 TABLET | Refills: 0 | Status: SHIPPED | OUTPATIENT
Start: 2022-01-13

## 2022-01-14 RX ORDER — SERTRALINE HYDROCHLORIDE 25 MG/1
25 TABLET, FILM COATED ORAL DAILY
Qty: 90 TABLET | Refills: 1 | Status: SHIPPED | OUTPATIENT
Start: 2022-01-14

## 2022-01-14 NOTE — TELEPHONE ENCOUNTER
With POP UP HIGH WARNING  between sertraline and meloxicam , unable to approved per protocol     Refill passed per [facility], [facility] protocol.      Requested Prescriptions   Pending Prescriptions Disp Refills    sertraline 25 MG Oral Tab 30 tablet 0     Sig

## 2022-03-03 ENCOUNTER — PATIENT MESSAGE (OUTPATIENT)
Dept: INTERNAL MEDICINE CLINIC | Facility: CLINIC | Age: 57
End: 2022-03-03

## 2022-03-10 RX ORDER — SERTRALINE HYDROCHLORIDE 25 MG/1
TABLET, FILM COATED ORAL
Qty: 30 TABLET | Refills: 0 | OUTPATIENT
Start: 2022-03-10

## 2022-03-12 RX ORDER — SERTRALINE HYDROCHLORIDE 25 MG/1
25 TABLET, FILM COATED ORAL DAILY
Qty: 90 TABLET | Refills: 1 | OUTPATIENT
Start: 2022-03-12

## 2022-03-14 ENCOUNTER — APPOINTMENT (OUTPATIENT)
Dept: CT IMAGING | Age: 57
End: 2022-03-14
Attending: NURSE PRACTITIONER
Payer: COMMERCIAL

## 2022-03-14 ENCOUNTER — HOSPITAL ENCOUNTER (OUTPATIENT)
Age: 57
Discharge: HOME | End: 2022-03-14
Payer: COMMERCIAL

## 2022-03-14 VITALS
TEMPERATURE: 98 F | DIASTOLIC BLOOD PRESSURE: 77 MMHG | RESPIRATION RATE: 17 BRPM | HEART RATE: 53 BPM | SYSTOLIC BLOOD PRESSURE: 151 MMHG | OXYGEN SATURATION: 100 %

## 2022-03-14 DIAGNOSIS — R10.9 ABDOMINAL PAIN OF UNKNOWN ETIOLOGY: Primary | ICD-10-CM

## 2022-03-14 LAB
#MXD IC: 0.5 X10ˆ3/UL (ref 0.1–1)
BILIRUB UR QL STRIP: NEGATIVE
BUN BLD-MCNC: 23 MG/DL (ref 7–18)
CHLORIDE BLD-SCNC: 108 MMOL/L (ref 98–112)
CO2 BLD-SCNC: 24 MMOL/L (ref 21–32)
COLOR UR: YELLOW
CREAT BLD-MCNC: 1 MG/DL
GLUCOSE BLD-MCNC: 100 MG/DL (ref 70–99)
GLUCOSE UR STRIP-MCNC: NEGATIVE MG/DL
HCT VFR BLD AUTO: 39.4 %
HCT VFR BLD CALC: 40 %
HGB BLD-MCNC: 13.2 G/DL
HGB UR QL STRIP: NEGATIVE
ISTAT IONIZED CALCIUM FOR CHEM 8: 1.25 MMOL/L (ref 1.12–1.32)
KETONES UR STRIP-MCNC: NEGATIVE MG/DL
LEUKOCYTE ESTERASE UR QL STRIP: NEGATIVE
LYMPHOCYTES # BLD AUTO: 1.7 X10ˆ3/UL (ref 1–4)
LYMPHOCYTES NFR BLD AUTO: 31.7 %
MCH RBC QN AUTO: 30.2 PG (ref 26–34)
MCHC RBC AUTO-ENTMCNC: 33.5 G/DL (ref 31–37)
MCV RBC AUTO: 90.2 FL (ref 80–100)
MIXED CELL %: 8.6 %
NEUTROPHILS # BLD AUTO: 3.1 X10ˆ3/UL (ref 1.5–7.7)
NEUTROPHILS NFR BLD AUTO: 59.7 %
NITRITE UR QL STRIP: NEGATIVE
PH UR STRIP: 7 [PH]
PLATELET # BLD AUTO: 199 X10ˆ3/UL (ref 150–450)
POTASSIUM BLD-SCNC: 4 MMOL/L (ref 3.6–5.1)
PROT UR STRIP-MCNC: NEGATIVE MG/DL
RBC # BLD AUTO: 4.37 X10ˆ6/UL
SODIUM BLD-SCNC: 143 MMOL/L (ref 136–145)
SP GR UR STRIP: 1.02
UROBILINOGEN UR STRIP-ACNC: <2 MG/DL
WBC # BLD AUTO: 5.3 X10ˆ3/UL (ref 4–11)

## 2022-03-14 PROCEDURE — 85025 COMPLETE CBC W/AUTO DIFF WBC: CPT | Performed by: NURSE PRACTITIONER

## 2022-03-14 PROCEDURE — 81002 URINALYSIS NONAUTO W/O SCOPE: CPT

## 2022-03-14 PROCEDURE — 80047 BASIC METABLC PNL IONIZED CA: CPT

## 2022-03-14 PROCEDURE — 74177 CT ABD & PELVIS W/CONTRAST: CPT | Performed by: NURSE PRACTITIONER

## 2022-03-14 PROCEDURE — 99215 OFFICE O/P EST HI 40 MIN: CPT

## 2022-03-14 PROCEDURE — 96374 THER/PROPH/DIAG INJ IV PUSH: CPT

## 2022-03-14 PROCEDURE — 96361 HYDRATE IV INFUSION ADD-ON: CPT

## 2022-03-14 PROCEDURE — 99214 OFFICE O/P EST MOD 30 MIN: CPT

## 2022-03-14 RX ORDER — KETOROLAC TROMETHAMINE 30 MG/ML
15 INJECTION, SOLUTION INTRAMUSCULAR; INTRAVENOUS ONCE
Status: COMPLETED | OUTPATIENT
Start: 2022-03-14 | End: 2022-03-14

## 2022-03-14 RX ORDER — DICYCLOMINE HYDROCHLORIDE 10 MG/5ML
20 SOLUTION ORAL ONCE
Status: COMPLETED | OUTPATIENT
Start: 2022-03-14 | End: 2022-03-14

## 2022-03-14 RX ORDER — SODIUM CHLORIDE 9 MG/ML
1000 INJECTION, SOLUTION INTRAVENOUS ONCE
Status: COMPLETED | OUTPATIENT
Start: 2022-03-14 | End: 2022-03-14

## 2022-03-14 RX ORDER — DICYCLOMINE HCL 20 MG
20 TABLET ORAL 4 TIMES DAILY PRN
Qty: 30 TABLET | Refills: 0 | Status: SHIPPED | OUTPATIENT
Start: 2022-03-14 | End: 2022-04-13

## 2022-03-14 RX ORDER — MAGNESIUM HYDROXIDE/ALUMINUM HYDROXICE/SIMETHICONE 120; 1200; 1200 MG/30ML; MG/30ML; MG/30ML
30 SUSPENSION ORAL ONCE
Status: COMPLETED | OUTPATIENT
Start: 2022-03-14 | End: 2022-03-14

## 2022-03-14 NOTE — ED INITIAL ASSESSMENT (HPI)
PATIENT ARRIVED AMBULATORY TO ROOM C/O SYMPTOMS THAT STARTED 4 DAYS AGO. +GENERALIZED EPIGASTRIC ABDOMINAL PAIN RADIATING TO THE MID BACK. PAIN WORSENS AFTER EATING. PATIENT ALSO REPORTS DISCOLORED URINE. PATIENT STATES \"IT'S NOT RED BUT IT'S A WEIRD COLOR\" NO PAIN WHEN URINATING.  NO FEVERS

## 2022-03-15 ENCOUNTER — TELEPHONE (OUTPATIENT)
Dept: GASTROENTEROLOGY | Facility: CLINIC | Age: 57
End: 2022-03-15

## 2022-03-15 NOTE — TELEPHONE ENCOUNTER
Patient last seen in 2018 and having stomach issues and would like to be seen sooner then Sept. Patient was seen in urgent care yesterday for abdominal pain. Please call at [phone],thanks.

## 2022-03-15 NOTE — TELEPHONE ENCOUNTER
Dr Shannon Caba,    I spoke to the pt and went over your recommendations with her    She was not sure when she should take dicyclomine.     I explained to her it is an antispasmodic so she should take it for any colicky type stomach or intestinal cramping from IBS    She voices understanding and will come to her f/u appt in April

## 2022-03-15 NOTE — TELEPHONE ENCOUNTER
Thank you Moe Stevenson!! My previous EGD/colonoscopy examinations of 1/10/2018 reviewed. Normal esophagus, no hiatal hernia. There really should not be much wrong if she is taking twice daily PPI. CT scan of yesterday 3/14/2022 reviewed. CT scan abdomen and pelvis with IV contrast.    Yesterday's basic labs looked okay. Ultrasound exam of 3/26/2021 showed normal gallbladder, no cholelithiasis. That follow-up appointment 4/29/2022 sounds perfect. If you talk to Ms. Mendiola again, please reassure her that this appears to be something entirely benign. She should feel better soon. I was about to recommend dicyclomine antispasmodic when I realized that was prescribed to her yesterday. If you speak to her again, please ask whether she tried that.      - cb

## 2022-03-15 NOTE — TELEPHONE ENCOUNTER
Dr Miladys Snow,    I spoke to the pt on the phone    She has been experiencing abdominal pain. She went to the [address] [place] [address] yesterday & they did a CT scan    She states it feels like her stomach is twisting & the pain is readiating to her back    She had been eating a gluten free diet and feels this was helping her    She went out for breakfast the other day and she thinks that even though she asked for gluten free pancakes, they did not give her gluten free. She was miserable after eating the pancakes    Her diet is healthy. No alcohol, no soda or caffeine. She uses an air fryer when she cooks    Last colon/EGD was 01/10/2018, mild chronic gastritis    She takes pantoprazole 40 mg BID prescribed by Dr Dana Ngo     She notices the pain is worse when she eats food    She also has a lot of heartburn    She does have diarrhea every other day. No constipation    I did schedule a f/u appt.  I think she would like to get an EGD    Your Appointments    Friday April 29, 2022  2:00 PM  Follow Up Visit with Jazmin Bolanos, [address] [place] [address], [facility] 23, 024 [facility]  (Curt 4[address]) [address]  [phone]

## 2022-04-12 RX ORDER — TOPIRAMATE 25 MG/1
TABLET ORAL
Qty: 90 TABLET | Refills: 1 | Status: SHIPPED | OUTPATIENT
Start: 2022-04-12

## 2022-04-12 RX ORDER — LORATADINE 10 MG/1
10 TABLET ORAL DAILY
Qty: 90 TABLET | Refills: 1 | Status: SHIPPED | OUTPATIENT
Start: 2022-04-12

## 2022-04-12 NOTE — TELEPHONE ENCOUNTER
Refill passed per [email or web address] [facility] protocol.   Requested Prescriptions   Pending Prescriptions Disp Refills    TOPIRAMATE 25 MG Oral Tab [Pharmacy Med Name: TOPIRAMATE 25MG TABLETS] 90 tablet 1     Sig: TAKE 1 TABLET(25 MG) BY MOUTH DAILY        Neurology Medications Passed - 4/12/2022  6:40 AM        Passed - Appointment in the past 6 or next 3 months           LORATADINE 10 MG Oral Tab [Pharmacy Med Name: LORATADINE 10MG TABLETS] 90 tablet 1     Sig: TAKE 1 TABLET(10 MG) BY MOUTH DAILY        Allergy Medication Protocol Passed - 4/12/2022  6:40 AM        Passed - Appoinment in past 12 or next 3 months            Recent Outpatient Visits              3 weeks ago Acute maxillary sinusitis, recurrence not specified    [facility] Virtual Visit LESVIA [place]    E-Visit    3 months ago Encounter for screening for [address] (Rony Cantor)    Nurse Only    4 months ago Elisabet Mehta 157, [facility] Geoff Rodriguez Seltjarnarnes, MD    Office Visit    5 months ago Anxiety and depression    [facility], [facility], [address], [place] Krystal Rey MD    Office Visit    5 months ago Type 2 diabetes mellitus without complication, without long-term current use of insulin ([facility] [address]    [facility] Rob Hughes DO    Office Visit          Future Appointments         Provider Department Appt Notes    In 2 weeks Corbin Krabbe, MD [place] [employer] [facility], [address], [place]  IC/f/u/tp

## 2022-04-26 ENCOUNTER — TELEPHONE (OUTPATIENT)
Dept: INTERNAL MEDICINE CLINIC | Facility: CLINIC | Age: 57
End: 2022-04-26

## 2022-04-29 ENCOUNTER — TELEPHONE (OUTPATIENT)
Dept: GASTROENTEROLOGY | Facility: CLINIC | Age: 57
End: 2022-04-29

## 2022-04-29 ENCOUNTER — OFFICE VISIT (OUTPATIENT)
Dept: GASTROENTEROLOGY | Facility: CLINIC | Age: 57
End: 2022-04-29
Payer: COMMERCIAL

## 2022-04-29 VITALS
HEIGHT: 63 IN | SYSTOLIC BLOOD PRESSURE: 121 MMHG | WEIGHT: 146.19 LBS | HEART RATE: 69 BPM | BODY MASS INDEX: 25.9 KG/M2 | DIASTOLIC BLOOD PRESSURE: 82 MMHG

## 2022-04-29 DIAGNOSIS — R10.84 ABDOMINAL PAIN, GENERALIZED: ICD-10-CM

## 2022-04-29 DIAGNOSIS — N85.9 LESION OF ENDOMETRIUM: Primary | ICD-10-CM

## 2022-04-29 DIAGNOSIS — R10.13 DYSPEPSIA: ICD-10-CM

## 2022-04-29 DIAGNOSIS — K52.9 CHRONIC DIARRHEA: ICD-10-CM

## 2022-04-29 DIAGNOSIS — K21.9 GASTROESOPHAGEAL REFLUX DISEASE, UNSPECIFIED WHETHER ESOPHAGITIS PRESENT: ICD-10-CM

## 2022-04-29 PROCEDURE — 3074F SYST BP LT 130 MM HG: CPT | Performed by: INTERNAL MEDICINE

## 2022-04-29 PROCEDURE — 3079F DIAST BP 80-89 MM HG: CPT | Performed by: INTERNAL MEDICINE

## 2022-04-29 PROCEDURE — 99214 OFFICE O/P EST MOD 30 MIN: CPT | Performed by: INTERNAL MEDICINE

## 2022-04-29 PROCEDURE — 3008F BODY MASS INDEX DOCD: CPT | Performed by: INTERNAL MEDICINE

## 2022-04-29 NOTE — TELEPHONE ENCOUNTER
I spoke with the pharmacist who states Rifaximin is covered by patient's insurance and will cost patient $10 out of pocket.     FYI sent to Dr. Miladys Snow

## 2022-04-29 NOTE — TELEPHONE ENCOUNTER
GI RNs,    Please check with [address] regarding coverage/co-pay for today's rifaximin antibiotic prescription

## 2022-05-23 ENCOUNTER — HOSPITAL ENCOUNTER (OUTPATIENT)
Dept: ULTRASOUND IMAGING | Facility: HOSPITAL | Age: 57
Discharge: HOME | End: 2022-05-23
Attending: INTERNAL MEDICINE
Payer: COMMERCIAL

## 2022-05-23 DIAGNOSIS — N85.9 LESION OF ENDOMETRIUM: ICD-10-CM

## 2022-05-23 PROCEDURE — 76830 TRANSVAGINAL US NON-OB: CPT | Performed by: INTERNAL MEDICINE

## 2022-05-23 PROCEDURE — 76856 US EXAM PELVIC COMPLETE: CPT | Performed by: INTERNAL MEDICINE

## 2022-06-12 ENCOUNTER — TELEPHONE (OUTPATIENT)
Dept: GASTROENTEROLOGY | Facility: CLINIC | Age: 57
End: 2022-06-12

## 2022-06-12 NOTE — TELEPHONE ENCOUNTER
GI RNs,    Please call Ms. Mendiola to advise that the recent pelvic ultrasound 5/23/2022 looked good. I ordered this during our office visit of 4/29/2022 to evaluate abnormal appearing endometrium/endometrial canal (lining of uterus) on a recent CT scan in March. Her uterus and endometrium look perfectly fine on the ultrasound, but a small likely complex cyst on one of her ovaries was still there. Apparently that was stable from previous exams going back to 2018. She should discuss these results with her gynecologist.    Please recommend/recall for repeat pelvic ultrasound exam in 6 months.     - cb

## 2022-06-13 NOTE — TELEPHONE ENCOUNTER
I spoke to the pt and went over Dr galloway's recommendations and f/u instructions below.  Pt voices understanding    She see Dr Jamil Baires on 06/23/2022    Recall placed to repeat pelvic US in 6 months to f/u on right ovarian complex cyst

## 2022-06-23 ENCOUNTER — OFFICE VISIT (OUTPATIENT)
Dept: OBGYN CLINIC | Facility: CLINIC | Age: 57
End: 2022-06-23
Payer: COMMERCIAL

## 2022-06-23 VITALS
SYSTOLIC BLOOD PRESSURE: 107 MMHG | DIASTOLIC BLOOD PRESSURE: 71 MMHG | HEART RATE: 60 BPM | BODY MASS INDEX: 25 KG/M2 | WEIGHT: 140 LBS

## 2022-06-23 DIAGNOSIS — Z12.31 SCREENING MAMMOGRAM, ENCOUNTER FOR: ICD-10-CM

## 2022-06-23 DIAGNOSIS — N83.201 CYST OF RIGHT OVARY: Primary | ICD-10-CM

## 2022-06-23 PROCEDURE — 3074F SYST BP LT 130 MM HG: CPT | Performed by: OBSTETRICS & GYNECOLOGY

## 2022-06-23 PROCEDURE — 3078F DIAST BP <80 MM HG: CPT | Performed by: OBSTETRICS & GYNECOLOGY

## 2022-06-23 PROCEDURE — 99213 OFFICE O/P EST LOW 20 MIN: CPT | Performed by: OBSTETRICS & GYNECOLOGY

## 2022-08-05 ENCOUNTER — HOSPITAL ENCOUNTER (OUTPATIENT)
Dept: MAMMOGRAPHY | Facility: HOSPITAL | Age: 57
Discharge: HOME | End: 2022-08-05
Attending: OBSTETRICS & GYNECOLOGY
Payer: COMMERCIAL

## 2022-08-05 ENCOUNTER — LAB ENCOUNTER (OUTPATIENT)
Dept: LAB | Facility: HOSPITAL | Age: 57
End: 2022-08-05
Attending: INTERNAL MEDICINE
Payer: COMMERCIAL

## 2022-08-05 DIAGNOSIS — R73.03 PREDIABETES: ICD-10-CM

## 2022-08-05 DIAGNOSIS — Z12.31 SCREENING MAMMOGRAM, ENCOUNTER FOR: ICD-10-CM

## 2022-08-05 DIAGNOSIS — Z00.00 PE (PHYSICAL EXAM), ROUTINE: ICD-10-CM

## 2022-08-05 LAB
ALBUMIN SERPL-MCNC: 3.7 G/DL (ref 3.4–5)
ALBUMIN/GLOB SERPL: 1.1 {RATIO} (ref 1–2)
ALP LIVER SERPL-CCNC: 93 U/L
ALT SERPL-CCNC: 17 U/L
ANION GAP SERPL CALC-SCNC: 4 MMOL/L (ref 0–18)
AST SERPL-CCNC: 11 U/L (ref 15–37)
BASOPHILS # BLD AUTO: 0.04 X10(3) UL (ref 0–0.2)
BASOPHILS NFR BLD AUTO: 0.6 %
BILIRUB SERPL-MCNC: 0.3 MG/DL (ref 0.1–2)
BILIRUB UR QL: NEGATIVE
BUN BLD-MCNC: 25 MG/DL (ref 7–18)
BUN/CREAT SERPL: 21.2 (ref 10–20)
CALCIUM BLD-MCNC: 8.8 MG/DL (ref 8.5–10.1)
CHLORIDE SERPL-SCNC: 112 MMOL/L (ref 98–112)
CHOLEST SERPL-MCNC: 203 MG/DL (ref ?–200)
CLARITY UR: CLEAR
CO2 SERPL-SCNC: 27 MMOL/L (ref 21–32)
COLOR UR: YELLOW
CREAT BLD-MCNC: 1.18 MG/DL
DEPRECATED RDW RBC AUTO: 41.8 FL (ref 35.1–46.3)
EOSINOPHIL # BLD AUTO: 0.14 X10(3) UL (ref 0–0.7)
EOSINOPHIL NFR BLD AUTO: 2.2 %
ERYTHROCYTE [DISTWIDTH] IN BLOOD BY AUTOMATED COUNT: 12.1 % (ref 11–15)
EST. AVERAGE GLUCOSE BLD GHB EST-MCNC: 114 MG/DL (ref 68–126)
FASTING PATIENT LIPID ANSWER: YES
FASTING STATUS PATIENT QL REPORTED: YES
GFR SERPLBLD BASED ON 1.73 SQ M-ARVRAT: 55 ML/MIN/1.73M2 (ref 60–?)
GLOBULIN PLAS-MCNC: 3.5 G/DL (ref 2.8–4.4)
GLUCOSE BLD-MCNC: 104 MG/DL (ref 70–99)
GLUCOSE UR-MCNC: NEGATIVE MG/DL
HBA1C MFR BLD: 5.6 % (ref ?–5.7)
HCT VFR BLD AUTO: 40.9 %
HDLC SERPL-MCNC: 59 MG/DL (ref 40–59)
HGB BLD-MCNC: 12.9 G/DL
IMM GRANULOCYTES # BLD AUTO: 0.02 X10(3) UL (ref 0–1)
IMM GRANULOCYTES NFR BLD: 0.3 %
KETONES UR-MCNC: NEGATIVE MG/DL
LDLC SERPL CALC-MCNC: 124 MG/DL (ref ?–100)
LEUKOCYTE ESTERASE UR QL STRIP.AUTO: NEGATIVE
LYMPHOCYTES # BLD AUTO: 2.19 X10(3) UL (ref 1–4)
LYMPHOCYTES NFR BLD AUTO: 34.5 %
MCH RBC QN AUTO: 29.7 PG (ref 26–34)
MCHC RBC AUTO-ENTMCNC: 31.5 G/DL (ref 31–37)
MCV RBC AUTO: 94 FL
MONOCYTES # BLD AUTO: 0.47 X10(3) UL (ref 0.1–1)
MONOCYTES NFR BLD AUTO: 7.4 %
NEUTROPHILS # BLD AUTO: 3.49 X10 (3) UL (ref 1.5–7.7)
NEUTROPHILS # BLD AUTO: 3.49 X10(3) UL (ref 1.5–7.7)
NEUTROPHILS NFR BLD AUTO: 55 %
NITRITE UR QL STRIP.AUTO: NEGATIVE
NONHDLC SERPL-MCNC: 144 MG/DL (ref ?–130)
OSMOLALITY SERPL CALC.SUM OF ELEC: 301 MOSM/KG (ref 275–295)
PH UR: 5.5 [PH] (ref 5–8)
PLATELET # BLD AUTO: 245 10(3)UL (ref 150–450)
POTASSIUM SERPL-SCNC: 4.5 MMOL/L (ref 3.5–5.1)
PROT SERPL-MCNC: 7.2 G/DL (ref 6.4–8.2)
PROT UR-MCNC: NEGATIVE MG/DL
RBC # BLD AUTO: 4.35 X10(6)UL
SODIUM SERPL-SCNC: 143 MMOL/L (ref 136–145)
SP GR UR STRIP: >=1.03 (ref 1–1.03)
TRIGL SERPL-MCNC: 111 MG/DL (ref 30–149)
TSI SER-ACNC: 1.16 MIU/ML (ref 0.36–3.74)
UROBILINOGEN UR STRIP-ACNC: 0.2
VLDLC SERPL CALC-MCNC: 20 MG/DL (ref 0–30)
WBC # BLD AUTO: 6.4 X10(3) UL (ref 4–11)

## 2022-08-05 PROCEDURE — 36415 COLL VENOUS BLD VENIPUNCTURE: CPT

## 2022-08-05 PROCEDURE — 80053 COMPREHEN METABOLIC PANEL: CPT

## 2022-08-05 PROCEDURE — 83036 HEMOGLOBIN GLYCOSYLATED A1C: CPT

## 2022-08-05 PROCEDURE — 3044F HG A1C LEVEL LT 7.0%: CPT | Performed by: INTERNAL MEDICINE

## 2022-08-05 PROCEDURE — 85025 COMPLETE CBC W/AUTO DIFF WBC: CPT

## 2022-08-05 PROCEDURE — 77067 SCR MAMMO BI INCL CAD: CPT | Performed by: OBSTETRICS & GYNECOLOGY

## 2022-08-05 PROCEDURE — 77063 BREAST TOMOSYNTHESIS BI: CPT | Performed by: OBSTETRICS & GYNECOLOGY

## 2022-08-05 PROCEDURE — 80061 LIPID PANEL: CPT

## 2022-08-05 PROCEDURE — 84443 ASSAY THYROID STIM HORMONE: CPT

## 2022-08-05 PROCEDURE — 81001 URINALYSIS AUTO W/SCOPE: CPT

## 2022-08-05 PROCEDURE — 81015 MICROSCOPIC EXAM OF URINE: CPT

## 2022-08-08 ENCOUNTER — TELEPHONE (OUTPATIENT)
Dept: OBGYN CLINIC | Facility: CLINIC | Age: 57
End: 2022-08-08

## 2022-08-10 ENCOUNTER — HOSPITAL ENCOUNTER (OUTPATIENT)
Dept: ULTRASOUND IMAGING | Age: 57
Discharge: HOME | End: 2022-08-10
Attending: INTERNAL MEDICINE
Payer: COMMERCIAL

## 2022-08-10 DIAGNOSIS — N28.9 FUNCTION KIDNEY DECREASED: ICD-10-CM

## 2022-08-10 DIAGNOSIS — N28.1 KIDNEY CYSTS: ICD-10-CM

## 2022-08-10 PROCEDURE — 76775 US EXAM ABDO BACK WALL LIM: CPT | Performed by: INTERNAL MEDICINE

## 2022-09-15 ENCOUNTER — OFFICE VISIT (OUTPATIENT)
Dept: OBGYN CLINIC | Facility: CLINIC | Age: 57
End: 2022-09-15
Payer: COMMERCIAL

## 2022-09-15 VITALS
DIASTOLIC BLOOD PRESSURE: 72 MMHG | BODY MASS INDEX: 26 KG/M2 | WEIGHT: 146 LBS | HEART RATE: 66 BPM | SYSTOLIC BLOOD PRESSURE: 128 MMHG

## 2022-09-15 DIAGNOSIS — Z01.419 WELL WOMAN EXAM: Primary | ICD-10-CM

## 2022-09-15 DIAGNOSIS — Z12.4 SCREENING FOR MALIGNANT NEOPLASM OF CERVIX: ICD-10-CM

## 2022-09-15 DIAGNOSIS — N89.8 VAGINAL DRYNESS: ICD-10-CM

## 2022-09-15 PROCEDURE — 3078F DIAST BP <80 MM HG: CPT | Performed by: OBSTETRICS & GYNECOLOGY

## 2022-09-15 PROCEDURE — 3074F SYST BP LT 130 MM HG: CPT | Performed by: OBSTETRICS & GYNECOLOGY

## 2022-09-15 PROCEDURE — 99396 PREV VISIT EST AGE 40-64: CPT | Performed by: OBSTETRICS & GYNECOLOGY

## 2022-09-16 LAB — HPV I/H RISK 1 DNA SPEC QL NAA+PROBE: NEGATIVE

## 2022-09-23 NOTE — TELEPHONE ENCOUNTER
Please review. Protocol failed. LOV 12/2021. No future appointments.     Requested Prescriptions   Pending Prescriptions Disp Refills    SERTRALINE 25 MG Oral Tab [Pharmacy Med Name: SERTRALINE 25MG TABLETS] 90 tablet 1     Sig: TAKE 1 TABLET(25 MG) BY MOUTH DAILY        Psychiatric Non-Scheduled (Anti-Anxiety) Failed - 9/23/2022  3:39 AM        Failed - In person appointment or virtual visit in the past 6 mos or appointment in next 3 mos       Recent Outpatient Visits              1 week ago Well woman exam    [facility], [address], [place], [place]    Office Visit    3 months ago Cyst of right ovary    [facility], [address] [facility], [facility],     Office Visit    4 months ago Lesion of endometrium    [address] C[address] Felipe MD    Office Visit    6 months ago Acute maxillary sinusitis, recurrence not specified    [facility] Virtual Visit Markus [place], APRN    E-Visit    8 months ago Encounter for screening for [address] [place] [address])    Nurse Only                       Recent Outpatient Visits              1 week ago Well woman exam    [facility], [address], [facility],     Office Visit    3 months ago Cyst of right ovary    [facility], [address], [facility],     Office Visit    4 months ago Lesion of endometrium    [address] Felipe MD    Office Visit    6 months ago Acute maxillary sinusitis, recurrence not specified    [facility] Virtual Visit Markus [place], APRN    E-Visit    8 months ago Encounter for screening for [address] [place] [address])    Nurse Only

## 2022-09-29 RX ORDER — SERTRALINE HYDROCHLORIDE 25 MG/1
25 TABLET, FILM COATED ORAL DAILY
Qty: 90 TABLET | Refills: 0 | Status: SHIPPED | OUTPATIENT
Start: 2022-09-29

## 2022-11-14 ENCOUNTER — TELEPHONE (OUTPATIENT)
Facility: CLINIC | Age: 57
End: 2022-11-14

## 2022-11-14 DIAGNOSIS — N83.9 LESION OF RIGHT OVARY: Primary | ICD-10-CM

## 2022-11-14 NOTE — TELEPHONE ENCOUNTER
Patient outreach message received:    Recall placed to repeat pelvic US in 6 months to f/u on right ovarian complex cyst

## 2022-11-14 NOTE — TELEPHONE ENCOUNTER
Repeat Pelvic US in 6 months to f/u on right ovarian complex cyst. Please review and sign off if appropriate. Thank you.

## 2022-11-15 NOTE — TELEPHONE ENCOUNTER
I spoke to the pt she will call Central Scheduling and make an appt for the [address]

## 2022-11-21 ENCOUNTER — TELEPHONE (OUTPATIENT)
Dept: INTERNAL MEDICINE CLINIC | Facility: CLINIC | Age: 57
End: 2022-11-21

## 2022-11-21 RX ORDER — TOPIRAMATE 25 MG/1
25 TABLET ORAL
Qty: 90 TABLET | Refills: 0 | OUTPATIENT
Start: 2022-11-21

## 2022-11-21 RX ORDER — LORATADINE 10 MG/1
10 TABLET ORAL DAILY
Qty: 90 TABLET | Refills: 0 | Status: SHIPPED | OUTPATIENT
Start: 2022-11-21

## 2022-11-21 RX ORDER — TOPIRAMATE 25 MG/1
25 TABLET ORAL DAILY
Qty: 30 TABLET | Refills: 0 | Status: SHIPPED | OUTPATIENT
Start: 2022-11-21

## 2022-11-22 NOTE — TELEPHONE ENCOUNTER
Pt calling for Topamax and loratadine script. Has not been seen by pcp since 12/21. Discussed I will send a temp refill but she needs a follow up with pcp.

## 2022-11-24 RX ORDER — LORATADINE 10 MG/1
10 TABLET ORAL DAILY
Qty: 90 TABLET | Refills: 1 | Status: SHIPPED | OUTPATIENT
Start: 2022-11-24

## 2022-11-24 RX ORDER — TOPIRAMATE 25 MG/1
25 TABLET ORAL DAILY
Qty: 90 TABLET | Refills: 1 | Status: SHIPPED | OUTPATIENT
Start: 2022-11-24

## 2022-12-05 ENCOUNTER — E-VISIT (OUTPATIENT)
Dept: TELEHEALTH | Age: 57
End: 2022-12-05

## 2022-12-05 DIAGNOSIS — Z02.9 ADMINISTRATIVE ENCOUNTER: Primary | ICD-10-CM

## 2022-12-12 ENCOUNTER — TELEPHONE (OUTPATIENT)
Facility: CLINIC | Age: 57
End: 2022-12-12

## 2022-12-12 NOTE — TELEPHONE ENCOUNTER
----- Message from Kellee Morgan CMA sent at 2018  3:16 PM CST -----  Regardin yr CLN recall  Recall colon in 5 years per.  Colon done 1/10/18

## 2022-12-15 ENCOUNTER — OFFICE VISIT (OUTPATIENT)
Dept: INTERNAL MEDICINE CLINIC | Facility: CLINIC | Age: 57
End: 2022-12-15
Payer: COMMERCIAL

## 2022-12-15 VITALS
WEIGHT: 142 LBS | DIASTOLIC BLOOD PRESSURE: 75 MMHG | HEIGHT: 63 IN | SYSTOLIC BLOOD PRESSURE: 119 MMHG | BODY MASS INDEX: 25.16 KG/M2 | HEART RATE: 87 BPM

## 2022-12-15 DIAGNOSIS — F41.9 ANXIETY: ICD-10-CM

## 2022-12-15 DIAGNOSIS — Z00.00 PE (PHYSICAL EXAM), ROUTINE: Primary | ICD-10-CM

## 2022-12-15 DIAGNOSIS — Z12.31 SCREENING MAMMOGRAM, ENCOUNTER FOR: ICD-10-CM

## 2022-12-15 DIAGNOSIS — J30.9 ALLERGIC RHINITIS, UNSPECIFIED SEASONALITY, UNSPECIFIED TRIGGER: ICD-10-CM

## 2022-12-15 DIAGNOSIS — K21.9 GASTROESOPHAGEAL REFLUX DISEASE, UNSPECIFIED WHETHER ESOPHAGITIS PRESENT: ICD-10-CM

## 2022-12-15 DIAGNOSIS — K63.5 POLYP OF COLON, UNSPECIFIED PART OF COLON, UNSPECIFIED TYPE: ICD-10-CM

## 2022-12-15 DIAGNOSIS — Z12.11 SCREENING FOR COLON CANCER: ICD-10-CM

## 2022-12-15 DIAGNOSIS — R73.03 PREDIABETES: ICD-10-CM

## 2022-12-15 PROCEDURE — 99213 OFFICE O/P EST LOW 20 MIN: CPT | Performed by: INTERNAL MEDICINE

## 2022-12-15 PROCEDURE — 3008F BODY MASS INDEX DOCD: CPT | Performed by: INTERNAL MEDICINE

## 2022-12-15 PROCEDURE — 3078F DIAST BP <80 MM HG: CPT | Performed by: INTERNAL MEDICINE

## 2022-12-15 PROCEDURE — 99396 PREV VISIT EST AGE 40-64: CPT | Performed by: INTERNAL MEDICINE

## 2022-12-15 PROCEDURE — 3074F SYST BP LT 130 MM HG: CPT | Performed by: INTERNAL MEDICINE

## 2022-12-15 RX ORDER — TOPIRAMATE 25 MG/1
TABLET ORAL
Qty: 30 TABLET | Refills: 0 | OUTPATIENT
Start: 2022-12-15

## 2022-12-15 RX ORDER — TOPIRAMATE 25 MG/1
25 TABLET ORAL DAILY
Qty: 90 TABLET | Refills: 3 | Status: SHIPPED | OUTPATIENT
Start: 2022-12-15

## 2022-12-16 NOTE — TELEPHONE ENCOUNTER
Face Procedure 1: gentle anti-aging facial Patient needs to make a follow up appt with Dr Luis Miguel Leyva Injectable 7 Free Text Discount (In Dollars- Use Only Numbers And Decimals): 0.00 Injectable  14 Units: 0 Show Monthly Cost Breakdown: No Face Procedure 1 Units: 1 Face Procedure 2 Price/Unit (In Dollars- Use Only Numbers And Decimals): 369.00 Detail Level: Zone Send Charges To Patient Encounter: Yes Face Procedure 1 Price/Unit (In Dollars- Use Only Numbers And Decimals): 175.00 Face Procedure 2: Platinum Hydrafacial Discount 1 Name: first time $50 off

## 2023-01-05 ENCOUNTER — HOSPITAL ENCOUNTER (OUTPATIENT)
Age: 58
Discharge: HOME | End: 2023-01-05
Payer: COMMERCIAL

## 2023-01-05 VITALS
OXYGEN SATURATION: 98 % | DIASTOLIC BLOOD PRESSURE: 89 MMHG | WEIGHT: 145 LBS | RESPIRATION RATE: 18 BRPM | TEMPERATURE: 98 F | HEART RATE: 70 BPM | BODY MASS INDEX: 25.69 KG/M2 | SYSTOLIC BLOOD PRESSURE: 146 MMHG | HEIGHT: 63 IN

## 2023-01-05 DIAGNOSIS — Z86.39 HISTORY OF DIABETES MELLITUS: ICD-10-CM

## 2023-01-05 DIAGNOSIS — R39.9 UTI SYMPTOMS: Primary | ICD-10-CM

## 2023-01-05 LAB
GLUCOSE BLD-MCNC: 98 MG/DL (ref 70–99)
POCT BILIRUBIN URINE: NEGATIVE
POCT GLUCOSE URINE: NEGATIVE MG/DL
POCT KETONE URINE: NEGATIVE MG/DL
POCT NITRITE URINE: NEGATIVE
POCT PH URINE: 5.5 (ref 5–8)
POCT PROTEIN URINE: NEGATIVE MG/DL
POCT SPECIFIC GRAVITY URINE: 1.02
POCT URINE CLARITY: CLEAR
POCT URINE COLOR: YELLOW
POCT UROBILINOGEN URINE: 0.2 MG/DL

## 2023-01-05 PROCEDURE — 87086 URINE CULTURE/COLONY COUNT: CPT | Performed by: PHYSICIAN ASSISTANT

## 2023-01-05 PROCEDURE — 82962 GLUCOSE BLOOD TEST: CPT | Performed by: PHYSICIAN ASSISTANT

## 2023-01-05 PROCEDURE — 81002 URINALYSIS NONAUTO W/O SCOPE: CPT | Performed by: PHYSICIAN ASSISTANT

## 2023-01-05 PROCEDURE — 99213 OFFICE O/P EST LOW 20 MIN: CPT | Performed by: PHYSICIAN ASSISTANT

## 2023-01-05 RX ORDER — NITROFURANTOIN 25; 75 MG/1; MG/1
100 CAPSULE ORAL 2 TIMES DAILY
Qty: 10 CAPSULE | Refills: 0 | Status: SHIPPED | OUTPATIENT
Start: 2023-01-05 | End: 2023-01-10

## 2023-01-05 RX ORDER — FLUCONAZOLE 150 MG/1
150 TABLET ORAL ONCE
Qty: 1 TABLET | Refills: 0 | Status: SHIPPED | OUTPATIENT
Start: 2023-01-05 | End: 2023-01-05

## 2023-01-05 NOTE — DISCHARGE INSTRUCTIONS
Please return to the ER/clinic if symptoms worsen. Follow-up with your PCP in 24-48 hours as needed. Take the full course of antibiotics as prescribed in tandem with a probiotic daily. While the antibiotics kill the bad bacteria that kill the good gut darline. Some good over-the-counter brands are Culturelle, Florastor or Align. Minimize your sugar intake. Push fluids. We will also send in a Diflucan for potential yeast infection. We will contact you if anything needs to be changed due to the culture results. Otherwise follow-up with your primary care physician for further evaluation and treatment.

## 2023-01-23 ENCOUNTER — HOSPITAL ENCOUNTER (OUTPATIENT)
Dept: ULTRASOUND IMAGING | Facility: HOSPITAL | Age: 58
Discharge: HOME | End: 2023-01-23
Attending: INTERNAL MEDICINE
Payer: COMMERCIAL

## 2023-01-23 ENCOUNTER — TELEMEDICINE (OUTPATIENT)
Dept: INTERNAL MEDICINE CLINIC | Facility: CLINIC | Age: 58
End: 2023-01-23

## 2023-01-23 DIAGNOSIS — J34.89 SINUS PAIN: Primary | ICD-10-CM

## 2023-01-23 DIAGNOSIS — J30.9 ALLERGIC RHINITIS, UNSPECIFIED SEASONALITY, UNSPECIFIED TRIGGER: ICD-10-CM

## 2023-01-23 DIAGNOSIS — N83.9 LESION OF RIGHT OVARY: ICD-10-CM

## 2023-01-23 DIAGNOSIS — R05.9 COUGH, UNSPECIFIED TYPE: ICD-10-CM

## 2023-01-23 PROCEDURE — 76830 TRANSVAGINAL US NON-OB: CPT | Performed by: INTERNAL MEDICINE

## 2023-01-23 PROCEDURE — 76856 US EXAM PELVIC COMPLETE: CPT | Performed by: INTERNAL MEDICINE

## 2023-01-23 RX ORDER — DOXYCYCLINE HYCLATE 100 MG
100 TABLET ORAL 2 TIMES DAILY
Qty: 20 TABLET | Refills: 0 | Status: SHIPPED | OUTPATIENT
Start: 2023-01-23

## 2023-01-23 RX ORDER — FLUTICASONE PROPIONATE 50 MCG
2 SPRAY, SUSPENSION (ML) NASAL DAILY
Qty: 1 EACH | Refills: 0 | Status: SHIPPED | OUTPATIENT
Start: 2023-01-23 | End: 2024-01-18

## 2023-02-02 NOTE — TELEPHONE ENCOUNTER
Refill passed per [facility] protocol. Requested Prescriptions   Pending Prescriptions Disp Refills    sertraline 25 MG Oral Tab 90 tablet 0     Sig: Take 1 tablet (25 mg total) by mouth daily.        Psychiatric Non-Scheduled (Anti-Anxiety) Passed - 2/2/2023  1:26 AM        Passed - In person appointment or virtual visit in the past 6 mos or appointment in next 3 mos     Recent Outpatient Visits              1 week ago Sinus pain    [facility], Darryl Royal MD    Telemedicine    1 month ago PE (physical exam), routine    Sudha Navarro MD    Office Visit    1 month ago Administrative encounter    [facility], Virtual Visit Wylene [place], [place]    E-Visit    4 months ago Well woman exam    Jimmy Hogan, [address], [address] Tally Altes, DO    Office Visit    7 months ago Cyst of right ovary    [facility], [address], [address] Tally Altes, DO    Office Visit          Future Appointments         Provider Department Appt Notes    In 6 months On license of [facility] SYSTEM OF THE [facility] [address] for Health mammogram                    Recent Outpatient Visits              1 week ago Sinus pain    [facility], [facility] Royal [facility]    Telemedicine    1 month ago PE (physical exam), routine    Darryl Tolentino MD    Office Visit    1 month ago Administrative encounter    Jimmy Hogan, Virtual Visit Wylene [place], [place]    E-Visit    4 months ago Well woman exam    Jimmy Hogan, [address], [facility] Tally Altes, DO    Office Visit    7 months ago Cyst of right ovary    Jimmy Hogan, [address], [address], DO    Office Visit Future Appointments         Provider Department Appt Notes    In 6 months [place] [address] for Health mammogram

## 2023-02-02 NOTE — TELEPHONE ENCOUNTER
Patient states she is totally out of medication. Patient aware of potential effect of taking meloxicam and sertraline together may increase the risk of bleeding. Patient states she rarely takes the meloxicam and needs to take her sertraline every day. Medication refilled and sent to [place] as requested.

## 2023-02-03 RX ORDER — SERTRALINE HYDROCHLORIDE 25 MG/1
25 TABLET, FILM COATED ORAL DAILY
Qty: 90 TABLET | Refills: 1 | Status: SHIPPED | OUTPATIENT
Start: 2023-02-03

## 2023-02-04 ENCOUNTER — TELEPHONE (OUTPATIENT)
Dept: GASTROENTEROLOGY | Facility: CLINIC | Age: 58
End: 2023-02-04

## 2023-02-04 DIAGNOSIS — Z86.010 PERSONAL HISTORY OF COLONIC POLYPS: ICD-10-CM

## 2023-02-04 DIAGNOSIS — Z12.11 COLON CANCER SCREENING: Primary | ICD-10-CM

## 2023-02-04 NOTE — TELEPHONE ENCOUNTER
GI RNs,    Please call Ms. Mendiola to advise that her pelvic ultrasound of 1/23/2023 looked great. This was to follow-up on abnormal right ovary on previous pelvic ultrasound of 5/23/2022. There is again an approximately 1 inch benign-appearing mass on the right ovary. Overall it is pretty similar going back to the exam of December 2018. On the 12/29/2018 ultrasound, it measured 2.0 x 1.4 x 1.9 cm.  4 years later, it is a few millimeters bigger at 2.34 x 2.11 x 2.13 cm. This is good news. If she wishes, we could measure it again with another ultrasound in 1 year. If any question she should follow-up with Dr. Radha Carpio or Dr. Horacio Olivera.     - cb

## 2023-02-08 ENCOUNTER — TELEPHONE (OUTPATIENT)
Facility: CLINIC | Age: 58
End: 2023-02-08

## 2023-02-08 NOTE — TELEPHONE ENCOUNTER
Dr Tracey Leonard,    I spoke to the pt and went over your recommendations below regarding the pelvic us from 01/23/2023. She would like us to f/u with another pelvic US for her in 1 year. Recall placed into pt outreach for pelvic us to f/u on ovarian cyst    She is also due for a colonoscopy. Please provide orders.   See below

## 2023-02-08 NOTE — TELEPHONE ENCOUNTER
Pt is returning call.  Cannot  the phone right away since she is at work     Requesting to leave detailed VM

## 2023-02-09 RX ORDER — SODIUM, POTASSIUM,MAG SULFATES 17.5-3.13G
SOLUTION, RECONSTITUTED, ORAL ORAL
Qty: 1 EACH | Refills: 0 | Status: SHIPPED | OUTPATIENT
Start: 2023-02-09

## 2023-02-18 NOTE — TELEPHONE ENCOUNTER
Attempted to contact to schedule procedure.  [facility] Patient Instructions by Loni Clay RN at 07/03/17 11:25 AM     Author:  Loni Clay RN Service:  (none) Author Type:  Registered Nurse     Filed:  07/03/17 11:26 AM Encounter Date:  7/3/2017 Status:  Signed     :  Loni Clay RN (Registered Nurse)            .      Department of Dermatology        Guidelines for Care of Procedure Sites         1. Gently clean the area once a day using bar soap.    2. Gently pat the site dry, and apply sterile Vaseline packet provided by the Dermatology Department.    3. Cover the area with a new band-aid or other bandage daily for 5 days, or as needed.    4. Your biopsy will take about a week to be resulted.  We send you a letter in the mail if it comes back \"normal.\" we prefer to call you and discuss any unusual findings.  If you have not received your result within two weeks from your biopsy date, please call us at [phone].  Tell the person answering the phone that you are following up on a biopsy and need results.      After 5:00 pm please call the [place] [facility] at [phone] if you have an urgent need to speak with a Dermatologist.     If you have questions please call Dept: [phone].      Additional Educational Resources:  For additional resources regarding your symptoms, diagnosis, or further health information, please visit the Health Resources section on [email or web address] or the Online Health Resources section in [employer].              Revision History        User Key Date/Time User Provider Type Action    > [N/A] 07/03/17 11:26 AM Loni Clay RN Registered Nurse Sign

## 2023-03-17 NOTE — TELEPHONE ENCOUNTER
Scheduled for:  Colonoscopy - [id number]    Provider Name:  Dr. Mia De Santiago  Date:  8/9/23  Location:  [facility]  Sedation:  MAC  Time:  Approx 2:45 pm (pt is aware that [facility] OF [facility] will call with arrival time)    Prep:  Suprep, Prep instructions were given to pt over the phone, pt verbalized understanding. Meds/Allergies Reconciled?:  Yes, Physician reviewed      Diagnosis with codes:  Colon screening - Z12.11, Hx of colon polyps - Z86.010  Was patient informed to call insurance with codes (Y/N):  Yes, I confirmed BCBS PPO insurance with this patient. Referral sent?:  Yes, referral sent. [facility] or [address] notified?:  Yes, Electronic case request was sent to [facility] via [employer]. Medication Orders:  N/A    Misc Orders:  N/A     Further instructions given by staff:  I discussed the prep instructions with the patient which she verbally understood and is aware that I will send the instructions today via [employer].

## 2023-03-22 RX ORDER — PANTOPRAZOLE SODIUM 40 MG/1
40 TABLET, DELAYED RELEASE ORAL
Qty: 180 TABLET | Refills: 3 | Status: SHIPPED | OUTPATIENT
Start: 2023-03-22

## 2023-03-22 NOTE — TELEPHONE ENCOUNTER
Refill passed per [place] [facility] [place], [facility] protocol. Requested Prescriptions   Pending Prescriptions Disp Refills    pantoprazole 40 MG Oral Tab  tablet 3     Sig: Take 1 tablet (40 mg total) by mouth 2 (two) times daily before meals.  30-60 minutes       Gastrointestional Medication Protocol Passed - 3/22/2023 11:21 AM        Passed - In person appointment or virtual visit in the past 12 mos or appointment in next 3 mos     Recent Outpatient Visits              1 month ago [facility]us [facility]    [facility]    Telemedicine    3 months ago PE (physical exam), routine    German Guido MD    Office Visit    3 months ago Administrative encounter    [address], Virtual Visit Ken Caicedo    E-Visit    6 months ago Well woman exam    [address], [address], [address] Lopesgeoff Mckenzie, DO    Office Visit    9 months ago Cyst of right ovary    [facility], [address], [address] Lopes Jonah, DO    Office Visit          Future Appointments         Provider Department Appt Notes    In 4 months [facility] [address] [facility] [address] for Health mammogram    In 4 months [address], [address], [facility] Colon w/MAC @ [facility]                     Future Appointments         Provider Department Appt Notes    In 4 months [facility] [address] for Health mammogram    In 4 months [address], [address], [facility] Colon w/MAC @ [facility]            Recent Outpatient Visits              1 month ago Sinus pain    [address], Darryl Duggan MD    Telemedicine    3 months ago PE (physical exam), routine    [address], Esdras Duggan, Daniel Flores MD    Office Visit    3 months ago Administrative encounter    [address], Virtual Visit Bryce Faithma    E-Visit    6 months ago Well woman exam    [address], [address], [address] Pari Dejesus, DO    Office Visit    9 months ago Cyst of right ovary    [address], [address], [address] [facility] [address], DO    Office Visit

## 2023-03-27 NOTE — TELEPHONE ENCOUNTER
Patient called, verified Name and . She is calling to follow up regarding pantoprazole 1 tablet by mouth 2 times daily. She was unable to  prescription from pharmacy stating it needs insurance approval. Spoke with Gary Jay, verified patient's Name and . She states patient is taking the medication once a day as opposed to 2 times daily. If she is taking it once daily then new prescription is needed. If taking it as directed, then will need PA. Called the patient to verify how often she takes the medication for clarification. Left message to call back.

## 2023-03-28 ENCOUNTER — TELEPHONE (OUTPATIENT)
Facility: CLINIC | Age: 58
End: 2023-03-28

## 2023-03-28 DIAGNOSIS — Z12.11 SCREEN FOR COLON CANCER: Primary | ICD-10-CM

## 2023-03-28 DIAGNOSIS — Z86.010 HISTORY OF COLON POLYPS: ICD-10-CM

## 2023-03-28 NOTE — TELEPHONE ENCOUNTER
Due to Dr. Vanessa Contreras being on vacation on 8/9/2023, patient's colonoscopy needs to be rescheduled. RE-Scheduled for:  Colonoscopy - [address] [address]   Provider Name:  Dr. Vanessa Contreras  Date:  FROM: 8/9/2023    TO: 8/24/2023  Location:  [facility]  Sedation:  MAC  Time:  FROM: 2:45 pm TO: 9:00 am (pt is aware that [place] [id number] will call with arrival time)    Prep:  Suprep  Meds/Allergies Reconciled?:  Yes  Diagnosis with codes:  Colon screening - Z12.11, Hx of colon polyps - Z86.010  Was patient informed to call insurance with codes (Y/N):  Yes, I confirmed BCBS PPO insurance with this patient. Referral sent?:  Yes, NEW referral sent. [facility] or [address] notified?:  Yes, [facility] [address] via [employer]. Medication Orders:  N/A    Misc Orders:  N/A  Further instructions given by staff: Informed patient I will send updated prep instructions via [employer] and she verbalized understanding.

## 2023-03-30 ENCOUNTER — TELEPHONE (OUTPATIENT)
Dept: INTERNAL MEDICINE CLINIC | Facility: CLINIC | Age: 58
End: 2023-03-30

## 2023-05-23 ENCOUNTER — E-VISIT (OUTPATIENT)
Dept: TELEHEALTH | Age: 58
End: 2023-05-23

## 2023-05-23 DIAGNOSIS — M54.50 LOW BACK PAIN POTENTIALLY ASSOCIATED WITH RADICULOPATHY: Primary | ICD-10-CM

## 2023-05-23 PROCEDURE — 99422 OL DIG E/M SVC 11-20 MIN: CPT | Performed by: PHYSICIAN ASSISTANT

## 2023-05-23 RX ORDER — CYCLOBENZAPRINE HCL 10 MG
10 TABLET ORAL EVERY 8 HOURS PRN
Qty: 30 TABLET | Refills: 0 | Status: SHIPPED | OUTPATIENT
Start: 2023-05-23

## 2023-05-25 ENCOUNTER — HOSPITAL ENCOUNTER (OUTPATIENT)
Age: 58
Discharge: HOME | End: 2023-05-25
Payer: COMMERCIAL

## 2023-05-25 VITALS
RESPIRATION RATE: 16 BRPM | HEART RATE: 81 BPM | TEMPERATURE: 97 F | SYSTOLIC BLOOD PRESSURE: 136 MMHG | OXYGEN SATURATION: 98 % | DIASTOLIC BLOOD PRESSURE: 79 MMHG

## 2023-05-25 DIAGNOSIS — M54.30 SCIATICA, UNSPECIFIED LATERALITY: Primary | ICD-10-CM

## 2023-05-25 PROCEDURE — 99214 OFFICE O/P EST MOD 30 MIN: CPT

## 2023-05-25 PROCEDURE — 99213 OFFICE O/P EST LOW 20 MIN: CPT

## 2023-05-25 RX ORDER — PREDNISONE 20 MG/1
40 TABLET ORAL DAILY
Qty: 14 TABLET | Refills: 0 | Status: SHIPPED | OUTPATIENT
Start: 2023-05-25 | End: 2023-06-01

## 2023-05-25 NOTE — ED INITIAL ASSESSMENT (HPI)
Patient arrives ambulatory with c/o lower back pain that radiates down right leg since Monday. Patient states her pain is usually better when she is standing.

## 2023-05-27 ENCOUNTER — HOSPITAL ENCOUNTER (EMERGENCY)
Facility: HOSPITAL | Age: 58
Discharge: HOME | End: 2023-05-27
Attending: EMERGENCY MEDICINE
Payer: COMMERCIAL

## 2023-05-27 ENCOUNTER — MOBILE ENCOUNTER (OUTPATIENT)
Dept: INTERNAL MEDICINE CLINIC | Facility: CLINIC | Age: 58
End: 2023-05-27

## 2023-05-27 VITALS
RESPIRATION RATE: 16 BRPM | HEART RATE: 72 BPM | WEIGHT: 144 LBS | TEMPERATURE: 98 F | SYSTOLIC BLOOD PRESSURE: 155 MMHG | BODY MASS INDEX: 25.52 KG/M2 | HEIGHT: 63 IN | DIASTOLIC BLOOD PRESSURE: 82 MMHG | OXYGEN SATURATION: 98 %

## 2023-05-27 DIAGNOSIS — M54.41 ACUTE RIGHT-SIDED LOW BACK PAIN WITH RIGHT-SIDED SCIATICA: Primary | ICD-10-CM

## 2023-05-27 PROCEDURE — 99283 EMERGENCY DEPT VISIT LOW MDM: CPT

## 2023-05-27 PROCEDURE — 96372 THER/PROPH/DIAG INJ SC/IM: CPT

## 2023-05-27 PROCEDURE — 99284 EMERGENCY DEPT VISIT MOD MDM: CPT

## 2023-05-27 RX ORDER — KETOROLAC TROMETHAMINE 30 MG/ML
60 INJECTION, SOLUTION INTRAMUSCULAR; INTRAVENOUS ONCE
Status: COMPLETED | OUTPATIENT
Start: 2023-05-27 | End: 2023-05-27

## 2023-05-27 RX ORDER — DIAZEPAM 5 MG/1
5 TABLET ORAL ONCE
Status: COMPLETED | OUTPATIENT
Start: 2023-05-27 | End: 2023-05-27

## 2023-05-27 RX ORDER — HYDROCODONE BITARTRATE AND ACETAMINOPHEN 7.5; 325 MG/1; MG/1
1 TABLET ORAL EVERY 6 HOURS PRN
Qty: 16 TABLET | Refills: 0 | Status: SHIPPED | OUTPATIENT
Start: 2023-05-27 | End: 2023-05-31

## 2023-05-27 RX ORDER — HYDROCODONE BITARTRATE AND ACETAMINOPHEN 5; 325 MG/1; MG/1
1 TABLET ORAL EVERY 6 HOURS PRN
Refills: 0 | Status: CANCELLED
Start: 2023-05-27

## 2023-05-27 RX ORDER — DIAZEPAM 5 MG/1
5 TABLET ORAL 3 TIMES DAILY PRN
Qty: 20 TABLET | Refills: 0 | Status: SHIPPED | OUTPATIENT
Start: 2023-05-27 | End: 2023-05-31

## 2023-05-27 RX ORDER — ACETAMINOPHEN AND CODEINE PHOSPHATE 300; 30 MG/1; MG/1
1 TABLET ORAL EVERY 4 HOURS PRN
Qty: 30 TABLET | Refills: 0 | Status: SHIPPED | OUTPATIENT
Start: 2023-05-27 | End: 2023-06-06

## 2023-05-27 RX ORDER — HYDROCODONE BITARTRATE AND ACETAMINOPHEN 5; 325 MG/1; MG/1
1 TABLET ORAL ONCE
Status: COMPLETED | OUTPATIENT
Start: 2023-05-27 | End: 2023-05-27

## 2023-05-27 NOTE — PROGRESS NOTES
Spoke with patient as doctor on call. Worsening low back pain. Pain shooting to the right leg. Shooting pain is worse with pressure applies to the lower lumbar spine. Already on prednisone, flexeril, mobic. Some intolerance to morphine, but has tolerated codeine. Rx sent for Tylenol #3. She is to contact us with an update in the coming days.

## 2023-05-28 NOTE — ED INITIAL ASSESSMENT (HPI)
Patient presents to ED with 6 days of lower back pain that radiates down R leg.  Patient states pain is getting worse, even though shes been managing it at home

## 2023-05-30 ENCOUNTER — TELEPHONE (OUTPATIENT)
Dept: INTERNAL MEDICINE CLINIC | Facility: CLINIC | Age: 58
End: 2023-05-30

## 2023-05-30 NOTE — TELEPHONE ENCOUNTER
Okay to take 2 tablets. If symptoms persist however she should go to the emergency room immediately.

## 2023-05-30 NOTE — TELEPHONE ENCOUNTER
Called patient (name and  verified) and instructed on providers message below. Patient verbalized understanding and agrees to plan. Patient was instructed that if symptoms worsen to be seen in the ER/IC for evaluation. Verbalized understanding.      Future Appointments   Date Time Provider Abiola Varner   2023 11:00 AM Wesley Brennan MD [facility]   2023  9:00 AM Franklyn Murdock DO [facility] [place] [facility] [place]   2023  2:00 PM [facility] RM4 [facility]   2023  9:00 AM TOMMY, PROCEDURE [id number] None

## 2023-05-30 NOTE — TELEPHONE ENCOUNTER
Pt was treated in the IC on 5/25 for low back pain and sciatica and then returned to the ER 5/27. Pt states that her sciatica has gotten much worse. The right leg is numb all the way to her ankle. No swelling or redness, no loss of bowel or bladder control but pt describes the pain as 10/10. \"the norco only takes a slight edge off of the pain. I need some more help. \"   I made the soonest appt available for tomorrow at 11am.  ER is protocol for 10/10 pain but pt has already been there. Could she take 1.5 Norco at a time until she sees the doctor tomorrow?  Sent to on call MD

## 2023-05-31 ENCOUNTER — HOSPITAL ENCOUNTER (OUTPATIENT)
Dept: GENERAL RADIOLOGY | Facility: HOSPITAL | Age: 58
Discharge: HOME | End: 2023-05-31
Attending: INTERNAL MEDICINE
Payer: COMMERCIAL

## 2023-05-31 ENCOUNTER — TELEMEDICINE (OUTPATIENT)
Dept: INTERNAL MEDICINE CLINIC | Facility: CLINIC | Age: 58
End: 2023-05-31
Payer: COMMERCIAL

## 2023-05-31 DIAGNOSIS — M54.31 SCIATICA OF RIGHT SIDE: ICD-10-CM

## 2023-05-31 DIAGNOSIS — M54.31 SCIATICA OF RIGHT SIDE: Primary | ICD-10-CM

## 2023-05-31 PROCEDURE — 72110 X-RAY EXAM L-2 SPINE 4/>VWS: CPT | Performed by: INTERNAL MEDICINE

## 2023-05-31 PROCEDURE — 99213 OFFICE O/P EST LOW 20 MIN: CPT | Performed by: INTERNAL MEDICINE

## 2023-05-31 RX ORDER — DIAZEPAM 5 MG/1
5 TABLET ORAL 3 TIMES DAILY PRN
Qty: 20 TABLET | Refills: 0 | Status: SHIPPED | OUTPATIENT
Start: 2023-05-31 | End: 2023-06-07

## 2023-05-31 RX ORDER — HYDROCODONE BITARTRATE AND ACETAMINOPHEN 10; 325 MG/1; MG/1
1 TABLET ORAL EVERY 6 HOURS PRN
Qty: 30 TABLET | Refills: 0 | Status: SHIPPED | OUTPATIENT
Start: 2023-05-31

## 2023-06-02 ENCOUNTER — TELEPHONE (OUTPATIENT)
Dept: INTERNAL MEDICINE CLINIC | Facility: CLINIC | Age: 58
End: 2023-06-02

## 2023-06-02 NOTE — TELEPHONE ENCOUNTER
Dr. Shu Polk to review and advise, also letter for work pended [see communications] -- please review and authorize/send if appropriate. Please reply to pool: EM RN TRIAGE    Patient called states that it has been some time since she has had some back pain/R sided sciatica witch began 5/22/23. Since she has had multiple visits with providers at ED, [address] [place] [address], and in office [see encounters]. She states she had started taking medications. She also states  she has been unable to walk, she is using crutches. diazePAM 5 MG Oral Tab 20 tablet 0 5/31/2023 6/7/2023    Sig - Route: Take 1 tablet (5 mg total) by mouth 3 (three) times daily as needed (muscle relaxant). - Oral    Sent to pharmacy as: diazePAM 5 MG Oral Tablet (Valium)    E-Prescribing Status: Receipt confirmed by pharmacy (5/31/2023 11:07 AM CDT)    And  HYDROcodone-acetaminophen (NORCO)  MG Oral Tab 30 tablet 0 5/31/2023     Sig - Route: Take 1 tablet by mouth every 6 (six) hours as needed for Pain. - Oral    Sent to pharmacy as: HYDROcodone-Acetaminophen  MG Oral Tablet    Earliest Fill Date: 5/31/2023    E-Prescribing Status: Receipt confirmed by pharmacy (5/31/2023 11:07 AM CDT)      She has not been to work as she unable to drive and increased sleepiness related to Rxs [above]. She states her entire R leg is numb. She has been incorporating some stretches. She does feel a \"twinge\" in her back and she states she would like to know what the next steps are. She needs a note for work for the time off so far. She is not sure of what to do as she is supposed to work on Monday. She states that if she can work from home, as long as pain is tolerable and not altered mental states related to medications. She states that she does have an appointment with Dr. Chuck Ragsdale soon. She will request FMLA/Accomodations with her employer and request that he fill out accordingly.  She may consider asking physiatrist for MRI and paying cash pay with stand alone Dx imaging location, if insurance does not cover MRI. I made her aware I will convey her request to Dr. Leora Yi via telemedicine visit on 5/31/23]. She would like letter to be sent via [employer] so that she can provide to employer. Patient verbalized understanding. No further questions or concerns at this time.     Future Appointments   Date Time Provider Abiola Varner   6/7/2023  9:00 AM Rob Hughes,  PM[facility] [place] [facility] [place]   8/6/2023  2:00 PM [facility]   8/24/2023  9:00 AM TOMMY, PROCEDURE [id number] None

## 2023-06-07 ENCOUNTER — OFFICE VISIT (OUTPATIENT)
Dept: PHYSICAL MEDICINE AND REHAB | Facility: CLINIC | Age: 58
End: 2023-06-07
Payer: COMMERCIAL

## 2023-06-07 VITALS
OXYGEN SATURATION: 97 % | HEART RATE: 82 BPM | WEIGHT: 144 LBS | HEIGHT: 63 IN | DIASTOLIC BLOOD PRESSURE: 84 MMHG | BODY MASS INDEX: 25.52 KG/M2 | SYSTOLIC BLOOD PRESSURE: 122 MMHG

## 2023-06-07 DIAGNOSIS — M54.16 RIGHT LUMBAR RADICULOPATHY: Primary | ICD-10-CM

## 2023-06-07 PROCEDURE — 3074F SYST BP LT 130 MM HG: CPT | Performed by: PHYSICAL MEDICINE & REHABILITATION

## 2023-06-07 PROCEDURE — 3008F BODY MASS INDEX DOCD: CPT | Performed by: PHYSICAL MEDICINE & REHABILITATION

## 2023-06-07 PROCEDURE — 99214 OFFICE O/P EST MOD 30 MIN: CPT | Performed by: PHYSICAL MEDICINE & REHABILITATION

## 2023-06-07 PROCEDURE — 3079F DIAST BP 80-89 MM HG: CPT | Performed by: PHYSICAL MEDICINE & REHABILITATION

## 2023-06-07 RX ORDER — METHYLPREDNISOLONE 4 MG/1
TABLET ORAL
Qty: 1 EACH | Refills: 0 | Status: SHIPPED | OUTPATIENT
Start: 2023-06-07

## 2023-06-07 RX ORDER — GABAPENTIN 300 MG/1
CAPSULE ORAL
Qty: 90 CAPSULE | Refills: 0 | Status: SHIPPED | OUTPATIENT
Start: 2023-06-07

## 2023-06-07 NOTE — PATIENT INSTRUCTIONS
-Start physical therapy and home exercises  -Medrol dose pack to be started today  -Gabapentin 300mg three times daily  -My office will call once injection is approved  -Please stop the medication if you have any side effects and call the office if you have any questions or concerns  -If no better will consider MRI for further evaluation  -Follow up 2 weeks after injection

## 2023-06-12 ENCOUNTER — OFFICE VISIT (OUTPATIENT)
Dept: SURGERY | Facility: CLINIC | Age: 58
End: 2023-06-12
Payer: COMMERCIAL

## 2023-06-12 DIAGNOSIS — M54.16 RIGHT LUMBAR RADICULOPATHY: Primary | ICD-10-CM

## 2023-06-12 NOTE — PROCEDURES
[address] [facility] [address]    BILATERAL S1 TFESI   NAME:  Sugey Greene    MR #:    [id number] :  10/7/1965     PHYSICIAN:  Calin Lorenzo. Joann Millan DO        Operative Report    DATE OF PROCEDURE: 2023   PREOPERATIVE DIAGNOSES: 1. Right lumbar radiculopathy        POSTOPERATIVE DIAGNOSES:   1. Right lumbar radiculopathy        PROCEDURES: Right  S1 transforaminal epidural steroid injections done under fluoroscopic guidance with contrast enhancement. SURGEON: Calin Lorenzo. Joann Millan DO   ANESTHESIA: Local   INDICATIONS:      OPERATIVE PROCEDURE:  Written consent was obtained from the patient. The patient was brought into the operating room and placed in the prone position on the fluoroscopy table with pillow underneath the abdomen. The patient's skin was cleaned and draped in a normal sterile fashion. Using AP fluoroscopy, all five lumbar vertebrae were identified. Then the right first pedicle was identified with the right first sacral foramen inferior to it. Then, 3.5 inch inch, 22-gauge spinal needles was inserted and directed towards the right first sacral foramen. When it was felt to be in good position, Omnipaque-240 contrast was used to obtain a good epidurograms indicating correct needle placement. Then, aspiration was performed. No blood, fluid, or air was aspirated. Then, the patient was injected with a 4 cc solution of 2 cc of 6 mg/cc of Celestone and 2 cc of 1% PF lidocaine without epinephrine on each side. After this, the needle was removed. The patient's skin was cleaned. Band-Aid was applied. The patient was transferred to the cart and into [facility]. The patient was given discharge instructions and will follow up in the clinic as scheduled. Throughout the whole procedure, the patient's pulse oximetry and vital signs were monitored and they remained completely stable. Also, throughout the whole procedure, prior to injection of any medication, aspiration was performed.   No blood, fluid, or air was aspirated at anytime.     Kaleigh Duarte DO, FAAPMR & CAQSM  Physical Medicine and Rehabilitation/Sports Medicine  MEDICAL CENTER [facility]

## 2023-06-20 ENCOUNTER — TELEPHONE (OUTPATIENT)
Dept: PHYSICAL MEDICINE AND REHAB | Facility: CLINIC | Age: 58
End: 2023-06-20

## 2023-06-20 NOTE — TELEPHONE ENCOUNTER
The update is that she is not doing well post injection and stated she cannot take the gabapentin. The question is in regards to the gabapentin she stated she stopped taking it and wanted to see if that's fine.  Please give pt a callback to discuss further

## 2023-06-20 NOTE — TELEPHONE ENCOUNTER
Please call patient she has a condition update and questions on her medications.   Please call to discuss

## 2023-06-22 ENCOUNTER — MED REC SCAN ONLY (OUTPATIENT)
Dept: PHYSICAL MEDICINE AND REHAB | Facility: CLINIC | Age: 58
End: 2023-06-22

## 2023-06-22 NOTE — TELEPHONE ENCOUNTER
Spoke with Dr. Rosalinda Maldonado- Patient to follow up and can start the Cyclobenzaprine PRN. Left detailed message.

## 2023-06-22 NOTE — TELEPHONE ENCOUNTER
Spoke with patient in regards to discontinuing Gabapentin due to confusion and muscle cramps in calves. Patient states after discontinuing she's no longer having cramps in legs. Patient has an appointment on 6/26/23. Patient states she's taken OTC rx with no relief, was given Norco in the past but makes her drowsy. Cyclobenzaprine was given to her but has not taken it. Currently in PT. Condition update: Right  S1 transforaminal epidural steroid injections with 60-70% improvement- patient is able to move a lot more. Patient states she still experiencing right heel pain, unable to bare weight without pain, patient requesting something to help with pain or can she continue Cyclobenzaprine. Per physician: if no better with injection or Gabapentin then possible MRI Lumbar.

## 2023-06-27 ENCOUNTER — LAB ENCOUNTER (OUTPATIENT)
Dept: LAB | Age: 58
End: 2023-06-27
Attending: NURSE PRACTITIONER
Payer: COMMERCIAL

## 2023-06-27 ENCOUNTER — NURSE TRIAGE (OUTPATIENT)
Dept: INTERNAL MEDICINE CLINIC | Facility: CLINIC | Age: 58
End: 2023-06-27

## 2023-06-27 ENCOUNTER — TELEPHONE (OUTPATIENT)
Dept: INTERNAL MEDICINE CLINIC | Facility: CLINIC | Age: 58
End: 2023-06-27

## 2023-06-27 ENCOUNTER — OFFICE VISIT (OUTPATIENT)
Dept: INTERNAL MEDICINE CLINIC | Facility: CLINIC | Age: 58
End: 2023-06-27

## 2023-06-27 VITALS
WEIGHT: 146.63 LBS | BODY MASS INDEX: 25.98 KG/M2 | SYSTOLIC BLOOD PRESSURE: 120 MMHG | HEIGHT: 63 IN | DIASTOLIC BLOOD PRESSURE: 80 MMHG | HEART RATE: 73 BPM

## 2023-06-27 DIAGNOSIS — R39.15 URGENCY OF URINATION: ICD-10-CM

## 2023-06-27 DIAGNOSIS — R39.9 UTI SYMPTOMS: ICD-10-CM

## 2023-06-27 DIAGNOSIS — R39.15 URGENCY OF URINATION: Primary | ICD-10-CM

## 2023-06-27 DIAGNOSIS — Z00.00 PE (PHYSICAL EXAM), ROUTINE: ICD-10-CM

## 2023-06-27 DIAGNOSIS — R73.03 PREDIABETES: ICD-10-CM

## 2023-06-27 LAB
ALBUMIN SERPL-MCNC: 3.8 G/DL (ref 3.4–5)
ALBUMIN/GLOB SERPL: 1.1 {RATIO} (ref 1–2)
ALP LIVER SERPL-CCNC: 74 U/L
ALT SERPL-CCNC: 28 U/L
ANION GAP SERPL CALC-SCNC: 8 MMOL/L (ref 0–18)
AST SERPL-CCNC: 13 U/L (ref 15–37)
BASOPHILS # BLD AUTO: 0.03 X10(3) UL (ref 0–0.2)
BASOPHILS NFR BLD AUTO: 0.4 %
BILIRUB SERPL-MCNC: 0.7 MG/DL (ref 0.1–2)
BILIRUB UR QL: NEGATIVE
BUN BLD-MCNC: 13 MG/DL (ref 7–18)
BUN/CREAT SERPL: 13 (ref 10–20)
CALCIUM BLD-MCNC: 9 MG/DL (ref 8.5–10.1)
CHLORIDE SERPL-SCNC: 110 MMOL/L (ref 98–112)
CHOLEST SERPL-MCNC: 249 MG/DL (ref ?–200)
CLARITY UR: CLEAR
CO2 SERPL-SCNC: 26 MMOL/L (ref 21–32)
CREAT BLD-MCNC: 1 MG/DL
DEPRECATED RDW RBC AUTO: 40.6 FL (ref 35.1–46.3)
EOSINOPHIL # BLD AUTO: 0.12 X10(3) UL (ref 0–0.7)
EOSINOPHIL NFR BLD AUTO: 1.8 %
ERYTHROCYTE [DISTWIDTH] IN BLOOD BY AUTOMATED COUNT: 12.4 % (ref 11–15)
EST. AVERAGE GLUCOSE BLD GHB EST-MCNC: 131 MG/DL (ref 68–126)
FASTING PATIENT LIPID ANSWER: YES
FASTING STATUS PATIENT QL REPORTED: YES
GFR SERPLBLD BASED ON 1.73 SQ M-ARVRAT: 67 ML/MIN/1.73M2 (ref 60–?)
GLOBULIN PLAS-MCNC: 3.5 G/DL (ref 2.8–4.4)
GLUCOSE BLD-MCNC: 127 MG/DL (ref 70–99)
GLUCOSE UR-MCNC: NORMAL MG/DL
HBA1C MFR BLD: 6.2 % (ref ?–5.7)
HCT VFR BLD AUTO: 40.4 %
HDLC SERPL-MCNC: 66 MG/DL (ref 40–59)
HGB BLD-MCNC: 13.3 G/DL
IMM GRANULOCYTES # BLD AUTO: 0.02 X10(3) UL (ref 0–1)
IMM GRANULOCYTES NFR BLD: 0.3 %
KETONES UR-MCNC: NEGATIVE MG/DL
LDLC SERPL CALC-MCNC: 157 MG/DL (ref ?–100)
LEUKOCYTE ESTERASE UR QL STRIP.AUTO: 250
LYMPHOCYTES # BLD AUTO: 1.97 X10(3) UL (ref 1–4)
LYMPHOCYTES NFR BLD AUTO: 29.4 %
MCH RBC QN AUTO: 29.8 PG (ref 26–34)
MCHC RBC AUTO-ENTMCNC: 32.9 G/DL (ref 31–37)
MCV RBC AUTO: 90.4 FL
MONOCYTES # BLD AUTO: 0.42 X10(3) UL (ref 0.1–1)
MONOCYTES NFR BLD AUTO: 6.3 %
NEUTROPHILS # BLD AUTO: 4.13 X10 (3) UL (ref 1.5–7.7)
NEUTROPHILS # BLD AUTO: 4.13 X10(3) UL (ref 1.5–7.7)
NEUTROPHILS NFR BLD AUTO: 61.8 %
NITRITE UR QL STRIP.AUTO: NEGATIVE
NONHDLC SERPL-MCNC: 183 MG/DL (ref ?–130)
OSMOLALITY SERPL CALC.SUM OF ELEC: 300 MOSM/KG (ref 275–295)
PH UR: 5.5 [PH] (ref 5–8)
PLATELET # BLD AUTO: 242 10(3)UL (ref 150–450)
POTASSIUM SERPL-SCNC: 4.1 MMOL/L (ref 3.5–5.1)
PROT SERPL-MCNC: 7.3 G/DL (ref 6.4–8.2)
PROT UR-MCNC: NEGATIVE MG/DL
RBC # BLD AUTO: 4.47 X10(6)UL
SODIUM SERPL-SCNC: 144 MMOL/L (ref 136–145)
SP GR UR STRIP: 1.02 (ref 1–1.03)
TRIGL SERPL-MCNC: 144 MG/DL (ref 30–149)
UROBILINOGEN UR STRIP-ACNC: NORMAL
VLDLC SERPL CALC-MCNC: 28 MG/DL (ref 0–30)
WBC # BLD AUTO: 6.7 X10(3) UL (ref 4–11)
WBC #/AREA URNS AUTO: >50 /HPF
WBC CLUMPS UR QL AUTO: PRESENT /HPF

## 2023-06-27 PROCEDURE — 3079F DIAST BP 80-89 MM HG: CPT | Performed by: NURSE PRACTITIONER

## 2023-06-27 PROCEDURE — 3044F HG A1C LEVEL LT 7.0%: CPT | Performed by: NURSE PRACTITIONER

## 2023-06-27 PROCEDURE — 85025 COMPLETE CBC W/AUTO DIFF WBC: CPT

## 2023-06-27 PROCEDURE — 36415 COLL VENOUS BLD VENIPUNCTURE: CPT

## 2023-06-27 PROCEDURE — 80053 COMPREHEN METABOLIC PANEL: CPT

## 2023-06-27 PROCEDURE — 80061 LIPID PANEL: CPT

## 2023-06-27 PROCEDURE — 81001 URINALYSIS AUTO W/SCOPE: CPT

## 2023-06-27 PROCEDURE — 87086 URINE CULTURE/COLONY COUNT: CPT

## 2023-06-27 PROCEDURE — 3008F BODY MASS INDEX DOCD: CPT | Performed by: NURSE PRACTITIONER

## 2023-06-27 PROCEDURE — 99213 OFFICE O/P EST LOW 20 MIN: CPT | Performed by: NURSE PRACTITIONER

## 2023-06-27 PROCEDURE — 83036 HEMOGLOBIN GLYCOSYLATED A1C: CPT

## 2023-06-27 PROCEDURE — 3074F SYST BP LT 130 MM HG: CPT | Performed by: NURSE PRACTITIONER

## 2023-06-27 RX ORDER — CIPROFLOXACIN HYDROCHLORIDE 3.5 MG/ML
2 SOLUTION/ DROPS TOPICAL
Qty: 10 ML | Refills: 0 | Status: SHIPPED | OUTPATIENT
Start: 2023-06-27 | End: 2023-06-27 | Stop reason: CLARIF

## 2023-06-27 RX ORDER — CIPROFLOXACIN 500 MG/1
500 TABLET, FILM COATED ORAL 2 TIMES DAILY
Qty: 10 TABLET | Refills: 0 | Status: SHIPPED | OUTPATIENT
Start: 2023-06-27 | End: 2023-07-02

## 2023-06-27 NOTE — TELEPHONE ENCOUNTER
Action Requested: Summary for Provider     []  Critical Lab, Recommendations Needed  [] Need Additional Advice  [x]   FYI    []   Need Orders  [] Need Medications Sent to Pharmacy  []  Other     SUMMARY: Patient stated that since yesterday has been having the following symptoms: Urinary frequency/urgency, bladder pressure. Has low back pain but that is nothing new. No fevers. No blood in the urine. No pain or burning with urination. Has not been drinking enough water lately. Took 2 AZO last night and started drinking more water yesterday. No other symptoms. Appointment made for today at 9:20am with Leslie Mendieta at [place].    Reason for call: Urinary Symptoms (Urinary frequency/urgency, bladder pressure)  Onset: Jun 26, 2023  Reason for Disposition   Urinating more frequently than usual (i.e., frequency)    Protocols used: Urinary Symptoms-A-OH

## 2023-06-28 ENCOUNTER — TELEMEDICINE (OUTPATIENT)
Dept: PHYSICAL MEDICINE AND REHAB | Facility: CLINIC | Age: 58
End: 2023-06-28
Payer: COMMERCIAL

## 2023-06-28 ENCOUNTER — TELEPHONE (OUTPATIENT)
Dept: PHYSICAL MEDICINE AND REHAB | Facility: CLINIC | Age: 58
End: 2023-06-28

## 2023-06-28 DIAGNOSIS — M54.16 RIGHT LUMBAR RADICULOPATHY: Primary | ICD-10-CM

## 2023-06-28 PROCEDURE — 99214 OFFICE O/P EST MOD 30 MIN: CPT | Performed by: PHYSICAL MEDICINE & REHABILITATION

## 2023-06-28 RX ORDER — PREGABALIN 25 MG/1
25 CAPSULE ORAL 2 TIMES DAILY
Qty: 50 CAPSULE | Refills: 0 | Status: SHIPPED | OUTPATIENT
Start: 2023-06-28

## 2023-06-28 NOTE — TELEPHONE ENCOUNTER
Initiated authorization for Right S1 TFESI CPT 35633 dx:M54.16 to be done at [facility] with [address]  Case #[id number]  Status: pending

## 2023-06-29 DIAGNOSIS — R31.9 HEMATURIA, UNSPECIFIED TYPE: Primary | ICD-10-CM

## 2023-07-03 NOTE — TELEPHONE ENCOUNTER
Right S1 TFESI Denied by Liss Acosta doctor wants to give you an injection into your lower back to help with the pain or tingling going down your leg. Your doctor told us that this is coming from an inflamed nerve in your lower back. Your doctor also told us that you had this injection in the past. This injection is needed again when the last injection decreased your pain by at least half. You should have been able to perform your normal activities again. You should have felt better for at least three months. Your doctor also needs to tell us that you were able to perform your normal activities for at least three months. We reviewed the notes we received. The notes do not show that your pain decreased by half. The notes also do not show you were able to perform your normal activities for at least three months. As a result, this injection is not medically necessary. We used [facility] [employer] Medical Benefits Management Clinical Guideline titled Interventional Pain Management, Epidural Injection Procedures and Diagnostic Selective Nerve Root Blocks to make this decision. You may view this guideline at [email or web address]/mbm-guidelines-musculoskeletal.

## 2023-07-05 ENCOUNTER — HOSPITAL ENCOUNTER (OUTPATIENT)
Age: 58
Discharge: HOME | End: 2023-07-05
Payer: COMMERCIAL

## 2023-07-05 VITALS
DIASTOLIC BLOOD PRESSURE: 77 MMHG | SYSTOLIC BLOOD PRESSURE: 127 MMHG | RESPIRATION RATE: 18 BRPM | OXYGEN SATURATION: 100 % | HEART RATE: 90 BPM | TEMPERATURE: 98 F

## 2023-07-05 DIAGNOSIS — J02.0 STREP PHARYNGITIS: Primary | ICD-10-CM

## 2023-07-05 LAB
S PYO AG THROAT QL: POSITIVE
SARS-COV-2 RNA RESP QL NAA+PROBE: NOT DETECTED

## 2023-07-05 PROCEDURE — U0002 COVID-19 LAB TEST NON-CDC: HCPCS | Performed by: NURSE PRACTITIONER

## 2023-07-05 PROCEDURE — 87880 STREP A ASSAY W/OPTIC: CPT | Performed by: NURSE PRACTITIONER

## 2023-07-05 PROCEDURE — 99213 OFFICE O/P EST LOW 20 MIN: CPT | Performed by: NURSE PRACTITIONER

## 2023-07-05 RX ORDER — AMOXICILLIN 500 MG/1
500 TABLET, FILM COATED ORAL 2 TIMES DAILY
Qty: 20 TABLET | Refills: 0 | Status: SHIPPED | OUTPATIENT
Start: 2023-07-05 | End: 2023-07-15

## 2023-07-05 NOTE — TELEPHONE ENCOUNTER
Contacted Gayle at [facility] and scheduled P2P scheduled 7/6/23 at 12:15p with Dr. Angela Crow refer to order #[id number]

## 2023-07-11 NOTE — TELEPHONE ENCOUNTER
Thao Hoover, DO   to Me     7/7/23  4:36 PM  Peer to peer denied per basis that patient has had more than 50% improvement so will need to wait 3 months. They stated even if trying to repeat with different medication will not be approved for commercial insurances but can be approved for Medicare insurance in the future. Please reach out to patient and if she is open to the idea of trying a different route like a caudal injection we can try to order that and see if it can get approved. If she would like to wait, we can follow up in 3 months. Thanks    Dr. Tarun Ragland to patient and informed her of Dr. Enma Sawyer message as documented above. Patient states she is feeling better and would like to wait. No further action required at this time.

## 2023-07-14 NOTE — TELEPHONE ENCOUNTER
Received denial letter via [employer] for injection of anesthetic and/or steroid into sacral spine nerve root using imaging guidance single level S1  Put in RN folder

## 2023-07-17 ENCOUNTER — HOSPITAL ENCOUNTER (OUTPATIENT)
Dept: MRI IMAGING | Age: 58
Discharge: HOME | End: 2023-07-17
Attending: PHYSICAL MEDICINE & REHABILITATION
Payer: COMMERCIAL

## 2023-07-17 DIAGNOSIS — M54.16 RIGHT LUMBAR RADICULOPATHY: ICD-10-CM

## 2023-08-03 ENCOUNTER — MED REC SCAN ONLY (OUTPATIENT)
Dept: PHYSICAL MEDICINE AND REHAB | Facility: CLINIC | Age: 58
End: 2023-08-03

## 2023-08-05 ENCOUNTER — TELEPHONE (OUTPATIENT)
Dept: INTERNAL MEDICINE CLINIC | Facility: CLINIC | Age: 58
End: 2023-08-05

## 2023-08-05 NOTE — TELEPHONE ENCOUNTER
Refill request from [facility] for:      sertraline 25 MG Oral Tab, Take 1 tablet (25 mg total) by mouth daily. , Disp: 90 tablet, Rfl: 1

## 2023-08-06 ENCOUNTER — HOSPITAL ENCOUNTER (OUTPATIENT)
Dept: MAMMOGRAPHY | Facility: HOSPITAL | Age: 58
Discharge: HOME | End: 2023-08-06
Attending: INTERNAL MEDICINE
Payer: COMMERCIAL

## 2023-08-06 ENCOUNTER — HOSPITAL ENCOUNTER (OUTPATIENT)
Dept: MAMMOGRAPHY | Facility: HOSPITAL | Age: 58
End: 2023-08-06
Attending: INTERNAL MEDICINE
Payer: COMMERCIAL

## 2023-08-06 DIAGNOSIS — Z12.31 SCREENING MAMMOGRAM, ENCOUNTER FOR: ICD-10-CM

## 2023-08-06 PROCEDURE — 77067 SCR MAMMO BI INCL CAD: CPT | Performed by: INTERNAL MEDICINE

## 2023-08-06 PROCEDURE — 77063 BREAST TOMOSYNTHESIS BI: CPT | Performed by: INTERNAL MEDICINE

## 2023-08-09 RX ORDER — SERTRALINE HYDROCHLORIDE 25 MG/1
25 TABLET, FILM COATED ORAL DAILY
Qty: 90 TABLET | Refills: 3 | Status: SHIPPED | OUTPATIENT
Start: 2023-08-09

## 2023-08-09 NOTE — TELEPHONE ENCOUNTER
Refill passed per [facility], [facility] protocol. Requested Prescriptions   Pending Prescriptions Disp Refills    sertraline 25 MG Oral Tab 90 tablet 1     Sig: Take 1 tablet (25 mg total) by mouth daily.        Psychiatric Non-Scheduled (Anti-Anxiety) Passed - 8/8/2023 12:18 PM        Passed - In person appointment or virtual visit in the past 6 mos or appointment in next 3 mos     Recent Outpatient Visits              1 month ago Right lumbar radiculopathy    [facility], [place], Bernestine Candy, DO    Telemedicine    1 month ago Urgency of urination    Rebeca Ervin [place], APRN    Office Visit    1 month ago Right lumbar radiculopathy    [facility] Pratima Rocha, DO    Office Visit    2 months ago Right lumbar radiculopathy    [facility], Angelita Murillo, DO    Office Visit    2 months ago Sciatica of right side    Darryl Ervin MD    Telemedicine          Future Appointments         Provider Department Appt Notes    Tomorrow [facility] MRI RM1 (1.5T WIDE) [address] [facility] EST FINAL    In 2 weeks [address] [facility], [address] [place] [address],[address], [facility] SCRN @ [address]

## 2023-08-10 ENCOUNTER — HOSPITAL ENCOUNTER (OUTPATIENT)
Dept: MRI IMAGING | Age: 58
Discharge: HOME | End: 2023-08-10
Attending: PHYSICAL MEDICINE & REHABILITATION
Payer: COMMERCIAL

## 2023-08-10 PROCEDURE — 72148 MRI LUMBAR SPINE W/O DYE: CPT | Performed by: PHYSICAL MEDICINE & REHABILITATION

## 2023-08-17 ENCOUNTER — TELEMEDICINE (OUTPATIENT)
Dept: PHYSICAL MEDICINE AND REHAB | Facility: CLINIC | Age: 58
End: 2023-08-17
Payer: COMMERCIAL

## 2023-08-17 DIAGNOSIS — M54.16 RIGHT LUMBAR RADICULOPATHY: Primary | ICD-10-CM

## 2023-08-17 PROCEDURE — 99214 OFFICE O/P EST MOD 30 MIN: CPT | Performed by: PHYSICAL MEDICINE & REHABILITATION

## 2023-08-20 NOTE — PROGRESS NOTES
[address]    Telemedicine Visit - Follow Up Evaluation    Telehealth Verbal Consent   I conducted a telehealth visit with Venda Schwab today, 08/17/23, which was completed using two-way, real-time interactive audio and video communication. This has been done in good suzanne to provide continuity of care in the best interest of the provider-patient relationship, due to the COVID -19 public health crisis/national emergency where restrictions of face-to-face office visits are ongoing. Every conscious effort was taken to allow for sufficient and adequate time to complete the visit. The patient was made aware of the limitations of the telehealth visit, including treatment limitations as no physical exam could be performed. The patient was advised to call 911 or to go to the ER in case there was an emergency. The patient was also advised of the potential privacy & security concerns related to the telehealth platform. The patient was made aware of where to find [facility] notice of privacy practices, telehealth consent form and other related consent forms and documents. which are located on the [facility] website. The patient verbally agreed to telehealth consent form, related consents and the risks discussed. Lastly, the patient confirmed that they were in [place]. Included in this visit, time may have been spent reviewing labs, medications, radiology tests and decision making. Appropriate medical decision-making and tests are ordered as detailed in the plan of care above. Coding/billing information is submitted for this visit based on complexity of care and/or time spent for the visit. HISTORY OF PRESENT ILLNESS:     Patient is following up right-sided low back and leg pain. Since last visit, she had MRI imaging which she is here to review. She continues physical therapy and home exercises. She continues Lyrica 50 mg twice daily which she is tolerating well.   She still has some pain radiating into the leg however this is much more manageable. Pain is exacerbated with prolonged sitting or increased activity. She denies any changes in strength or bowel bladder. IMAGING:   MRI lumbar spine completed on 8/10/2023 was reviewed which is notable for the following    Posterior annular central fissures at L4-5 and L5-S1 with left foraminal far lateral annular fissuring at L3-4. There is bilateral subarticular zone stenosis at L4-5 and L5-S1. All imaging results were reviewed and discussed with patient. ASSESSMENT:     1. Right lumbar radiculopathy          PLAN:   Judy Pedro is a 56year old female following up for right lumbar radiculopathy. I recommended increasing Lyrica to 75 mg twice daily. Advised her to continue PT and home exercises and follow-up with me in 4 to 6 weeks so we can reevaluate her symptoms and if she continues to be limited can consider repeating the injection if needed. Follow-up:  1 month    We discussed that a telemedicine visit is in place of an office visit; however, this limits the ability to perform a thorough physical examination which may affect objective findings related to a specific condition and can affect treatment. The patient verbalized understanding with this plan and was in agreement. There are no barriers to learning. All questions were answered. Please note Dragon dictation software was used to dictate this note which may result in inadvertent typos. Chaitanya BENTLEY [address] [facility]  Physical Medicine and Rehabilitation/Sports Medicine    PAST MEDICAL HISTORY:     Past Medical History:   Diagnosis Date    Anemia     recently dx-taking FeSO4    Diabetes ([address]     Divorce 1/1/2009    Finalized 2010    DM2 (diabetes mellitus, type 2) ([facility]     Endometriosis 1996    Laparoscopy-diagnostic    History of pregnancy 2008    EAB    Lipid screening 11/11/2007    per NG         PAST SURGICAL HISTORY:     Past Surgical History:   Procedure Laterality Date    BIOPSY OF UTERUS LINING  6/2011    neg embx    COLONOSCOPY N/A 1/10/2018    Procedure: COLONOSCOPY;  Surgeon: Chan Lloyd MD;  [facility]    Diagnostic for endometriosis         CURRENT MEDICATIONS:     Current Outpatient Medications   Medication Sig Dispense Refill    sertraline 25 MG Oral Tab Take 1 tablet (25 mg total) by mouth daily. 90 tablet 3    pregabalin (LYRICA) 25 MG Oral Cap Take 1 capsule (25 mg total) by mouth 2 (two) times daily. 50 capsule 0    diazePAM 10 MG Oral Tab Take 1/2 tablet (5 mg) by mouth one hour before procedure & 1/2 tablet (5 mg) 20 mins before procedure. (Patient not taking: Reported on 6/27/2023) 1 tablet 0    HYDROcodone-acetaminophen (NORCO)  MG Oral Tab Take 1 tablet by mouth every 6 (six) hours as needed for Pain. (Patient not taking: Reported on 6/27/2023) 30 tablet 0    cyclobenzaprine 10 MG Oral Tab Take 1 tablet (10 mg total) by mouth every 8 (eight) hours as needed for Muscle spasms. 30 tablet 0    pantoprazole 40 MG Oral Tab EC Take 1 tablet (40 mg total) by mouth every morning before breakfast. 90 tablet 3    Na Sulfate-K Sulfate-Mg Sulf (SUPREP BOWEL PREP KIT) 17.5-3.13-1.6 GM/177ML Oral Solution Take as directed (Patient not taking: Reported on 6/7/2023) 1 each 0    fluticasone propionate 50 MCG/ACT Nasal Suspension 2 sprays by Each Nare route daily. For 1 week than as needed (Patient not taking: Reported on 6/27/2023) 1 each 0    topiramate 25 MG Oral Tab Take 1 tablet (25 mg total) by mouth daily. 90 tablet 3    loratadine 10 MG Oral Tab Take 1 tablet (10 mg total) by mouth daily. 90 tablet 1    albuterol 108 (90 Base) MCG/ACT Inhalation Aero Soln 1 puff at first, then a 2nd puff in 15-30 min.  Stop IF develop increased heart rate or blood pressure and proceed to Emergency Dept at Hospital (Patient not taking: Reported on 9/15/2022) 1 each 0    ALPRAZolam 0.25 MG Oral Tab Take 1 tablet (0.25 mg total) by mouth daily as needed for Anxiety. 15 tablet 0    Estrogens, Conjugated (PREMARIN) 0.625 MG/GM Vaginal Cream Apply twice weekly to vagina for vaginal burning 42.5 g 0    Meloxicam 15 MG Oral Tab Take 1 tablet (15 mg total) by mouth daily. 30 tablet 0    Blood Glucose Monitoring Suppl (ONETOUCH ULTRA MINI) w/Device Does not apply Kit Check blood sugars two times daily 1 kit 0         ALLERGIES:     Sulfa Antibiotics       HIVES  Aminoglycosides         UNKNOWN  Compazine [Prochlor*      Morphine                OTHER (SEE COMMENTS)    Comment:convulsions      FAMILY HISTORY:     Family History   Problem Relation Age of Onset    Heart Attack Brother         MI-Cause of death    Colon Cancer Paternal Grandmother         Cause of death    Heart Disease Father         CAD    Diabetes Father         Diabetes/CRF/CAD cause of death    Other (Other) Father         CRF    Heart Disease Mother         CAD    Cancer Mother         Lung cancer and CAD cause of death    Heart Disease Brother 39        Premature CAD    Breast Cancer Maternal Aunt 80          SOCIAL HISTORY:     Social History     Socioeconomic History    Marital status:    Tobacco Use    Smoking status: Former    Smokeless tobacco: Never   Vaping Use    Vaping Use: Never used   Substance and Sexual Activity    Alcohol use:  Yes     Alcohol/week: 1.0 standard drink of alcohol     Types: 1 Glasses of wine per week     Comment: 3 glasses per month     Drug use: No    Sexual activity: Yes     Partners: Male     Birth control/protection: Condom   Other Topics Concern    Caffeine Concern No    Reaction to local anesthetic No          REVIEW OF SYSTEMS:   As noted in HPI      PHYSICAL EXAM:   General: No immediate distress  Head: Normocephalic/ Atraumatic  Eyes: Extra-occular movements intact  Ears/Nose/Throat:  External appearance identifies normal appearance without obvious deformity  Cardiovascular: No cyanosis, clubbing or edema  Respiratory: Non-labored respirations  Skin: No lesions noted   Neurological: alert & oriented x 3, attentive, able to follow commands, comprehention intact, spontaneous speech intact  Psychiatric: Mood and affect appropriate  Musculoskeletal Exam:  No change since last exam        LABS:     Lab Results   Component Value Date     (H) 06/27/2023    A1C 6.2 (H) 06/27/2023     Lab Results   Component Value Date    WBC 6.7 06/27/2023    RBC 4.47 06/27/2023    HGB 13.3 06/27/2023    HCT 40.4 06/27/2023    MCV 90.4 06/27/2023    MCH 29.8 06/27/2023    MCHC 32.9 06/27/2023    RDW 12.4 06/27/2023    .0 06/27/2023    MPV 8.3 05/14/2018     Lab Results   Component Value Date     (H) 06/27/2023    BUN 13 06/27/2023    BUNCREA 13.0 06/27/2023    CREATSERUM 1.00 06/27/2023    ANIONGAP 8 06/27/2023    GFRNAA 64 03/14/2022    GFRAA 74 03/14/2022    CA 9.0 06/27/2023    OSMOCALC 300 (H) 06/27/2023    ALKPHO 74 06/27/2023    AST 13 (L) 06/27/2023    ALT 28 06/27/2023    ALKPHOS 68 05/29/2015    BILT 0.7 06/27/2023    TP 7.3 06/27/2023    ALB 3.8 06/27/2023    GLOBULIN 3.5 06/27/2023    AGRATIO 1.3 05/29/2015     06/27/2023    K 4.1 06/27/2023     06/27/2023    CO2 26.0 06/27/2023     No results found for: PTP, PT, INR  Lab Results   Component Value Date    VITD 28.3 (L) 01/21/2020

## 2023-08-24 PROCEDURE — 88305 TISSUE EXAM BY PATHOLOGIST: CPT | Performed by: INTERNAL MEDICINE

## 2023-08-29 ENCOUNTER — MED REC SCAN ONLY (OUTPATIENT)
Dept: PHYSICAL MEDICINE AND REHAB | Facility: CLINIC | Age: 58
End: 2023-08-29

## 2023-09-11 ENCOUNTER — TELEPHONE (OUTPATIENT)
Dept: GASTROENTEROLOGY | Facility: CLINIC | Age: 58
End: 2023-09-11

## 2023-09-11 NOTE — TELEPHONE ENCOUNTER
Results letter mailed to patient per   Colon recall entered for repeat in 7 yrs, 8/24/2030  Colon done in 8/24/2023    Updated and Patient Outreach was placed for Colon recall     Dee Simmons MD  P Em Gi Clinical Staff  GI RNs - 1.  Please print and mail this letter to patient; 2. Recall for colonoscopy exam in 7 years

## 2023-09-19 ENCOUNTER — TELEPHONE (OUTPATIENT)
Dept: FAMILY MEDICINE CLINIC | Facility: CLINIC | Age: 58
End: 2023-09-19

## 2023-09-19 RX ORDER — LORATADINE 10 MG/1
10 TABLET ORAL DAILY
Qty: 90 TABLET | Refills: 3 | Status: SHIPPED | OUTPATIENT
Start: 2023-09-19

## 2023-09-19 NOTE — TELEPHONE ENCOUNTER
Pt is requesting a refill on       topiramate 25 MG Oral Tab, Take 1 tablet (25 mg total) by mouth daily. , Disp: 90 tablet, Rfl: 3

## 2023-09-19 NOTE — TELEPHONE ENCOUNTER
Pt is requesting a refill on       loratadine 10 MG Oral Tab, Take 1 tablet (10 mg total) by mouth daily. , Disp: 90 tablet, Rfl: 1

## 2023-09-19 NOTE — TELEPHONE ENCOUNTER
Refill passed per [facility] protocol. Requested Prescriptions   Pending Prescriptions Disp Refills    loratadine 10 MG Oral Tab 90 tablet 1     Sig: Take 1 tablet (10 mg total) by mouth daily.        Allergy Medication Protocol Passed - 9/19/2023 11:58 AM        Passed - In person appointment or virtual visit in the past 12 mos or appointment in next 3 mos     Recent Outpatient Visits              1 month ago Right lumbar radiculopathy    [facility], [address] [facility],[address], [place], [place]    Telemedicine    2 months ago Right lumbar radiculopathy    [facility], [address], [place] Mauricia Lamp, DO    Telemedicine    2 months ago Urgency of urination    [facility], [address] [facility] [place]    Office Visit    3 months ago Right lumbar radiculopathy    [address]    Office Visit    3 months ago Right lumbar radiculopathy    [facility], [address], [place] Mauricia [facility]     Office Visit

## 2023-09-25 ENCOUNTER — TELEPHONE (OUTPATIENT)
Dept: INTERNAL MEDICINE CLINIC | Facility: CLINIC | Age: 58
End: 2023-09-25

## 2023-09-27 RX ORDER — LORATADINE 10 MG/1
10 TABLET ORAL DAILY
Qty: 90 TABLET | Refills: 3 | OUTPATIENT
Start: 2023-09-27

## 2023-09-27 RX ORDER — TOPIRAMATE 25 MG/1
25 TABLET ORAL DAILY
Qty: 90 TABLET | Refills: 3 | OUTPATIENT
Start: 2023-09-27

## 2023-10-04 ENCOUNTER — PATIENT MESSAGE (OUTPATIENT)
Dept: INTERNAL MEDICINE CLINIC | Facility: CLINIC | Age: 58
End: 2023-10-04

## 2023-10-05 RX ORDER — MELOXICAM 15 MG/1
15 TABLET ORAL DAILY
Qty: 90 TABLET | Refills: 1 | Status: CANCELLED
Start: 2023-10-05

## 2023-10-05 NOTE — TELEPHONE ENCOUNTER
Lizbeth Ramirez, April, RN 10/5/2023 9:19 AM CDT        ----- Message -----  From: Katerine Becker  Sent: 10/4/2023 7:36 PM CDT  To: Em Triage Support  Subject: Meloxicam     Please refill

## 2023-10-12 ENCOUNTER — MED REC SCAN ONLY (OUTPATIENT)
Dept: PHYSICAL MEDICINE AND REHAB | Facility: CLINIC | Age: 58
End: 2023-10-12

## 2023-10-17 ENCOUNTER — MED REC SCAN ONLY (OUTPATIENT)
Dept: PHYSICAL MEDICINE AND REHAB | Facility: CLINIC | Age: 58
End: 2023-10-17

## 2023-11-02 ENCOUNTER — MED REC SCAN ONLY (OUTPATIENT)
Dept: PHYSICAL MEDICINE AND REHAB | Facility: CLINIC | Age: 58
End: 2023-11-02

## 2023-11-15 ENCOUNTER — OFFICE VISIT (OUTPATIENT)
Dept: PHYSICAL MEDICINE AND REHAB | Facility: CLINIC | Age: 58
End: 2023-11-15
Payer: COMMERCIAL

## 2023-11-15 ENCOUNTER — TELEPHONE (OUTPATIENT)
Dept: PHYSICAL MEDICINE AND REHAB | Facility: CLINIC | Age: 58
End: 2023-11-15

## 2023-11-15 VITALS — HEIGHT: 63 IN | RESPIRATION RATE: 18 BRPM | BODY MASS INDEX: 25.52 KG/M2 | WEIGHT: 144 LBS

## 2023-11-15 DIAGNOSIS — M51.26 HNP (HERNIATED NUCLEUS PULPOSUS), LUMBAR: Primary | ICD-10-CM

## 2023-11-15 DIAGNOSIS — M54.16 BILATERAL LUMBAR RADICULOPATHY: ICD-10-CM

## 2023-11-15 DIAGNOSIS — M54.16 RIGHT LUMBAR RADICULOPATHY: ICD-10-CM

## 2023-11-15 DIAGNOSIS — M54.31 SCIATICA OF RIGHT SIDE: ICD-10-CM

## 2023-11-15 PROCEDURE — 99214 OFFICE O/P EST MOD 30 MIN: CPT | Performed by: PHYSICAL MEDICINE & REHABILITATION

## 2023-11-15 PROCEDURE — 3008F BODY MASS INDEX DOCD: CPT | Performed by: PHYSICAL MEDICINE & REHABILITATION

## 2023-11-15 RX ORDER — CYCLOBENZAPRINE HCL 10 MG
10 TABLET ORAL NIGHTLY PRN
Qty: 30 TABLET | Refills: 0 | Status: SHIPPED | OUTPATIENT
Start: 2023-11-15

## 2023-11-15 RX ORDER — METHYLPREDNISOLONE 4 MG/1
TABLET ORAL
Qty: 1 EACH | Refills: 0 | Status: SHIPPED | OUTPATIENT
Start: 2023-11-15

## 2023-11-15 NOTE — PATIENT INSTRUCTIONS
-My office will call once injection is approved  -medrol dose pack to be started  -Cyclobenzaprine at nighttime  -Norco as needed  -See neurosurgery

## 2023-11-15 NOTE — TELEPHONE ENCOUNTER
Patient has been scheduled for L5-S1 Interlaminar Epidural Steroid Injection under fluoroscopy guidance   on 11/20/2023 at the [facility] with Dr. Zay De La Vega.   -Anesthesia type: IVCS  -Patient was advised that if he/she does receive the covid vaccine it needs to be at least 2 weeks before or after the injection. -Medications and allergies reviewed. -Patient reminded to hold NSAIDs (Ibuprofen, ASA 81, Aleve, Naproxen, Mobic, Diclofenac, Etodolac, Celebrex etc.) for 3 days prior to Lumbar MBB/Facet if BMI is greater than 35. For Cervical injections only hold multivitamins, Vitamin E, Fish Oil, Phentermine/Lomaira for 7 days prior to injection and NSAIDS.  mg to be held for 7 days prior to injections.  -If patient is receiving MAC/IVCS, weight loss oral/injectable medications will need to be held for 7 days prior to injection.  -Patient informed to fast 12 hours prior to procedure with IVCS/MAC.   -If on blood thinner, clearance has been received and approved to hold this medication by provider.   -Patient informed he/she will need a  to and from procedure. Eriberto Farley is located in the [facility] [address] 1st floor,  may park in the yellow/purple parking lot. Patient verbalized understanding and agrees with plan. Scheduled in Epic: Yes  Scheduled in Surgical Case: Yes  Follow up appointment made:  No

## 2023-11-15 NOTE — TELEPHONE ENCOUNTER
Initiated authorization for L5-S1 Interlaminar Epidural Steroid Injection under fluoroscopy guidance CPT 43002 dx:M54.16 to be done at [facility] with [employer]  Status: Approved w/ order #[id number] valid 11/20/23-12/19/23      Per Dr. Ángel Buitrago w/ IVCS

## 2023-11-15 NOTE — TELEPHONE ENCOUNTER
Patient would like to know if she can continue steroid pack prior to injection scheduled on 11/20/2023. Can inform patient via [employer]. Currently awaiting Dr. Babatunde Aleman response.

## 2023-11-16 NOTE — ADDENDUM NOTE
Addended by: Darron Certain on: 11/16/2023 11:05 AM     Modules accepted: Orders pt c/o back pain radiating into bilateral legs x 1 week, denies urinary symptoms, trauma or falls, denies any abdominal pain, fever, chills, vag bleeding. lmp 12/3/17 pt c/o back pain radiating into bilateral legs x 1 week, denies urinary symptoms, denies any bowel/bladder incontinence, trauma or falls, denies any abdominal pain, fever, chills, vag bleeding. lmp 12/3/17

## 2023-11-16 NOTE — TELEPHONE ENCOUNTER
Please review. Protocol failed or has no protocol. Requested Prescriptions   Pending Prescriptions Disp Refills    HYDROcodone-acetaminophen (NORCO)  MG Oral Tab 30 tablet 0     Sig: Take 1 tablet by mouth every 6 (six) hours as needed for Pain.        There is no refill protocol information for this order          Recent Outpatient Visits              Yesterday HNP (herniated nucleus pulposus), lumbar    [facility], [address], [place],     Office Visit    3 months ago Right lumbar radiculopathy    [facility], [address], [facility],     Telemedicine    4 months ago Right lumbar radiculopathy    [facility], [address], [place],     Telemedicine    4 months ago Urgency of urination    [facility], [address] [facility] LESVIA [place]der[place]    Office Visit    5 months ago Right lumbar radiculopathy    [address]    Office Visit            Future Appointments         Provider Department Appt Notes    In 4 days Sonny Grewal [address] L5-S1 Interlaminar Epidural Steroid Injection under fluoroscopy guidance IVCS

## 2023-11-17 RX ORDER — HYDROCODONE BITARTRATE AND ACETAMINOPHEN 10; 325 MG/1; MG/1
1 TABLET ORAL EVERY 6 HOURS PRN
Qty: 30 TABLET | Refills: 0 | OUTPATIENT
Start: 2023-11-17

## 2023-11-18 NOTE — TELEPHONE ENCOUNTER
HYDROcodone-Acetaminophen     Dispensed Written Strength Quantity Refills Days Supply Provider Pharmacy   HYDROCODONE BITARTRATE-ACETAMINOPHE 05/27/2023 05/27/2023 325-7.5 mg 16  4 JERRY MULLEN

## 2023-11-20 ENCOUNTER — APPOINTMENT (OUTPATIENT)
Dept: SURGERY | Facility: CLINIC | Age: 58
End: 2023-11-20
Payer: COMMERCIAL

## 2023-11-20 ENCOUNTER — PATIENT MESSAGE (OUTPATIENT)
Dept: INTERNAL MEDICINE CLINIC | Facility: CLINIC | Age: 58
End: 2023-11-20

## 2023-11-20 DIAGNOSIS — M54.31 SCIATICA OF RIGHT SIDE: ICD-10-CM

## 2023-11-21 ENCOUNTER — OFFICE VISIT (OUTPATIENT)
Dept: INTERNAL MEDICINE CLINIC | Facility: CLINIC | Age: 58
End: 2023-11-21

## 2023-11-21 ENCOUNTER — NURSE TRIAGE (OUTPATIENT)
Dept: INTERNAL MEDICINE CLINIC | Facility: CLINIC | Age: 58
End: 2023-11-21

## 2023-11-21 VITALS
HEIGHT: 63 IN | SYSTOLIC BLOOD PRESSURE: 118 MMHG | DIASTOLIC BLOOD PRESSURE: 77 MMHG | BODY MASS INDEX: 26.75 KG/M2 | WEIGHT: 151 LBS | HEART RATE: 88 BPM

## 2023-11-21 DIAGNOSIS — Z12.83 SKIN CANCER SCREENING: ICD-10-CM

## 2023-11-21 DIAGNOSIS — M54.16 BILATERAL LUMBAR RADICULOPATHY: Primary | ICD-10-CM

## 2023-11-21 PROCEDURE — 99214 OFFICE O/P EST MOD 30 MIN: CPT | Performed by: NURSE PRACTITIONER

## 2023-11-21 PROCEDURE — 3008F BODY MASS INDEX DOCD: CPT | Performed by: NURSE PRACTITIONER

## 2023-11-21 PROCEDURE — 3078F DIAST BP <80 MM HG: CPT | Performed by: NURSE PRACTITIONER

## 2023-11-21 PROCEDURE — 3074F SYST BP LT 130 MM HG: CPT | Performed by: NURSE PRACTITIONER

## 2023-11-21 RX ORDER — MELOXICAM 15 MG/1
15 TABLET ORAL DAILY
Qty: 30 TABLET | Refills: 1 | Status: SHIPPED | OUTPATIENT
Start: 2023-11-21

## 2023-11-21 NOTE — TELEPHONE ENCOUNTER
From: Juan Fernandez  To: Huong Pranavluz  Sent: 11/20/2023 4:36 PM CST  Subject: Pain medicine     Dr gallagher  There needs to be a pain medication prescribed to me   Either meloxicam or Norco    Please choose which pain is best for me  Advil hurts my stomach  Tylenol has no benefit     Lillei

## 2023-11-21 NOTE — TELEPHONE ENCOUNTER
RANDALL Cano    Action Requested: Summary for Provider     []  Critical Lab, Recommendations Needed  [] Need Additional Advice  [x]   RANDALL    []   Need Orders  [] Need Medications Sent to Pharmacy  []  Other     SUMMARY: Patient called states she has 3 bulging disks and is in pain. She states Tylenol does not help, she cannot take NSAIDS as it upsets her stomach. She had steroidal injections in the past, but they wear off; she had one yesterday. She wanted to get a refill of Norco, but it was externally prescribed. She asked Dr. Robyn Tineo for [facility] for pain, she was told to take the muscle relaxer and steroid and she has taken as prescribed. She was told she cannot take Meloxicam because she was told it interacts with one of her medications per pharmacist and insurance will not approve it. She does not know who to go to as she is in pain and feels like she has been redirected to providers without resolve, she ultimately was told to see primary care/PCP for pain Rx. Pain is 4/10 while she lays in bed, when she stands it is a 9/10, with ambulation and movement it is at 12/10. Denies any radiating down leg. Denies any paresthesias or weakness of legs. Last office visit with Dr. Kathy Cano was 12/15/2022. She has been doing PT since onset. She may want to see a surgeon. She is willing to come into the office today to be seen, no visits available with PCP. She is willing to see Sally LAKHANI for same day visit--> scheduled. Home Care Advice discussed, per protocol. Patient instructed any new or worsening symptoms [reviewed] seek immediate medical attention.     Reason for call: Low Back Pain  Onset: 03/2023    Future Appointments   Date Time Provider Abiola Varner   11/21/2023 10:00 AM LESVIA Hung [facility] [place]     Reason for Disposition   SEVERE back pain (e.g., excruciating, unable to do any normal activities) and not improved after pain medicine and CARE ADVICE    Protocols used: Back Pain-A-[place]

## 2023-11-22 RX ORDER — HYDROCODONE BITARTRATE AND ACETAMINOPHEN 10; 325 MG/1; MG/1
1 TABLET ORAL EVERY 6 HOURS PRN
Qty: 30 TABLET | Refills: 0 | OUTPATIENT
Start: 2023-11-22

## 2023-12-04 ENCOUNTER — TELEPHONE (OUTPATIENT)
Dept: FAMILY MEDICINE CLINIC | Facility: CLINIC | Age: 58
End: 2023-12-04

## 2023-12-04 DIAGNOSIS — R73.01 ELEVATED FASTING BLOOD SUGAR: Primary | ICD-10-CM

## 2023-12-04 NOTE — TELEPHONE ENCOUNTER
L5-S1 ALIF on 12/18/23 with Dr. Cirilo Martinez @ [address] [place] [address]    H&P- completed 12/06/23  Labs- completed FBS (117), A1C (6.3), Globin (2.5), +MSSA, all other labs WNL  EKG- normal sinus rhythm with sinus arrhythmia, low voltage QRS, consider pulmonary disease, pericardial effusion, or normal variant.  Previous EKG in Cardio tab 11/28/21  X-ray- WNL

## 2023-12-06 ENCOUNTER — OFFICE VISIT (OUTPATIENT)
Dept: FAMILY MEDICINE CLINIC | Facility: CLINIC | Age: 58
End: 2023-12-06
Payer: COMMERCIAL

## 2023-12-06 ENCOUNTER — HOSPITAL ENCOUNTER (OUTPATIENT)
Dept: GENERAL RADIOLOGY | Facility: HOSPITAL | Age: 58
Discharge: HOME | End: 2023-12-06
Attending: FAMILY MEDICINE
Payer: COMMERCIAL

## 2023-12-06 ENCOUNTER — LAB ENCOUNTER (OUTPATIENT)
Dept: LAB | Facility: HOSPITAL | Age: 58
End: 2023-12-06
Attending: FAMILY MEDICINE
Payer: COMMERCIAL

## 2023-12-06 VITALS
SYSTOLIC BLOOD PRESSURE: 128 MMHG | BODY MASS INDEX: 26.58 KG/M2 | TEMPERATURE: 97 F | OXYGEN SATURATION: 98 % | HEART RATE: 67 BPM | WEIGHT: 150 LBS | HEIGHT: 63 IN | DIASTOLIC BLOOD PRESSURE: 78 MMHG | RESPIRATION RATE: 16 BRPM

## 2023-12-06 DIAGNOSIS — Z01.818 PREOPERATIVE EXAMINATION, UNSPECIFIED: Primary | ICD-10-CM

## 2023-12-06 DIAGNOSIS — Z01.812 PRE-OPERATIVE LABORATORY EXAMINATION: ICD-10-CM

## 2023-12-06 DIAGNOSIS — Z01.818 PREOPERATIVE EXAMINATION, UNSPECIFIED: ICD-10-CM

## 2023-12-06 DIAGNOSIS — Z82.49 FAMILY HISTORY OF EARLY CAD: ICD-10-CM

## 2023-12-06 DIAGNOSIS — R73.03 PREDIABETES: ICD-10-CM

## 2023-12-06 DIAGNOSIS — R73.01 ELEVATED FASTING BLOOD SUGAR: ICD-10-CM

## 2023-12-06 DIAGNOSIS — M54.16 BILATERAL LUMBAR RADICULOPATHY: ICD-10-CM

## 2023-12-06 PROBLEM — M48.061 LUMBAR SPINAL STENOSIS: Status: RESOLVED | Noted: 2023-12-06 | Resolved: 2023-12-06

## 2023-12-06 PROBLEM — M48.061 LUMBAR SPINAL STENOSIS: Status: ACTIVE | Noted: 2023-12-06

## 2023-12-06 LAB
ALBUMIN SERPL-MCNC: 4.4 G/DL (ref 3.2–4.8)
ALBUMIN/GLOB SERPL: 1.8 {RATIO} (ref 1–2)
ALP LIVER SERPL-CCNC: 84 U/L
ALT SERPL-CCNC: 28 U/L
ANION GAP SERPL CALC-SCNC: 6 MMOL/L (ref 0–18)
ANTIBODY SCREEN: NEGATIVE
APTT PPP: 26.9 SECONDS (ref 23.3–35.6)
AST SERPL-CCNC: 14 U/L (ref ?–34)
ATRIAL RATE: 72 BPM
BASOPHILS # BLD AUTO: 0.02 X10(3) UL (ref 0–0.2)
BASOPHILS NFR BLD AUTO: 0.3 %
BILIRUB SERPL-MCNC: 0.6 MG/DL (ref 0.3–1.2)
BILIRUB UR QL: NEGATIVE
BUN BLD-MCNC: 17 MG/DL (ref 9–23)
BUN/CREAT SERPL: 16.8 (ref 10–20)
CALCIUM BLD-MCNC: 9.4 MG/DL (ref 8.7–10.4)
CHLORIDE SERPL-SCNC: 108 MMOL/L (ref 98–112)
CLARITY UR: CLEAR
CO2 SERPL-SCNC: 26 MMOL/L (ref 21–32)
CREAT BLD-MCNC: 1.01 MG/DL
DEPRECATED RDW RBC AUTO: 42.1 FL (ref 35.1–46.3)
EGFRCR SERPLBLD CKD-EPI 2021: 65 ML/MIN/1.73M2 (ref 60–?)
EOSINOPHIL # BLD AUTO: 0.18 X10(3) UL (ref 0–0.7)
EOSINOPHIL NFR BLD AUTO: 3.1 %
ERYTHROCYTE [DISTWIDTH] IN BLOOD BY AUTOMATED COUNT: 12.5 % (ref 11–15)
EST. AVERAGE GLUCOSE BLD GHB EST-MCNC: 134 MG/DL (ref 68–126)
FASTING STATUS PATIENT QL REPORTED: NO
GLOBULIN PLAS-MCNC: 2.5 G/DL (ref 2.8–4.4)
GLUCOSE BLD-MCNC: 117 MG/DL (ref 70–99)
GLUCOSE UR-MCNC: NORMAL MG/DL
HBA1C MFR BLD: 6.3 % (ref ?–5.7)
HCT VFR BLD AUTO: 38.7 %
HGB BLD-MCNC: 12.5 G/DL
HGB UR QL STRIP.AUTO: NEGATIVE
IMM GRANULOCYTES # BLD AUTO: 0.02 X10(3) UL (ref 0–1)
IMM GRANULOCYTES NFR BLD: 0.3 %
INR BLD: 0.92 (ref 0.8–1.2)
KETONES UR-MCNC: NEGATIVE MG/DL
LEUKOCYTE ESTERASE UR QL STRIP.AUTO: NEGATIVE
LYMPHOCYTES # BLD AUTO: 1.8 X10(3) UL (ref 1–4)
LYMPHOCYTES NFR BLD AUTO: 31 %
MCH RBC QN AUTO: 29.8 PG (ref 26–34)
MCHC RBC AUTO-ENTMCNC: 32.3 G/DL (ref 31–37)
MCV RBC AUTO: 92.1 FL
MONOCYTES # BLD AUTO: 0.37 X10(3) UL (ref 0.1–1)
MONOCYTES NFR BLD AUTO: 6.4 %
NEUTROPHILS # BLD AUTO: 3.42 X10 (3) UL (ref 1.5–7.7)
NEUTROPHILS # BLD AUTO: 3.42 X10(3) UL (ref 1.5–7.7)
NEUTROPHILS NFR BLD AUTO: 58.9 %
NITRITE UR QL STRIP.AUTO: NEGATIVE
OSMOLALITY SERPL CALC.SUM OF ELEC: 293 MOSM/KG (ref 275–295)
P AXIS: 69 DEGREES
P-R INTERVAL: 156 MS
PH UR: 6.5 [PH] (ref 5–8)
PLATELET # BLD AUTO: 231 10(3)UL (ref 150–450)
POTASSIUM SERPL-SCNC: 3.8 MMOL/L (ref 3.5–5.1)
PROT SERPL-MCNC: 6.9 G/DL (ref 5.7–8.2)
PROT UR-MCNC: NEGATIVE MG/DL
PROTHROMBIN TIME: 12.9 SECONDS (ref 11.6–14.8)
Q-T INTERVAL: 398 MS
QRS DURATION: 82 MS
QTC CALCULATION (BEZET): 435 MS
R AXIS: 33 DEGREES
RBC # BLD AUTO: 4.2 X10(6)UL
RH BLOOD TYPE: POSITIVE
RH BLOOD TYPE: POSITIVE
SODIUM SERPL-SCNC: 140 MMOL/L (ref 136–145)
SP GR UR STRIP: 1.02 (ref 1–1.03)
T AXIS: 36 DEGREES
UROBILINOGEN UR STRIP-ACNC: NORMAL
VENTRICULAR RATE: 72 BPM
WBC # BLD AUTO: 5.8 X10(3) UL (ref 4–11)

## 2023-12-06 PROCEDURE — 85025 COMPLETE CBC W/AUTO DIFF WBC: CPT | Performed by: FAMILY MEDICINE

## 2023-12-06 PROCEDURE — 86901 BLOOD TYPING SEROLOGIC RH(D): CPT | Performed by: FAMILY MEDICINE

## 2023-12-06 PROCEDURE — 3074F SYST BP LT 130 MM HG: CPT | Performed by: FAMILY MEDICINE

## 2023-12-06 PROCEDURE — 83036 HEMOGLOBIN GLYCOSYLATED A1C: CPT | Performed by: FAMILY MEDICINE

## 2023-12-06 PROCEDURE — 85610 PROTHROMBIN TIME: CPT | Performed by: FAMILY MEDICINE

## 2023-12-06 PROCEDURE — 99243 OFF/OP CNSLTJ NEW/EST LOW 30: CPT | Performed by: FAMILY MEDICINE

## 2023-12-06 PROCEDURE — 87147 CULTURE TYPE IMMUNOLOGIC: CPT | Performed by: FAMILY MEDICINE

## 2023-12-06 PROCEDURE — 71046 X-RAY EXAM CHEST 2 VIEWS: CPT | Performed by: FAMILY MEDICINE

## 2023-12-06 PROCEDURE — 80053 COMPREHEN METABOLIC PANEL: CPT | Performed by: FAMILY MEDICINE

## 2023-12-06 PROCEDURE — 87081 CULTURE SCREEN ONLY: CPT | Performed by: FAMILY MEDICINE

## 2023-12-06 PROCEDURE — 3008F BODY MASS INDEX DOCD: CPT | Performed by: FAMILY MEDICINE

## 2023-12-06 PROCEDURE — 86900 BLOOD TYPING SEROLOGIC ABO: CPT | Performed by: FAMILY MEDICINE

## 2023-12-06 PROCEDURE — 81003 URINALYSIS AUTO W/O SCOPE: CPT | Performed by: FAMILY MEDICINE

## 2023-12-06 PROCEDURE — 3044F HG A1C LEVEL LT 7.0%: CPT | Performed by: FAMILY MEDICINE

## 2023-12-06 PROCEDURE — 86850 RBC ANTIBODY SCREEN: CPT | Performed by: FAMILY MEDICINE

## 2023-12-06 PROCEDURE — 3078F DIAST BP <80 MM HG: CPT | Performed by: FAMILY MEDICINE

## 2023-12-06 PROCEDURE — 85730 THROMBOPLASTIN TIME PARTIAL: CPT | Performed by: FAMILY MEDICINE

## 2023-12-06 NOTE — TELEPHONE ENCOUNTER
Dr. Mar Melgar, please review:    Labs- completed FBS (117), A1C (6.3), Globin (2.5), +MSSA, all other labs WNL    EKG- normal sinus rhythm with sinus arrhythmia, low voltage QRS, consider pulmonary disease, pericardial effusion, or normal variant. Previous EKG in Cardio tab 11/28/21    X-ray- WNL    If A1C OK, ok for surgery?

## 2023-12-08 NOTE — TELEPHONE ENCOUNTER
Left message with patient with test results and that she will need to start abx ointment Bactron starting 12/13 BID x 5days. Rx sent to pharmacy. Patient advised to call us back if she has questions or if she can just let me know she received my message.

## 2023-12-08 NOTE — TELEPHONE ENCOUNTER
Recommend MRSA / MSSA protocol     Chart and results reviewed and are acceptable for surgery. Pt is medically clear to proceed with surgery as planned.

## 2023-12-11 NOTE — TELEPHONE ENCOUNTER
Spoke to patient, answered additional questions regarding +MSSA. Patient clear for surgery per Dr. Rufino Arce.

## 2023-12-18 ENCOUNTER — HOSPITAL ENCOUNTER (INPATIENT)
Facility: HOSPITAL | Age: 58
LOS: 2 days | Discharge: HOME | End: 2023-12-20
Attending: ORTHOPAEDIC SURGERY | Admitting: ORTHOPAEDIC SURGERY
Payer: COMMERCIAL

## 2023-12-18 ENCOUNTER — HOSPITAL ENCOUNTER (INPATIENT)
Facility: HOSPITAL | Age: 58
LOS: 2 days | Discharge: HOME HEALTH | DRG: 455 | End: 2023-12-20
Attending: ORTHOPAEDIC SURGERY | Admitting: ORTHOPAEDIC SURGERY
Payer: COMMERCIAL

## 2023-12-18 ENCOUNTER — ANESTHESIA EVENT (OUTPATIENT)
Dept: SURGERY | Facility: HOSPITAL | Age: 58
End: 2023-12-18
Payer: COMMERCIAL

## 2023-12-18 ENCOUNTER — APPOINTMENT (OUTPATIENT)
Dept: GENERAL RADIOLOGY | Facility: HOSPITAL | Age: 58
End: 2023-12-18
Attending: ORTHOPAEDIC SURGERY
Payer: COMMERCIAL

## 2023-12-18 ENCOUNTER — APPOINTMENT (OUTPATIENT)
Dept: GENERAL RADIOLOGY | Facility: HOSPITAL | Age: 58
DRG: 455 | End: 2023-12-18
Attending: ORTHOPAEDIC SURGERY
Payer: COMMERCIAL

## 2023-12-18 ENCOUNTER — ANESTHESIA (OUTPATIENT)
Dept: SURGERY | Facility: HOSPITAL | Age: 58
End: 2023-12-18
Payer: COMMERCIAL

## 2023-12-18 PROBLEM — R73.9 HYPERGLYCEMIA: Status: ACTIVE | Noted: 2023-12-18

## 2023-12-18 PROBLEM — E78.00 HIGH CHOLESTEROL: Status: ACTIVE | Noted: 2023-12-18

## 2023-12-18 PROBLEM — Z98.1 S/P LUMBAR SPINAL FUSION: Status: ACTIVE | Noted: 2023-12-18

## 2023-12-18 LAB
GLUCOSE BLDC GLUCOMTR-MCNC: 126 MG/DL (ref 70–99)
GLUCOSE BLDC GLUCOMTR-MCNC: 171 MG/DL (ref 70–99)
GLUCOSE BLDC GLUCOMTR-MCNC: 175 MG/DL (ref 70–99)
GLUCOSE BLDC GLUCOMTR-MCNC: 182 MG/DL (ref 70–99)
GLUCOSE BLDC GLUCOMTR-MCNC: 213 MG/DL (ref 70–99)

## 2023-12-18 PROCEDURE — 50715 RELEASE OF URETER: CPT | Performed by: SURGERY

## 2023-12-18 PROCEDURE — 0TN70ZZ RELEASE LEFT URETER, OPEN APPROACH: ICD-10-PCS | Performed by: SURGERY

## 2023-12-18 PROCEDURE — 22558 ARTHRD ANT NTRBD MIN DSC LUM: CPT | Performed by: SURGERY

## 2023-12-18 PROCEDURE — 0SG30A0 FUSION OF LUMBOSACRAL JOINT WITH INTERBODY FUSION DEVICE, ANTERIOR APPROACH, ANTERIOR COLUMN, OPEN APPROACH: ICD-10-PCS | Performed by: ORTHOPAEDIC SURGERY

## 2023-12-18 PROCEDURE — 76000 FLUOROSCOPY <1 HR PHYS/QHP: CPT | Performed by: ORTHOPAEDIC SURGERY

## 2023-12-18 PROCEDURE — 99222 1ST HOSP IP/OBS MODERATE 55: CPT | Performed by: HOSPITALIST

## 2023-12-18 PROCEDURE — 0SG3071 FUSION OF LUMBOSACRAL JOINT WITH AUTOLOGOUS TISSUE SUBSTITUTE, POSTERIOR APPROACH, POSTERIOR COLUMN, OPEN APPROACH: ICD-10-PCS | Performed by: ORTHOPAEDIC SURGERY

## 2023-12-18 DEVICE — COROENT XLR-F SCREW 5.5X22.5: Type: IMPLANTABLE DEVICE | Site: BACK | Status: FUNCTIONAL

## 2023-12-18 DEVICE — SCREW SPNL L45MM OD6.5MM 2C REDUC RELINE: Type: IMPLANTABLE DEVICE | Site: BACK | Status: FUNCTIONAL

## 2023-12-18 DEVICE — SCREW SPNL DIA5.5MM OPN TULIP LOK RELINE: Type: IMPLANTABLE DEVICE | Site: BACK | Status: FUNCTIONAL

## 2023-12-18 DEVICE — COROENT XLR-F SCREW 5.5X20: Type: IMPLANTABLE DEVICE | Site: BACK | Status: FUNCTIONAL

## 2023-12-18 DEVICE — OSTEOCEL PRO LARGE BULK BUY: Type: IMPLANTABLE DEVICE | Site: BACK | Status: FUNCTIONAL

## 2023-12-18 DEVICE — BRIGADE IMPLANT 12X38X28 12DEG: Type: IMPLANTABLE DEVICE | Site: BACK | Status: FUNCTIONAL

## 2023-12-18 DEVICE — SCREW SPNL L40MM OD6.5MM 2C REDUC RELINE: Type: IMPLANTABLE DEVICE | Site: BACK | Status: FUNCTIONAL

## 2023-12-18 DEVICE — ROD SPNL L35MM DIA5.5MM POST: Type: IMPLANTABLE DEVICE | Site: BACK | Status: FUNCTIONAL

## 2023-12-18 DEVICE — K WIRE FIX NIT BVL BLNT TIP PRECEPT: Type: IMPLANTABLE DEVICE | Site: BACK

## 2023-12-18 RX ORDER — MIDAZOLAM HYDROCHLORIDE 1 MG/ML
INJECTION INTRAMUSCULAR; INTRAVENOUS AS NEEDED
Status: DISCONTINUED | OUTPATIENT
Start: 2023-12-18 | End: 2023-12-18 | Stop reason: SURG

## 2023-12-18 RX ORDER — ONDANSETRON 2 MG/ML
4 INJECTION INTRAMUSCULAR; INTRAVENOUS EVERY 6 HOURS PRN
Status: DISCONTINUED | OUTPATIENT
Start: 2023-12-18 | End: 2023-12-20

## 2023-12-18 RX ORDER — ROCURONIUM BROMIDE 10 MG/ML
INJECTION, SOLUTION INTRAVENOUS AS NEEDED
Status: DISCONTINUED | OUTPATIENT
Start: 2023-12-18 | End: 2023-12-18 | Stop reason: SURG

## 2023-12-18 RX ORDER — SODIUM CHLORIDE 9 MG/ML
INJECTION, SOLUTION INTRAVENOUS CONTINUOUS PRN
Status: DISCONTINUED | OUTPATIENT
Start: 2023-12-18 | End: 2023-12-18

## 2023-12-18 RX ORDER — NICOTINE POLACRILEX 4 MG
15 LOZENGE BUCCAL
Status: DISCONTINUED | OUTPATIENT
Start: 2023-12-18 | End: 2023-12-20

## 2023-12-18 RX ORDER — TIZANIDINE 2 MG/1
2 TABLET ORAL EVERY 8 HOURS PRN
Status: DISCONTINUED | OUTPATIENT
Start: 2023-12-18 | End: 2023-12-20

## 2023-12-18 RX ORDER — CEFAZOLIN SODIUM/WATER 2 G/20 ML
2 SYRINGE (ML) INTRAVENOUS ONCE
Status: COMPLETED | OUTPATIENT
Start: 2023-12-18 | End: 2023-12-18

## 2023-12-18 RX ORDER — OXYCODONE HYDROCHLORIDE 5 MG/1
5 TABLET ORAL EVERY 4 HOURS PRN
Status: DISCONTINUED | OUTPATIENT
Start: 2023-12-18 | End: 2023-12-20

## 2023-12-18 RX ORDER — EPHEDRINE SULFATE 50 MG/ML
INJECTION, SOLUTION INTRAVENOUS AS NEEDED
Status: DISCONTINUED | OUTPATIENT
Start: 2023-12-18 | End: 2023-12-18 | Stop reason: SURG

## 2023-12-18 RX ORDER — DIPHENHYDRAMINE HYDROCHLORIDE 50 MG/ML
25 INJECTION INTRAMUSCULAR; INTRAVENOUS EVERY 4 HOURS PRN
Status: DISCONTINUED | OUTPATIENT
Start: 2023-12-18 | End: 2023-12-20

## 2023-12-18 RX ORDER — SCOLOPAMINE TRANSDERMAL SYSTEM 1 MG/1
1 PATCH, EXTENDED RELEASE TRANSDERMAL
Status: DISCONTINUED | OUTPATIENT
Start: 2023-12-18 | End: 2023-12-18 | Stop reason: HOSPADM

## 2023-12-18 RX ORDER — HYDROMORPHONE HYDROCHLORIDE 1 MG/ML
0.6 INJECTION, SOLUTION INTRAMUSCULAR; INTRAVENOUS; SUBCUTANEOUS EVERY 5 MIN PRN
Status: DISCONTINUED | OUTPATIENT
Start: 2023-12-18 | End: 2023-12-18 | Stop reason: HOSPADM

## 2023-12-18 RX ORDER — DEXTROSE MONOHYDRATE 25 G/50ML
50 INJECTION, SOLUTION INTRAVENOUS
Status: DISCONTINUED | OUTPATIENT
Start: 2023-12-18 | End: 2023-12-18 | Stop reason: HOSPADM

## 2023-12-18 RX ORDER — MORPHINE SULFATE 4 MG/ML
4 INJECTION, SOLUTION INTRAMUSCULAR; INTRAVENOUS EVERY 10 MIN PRN
Status: DISCONTINUED | OUTPATIENT
Start: 2023-12-18 | End: 2023-12-18 | Stop reason: HOSPADM

## 2023-12-18 RX ORDER — PHENYLEPHRINE HCL 10 MG/ML
VIAL (ML) INJECTION AS NEEDED
Status: DISCONTINUED | OUTPATIENT
Start: 2023-12-18 | End: 2023-12-18 | Stop reason: SURG

## 2023-12-18 RX ORDER — DOCUSATE SODIUM 100 MG/1
100 CAPSULE, LIQUID FILLED ORAL 2 TIMES DAILY
Status: DISCONTINUED | OUTPATIENT
Start: 2023-12-18 | End: 2023-12-20

## 2023-12-18 RX ORDER — ONDANSETRON 2 MG/ML
INJECTION INTRAMUSCULAR; INTRAVENOUS AS NEEDED
Status: DISCONTINUED | OUTPATIENT
Start: 2023-12-18 | End: 2023-12-18 | Stop reason: SURG

## 2023-12-18 RX ORDER — HYDROMORPHONE HYDROCHLORIDE 1 MG/ML
0.2 INJECTION, SOLUTION INTRAMUSCULAR; INTRAVENOUS; SUBCUTANEOUS EVERY 5 MIN PRN
Status: DISCONTINUED | OUTPATIENT
Start: 2023-12-18 | End: 2023-12-18 | Stop reason: HOSPADM

## 2023-12-18 RX ORDER — NICOTINE POLACRILEX 4 MG
15 LOZENGE BUCCAL
Status: DISCONTINUED | OUTPATIENT
Start: 2023-12-18 | End: 2023-12-18 | Stop reason: HOSPADM

## 2023-12-18 RX ORDER — HYDROMORPHONE HYDROCHLORIDE 1 MG/ML
0.4 INJECTION, SOLUTION INTRAMUSCULAR; INTRAVENOUS; SUBCUTANEOUS EVERY 2 HOUR PRN
Status: DISCONTINUED | OUTPATIENT
Start: 2023-12-18 | End: 2023-12-20

## 2023-12-18 RX ORDER — DIAZEPAM 5 MG/1
5 TABLET ORAL EVERY 6 HOURS PRN
Status: DISCONTINUED | OUTPATIENT
Start: 2023-12-18 | End: 2023-12-20

## 2023-12-18 RX ORDER — NICOTINE POLACRILEX 4 MG
30 LOZENGE BUCCAL
Status: DISCONTINUED | OUTPATIENT
Start: 2023-12-18 | End: 2023-12-18 | Stop reason: HOSPADM

## 2023-12-18 RX ORDER — HYDROMORPHONE HYDROCHLORIDE 1 MG/ML
0.8 INJECTION, SOLUTION INTRAMUSCULAR; INTRAVENOUS; SUBCUTANEOUS EVERY 2 HOUR PRN
Status: DISCONTINUED | OUTPATIENT
Start: 2023-12-18 | End: 2023-12-20

## 2023-12-18 RX ORDER — CYCLOBENZAPRINE HCL 10 MG
10 TABLET ORAL NIGHTLY PRN
Status: DISCONTINUED | OUTPATIENT
Start: 2023-12-18 | End: 2023-12-18

## 2023-12-18 RX ORDER — LIDOCAINE HYDROCHLORIDE 10 MG/ML
INJECTION, SOLUTION EPIDURAL; INFILTRATION; INTRACAUDAL; PERINEURAL AS NEEDED
Status: DISCONTINUED | OUTPATIENT
Start: 2023-12-18 | End: 2023-12-18 | Stop reason: SURG

## 2023-12-18 RX ORDER — SERTRALINE HYDROCHLORIDE 25 MG/1
25 TABLET, FILM COATED ORAL EVERY EVENING
Status: DISCONTINUED | OUTPATIENT
Start: 2023-12-18 | End: 2023-12-20

## 2023-12-18 RX ORDER — GLYCOPYRROLATE 0.2 MG/ML
INJECTION, SOLUTION INTRAMUSCULAR; INTRAVENOUS AS NEEDED
Status: DISCONTINUED | OUTPATIENT
Start: 2023-12-18 | End: 2023-12-18 | Stop reason: SURG

## 2023-12-18 RX ORDER — HYDROMORPHONE HYDROCHLORIDE 1 MG/ML
0.4 INJECTION, SOLUTION INTRAMUSCULAR; INTRAVENOUS; SUBCUTANEOUS EVERY 5 MIN PRN
Status: DISCONTINUED | OUTPATIENT
Start: 2023-12-18 | End: 2023-12-18 | Stop reason: HOSPADM

## 2023-12-18 RX ORDER — BUPIVACAINE HYDROCHLORIDE AND EPINEPHRINE 5; 5 MG/ML; UG/ML
INJECTION, SOLUTION PERINEURAL AS NEEDED
Status: DISCONTINUED | OUTPATIENT
Start: 2023-12-18 | End: 2023-12-18 | Stop reason: HOSPADM

## 2023-12-18 RX ORDER — SODIUM CHLORIDE, SODIUM LACTATE, POTASSIUM CHLORIDE, CALCIUM CHLORIDE 600; 310; 30; 20 MG/100ML; MG/100ML; MG/100ML; MG/100ML
INJECTION, SOLUTION INTRAVENOUS CONTINUOUS
Status: DISCONTINUED | OUTPATIENT
Start: 2023-12-18 | End: 2023-12-18 | Stop reason: HOSPADM

## 2023-12-18 RX ORDER — KETAMINE HYDROCHLORIDE 50 MG/ML
INJECTION, SOLUTION, CONCENTRATE INTRAMUSCULAR; INTRAVENOUS AS NEEDED
Status: DISCONTINUED | OUTPATIENT
Start: 2023-12-18 | End: 2023-12-18 | Stop reason: SURG

## 2023-12-18 RX ORDER — DIPHENHYDRAMINE HYDROCHLORIDE 50 MG/ML
INJECTION INTRAMUSCULAR; INTRAVENOUS AS NEEDED
Status: DISCONTINUED | OUTPATIENT
Start: 2023-12-18 | End: 2023-12-18 | Stop reason: SURG

## 2023-12-18 RX ORDER — DEXAMETHASONE SODIUM PHOSPHATE 4 MG/ML
VIAL (ML) INJECTION AS NEEDED
Status: DISCONTINUED | OUTPATIENT
Start: 2023-12-18 | End: 2023-12-18 | Stop reason: SURG

## 2023-12-18 RX ORDER — ALPRAZOLAM 0.25 MG/1
0.25 TABLET ORAL
Status: DISCONTINUED | OUTPATIENT
Start: 2023-12-18 | End: 2023-12-20

## 2023-12-18 RX ORDER — DIPHENHYDRAMINE HCL 25 MG
25 CAPSULE ORAL EVERY 4 HOURS PRN
Status: DISCONTINUED | OUTPATIENT
Start: 2023-12-18 | End: 2023-12-20

## 2023-12-18 RX ORDER — ACETAMINOPHEN 500 MG
1000 TABLET ORAL ONCE
Status: COMPLETED | OUTPATIENT
Start: 2023-12-18 | End: 2023-12-18

## 2023-12-18 RX ORDER — TOPIRAMATE 25 MG/1
25 TABLET ORAL EVERY EVENING
Status: DISCONTINUED | OUTPATIENT
Start: 2023-12-18 | End: 2023-12-20

## 2023-12-18 RX ORDER — SODIUM CHLORIDE, SODIUM LACTATE, POTASSIUM CHLORIDE, CALCIUM CHLORIDE 600; 310; 30; 20 MG/100ML; MG/100ML; MG/100ML; MG/100ML
INJECTION, SOLUTION INTRAVENOUS CONTINUOUS
Status: DISCONTINUED | OUTPATIENT
Start: 2023-12-18 | End: 2023-12-20

## 2023-12-18 RX ORDER — BISACODYL 10 MG
10 SUPPOSITORY, RECTAL RECTAL
Status: DISCONTINUED | OUTPATIENT
Start: 2023-12-18 | End: 2023-12-20

## 2023-12-18 RX ORDER — MORPHINE SULFATE 10 MG/ML
6 INJECTION, SOLUTION INTRAMUSCULAR; INTRAVENOUS EVERY 10 MIN PRN
Status: DISCONTINUED | OUTPATIENT
Start: 2023-12-18 | End: 2023-12-18 | Stop reason: HOSPADM

## 2023-12-18 RX ORDER — SENNOSIDES 8.6 MG
17.2 TABLET ORAL NIGHTLY
Status: DISCONTINUED | OUTPATIENT
Start: 2023-12-18 | End: 2023-12-20

## 2023-12-18 RX ORDER — MORPHINE SULFATE 4 MG/ML
2 INJECTION, SOLUTION INTRAMUSCULAR; INTRAVENOUS EVERY 10 MIN PRN
Status: DISCONTINUED | OUTPATIENT
Start: 2023-12-18 | End: 2023-12-18 | Stop reason: HOSPADM

## 2023-12-18 RX ORDER — ENEMA 19; 7 G/133ML; G/133ML
1 ENEMA RECTAL ONCE AS NEEDED
Status: DISCONTINUED | OUTPATIENT
Start: 2023-12-18 | End: 2023-12-20

## 2023-12-18 RX ORDER — NALOXONE HYDROCHLORIDE 0.4 MG/ML
0.08 INJECTION, SOLUTION INTRAMUSCULAR; INTRAVENOUS; SUBCUTANEOUS AS NEEDED
Status: DISCONTINUED | OUTPATIENT
Start: 2023-12-18 | End: 2023-12-18 | Stop reason: HOSPADM

## 2023-12-18 RX ORDER — DEXTROSE MONOHYDRATE 25 G/50ML
50 INJECTION, SOLUTION INTRAVENOUS
Status: DISCONTINUED | OUTPATIENT
Start: 2023-12-18 | End: 2023-12-20

## 2023-12-18 RX ORDER — OXYCODONE HYDROCHLORIDE 5 MG/1
10 TABLET ORAL EVERY 4 HOURS PRN
Status: DISCONTINUED | OUTPATIENT
Start: 2023-12-18 | End: 2023-12-20

## 2023-12-18 RX ORDER — METOCLOPRAMIDE HYDROCHLORIDE 5 MG/ML
10 INJECTION INTRAMUSCULAR; INTRAVENOUS ONCE
Status: COMPLETED | OUTPATIENT
Start: 2023-12-18 | End: 2023-12-18

## 2023-12-18 RX ORDER — INSULIN ASPART 100 [IU]/ML
INJECTION, SOLUTION INTRAVENOUS; SUBCUTANEOUS ONCE
Status: COMPLETED | OUTPATIENT
Start: 2023-12-18 | End: 2023-12-18

## 2023-12-18 RX ORDER — NICOTINE POLACRILEX 4 MG
30 LOZENGE BUCCAL
Status: DISCONTINUED | OUTPATIENT
Start: 2023-12-18 | End: 2023-12-20

## 2023-12-18 RX ORDER — HYDROMORPHONE HYDROCHLORIDE 1 MG/ML
INJECTION, SOLUTION INTRAMUSCULAR; INTRAVENOUS; SUBCUTANEOUS AS NEEDED
Status: DISCONTINUED | OUTPATIENT
Start: 2023-12-18 | End: 2023-12-18 | Stop reason: SURG

## 2023-12-18 RX ORDER — POLYETHYLENE GLYCOL 3350 17 G/17G
17 POWDER, FOR SOLUTION ORAL DAILY PRN
Status: DISCONTINUED | OUTPATIENT
Start: 2023-12-18 | End: 2023-12-20

## 2023-12-18 RX ORDER — CEFAZOLIN SODIUM/WATER 2 G/20 ML
2 SYRINGE (ML) INTRAVENOUS EVERY 8 HOURS
Qty: 40 ML | Refills: 0 | Status: COMPLETED | OUTPATIENT
Start: 2023-12-18 | End: 2023-12-19

## 2023-12-18 RX ADMIN — EPHEDRINE SULFATE 10 MG: 50 INJECTION, SOLUTION INTRAVENOUS at 12:02:00

## 2023-12-18 RX ADMIN — GLYCOPYRROLATE 0.2 MG: 0.2 INJECTION, SOLUTION INTRAMUSCULAR; INTRAVENOUS at 10:00:00

## 2023-12-18 RX ADMIN — MIDAZOLAM HYDROCHLORIDE 2 MG: 1 INJECTION INTRAMUSCULAR; INTRAVENOUS at 09:55:00

## 2023-12-18 RX ADMIN — KETAMINE HYDROCHLORIDE 10 MG: 50 INJECTION, SOLUTION, CONCENTRATE INTRAMUSCULAR; INTRAVENOUS at 11:58:00

## 2023-12-18 RX ADMIN — KETAMINE HYDROCHLORIDE 10 MG: 50 INJECTION, SOLUTION, CONCENTRATE INTRAMUSCULAR; INTRAVENOUS at 10:41:00

## 2023-12-18 RX ADMIN — LIDOCAINE HYDROCHLORIDE 50 MG: 10 INJECTION, SOLUTION EPIDURAL; INFILTRATION; INTRACAUDAL; PERINEURAL at 10:00:00

## 2023-12-18 RX ADMIN — PHENYLEPHRINE HCL 50 MCG: 10 MG/ML VIAL (ML) INJECTION at 10:37:00

## 2023-12-18 RX ADMIN — ONDANSETRON 4 MG: 2 INJECTION INTRAMUSCULAR; INTRAVENOUS at 12:41:00

## 2023-12-18 RX ADMIN — DEXAMETHASONE SODIUM PHOSPHATE 8 MG: 4 MG/ML VIAL (ML) INJECTION at 10:00:00

## 2023-12-18 RX ADMIN — PHENYLEPHRINE HCL 100 MCG: 10 MG/ML VIAL (ML) INJECTION at 10:23:00

## 2023-12-18 RX ADMIN — SODIUM CHLORIDE, SODIUM LACTATE, POTASSIUM CHLORIDE, CALCIUM CHLORIDE: 600; 310; 30; 20 INJECTION, SOLUTION INTRAVENOUS at 10:02:00

## 2023-12-18 RX ADMIN — PHENYLEPHRINE HCL 100 MCG: 10 MG/ML VIAL (ML) INJECTION at 10:13:00

## 2023-12-18 RX ADMIN — HYDROMORPHONE HYDROCHLORIDE 0.5 MG: 1 INJECTION, SOLUTION INTRAMUSCULAR; INTRAVENOUS; SUBCUTANEOUS at 12:19:00

## 2023-12-18 RX ADMIN — KETAMINE HYDROCHLORIDE 20 MG: 50 INJECTION, SOLUTION, CONCENTRATE INTRAMUSCULAR; INTRAVENOUS at 10:00:00

## 2023-12-18 RX ADMIN — DIPHENHYDRAMINE HYDROCHLORIDE 12.5 MG: 50 INJECTION INTRAMUSCULAR; INTRAVENOUS at 12:41:00

## 2023-12-18 RX ADMIN — CEFAZOLIN SODIUM/WATER 2 G: 2 G/20 ML SYRINGE (ML) INTRAVENOUS at 10:00:00

## 2023-12-18 RX ADMIN — ROCURONIUM BROMIDE 70 MG: 10 INJECTION, SOLUTION INTRAVENOUS at 10:00:00

## 2023-12-18 RX ADMIN — KETAMINE HYDROCHLORIDE 10 MG: 50 INJECTION, SOLUTION, CONCENTRATE INTRAMUSCULAR; INTRAVENOUS at 12:35:00

## 2023-12-18 RX ADMIN — KETAMINE HYDROCHLORIDE 10 MG: 50 INJECTION, SOLUTION, CONCENTRATE INTRAMUSCULAR; INTRAVENOUS at 11:09:00

## 2023-12-18 NOTE — ANESTHESIA PROCEDURE NOTES
Airway  Date/Time: 12/18/2023 10:01 AM  Urgency: Elective    Airway not difficult    General Information and Staff    Patient location during procedure: OR  Resident/CRNA: Enedina Bashir CRNA  Performed: CRNA   Performed by: Enedina Bashir CRNA  Authorized by: Glendy Mills MD      Indications and Patient Condition  Indications for airway management: anesthesia  Spontaneous Ventilation: absent  Sedation level: deep  Preoxygenated: yes  Patient position: sniffing  MILS maintained throughout  Mask difficulty assessment: 0 - not attempted  No planned trial extubation    Final Airway Details  Final airway type: endotracheal airway      Successful airway: ETT  Cuffed: yes   Successful intubation technique: direct laryngoscopy  Facilitating devices/methods: intubating stylet  Endotracheal tube insertion site: oral  Blade: Madonna  Blade size: #3  ETT size (mm): 7.0    Placement verified by: capnometry   Measured from: teeth  ETT to teeth (cm): 21  Number of attempts at approach: 1  Number of other approaches attempted: 0

## 2023-12-18 NOTE — ANESTHESIA POSTPROCEDURE EVALUATION
Patient: Juan Fernandez    Procedure Summary       Date: 12/18/23 Room / Location: [address] [place] [address] MAIN OR 04 / [address] [place] [address] MAIN OR    Anesthesia Start: 3611 Anesthesia Stop:     Procedure: L5-S1 anterior lumbar interbody fusion with posterior spinal fusion (Spine Lumbar) Diagnosis: (Lumbar radiculopathy, spinal stenosis, lumbar region with neurogenic claudication)    Surgeons: Almeda Holstein, MD Anesthesiologist: Rodríguez Orellana MD    Anesthesia Type: general ASA Status: 2            Anesthesia Type: general    Vitals Value Taken Time   /75 12/18/23 1319   Temp 97 12/18/23 1319   Pulse 92 12/18/23 1319   Resp 15 12/18/23 1319   SpO2 97 % 12/18/23 1319   Vitals shown include unfiled device data.     [address] [place] [address] AN Post Evaluation:   Patient Evaluated in PACU  Patient Participation: complete - patient participated  Level of Consciousness: sleepy but conscious  Pain Score: 0  Pain Management: adequate  Airway Patency:patent  Dental exam unchanged from preop  Yes    Cardiovascular Status: acceptable  Respiratory Status: acceptable and nasal cannula  Postoperative Hydration acceptable      Deanna Kang CRNA  12/18/2023 1:19 PM

## 2023-12-19 ENCOUNTER — APPOINTMENT (OUTPATIENT)
Dept: GENERAL RADIOLOGY | Facility: HOSPITAL | Age: 58
End: 2023-12-19
Attending: PHYSICIAN ASSISTANT
Payer: COMMERCIAL

## 2023-12-19 ENCOUNTER — APPOINTMENT (OUTPATIENT)
Dept: GENERAL RADIOLOGY | Facility: HOSPITAL | Age: 58
DRG: 455 | End: 2023-12-19
Attending: PHYSICIAN ASSISTANT
Payer: COMMERCIAL

## 2023-12-19 LAB
ANION GAP SERPL CALC-SCNC: 5 MMOL/L (ref 0–18)
BUN BLD-MCNC: 13 MG/DL (ref 9–23)
BUN/CREAT SERPL: 13.1 (ref 10–20)
CALCIUM BLD-MCNC: 9.1 MG/DL (ref 8.7–10.4)
CHLORIDE SERPL-SCNC: 109 MMOL/L (ref 98–112)
CO2 SERPL-SCNC: 25 MMOL/L (ref 21–32)
CREAT BLD-MCNC: 0.99 MG/DL
DEPRECATED RDW RBC AUTO: 39.8 FL (ref 35.1–46.3)
EGFRCR SERPLBLD CKD-EPI 2021: 67 ML/MIN/1.73M2 (ref 60–?)
ERYTHROCYTE [DISTWIDTH] IN BLOOD BY AUTOMATED COUNT: 12.1 % (ref 11–15)
GLUCOSE BLD-MCNC: 125 MG/DL (ref 70–99)
GLUCOSE BLDC GLUCOMTR-MCNC: 122 MG/DL (ref 70–99)
GLUCOSE BLDC GLUCOMTR-MCNC: 156 MG/DL (ref 70–99)
GLUCOSE BLDC GLUCOMTR-MCNC: 172 MG/DL (ref 70–99)
GLUCOSE BLDC GLUCOMTR-MCNC: 187 MG/DL (ref 70–99)
HCT VFR BLD AUTO: 33.7 %
HGB BLD-MCNC: 11.8 G/DL
MCH RBC QN AUTO: 31.5 PG (ref 26–34)
MCHC RBC AUTO-ENTMCNC: 35 G/DL (ref 31–37)
MCV RBC AUTO: 89.9 FL
OSMOLALITY SERPL CALC.SUM OF ELEC: 290 MOSM/KG (ref 275–295)
PLATELET # BLD AUTO: 232 10(3)UL (ref 150–450)
POTASSIUM SERPL-SCNC: 3.6 MMOL/L (ref 3.5–5.1)
RBC # BLD AUTO: 3.75 X10(6)UL
SODIUM SERPL-SCNC: 139 MMOL/L (ref 136–145)
WBC # BLD AUTO: 12.4 X10(3) UL (ref 4–11)

## 2023-12-19 NOTE — PHYSICAL THERAPY NOTE
PHYSICAL THERAPY EVALUATION - INPATIENT     Room Number: 410/410-A  Evaluation Date: 12/19/2023  Type of Evaluation: Initial   Physician Order: PT Eval and Treat    Presenting Problem: L spine anterior fusion     Reason for Therapy: Mobility Dysfunction and Discharge Planning    PHYSICAL THERAPY ASSESSMENT     Patient is a 66year old female admitted 12/18/2023 for L spine anterior fusion performed 12/18. Patient's current functional deficits include bed mobility, transfers, and gait, which are below the patient's pre-admission status. Patient was independent prior to this admission. Functional Mobility:   - spine precautions reviewed prior to starting therapy  - bed mobility: supine to/from sit CGA with log roll mechanics   - transfers: sit to/from stand transfers with use of RW with SBA, cues for hand placement   - standing balance with SBA    - gait: ambulation with RW with ' x 2   -  one step ascending/descending with railing    At end of session patient in chair with all needs in reach, RN aware. The patient's Approx Degree of Impairment: 35.83% has been calculated based on documentation in the [facility] '6 clicks' Inpatient Basic Mobility Short Form. Research supports that patients with this level of impairment may benefit from home with no needs vs home care. Patient will benefit from continued IP PT services to address these deficits in preparation for discharge. DISCHARGE RECOMMENDATIONS  PT Discharge Recommendations: Home with home health PT; Intermittent Supervision    PLAN  PT Treatment Plan: Bed mobility; Body mechanics; Endurance; Energy conservation; Family education;Gait training;Range of motion;Stoop training;Transfer training;Balance training  Rehab Potential : Good  Frequency (Obs): Daily       PHYSICAL THERAPY MEDICAL/SOCIAL HISTORY     History related to current admission: arthritis, hyperlipidemia      Problem List  Principal Problem:    Bilateral lumbar radiculopathy  Active Problems:    S/P lumbar spinal fusion    Hyperglycemia    High cholesterol      HOME SITUATION  Home Situation  Type of Home: House  Home Layout: One level  Stairs to Enter : 1  Railing: No  Stairs to Bedroom: 0  Lives With: Alone  Drives: Yes     Prior Level of [place]: independent     SUBJECTIVE  \"I'll be home alone. \"     PHYSICAL THERAPY EXAMINATION     OBJECTIVE  Precautions: Bed/chair alarm  Fall Risk: High fall risk    WEIGHT BEARING RESTRICTION      No restrictions           PAIN ASSESSMENT     Location: back pain moderate unrated       COGNITION  Overall Cognitive Status:  WFL - within functional limits    BALANCE  Static Sitting: Good  Dynamic Sitting: Good  Static Standing: Fair +  Dynamic Standing: Fair +    AM-PAC '6-Clicks' INPATIENT SHORT FORM - BASIC MOBILITY  How much difficulty does the patient currently have. .. Patient Difficulty: Turning over in bed (including adjusting bedclothes, sheets and blankets)?: A Little   Patient Difficulty: Sitting down on and standing up from a chair with arms (e.g., wheelchair, bedside commode, etc.): None   Patient Difficulty: Moving from lying on back to sitting on the side of the bed?: A Little   How much help from another person does the patient currently need. ..    Help from Another: Moving to and from a bed to a chair (including a wheelchair)?: A Little   Help from Another: Need to walk in hospital room?: None   Help from Another: Climbing 3-5 steps with a railing?: A Little     AM-PAC Score:  Raw Score: 20   Approx Degree of Impairment: 35.83%   Standardized Score (AM-PAC Scale): 47.67   CMS Modifier (G-Code): CJ    FUNCTIONAL ABILITY STATUS  Functional Mobility/Gait Assessment  Gait Assistance: Supervision  Distance (ft): 150' x 2  Assistive Device: Rolling walker  Pattern: Within Functional Limits  Stairs: Stoop/curb  Stoop/Curb: 1 step SBA    Bed Mobility: SBA    Transfers: CGA    Exercise/Education Provided:  Bed mobility  Body mechanics  Functional activity tolerated  Gait training  Transfer training    Patient End of Session: Up in chair;Needs met;Call light within reach;RN aware of session/findings; All patient questions and concerns addressed; Alarm set; Family present    CURRENT GOALS    Goals to be met by: 1/15  Patient Goal Patient's self-stated goal is: unstated    Goal #1 Patient is able to demonstrate supine - sit EOB @ level: supervision     Goal #1   Current Status    Goal #2 Patient is able to demonstrate transfers EOB to/from [facility] at assistance level: supervision with walker - rolling     Goal #2  Current Status    Goal #3 Patient is able to ambulate 150 feet with assist device: walker - rolling at assistance level: supervision   Goal #3   Current Status    Goal #4 Patient will negotiate 2 stairs/one curb w/ assistive device and supervision   Goal #4   Current Status    Goal #5 Patient to demonstrate independence with home activity/exercise instructions provided to patient in preparation for discharge.    Goal #5   Current Status    Goal #6    Goal #6  Current Status      Patient Evaluation Complexity Level:  History Moderate - 1 or 2 personal factors and/or co-morbidities   Examination of body systems Moderate - addressing a total of 3 or more elements   Clinical Presentation Moderate - Evolving   Clinical Decision Making Moderate Complexity       Gait Training: 15 minutes

## 2023-12-19 NOTE — PLAN OF CARE
Pt is A&Ox4. RA. SCDs and teds for dvt prophylaxis. Voiding freely. Last BM was 12/18. Gel ice. PRN oxy and zanaflex for pain management. Gluten free diet. Saline locked. ACHS. Up standby. Plan for home tomorrow when clear. Problem: Patient Centered Care  Goal: Patient preferences are identified and integrated in the patient's plan of care  Description: Interventions:  - What would you like us to know as we care for you?  My fiance was here  - Provide timely, complete, and accurate information to patient/family  - Incorporate patient and family knowledge, values, beliefs, and cultural backgrounds into the planning and delivery of care  - Encourage patient/family to participate in care and decision-making at the level they choose  - Honor patient and family perspectives and choices  Outcome: Progressing     Problem: Patient/Family Goals  Goal: Patient/Family Long Term Goal  Description: Patient's Long Term Goal: to go home without pain    Interventions:  - pain meds  - See additional Care Plan goals for specific interventions  Outcome: Progressing  Goal: Patient/Family Short Term Goal  Description: Patient's Short Term Goal: to go home    Interventions:   - be cleared medically   - See additional Care Plan goals for specific interventions  Outcome: Progressing     Problem: Diabetes/Glucose Control  Goal: Glucose maintained within prescribed range  Description: INTERVENTIONS:  - Monitor Blood Glucose as ordered  - Assess for signs and symptoms of hyperglycemia and hypoglycemia  - Administer ordered medications to maintain glucose within target range  - Assess barriers to adequate nutritional intake and initiate nutrition consult as needed  - Instruct patient on self management of diabetes  Outcome: Progressing

## 2023-12-19 NOTE — PLAN OF CARE
Pt is A&Ox4. RA. SCDs and teds for dvt prophylaxis. Check void by 10pm. Last BM was 12/18. Gel ice. PRN oxy and zanaflex for pain management. Pt reported anxiety, prn xanax for pain management. Gluten free diet. LR @20 TKO. IV ancef/vanco. ACHS. Plan for PT/OT eval tomorrow. Problem: Patient Centered Care  Goal: Patient preferences are identified and integrated in the patient's plan of care  Description: Interventions:  - What would you like us to know as we care for you? My fiance was here.   - Provide timely, complete, and accurate information to patient/family  - Incorporate patient and family knowledge, values, beliefs, and cultural backgrounds into the planning and delivery of care  - Encourage patient/family to participate in care and decision-making at the level they choose  - Honor patient and family perspectives and choices  Outcome: Progressing     Problem: Patient/Family Goals  Goal: Patient/Family Long Term Goal  Description: Patient's Long Term Goal: to go home without pain    Interventions:  - pain meds  - See additional Care Plan goals for specific interventions  Outcome: Progressing  Goal: Patient/Family Short Term Goal  Description: Patient's Short Term Goal: to go home    Interventions:   - be cleared medically   - See additional Care Plan goals for specific interventions  Outcome: Progressing     Problem: Diabetes/Glucose Control  Goal: Glucose maintained within prescribed range  Description: INTERVENTIONS:  - Monitor Blood Glucose as ordered  - Assess for signs and symptoms of hyperglycemia and hypoglycemia  - Administer ordered medications to maintain glucose within target range  - Assess barriers to adequate nutritional intake and initiate nutrition consult as needed  - Instruct patient on self management of diabetes  Outcome: Progressing

## 2023-12-19 NOTE — CONSULTS
[facility]    PATIENT'S NAME: Tyler Berkowitz   ATTENDING PHYSICIAN: Beatriz Castro MD   CONSULTING PHYSICIAN: Tremaine Costello MD   PATIENT ACCOUNT#:   [id number]    LOCATION:  [facility]  RECORD #:   [id number]       YOB: 1965  ADMISSION DATE:       12/18/2023      CONSULT DATE:  12/18/2023    REPORT OF CONSULTATION    (Addendum added 12/19/2023)    REASON FOR ADMISSION:  Post anterior lumbar interbody fusion procedure. REASON FOR CONSULTATION:  Medical comanagement. HISTORY OF PRESENT ILLNESS:  The patient is a 47-year-old  female with chronic lumbar radiculopathy and lumbar spondylolysis, evaluated by Orthopedic Spine Surgery, Dr. Beatriz Castro, and scheduled for above-mentioned procedure. Postprocedure today she was transferred to PACU for further monitoring. 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 Also a history of chronic migraines, depression, and anxiety. PAST SURGICAL HISTORY:  Endometrial biopsy and diagnostic laparoscopy. MEDICATIONS:  Please see medication reconciliation list.     ALLERGIES:  Sulfa and aminoglycosides. She has side effects to morphine and Compazine. SOCIAL HISTORY:  Ex-tobacco user. Social alcohol. No drug use. Lives with her family. Independent for basic activities of daily living. FAMILY HISTORY:  Mother had coronary artery disease and lung cancer. Father had coronary artery disease and diabetes mellitus type 2. REVIEW OF SYSTEMS:  Lower abdominal discomfort, but no other radiculopathy. No leg pain, no chest pain, no shortness of breath. Other 12-point review of systems is negative. PHYSICAL EXAMINATION:    GENERAL:  Alert and oriented to time, place, and person. No acute distress.     VITAL SIGNS:  Temperature 98.0, pulse 78, respiratory rate 10, blood pressure 140/74, pulse ox 100% on 3L nasal cannula oxygen. HEENT:  Atraumatic. Oropharynx clear. Moist mucous membranes. Normal hard and soft palate. Eyes:  Anicteric sclerae. NECK:  Supple. No lymphadenopathy. Trachea midline. Full range of motion. LUNGS:  Clear to auscultation bilaterally. Normal respiratory effort. HEART:  Regular rate and rhythm. S1 and S2 auscultated. No murmur. ABDOMEN:  Soft, nondistended. Lower abdominal dressing. Hypoactive bowel sounds. No tenderness. EXTREMITIES:  No peripheral edema, clubbing, or cyanosis. NEUROLOGIC:  Motor and sensory intact. Cranial nerves II through XII are intact. ASSESSMENT AND PLAN:  Lumbar spondylolysis with lumbar spinal stenosis, status post L5-S1 anterior lumbar interbody fusion with posterior spinal fusion. Pain control. Neuro checks. Deep venous thrombosis prophylaxis. Physical and occupational therapy. Further recommendations to follow. ADDENDUM (Job [id number]):    PAST MEDICAL HISTORY:  Diabetes mellitus type 2. ASSESSMENT AND PLAN:  Diabetes mellitus type 2. We will place her on insulin sliding scale and monitor Accu-Cheks.     Dictated By Phoebe Dooley MD  d: 12/18/2023 15:52:20  t: 12/18/2023 18:19:57  Job [id number]  FB/    cc: Zahra Geronimo MD

## 2023-12-19 NOTE — OPERATIVE REPORT
[facility]     Operative Report    Patient Name:  Abdulkadir Brandt  MR:  [id number]  :  10/7/1965  DOS:  23    Preop Dx:  Lumbar radiculopathy, spinal stenosis, lumbar region with neurogenic claudication  Postop Dx:  Lumbar radiculopathy, spinal stenosis, lumbar region with neurogenic claudication    Procedure:    Anterior lumbar interbody fusion L5 to S1 (61280-05)  Ureterolysis (41895)  Real-time intraoperative C-arm fluoroscopy with image guidance and surgeon interpretation (78657)    Surgeons:  Kori Cutler MD, Noni Hameed MD  PA: Justin Chavira., SKIP, Sally Goodman MD  EBL: 50 ml  Complications:  None. Description:    The patient is a 66year old female with Lumbar radiculopathy, spinal stenosis, lumbar region with neurogenic claudication who is undergoing an ALIF with Dr. Noni Hameed. General surgery was asked to assist in exposing the anterior disc space. The patient was taken to the OR and placed in supine position. General anesthesia was established and the abdomen was prepped in standard fashion. Fluoroscopy was used to min the L5-S1 level. A transverse incision was made over the left lower quadrant. The anterior left rectus fascia was incised and the left rectus muscle was mobilized medially. A vertical incision was made in the tranversalis fascia and the retroperitoneum was entered. The peritoneum and left ureter were identified and mobilized medially. Left ureterolysis was performed safely and no injury occurred. I was able to mobilize the left ureter to the right of the disc space. Just medial to the left iliac vein, we dissected the prevertebral space. The median sacral vessels were divided to further expose the L5-S1 disc space. The retractors were placed to expose the anterior disc space. Fluoroscopy was used to confirm the L5-S1 disc level. The case was turned over to Dr. Mandeep Bowman for the discectomy and implant placement.     After fluoroscopic confirmation of the implant placement, the operative field was irrigated with saline. Adequate hemostasis was obtained. The retractors were slowly removed and the operative field remained hemostatic. There were no injury to the left ureter, peritoneal contents or the sympathetic chain during our dissection. A left transversus abdominus plane block was performed using 30 ml of 0.5% marcaine. The anterior fascia was closed using 0 looped PDS. We closed the subcutaneous tissue and skin. All instrument and sponge counts were correct. I was present to expose the anterior disc space.     Chelsey Andrew MD

## 2023-12-20 ENCOUNTER — APPOINTMENT (OUTPATIENT)
Dept: GENERAL RADIOLOGY | Facility: HOSPITAL | Age: 58
End: 2023-12-20
Attending: PHYSICIAN ASSISTANT
Payer: COMMERCIAL

## 2023-12-20 ENCOUNTER — APPOINTMENT (OUTPATIENT)
Dept: GENERAL RADIOLOGY | Facility: HOSPITAL | Age: 58
DRG: 455 | End: 2023-12-20
Attending: PHYSICIAN ASSISTANT
Payer: COMMERCIAL

## 2023-12-20 VITALS
SYSTOLIC BLOOD PRESSURE: 130 MMHG | OXYGEN SATURATION: 96 % | TEMPERATURE: 99 F | HEART RATE: 82 BPM | WEIGHT: 150 LBS | DIASTOLIC BLOOD PRESSURE: 69 MMHG | RESPIRATION RATE: 16 BRPM | HEIGHT: 63 IN | BODY MASS INDEX: 26.58 KG/M2

## 2023-12-20 LAB
GLUCOSE BLDC GLUCOMTR-MCNC: 110 MG/DL (ref 70–99)
GLUCOSE BLDC GLUCOMTR-MCNC: 112 MG/DL (ref 70–99)
GLUCOSE BLDC GLUCOMTR-MCNC: 148 MG/DL (ref 70–99)

## 2023-12-20 PROCEDURE — 99239 HOSP IP/OBS DSCHRG MGMT >30: CPT | Performed by: HOSPITALIST

## 2023-12-20 PROCEDURE — 72100 X-RAY EXAM L-S SPINE 2/3 VWS: CPT | Performed by: PHYSICIAN ASSISTANT

## 2023-12-20 NOTE — PROGRESS NOTES
Patient has all clearances from PT/OT, surgical, and medical to go home. Discharge went over with patient. Paper work given to the patient. Extra supply given to patient for dressing change if needed. All questions addressed.

## 2023-12-20 NOTE — PHYSICAL THERAPY NOTE
PHYSICAL THERAPY TREATMENT NOTE - INPATIENT     Room Number: 924/015-D       Presenting Problem: L spine anterior fusion    Problem List  Principal Problem:    Bilateral lumbar radiculopathy  Active Problems:    S/P lumbar spinal fusion    Hyperglycemia    High cholesterol      PHYSICAL THERAPY ASSESSMENT   Chart reviewed. RN  approved participation in physical therapy. PPE worn by therapist: mask, gloves, and goggles. Patient was wearing a mask during session. Patient presented in bed with 6/10 pain. Patient with good  progress towards goals during this session. Education provided on Physical therapy plan of care and physiological benefits of out of bed mobility. Patient with good carryover. Bed mobility: Min assist  Transfers: Min assist  Gait Assistance: Contact guard assist  Distance (ft): 2 x 100  Assistive Device: Rolling walker  Pattern: Shuffle          Pt seen daily. Min a for bed mobility and transfer. EOB sitting balance activity with emphasis on core stabilization. Pt amb 2 x 100 ft with RW and CGA;provided cuing for gait pattern as well as for postural awareness. Family present;family education;all questions and concerns addressed. Spinal precautions reviewed. Education. There ex. Patient was left in bedside chair at end of session with all needs in reach. The patient's Approx Degree of Impairment: 35.83% has been calculated based on documentation in the [facility] '6 clicks' Inpatient Basic Mobility Short Form. Research supports that patients with this level of impairment may benefit from Home with home health PT.  RN aware of patient status post session. DISCHARGE RECOMMENDATIONS  PT Discharge Recommendations: Home with home health PT     PLAN  PT Treatment Plan: Bed mobility; Body mechanics; Endurance    SUBJECTIVE  Pt reports being ready for PT RX    OBJECTIVE  Precautions: Bed/chair alarm;Spine    WEIGHT BEARING RESTRICTION  Weight Bearing Restriction: None                PAIN ASSESSMENT   Rating: 6  Location: back pain moderate unrated       BALANCE                                                                                                                       Static Sitting: Good  Dynamic Sitting: Good           Static Standing: Fair +  Dynamic Standing: Fair +    ACTIVITY TOLERANCE                         O2 WALK       AM-PAC '6-Clicks' INPATIENT SHORT FORM - BASIC MOBILITY  How much difficulty does the patient currently have. .. Patient Difficulty: Turning over in bed (including adjusting bedclothes, sheets and blankets)?: A Little   Patient Difficulty: Sitting down on and standing up from a chair with arms (e.g., wheelchair, bedside commode, etc.): None   Patient Difficulty: Moving from lying on back to sitting on the side of the bed?: A Little   How much help from another person does the patient currently need. .. Help from Another: Moving to and from a bed to a chair (including a wheelchair)?: A Little   Help from Another: Need to walk in hospital room?: None   Help from Another: Climbing 3-5 steps with a railing?: A Little     AM-PAC Score:  Raw Score: 20   Approx Degree of Impairment: 35.83%   Standardized Score (AM-PAC Scale): 47.67   CMS Modifier (G-Code): CJ      Additional information:     THERAPEUTIC EXERCISES  Lower Extremity Ankle pumps  Glut sets  Quad sets     Position Supine       Patient End of Session: Up in chair;Call light within reach;RN aware of session/findings; All patient questions and concerns addressed    CURRENT GOALS     Patient Goal Patient's self-stated goal is: unstated    Goal #1 Patient is able to demonstrate supine - sit EOB @ level: supervision     Goal #1   Current Status Min a   Goal #2 Patient is able to demonstrate transfers EOB to/from [facility] at assistance level: supervision with walker - rolling     Goal #2  Current Status Min a   Goal #3 Patient is able to ambulate 150 feet with assist device: walker - rolling at assistance level: supervision   Goal #3   Current Status Pt amb 2 x 100 ft with RW and CGA   Goal #4 Patient will negotiate 2 stairs/one curb w/ assistive device and supervision   Goal #4   Current Status NT   Goal #5 Patient to demonstrate independence with home activity/exercise instructions provided to patient in preparation for discharge. Goal #5   Current Status In progress   Goal #6    Goal #6  Current Status    Gait-15 minutes;  There activity-15 minutes

## 2023-12-20 NOTE — CM/SW NOTE
Per chart review, MD and ortho notes say Merged with [facility] as needed. Pt is recommending hh at this time. Pt is agreeable to Merged with [facility]. NGUYỄN initiated Merged with [facility] referrals via aidin to MD's preferred providers, [facility] is unable to accept at this time. SW reached out [facility] [address] was transferred to . Due to patient being discharged today, NGUYỄN extended Merged with [facility] providers via aidin. NGUYỄN placed f2f.     258pm- NGUYỄN called [facility] again, sent message via aidin again. At  this time, Sw was informed by [facility] that pt's insurance has 20% coinsurance and pt will have to pay $25.00 out of pocket for Home Health Visit per [facility]. NGUYỄN informed patient. Patient is aware that this is the only agency accepting at this time. Pt was informed that Surgeon's preferred providers- [facility]- has not responded in aidin or return calls and [facility] denied. Pt is agreeable to [facility]. NGUYỄN reserved [facility] via aidin, and placed [facility] in AVS.        12/20/23 1500   Discharge disposition   Expected discharge disposition Home or [facility] Provider   ([address])   Discharge transportation Private car       Plan:  DC to Home with [facility], f2f placed. SW/FRANTZ to remain available for support and/or discharge planning.      Oscar Diana, MSW, LSW   x [phone]

## 2023-12-20 NOTE — PLAN OF CARE
Pt is POD #1-2. Vital signs within normal limits. PRN Oxy IR and Zanaflex provided for pain. Alert and oriented x4. CMS intact. On room air. Tolerating general diet. Voids freely. Dressing clean, dry, and intact. Up with standby assist and the walker. SCDs and TEDs for DVT prophylaxis. Appropriate safety precautions maintained. Bed locked in lowest position, call light within reach, and frequent rounding maintained. Plan to go home.     Problem: Patient Centered Care  Goal: Patient preferences are identified and integrated in the patient's plan of care  Description: Interventions:  - What would you like us to know as we care for you?   - Provide timely, complete, and accurate information to patient/family  - Incorporate patient and family knowledge, values, beliefs, and cultural backgrounds into the planning and delivery of care  - Encourage patient/family to participate in care and decision-making at the level they choose  - Honor patient and family perspectives and choices  Outcome: Progressing     Problem: Diabetes/Glucose Control  Goal: Glucose maintained within prescribed range  Description: INTERVENTIONS:  - Monitor Blood Glucose as ordered  - Assess for signs and symptoms of hyperglycemia and hypoglycemia  - Administer ordered medications to maintain glucose within target range  - Assess barriers to adequate nutritional intake and initiate nutrition consult as needed  - Instruct patient on self management of diabetes  Outcome: Progressing     Problem: PAIN - ADULT  Goal: Verbalizes/displays adequate comfort level or patient's stated pain goal  Description: INTERVENTIONS:  - Encourage pt to monitor pain and request assistance  - Assess pain using appropriate pain scale  - Administer analgesics based on type and severity of pain and evaluate response  - Implement non-pharmacological measures as appropriate and evaluate response  - Consider cultural and social influences on pain and pain management  - Manage/alleviate anxiety  - Utilize distraction and/or relaxation techniques  - Monitor for opioid side effects  - Notify MD/LIP if interventions unsuccessful or patient reports new pain  - Anticipate increased pain with activity and pre-medicate as appropriate  Outcome: Progressing     Problem: RISK FOR INFECTION - ADULT  Goal: Absence of fever/infection during anticipated neutropenic period  Description: INTERVENTIONS  - Monitor WBC  - Administer growth factors as ordered  - Implement neutropenic guidelines  Outcome: Progressing     Problem: SAFETY ADULT - FALL  Goal: Free from fall injury  Description: INTERVENTIONS:  - Assess pt frequently for physical needs  - Identify cognitive and physical deficits and behaviors that affect risk of falls.   - [place] fall precautions as indicated by assessment.  - Educate pt/family on patient safety including physical limitations  - Instruct pt to call for assistance with activity based on assessment  - Modify environment to reduce risk of injury  - Provide assistive devices as appropriate  - Consider OT/PT consult to assist with strengthening/mobility  - Encourage toileting schedule  Outcome: Progressing     Problem: DISCHARGE PLANNING  Goal: Discharge to home or other facility with appropriate resources  Description: INTERVENTIONS:  - Identify barriers to discharge w/pt and caregiver  - Include patient/family/discharge partner in discharge planning  - Arrange for needed discharge resources and transportation as appropriate  - Identify discharge learning needs (meds, wound care, etc)  - Arrange for interpreters to assist at discharge as needed  - Consider post-discharge preferences of patient/family/discharge partner  - Complete POLST form as appropriate  - Assess patient's ability to be responsible for managing their own health  - Refer to Case Management Department for coordinating discharge planning if the patient needs post-hospital services based on physician/LIP order or complex needs related to functional status, cognitive ability or social support system  Outcome: Progressing

## 2023-12-20 NOTE — PLAN OF CARE
Problem: PAIN - ADULT  AO times 4. Goal: Verbalizes/displays adequate comfort level or patient's stated pain goal  Description: INTERVENTIONS:  - Encourage pt to monitor pain and request assistance  - Assess pain using appropriate pain scale  - Administer analgesics based on type and severity of pain and evaluate response  - Implement non-pharmacological measures as appropriate and evaluate response  - Consider cultural and social influences on pain and pain management  - Manage/alleviate anxiety  - Utilize distraction and/or relaxation techniques  - Monitor for opioid side effects  - Notify MD/LIP if interventions unsuccessful or patient reports new pain  - Anticipate increased pain with activity and pre-medicate as appropriate  Outcome: Progressing     Problem: SAFETY ADULT - FALL  Goal: Free from fall injury  Description: INTERVENTIONS:  - Assess pt frequently for physical needs  - Identify cognitive and physical deficits and behaviors that affect risk of falls.   - [place] fall precautions as indicated by assessment.  - Educate pt/family on patient safety including physical limitations  - Instruct pt to call for assistance with activity based on assessment  - Modify environment to reduce risk of injury  - Provide assistive devices as appropriate  - Consider OT/PT consult to assist with strengthening/mobility  - Encourage toileting schedule  Outcome: Progressing

## 2023-12-21 ENCOUNTER — PATIENT OUTREACH (OUTPATIENT)
Dept: CASE MANAGEMENT | Age: 58
End: 2023-12-21

## 2023-12-21 PROCEDURE — 1111F DSCHRG MED/CURRENT MED MERGE: CPT

## 2023-12-21 RX ORDER — OXYCODONE HYDROCHLORIDE 5 MG/1
5 TABLET ORAL EVERY 6 HOURS PRN
COMMUNITY
Start: 2023-12-17

## 2023-12-21 RX ORDER — CEFADROXIL 500 MG/1
500 CAPSULE ORAL 2 TIMES DAILY
COMMUNITY
Start: 2023-12-17

## 2023-12-21 RX ORDER — AMOXICILLIN 250 MG
1 CAPSULE ORAL 2 TIMES DAILY
COMMUNITY
Start: 2023-12-17

## 2023-12-21 NOTE — PROGRESS NOTES
Initial Post Discharge Follow Up   Discharge Date: 12/20/23  Contact Date: 12/21/2023    Consent Verification:  Assessment Completed With: Patient  HIPAA Verified? Yes    Discharge Dx:    Lumbar spondylolysis with lumbar spinal stenosis, status post L5-S1 anterior lumbar interbody fusion with posterior spinal fusion     General:   How have you been since your discharge from the hospital? Pt stated she is doing okay. Pt is in some pain still, she feels like she needs to take two Oxycodone instead of one. Pt also feels she needs more Cyclobenzaprine for her muscles. Pt reports muscle spasms to her buttocks still as she had in the hospital. Pt denies LE weakness and is able to ambulate. Pt has a walker, no brace. Pt denies numbness/tingling to extremities. Pt is taking Oxycodone 5 mg every 4-6 hours. Pt confirmed [facility] coming tomorrow to start services. Pt denies swelling to the legs, CP, SOB, dizziness, weakness, vision changes, n/v/d, confusion, HA, fevers and worsening pain. Pt is using ice packs as needed. No brace at discharge. Pt is able to urinate fine however pt has not had a BM yet. Pt is aware to monitor for s/s of constipation, denies symptoms at this time. Pt is taking Senna currently. Discussed side effect of constipation from Oxycodone, recommended proper hydration, ambulating and to try Miralax or stool softeners if Senna does not seen to help. Do you have any pain since discharge? Yes  Where: surgical site    Rating on pain scale 1-10, 10 being the worst pain you have ever experienced, what is current pain: 8  Alleviating factors: oxycodone helps some, ice packs and rest.   Aggravating factors: a lot of movement, changing positions. Is the pain manageable at home? Yes  How well was your pain managed while in the hospital?   On a scale of 1-5   1- Very Poor and 5- Very well   Very Well  When you were leaving the hospital were your discharge instructions reviewed with you?  Yes  How well were your discharge instructions explained to you? On a scale of 1-5   1- Very Poor and 5- Very well   Very Well  Do you have any questions about your discharge instructions? No  Before leaving the hospital was your diagnoses explained to you? Yes  Do you have any questions about your diagnoses? No  Are you able to perform normal daily activities of living as you have prior to your hospital stay (dressing, bathing, ambulating to the bathroom, etc)? no  If No, What are some barriers or concerns? Pt has limitations post surgery   (NCM) Was patient given a different diet per AVS? yes  If so, which diet? DM diet  Are there any barriers to following this diet? no    Medications:   Current Outpatient Medications   Medication Sig Dispense Refill    Meloxicam 15 MG Oral Tab Take 1 tablet (15 mg total) by mouth daily. 30 tablet 1    cyclobenzaprine 10 MG Oral Tab Take 1 tablet (10 mg total) by mouth nightly as needed for Muscle spasms. 30 tablet 0    loratadine 10 MG Oral Tab Take 1 tablet (10 mg total) by mouth daily. 90 tablet 3    sertraline 25 MG Oral Tab Take 1 tablet (25 mg total) by mouth daily. (Patient taking differently: Take 1 tablet (25 mg total) by mouth every evening.) 90 tablet 3    topiramate 25 MG Oral Tab Take 1 tablet (25 mg total) by mouth daily. (Patient taking differently: Take 1 tablet (25 mg total) by mouth every evening.) 90 tablet 3    ALPRAZolam 0.25 MG Oral Tab Take 1 tablet (0.25 mg total) by mouth daily as needed for Anxiety.  15 tablet 0    Estrogens, Conjugated (PREMARIN) 0.625 MG/GM Vaginal Cream Apply twice weekly to vagina for vaginal burning 42.5 g 0    Blood Glucose Monitoring Suppl (ONETOUCH ULTRA MINI) w/Device Does not apply Kit Check blood sugars two times daily 1 kit 0     Were there any changes to your current medication(s) noted on the AVS? Yes  If so, were these medication changes discussed with you prior to leaving the hospital? Yes  If a new medication was prescribed:    Was the new medication's purpose & side effects reviewed? Yes  Do you have any questions about your new medication? No  Did you  your discharge medications when you left the hospital? Yes  Let's go over your medications together to make sure we are not missing anything. Medications Reviewed  Are there any reasons that keep you from taking your medication as prescribed? No  Are you having any concerns with constipation? No  Did patient receive their flu shot (Sept-March)? Unknown     Discharge medications reviewed/discussed/and reconciled against outpatient medications with patient. Any changes or updates to medications sent to PCP. Patient Acknowledged     Referrals/orders at D/C:  Referrals/orders placed at D/C? yes  What services:   Home health   (If HH was ordered) Has HH been set up? Yes    If Yes: With Whom:  [facility]  Except for [facility] mentioned above, have you scheduled these other services? No    DME ordered at D/C? No      Discharge orders, AVS reviewed and discussed with patient. Any changes or updates to orders sent to PCP. Patient Acknowledged      SDOH:     Transportation Needs: No Transportation Needs (12/21/2023)    Transportation Needs     Lack of Transportation: No       Financial Resource Strain: Low Risk  (12/21/2023)    Financial Resource Strain     Difficulty of Paying Living Expenses: Not very hard     Med Affordability: No       Diagnosis specifics:   Ortho Spine:  Has there been a change in your incision/wound since d/c?  no  Are you following your exercise program (exercises for total joints and walking only for spine surgery)  yes   When you were in the hospital after your surgery, were you offered anything other than medications to help with your pain management?   yes  Do you remember what this might have been?         (put X by all the patient mentions:           repositioning            sleep/relaxation kit              music           TV         I-pad reading        X     Cold-pack      other__________________________)   Did you understand when to take your pain medication at home? yes  On a scale of 1-5   1- Very Poor and 5- Very well   Very well  Comments:  Was it clear how to use less narcotic pain medication as your pain decreased at home? yes   On a scale of 1-5   1- Very Poor/unclear and 5- Very well/clear  Very well  Comments:  Were you satisfied with the discharge process? yes  (For SPINE PATIENTS only)      Did your surgeon order a brace or cervical collar for you after surgery? no      Follow up appointments:          TCC  Was TCC ordered: No    PCP (If no TCC appointment)  Does patient already have a PCP appointment scheduled? No  NCM Attempted to schedule PCP office TCM appointment with patient   If no appointment scheduled: Explain: pt declined, plans to see the specialist for now    Specialist    Does the patient have any other follow up appointment(s) needing to be scheduled? Yes  If yes: NCM reviewed upcoming specialist appointment with patient: Yes  Does the patient need assistance scheduling appointment(s): No- Pt stated she has an appt with the Surgeon on 12/29/23. Is there any reason as to why you cannot make your appointment(s)? No     Needs post D/C:   Now that you are home, are there any needs or concerns you need addressed before your next visit with your PCP?  (DME, meds, questions, etc.): Yes- Pt would like a script for Cyclobenzaprine and discuss her pain management. Call to ortho, see below. Interventions by NCM:   All d/c instructions reviewed with the pt. Reviewed when to call MD vs when to call 911 or go the ED. Discussed s/s of infection, PE/DVT and PNA. Educated pt on the importance of taking all meds as prescribed as well as close f/u with PCP/specialists. Pt verbalized understanding and will contact the office with any further questions or concerns.      Contacted Dr. Valentín Carpio office ([phone])- call sent to  for [place]- detailed message left to contact pt regarding pain management concerns. Requested a CB to [facility] to ensure all was received. Will await a returned phone call. Overall Rating:    How would you rate the care you received while in the hospital? good    CCM referral placed:    Not Applicable

## 2023-12-21 NOTE — PROGRESS NOTES
LM for pt to call [facility] for TCM since discharge. [facility] phone number was provided for pt to call back.

## 2023-12-22 NOTE — PROGRESS NOTES
Dr. Luma Canela office has not called back, [facility] to call pt to FU to ensure they contacted her. S/w pt, she stated she did hear from Dr. Seda Hammond office and that she had all her questions and concerns addressed. Pt is feeling well. Pt did confirm [facility] services started today. Closing encounter.

## 2023-12-26 ENCOUNTER — APPOINTMENT (OUTPATIENT)
Dept: ULTRASOUND IMAGING | Facility: HOSPITAL | Age: 58
End: 2023-12-26
Payer: COMMERCIAL

## 2023-12-26 ENCOUNTER — TELEPHONE (OUTPATIENT)
Dept: INTERNAL MEDICINE CLINIC | Facility: CLINIC | Age: 58
End: 2023-12-26

## 2023-12-26 ENCOUNTER — HOSPITAL ENCOUNTER (EMERGENCY)
Facility: HOSPITAL | Age: 58
Discharge: HOME | End: 2023-12-26
Attending: EMERGENCY MEDICINE
Payer: COMMERCIAL

## 2023-12-26 VITALS
SYSTOLIC BLOOD PRESSURE: 125 MMHG | HEIGHT: 63 IN | BODY MASS INDEX: 26.58 KG/M2 | DIASTOLIC BLOOD PRESSURE: 78 MMHG | TEMPERATURE: 97 F | RESPIRATION RATE: 18 BRPM | WEIGHT: 150 LBS | OXYGEN SATURATION: 96 % | HEART RATE: 100 BPM

## 2023-12-26 DIAGNOSIS — T14.8XXA MUSCLE STRAIN: Primary | ICD-10-CM

## 2023-12-26 PROCEDURE — 93971 EXTREMITY STUDY: CPT

## 2023-12-26 PROCEDURE — 99284 EMERGENCY DEPT VISIT MOD MDM: CPT

## 2023-12-26 NOTE — ED QUICK NOTES
Patient safe to DC home per MD. Rossi Her to dress self. DC teaching done, instructions reviewed with patient including when and how to follow up with healthcare providers and when to seek emergency care. The patient verbalizes understanding. Patient wheeled to exit.

## 2023-12-26 NOTE — ED INITIAL ASSESSMENT (HPI)
Pt to ed c/o right calf and leg pain x days. Pt states she had lumbar fusion done last wk. Pt states she was told to come in to r/o dvt. Denies redness and swelling.

## 2023-12-26 NOTE — TELEPHONE ENCOUNTER
Action Requested: Summary for Provider     []  Critical Lab, Recommendations Needed  [] Need Additional Advice  [x]   FYI    []   Need Orders  [] Need Medications Sent to Pharmacy  []  Other     SUMMARY: Patient called stated was advise to proceed to the ER for possibly DVT    Stated spoke with surgeon office RN and was told to advise PCP    Reason for call: Condition Update  Onset: Data Unavailable

## 2023-12-27 NOTE — OPERATIVE REPORT
OPERATIVE REPORT    DATE OF SURGERY   12/18/23    PATIENT   Jairo Beach    PREOPERATIVE DIAGNOSIS    L5-S1 Lumbar spondylosis with disk degeneration  L5-S1 spinal stenosis    POST OPERATIVE DIAGNOSIS   Same    OPERATION  1. Anterior interbody fusion L5-S1  2. Anterior instrumentation L5-S1  3. Posterior non-segmental instrumentation L5-S1  4. Posterior lumbar fusion L5-S1  5. L5-S1 laminectomy  6. Fluoroscopy  7. Use of microscope      SURGEON  MD Steven Hahn MD    [address] [place] [address]    ANESTHESIA  General    EBL  [id number]    COMPLICATIONS  None immediate    DRAINS  None    ACTIVITY  Up as tolerated    IMPLANT  Nuvasive Brigade  Nuvasive anterior instrumentation  Nuvasive pedicle sc      INDICATIONS   The patient is a 66year old female with bilateral  lower extremity   radiculopathy. Patient failed a course of physical therapy as well   as epidural injections. He had disk degeneration at L5-S1 causing severe foraminal stenosis. Anterior surgery with posterior fixation was discussed. The   patient understood the risks of anterior spinal surgery. Risks and benefits of the procedure was   discussed with the patient which included, but was not limited   to, large vessel injury, bleeding, infection, nerve injury, epidural hematoma,    durotomy as well as complications associated with general   anesthesia (Stroke, myocardial infarction, DVT, PE). The patient   understood/consented and was taken to the operating room. Due to the   complexity of the case, a surgical assistant was required for retraction, implant placement. TECHNIQUE Ancef 2g was given preoperatively. Upon successful   intubation, the patient was transferred onto the regular   operating room table in a supine position. All pressure points   were well padded. The abdomen was prepped and draped in the normal sterile fashion. A lateral fluoroscopic image was used to localize at L5-S1   interspace by Dr. Adela Payne.  A left sided retroperitoneal approach was used. Following placement of the abdominal retractors at the appropriate level a   lateral fluoroscopic image was taken to confirm level. Excellent hemostasis was achieved. After localization, an annulotomy was then made. A combination of straight and curved   curretes were used to remove the disk material and cartilaginous   end plates. Fluoroscopy was used to confirm appropriate depth of the endplate. Trial cages were placed and checked under fluoroscopy. A PEEK intervertebral cage was then   appropriately sized. The PEEK cage was then fashioned into place   and packed with local bone graft that had been obtained from the   anterior osteophyte resection as well as allograft. The intervertebral cage was then   secured into the vertebral bodies using anterior spinal   instrumentation with screws placed into the L5 and S1 vertebral   bodies. Excellent fixation was obtained and was felt to be adequate. The wound was then copiously   irrigated. The retractors were removed and the vessels were inspected and hemostasis was   maintained. The abdominal fascia was closed with #0 looped PDS was used. 2-0 vicryl was   used or the deep dermal and 4-0 monocryl for subcuticular layer. Dermabond was applied. A sterile dressing was    applied. The patient was then positioned prone on the Dangelo spinal frame. Care was taken to   ensure bony prominences were well padded. The lower back was   prepped and draped in the usual fashion for surgery and local   anesthetic was infiltrated into the paraspinal area. Under   fluoroscopy, an incision was made immediately lateral to the L5   and S1 pedicles under biplane fluoroscopy, a Jamshidi was   advanced from the lateral aspect of the pedicle down the pedicle   into the vertebral body. A guidewire was then advanced down the   Jamshidi into the vertebral body under biplanar fluorosopy. This was performed in identical fashion at all pedicles.  At this point, the incision   connecting the guidewire was made. This was continued through   fascia with Bovie dissection. A tubular retractor was placed at L5-S1 and the lamina and medial facet was exposed. This was removed with a rubén and the ligamentum was removed piecemeal exposing the dura and traversing nerve root. The pars and transverse process at L5-S1 was decorticated and bone graft both autograft and allogfat was left for posterior spinal fusion. The dilators were advanced over   the guidewires and  L5 and S1 pedicle screws   were then advanced over the guidewire under fluoroscopy. In   addition, live EMG monitoring was performed as the screws were   Advanced. No neural stimulation was   noted. All  pedicle screws were well positioned in the same   fashion. A renee was then advanced down the connectors   into the heads of the pedicle screws. The set screws   were then fastened securely. The extenders were then removed and   the pedicle screw renee constructs was seen to be well positioned   on fluoroscopy. The wound was thoroughly irrigated. Hemostasis was ensured. The fascia was approximated with an 0 Vicryl suture, subcutaneous   tissue, and skin was then approximated. Sterile dressing   applied. The patient returned to recovery in stable condition.

## 2024-01-04 ENCOUNTER — TELEPHONE (OUTPATIENT)
Dept: INTERNAL MEDICINE CLINIC | Facility: CLINIC | Age: 59
End: 2024-01-04

## 2024-01-04 NOTE — TELEPHONE ENCOUNTER
Rec'd fax for physical therapy evaluation from [facility] to be signed by Dr. Florez.  Dr. Florez has signed PT evaluation order and stts that patient will need to be seen in the office as well.  Last office visit with Dr. Florez was on 12/15/22.  Pls call pt to schedule post op visit (40 min appt).

## 2024-01-15 ENCOUNTER — OFFICE VISIT (OUTPATIENT)
Dept: INTERNAL MEDICINE CLINIC | Facility: CLINIC | Age: 59
End: 2024-01-15

## 2024-01-15 VITALS
DIASTOLIC BLOOD PRESSURE: 82 MMHG | SYSTOLIC BLOOD PRESSURE: 115 MMHG | HEIGHT: 63 IN | BODY MASS INDEX: 26.75 KG/M2 | HEART RATE: 94 BPM | WEIGHT: 151 LBS

## 2024-01-15 DIAGNOSIS — E11.9 DIABETIC EYE EXAM (HCC): ICD-10-CM

## 2024-01-15 DIAGNOSIS — K21.9 GASTROESOPHAGEAL REFLUX DISEASE, UNSPECIFIED WHETHER ESOPHAGITIS PRESENT: ICD-10-CM

## 2024-01-15 DIAGNOSIS — E11.9 TYPE 2 DIABETES MELLITUS WITHOUT COMPLICATION, WITHOUT LONG-TERM CURRENT USE OF INSULIN (HCC): ICD-10-CM

## 2024-01-15 DIAGNOSIS — Z98.1 S/P LUMBAR SPINAL FUSION: Primary | ICD-10-CM

## 2024-01-15 DIAGNOSIS — J30.9 ALLERGIC RHINITIS, UNSPECIFIED SEASONALITY, UNSPECIFIED TRIGGER: ICD-10-CM

## 2024-01-15 DIAGNOSIS — D22.9 CHANGE IN SKIN MOLE: ICD-10-CM

## 2024-01-15 DIAGNOSIS — Z01.00 DIABETIC EYE EXAM (HCC): ICD-10-CM

## 2024-01-15 PROCEDURE — 3074F SYST BP LT 130 MM HG: CPT | Performed by: INTERNAL MEDICINE

## 2024-01-15 PROCEDURE — 3008F BODY MASS INDEX DOCD: CPT | Performed by: INTERNAL MEDICINE

## 2024-01-15 PROCEDURE — 3079F DIAST BP 80-89 MM HG: CPT | Performed by: INTERNAL MEDICINE

## 2024-01-15 PROCEDURE — 99215 OFFICE O/P EST HI 40 MIN: CPT | Performed by: INTERNAL MEDICINE

## 2024-01-15 NOTE — PROGRESS NOTES
HPI:    Patient ID: Emmanuel Mendiola is a 58 year old female.  Chief Complaint   Patient presents with    Post-Op     Back surgery   Patient presents today for  f/u  after lower back   surgery  on 12/18 /23 -state doing well overall , lower back and legs are still hurting but improving slowly  ,Takng Tylnol  500 mg tid as needed for pain  P Therapy in outpatient  and is helping    denies chest pain, shortness of breath, dyspnea on exertion or heart palpitations also denies nausea, vomiting, diarrhea, constipation or abdominal pain. No fever, chills, or UTI symptoms\    The rest of the review of systems, see below.     Gastro-esophageal Reflux  She complains of heartburn. She reports no abdominal pain, no chest pain or no wheezing. The problem has been gradually improving. The symptoms are aggravated by certain foods. She has tried a PPI and a diet change for the symptoms. The treatment provided significant relief.       Review of Systems   HENT:  Negative for ear pain and postnasal drip.    Eyes:  Negative for pain and redness.   Respiratory:  Negative for shortness of breath and wheezing.    Cardiovascular:  Negative for chest pain, palpitations and leg swelling.   Gastrointestinal:  Positive for heartburn. Negative for abdominal pain, anal bleeding, blood in stool, constipation and diarrhea.   Genitourinary:  Negative for dysuria, frequency and vaginal bleeding.   Musculoskeletal:  Negative for back pain.   Skin:  Negative for pallor.   Neurological:  Negative for dizziness.   Psychiatric/Behavioral:  Negative for confusion. The patient is not nervous/anxious.             Current Outpatient Medications   Medication Sig Dispense Refill    sennosides-docusate (SENOKOT S) 8.6-50 MG Oral Tab Take 1 tablet by mouth in the morning and 1 tablet before bedtime.      cyclobenzaprine 10 MG Oral Tab Take 1 tablet (10 mg total) by mouth nightly as needed for Muscle spasms. 30 tablet 0    sertraline 25 MG Oral Tab Take 1 tablet  (25 mg total) by mouth daily. (Patient taking differently: Take 1 tablet (25 mg total) by mouth every evening.) 90 tablet 3    topiramate 25 MG Oral Tab Take 1 tablet (25 mg total) by mouth daily. (Patient taking differently: Take 1 tablet (25 mg total) by mouth every evening.) 90 tablet 3    ALPRAZolam 0.25 MG Oral Tab Take 1 tablet (0.25 mg total) by mouth daily as needed for Anxiety. 15 tablet 0    Estrogens, Conjugated (PREMARIN) 0.625 MG/GM Vaginal Cream Apply twice weekly to vagina for vaginal burning 42.5 g 0    Blood Glucose Monitoring Suppl (ONETOUCH ULTRA MINI) w/Device Does not apply Kit Check blood sugars two times daily 1 kit 0    oxyCODONE 5 MG Oral Tab Take 1 tablet (5 mg total) by mouth every 6 (six) hours as needed. (Patient not taking: Reported on 1/15/2024)       Allergies:  Allergies   Allergen Reactions    Sulfa Antibiotics HIVES    Aminoglycosides UNKNOWN     Possibly hives    Compazine [Prochlorperazine] OTHER (SEE COMMENTS)     Convulsions    Morphine OTHER (SEE COMMENTS)     convulsions      PHYSICAL EXAM:   Physical Exam  Vitals and nursing note reviewed.   Constitutional:       General: She is not in acute distress.     Appearance: She is well-developed.      Comments: Obese    HENT:      Head: Normocephalic and atraumatic.      Right Ear: Tympanic membrane, ear canal and external ear normal.      Left Ear: Hearing, tympanic membrane, ear canal and external ear normal.      Nose:      Right Sinus: No maxillary sinus tenderness or frontal sinus tenderness.      Left Sinus: No maxillary sinus tenderness or frontal sinus tenderness.      Mouth/Throat:      Pharynx: Uvula midline.   Eyes:      General: Lids are normal. No scleral icterus.        Right eye: No discharge.         Left eye: No discharge.      Conjunctiva/sclera: Conjunctivae normal.      Right eye: Right conjunctiva is not injected.      Left eye: Left conjunctiva is not injected.   Neck:      Thyroid: No thyromegaly.       Vascular: No JVD.   Cardiovascular:      Rate and Rhythm: Normal rate and regular rhythm.      Heart sounds: Normal heart sounds. No murmur heard.  Pulmonary:      Effort: Pulmonary effort is normal. No respiratory distress.      Breath sounds: Normal breath sounds. No wheezing or rales.   Abdominal:      General: Bowel sounds are normal.      Palpations: Abdomen is soft. Abdomen is not rigid. There is no mass.      Tenderness: There is no abdominal tenderness. There is no guarding or rebound.   Musculoskeletal:      Cervical back: Neck supple.   Lymphadenopathy:      Cervical: No cervical adenopathy.   Skin:     General: Skin is warm and dry.      Comments:   Skin small round brown mole changing   Neurological:      Mental Status: She is alert and oriented to person, place, and time.   Psychiatric:         Mood and Affect: Mood is not anxious or depressed.         Behavior: Behavior normal.      Comments: Stable on sertraline        Blood pressure 115/82, pulse 94, height 5' 3\" (1.6 m), weight 151 lb (68.5 kg), last menstrual period 12/26/2017, not currently breastfeeding.             ASSESSMENT/PLAN:     S/p  spinal fusion  surgery  L5-S1  Dr Watts  1/25 /24   Physical th   Improving   Was in ER  on 12/26 -  due to bill legs - pain - no   DVT   Tylenol 500 mg tid as needed   Stable cpm      Gastroesophageal reflux disease without esophagitis  Patient advised to avoid spicy food, coffee, tea, caffeinated drinks, alcohol, acidic food/juices  Advised to avoid NSAID's - Aspirin-based medications  Advised to avoid wearing  tight clothes   Advised to elevate the head of the bed   Avoid eating at least 3 hours before bedtime   Counseling on ideal weight/BMI  Take PPIs qd in the morning 30-60 minutes before breakfast always on empty stomach Side effects and directions of medication discussed with patient. Patient verbalized understanding and compliance.     3. Allergic rhinitis, unspecified seasonality, unspecified  trigger  stsble  Patient can use loratidine  5 mg continue daily for  3-4  weeks then as needed    Flonase as needed 1 to 2 weeks and then as needed  Can use Benadryl if needed at night  Side effects and directions discussed with patient       DM2   Per pt had labs at [facility] state was told   A1c = 7.0 -12/23 no records   [facility] 6[facility]3 [facility]   Keep sugars glycemic control to prevent complications of DM2  Keep 1800 therese ADA- Diabetic diet   Walking and activity as tolerated   accu-checks   Eye exam and foot exam yearly  Take medications as prescribed  Metformin 500 mg every day --Bid  Tid   Directions and side effects of medications discussed w/pt  Pt verbalized understanding and compliance       Depression anxiety  Stable with sertraline   cpm      Migraine   stsble with Topamax   cpm         Skin mole change heal  Refe ro Dermatology    No orders of the defined types were placed in this encounter.      Meds This Visit:  Requested Prescriptions      No prescriptions requested or ordered in this encounter       Imaging & Referrals:  None       ID#[id number]

## 2024-01-17 NOTE — TELEPHONE ENCOUNTER
Refill passed per [facility] protocol.  Requested Prescriptions   Pending Prescriptions Disp Refills    METFORMIN 500 MG Oral Tab [Pharmacy Med Name: METFORMIN 500MG TABLETS] 270 tablet 0     Sig: TAKE 1 TABLET(500 MG) BY MOUTH THREE TIMES DAILY BEFORE MEALS       Diabetes Medication Protocol Passed - 1/15/2024  5:01 PM        Passed - Last A1C < 7.5 and within past 6 months     Lab Results   Component Value Date    A1C 6.3 (H) 12/06/2023             Passed - In person appointment or virtual visit in the past 6 mos or appointment in next 3 mos     Recent Outpatient Visits              2 days ago S/P lumbar spinal [facility] [place]    Office Visit    1 month ago Preoperative examination, unspecified    [facility] Michael Rudd MD    Office Visit    1 month ago Bilateral lumbar radiculopathy    [facility], [address], [place] Amber Wood APRN    Office Visit    2 months ago HNP (herniated nucleus pulposus), lumbar [facility], [address], [place] Kari Dudley DO    Office Visit    5 months ago Right lumbar radiculopathy    [facility] Kari Dudley DO    Telemedicine                      Passed - EGFRCR or GFRNAA > 50     GFR Evaluation  EGFRCR: 67 , resulted on 12/19/2023          Passed - GFR in the past 12 months           Recent Outpatient Visits              2 days ago S/P lumbar spinal fusion    [facility], [place]in Vogt MD    Office Visit    1 month ago Preoperative examination, unspecified    [facility] Michael Rudd MD    Office Visit    1 month ago Bilateral lumbar radiculopathy    [facility], [address], [place] Amber Wood APRN    Office Visit    2 months ago HNP (herniated nucleus pulposus), [facility], [address], [place] Kari Dudley, DO    Office Visit    5 months ago Right lumbar radiculopathy    [facility], [place] Kari Dudley, DO    Telemedicine

## 2024-01-23 ENCOUNTER — MED REC SCAN ONLY (OUTPATIENT)
Dept: INTERNAL MEDICINE CLINIC | Facility: CLINIC | Age: 59
End: 2024-01-23

## 2024-01-24 ENCOUNTER — TELEPHONE (OUTPATIENT)
Dept: GASTROENTEROLOGY | Facility: CLINIC | Age: 59
End: 2024-01-24

## 2024-01-24 ENCOUNTER — TELEPHONE (OUTPATIENT)
Dept: INTERNAL MEDICINE CLINIC | Facility: CLINIC | Age: 59
End: 2024-01-24

## 2024-01-24 DIAGNOSIS — N83.9 LESION OF RIGHT OVARY: Primary | ICD-10-CM

## 2024-01-24 NOTE — TELEPHONE ENCOUNTER
Patient outreach message received:    I spoke to the pt and went over your recommendations below regarding the pelvic us from 01/23/2023.     She would like us to f/u with another pelvic US for her in 1 year.

## 2024-01-24 NOTE — TELEPHONE ENCOUNTER
Rhonda- [facility] calling to confirm that office has received the fax for orders for home health, please advise     Fax# [phone]

## 2024-01-29 NOTE — TELEPHONE ENCOUNTER
I spoke to the pt    She is aware the the pelvic us is ordered     She recently had a lumbar fusion and laminectomy on 12/18/2023    She will wait until she is healed to have the pelvic ultrasound.    I provided her with the phone number to Central Scheduling

## 2024-02-14 ENCOUNTER — OFFICE VISIT (OUTPATIENT)
Dept: DERMATOLOGY CLINIC | Facility: CLINIC | Age: 59
End: 2024-02-14

## 2024-02-14 DIAGNOSIS — D22.9 MULTIPLE BENIGN NEVI: ICD-10-CM

## 2024-02-14 DIAGNOSIS — L81.4 LENTIGINES: ICD-10-CM

## 2024-02-14 DIAGNOSIS — L82.1 SEBORRHEIC KERATOSES: Primary | ICD-10-CM

## 2024-02-14 PROCEDURE — 99243 OFF/OP CNSLTJ NEW/EST LOW 30: CPT | Performed by: STUDENT IN AN ORGANIZED HEALTH CARE EDUCATION/TRAINING PROGRAM

## 2024-02-14 NOTE — PROGRESS NOTES
February 14, 2024    New patient     Referred by Dr. Tenorio    CHIEF COMPLAINT: FBSE    HISTORY OF PRESENT ILLNESS: .    1. Dark growth   Location: On scalp   Duration: 1 year   Signs and symptoms: Dark, raised and growing    2. Mole  Location: Right arm   Duration: years   Signs and symptoms: Raised and hard to the touch      3. Dry patch  Location: Back    Duration: 6 months   Signs and symptoms: dry area wont heal    DERM HISTORY:  History of skin cancer: No    FAMILY HISTORY:  History of melanoma: No    PAST MEDICAL HISTORY:  Past Medical History:   Diagnosis Date    Anemia     recently dx-taking FeSO4    Back problem     Divorce 01/01/2009    Finalized 2010    DM2 (diabetes mellitus, type 2) (HCC)     controlled with diet    Endometriosis 1996    Laparoscopy-diagnostic    Esophageal reflux     History of blood transfusion     No reaction    History of pregnancy 2008    EAB    Hx of motion sickness     Lipid screening 11/11/2007    per NG    Migraines     Visual impairment     wears glasses       REVIEW OF SYSTEMS:  Constitutional: Denies fever, chills, unintentional weight loss.   Skin as per HPI    Medications  Current Outpatient Medications   Medication Sig Dispense Refill    metFORMIN 500 MG Oral Tab Take 1 tablet (500 mg total) by mouth 3 (three) times daily before meals. 270 tablet 3    sennosides-docusate (SENOKOT S) 8.6-50 MG Oral Tab Take 1 tablet by mouth in the morning and 1 tablet before bedtime.      cyclobenzaprine 10 MG Oral Tab Take 1 tablet (10 mg total) by mouth nightly as needed for Muscle spasms. 30 tablet 0    sertraline 25 MG Oral Tab Take 1 tablet (25 mg total) by mouth daily. (Patient taking differently: Take 1 tablet (25 mg total) by mouth every evening.) 90 tablet 3    topiramate 25 MG Oral Tab Take 1 tablet (25 mg total) by mouth daily. (Patient taking differently: Take 1 tablet (25 mg total) by mouth every evening.) 90 tablet 3    ALPRAZolam 0.25 MG Oral Tab Take 1 tablet (0.25  mg total) by mouth daily as needed for Anxiety. 15 tablet 0    Estrogens, Conjugated (PREMARIN) 0.625 MG/GM Vaginal Cream Apply twice weekly to vagina for vaginal burning 42.5 g 0    Blood Glucose Monitoring Suppl (ONETOUCH ULTRA MINI) w/Device Does not apply Kit Check blood sugars two times daily 1 kit 0       PHYSICAL EXAM:  Patient declined chaperone   General: awake, alert, no acute distress  Skin: Skin exam was performed today including the following: head and face, scalp, neck, chest (including breasts and axillae), abdomen, back, bilateral upper extremities, bilateral lower extremities, hands, feet, digits, nails. Pertinent findings include:   - Scattered bright red-purple dome-shaped papules on the trunk and extremities   - Scattered light brown stellate macules on sun exposed sites  - Scattered, evenly colored, round brown macules and papules with regular borders on the trunk and extremities  - Numerous scattered skin-colored and brown, waxy, stuck-on papules and plaques on the trunk and extremities      ASSESSMENT & PLAN:  Pathophysiology of diagnoses discussed with patient.  Therapeutic options reviewed. Risks, benefits, and alternatives discussed with patient. Instructions reviewed at length.    #Lentigines  #Seborrheic keratoses   #Cherry angiomas   - Reassurance provided regarding the benign nature of these lesions.    #Multiple benign nevi  - Complete skin exam performed today with no outlier lesions identified   - Reassured patient of benign nature of these lesions.   - Recommend daily photoprotection with broad-spectrum sunscreen, avoidance of sun during peak hours, and sun protective clothing.    - Dermoscopy was used for physical examination of pigmented lesions during today's office visit.      Return to clinic: 2-3 years  or sooner if something concerning arises     Louis Loyd MD

## 2024-03-11 ENCOUNTER — TELEPHONE (OUTPATIENT)
Facility: CLINIC | Age: 59
End: 2024-03-11

## 2024-03-11 NOTE — TELEPHONE ENCOUNTER
Date/Time Provider Department   4/22/24 5:30 PM  - 60 min [facility] US RM6 PM (Resource) [facility] Ultrasound   US PELVIS (TRANSABDOMINAL AND TRANSVAGINAL) (GID=[id number]) (Order #[id number]) on 1/28/24

## 2024-03-19 RX ORDER — TOPIRAMATE 25 MG/1
25 TABLET ORAL DAILY
Qty: 90 TABLET | Refills: 3 | Status: SHIPPED | OUTPATIENT
Start: 2024-03-19

## 2024-03-19 NOTE — TELEPHONE ENCOUNTER
Refill passed per [facility] protocol.  Requested Prescriptions   Pending Prescriptions Disp Refills    TOPIRAMATE 25 MG Oral Tab [Pharmacy Med Name: TOPIRAMATE 25MG TABLETS] 90 tablet 3     Sig: TAKE 1 TABLET(25 MG) BY MOUTH DAILY       Neurology Medications Passed - 3/17/2024  5:43 AM        Passed - In person appointment or virtual visit in the past 6 mos or appointment in next 3 mos     Recent Outpatient Visits              1 month ago [facility]    [facility]    2 months ago [facility]    [facility]    3 months ago Preoperative examination, unspecified    [facility] Michael Rudd MD    Office Visit    3 months ago Bilateral lumbar radiculopathy    [facility], [address], [place] Amber Wood APRN    Office Visit    4 months ago HNP (herniated nucleus pulposus), lumbar    [facility], [address], [place] Kari Dudley DO    Office Visit          Future Appointments         Provider Department Appt Notes    In 1 month [facility] US RM6 PM [facility] qa jg   Est final                  Recent Outpatient Visits              1 month ago Seborrheic keratoses    [facility]    Office Visit    2 months ago S/P lumbar spinal fusion    [facility]    [facility] Visit    3 months ago Preoperative examination, unspecified    [facility] Michael Rudd MD    Office Visit    3 months ago Bilateral lumbar radiculopathy    [facility], [address], [place] Amber Wood APRN    Office Visit    4 months ago HNP (herniated nucleus pulposus), [facility]  [facility], [address], [place] Kari Dudley,     Office Visit          Future Appointments         Provider Department Appt Notes    In 1 month [facility] US RM6 PM [facility] qa jg   Est final

## 2024-04-06 ENCOUNTER — HOSPITAL ENCOUNTER (OUTPATIENT)
Age: 59
Discharge: HOME | End: 2024-04-06
Payer: COMMERCIAL

## 2024-04-06 VITALS
DIASTOLIC BLOOD PRESSURE: 77 MMHG | SYSTOLIC BLOOD PRESSURE: 131 MMHG | OXYGEN SATURATION: 98 % | RESPIRATION RATE: 20 BRPM | TEMPERATURE: 98 F | HEART RATE: 66 BPM

## 2024-04-06 DIAGNOSIS — N76.0 ACUTE VAGINITIS: Primary | ICD-10-CM

## 2024-04-06 LAB
BILIRUB UR QL STRIP: NEGATIVE
BV BACTERIA DNA VAG QL NAA+PROBE: NEGATIVE
C GLABRATA DNA VAG QL NAA+PROBE: NEGATIVE
C KRUSEI DNA VAG QL NAA+PROBE: NEGATIVE
CANDIDA DNA VAG QL NAA+PROBE: NEGATIVE
CLARITY UR: CLEAR
COLOR UR: YELLOW
GLUCOSE UR STRIP-MCNC: NEGATIVE MG/DL
HGB UR QL STRIP: NEGATIVE
KETONES UR STRIP-MCNC: NEGATIVE MG/DL
NITRITE UR QL STRIP: NEGATIVE
PH UR STRIP: 6.5 [PH]
PROT UR STRIP-MCNC: NEGATIVE MG/DL
SP GR UR STRIP: 1.02
T VAGINALIS DNA VAG QL NAA+PROBE: NEGATIVE
UROBILINOGEN UR STRIP-ACNC: <2 MG/DL

## 2024-04-06 PROCEDURE — 99214 OFFICE O/P EST MOD 30 MIN: CPT

## 2024-04-06 PROCEDURE — 81514 NFCT DS BV&VAGINITIS DNA ALG: CPT | Performed by: NURSE PRACTITIONER

## 2024-04-06 PROCEDURE — 81002 URINALYSIS NONAUTO W/O SCOPE: CPT

## 2024-04-06 PROCEDURE — 87591 N.GONORRHOEAE DNA AMP PROB: CPT | Performed by: NURSE PRACTITIONER

## 2024-04-06 PROCEDURE — 87491 CHLMYD TRACH DNA AMP PROBE: CPT | Performed by: NURSE PRACTITIONER

## 2024-04-06 RX ORDER — FLUCONAZOLE 150 MG/1
150 TABLET ORAL
Qty: 2 TABLET | Refills: 0 | Status: SHIPPED | OUTPATIENT
Start: 2024-04-06 | End: 2024-04-10

## 2024-04-06 RX ORDER — TIZANIDINE 4 MG/1
TABLET ORAL
COMMUNITY
Start: 2024-03-28

## 2024-04-06 RX ORDER — MICONAZOLE NITRATE 200 MG/1
200 SUPPOSITORY VAGINAL NIGHTLY
Qty: 3 SUPPOSITORY | Refills: 0 | Status: SHIPPED | OUTPATIENT
Start: 2024-04-06 | End: 2024-04-09

## 2024-04-06 NOTE — ED INITIAL ASSESSMENT (HPI)
Patient reports 2 day hx of perineal itching and irritation.  Denies dysuria, denies vaginal discharge.

## 2024-04-06 NOTE — DISCHARGE INSTRUCTIONS
You are being treated today for a yeast infection. Cultures take 2 days to result. Take the Diflucan and suppositories as directed. Place the suppositories at night before bed. Wear a pad or panty liner as the suppositories can leak out of the vaginal canal for >24hrs.  Do not douche, do not use tampons, or tight fitting pants, undergarments, or pantyhose until you see your gynecologist. Wear 100% cotton underwear. Follow up with your gynecologist or the one provided in your discharge instructions in the next 2 days.

## 2024-04-06 NOTE — ED PROVIDER NOTES
Patient Seen in: [facility] [place]    History   CC: vaginal irritation  HPI: Emmanuel Mendiola 58 year old female  who presents c/o intense vaginal itching and irritation x3 days. +new male partner, first time in 4 years, unsure if related. Denies vaginal dx, pelvic pain, abd pain, n/v, dyspareunia, urinary signs/symptoms, fever or rash.    Past Medical History:   Diagnosis Date    Anemia     recently dx-taking FeSO4    Back problem     Divorce 2009    Finalized     DM2 (diabetes mellitus, type 2) (HCC)     controlled with diet    Endometriosis     Laparoscopy-diagnostic    Esophageal reflux     History of blood transfusion     No reaction    History of pregnancy     EAB    Hx of motion sickness     Lipid screening 2007    per NG    Migraines     Visual impairment     wears glasses       Past Surgical History:   Procedure Laterality Date    BIOPSY OF UTERUS LINING  2011    neg embx    COLONOSCOPY N/A 1/10/2018    Procedure: COLONOSCOPY;  Surgeon: Humble Hodges MD;  Location: [facility]    LAPAROSCOPY,DIAGNOSTIC      Diagnostic for endometriosis       Family History   Problem Relation Age of Onset    Heart Attack Brother         MI-Cause of death    Colon Cancer Paternal Grandmother         Cause of death    Heart Disease Father         CAD    Diabetes Father         Diabetes/CRF/CAD cause of death    Other (Other) Father         CRF    Heart Disease Mother         CAD    Cancer Mother         Lung cancer and CAD cause of death    Heart Disease Brother 41        Premature CAD    Breast Cancer Maternal Aunt 82       Social History     Socioeconomic History    Marital status:    Tobacco Use    Smoking status: Former     Packs/day: 1.00     Years: 5.00     Additional pack years: 0.00     Total pack years: 5.00     Types: Cigarettes     Quit date:      Years since quittin.2     Passive exposure: Past    Smokeless tobacco: Never   Vaping Use    Vaping Use: Never  used   Substance and Sexual Activity    Alcohol use: Yes     Alcohol/week: 1.0 standard drink of alcohol     Types: 1 Glasses of wine per week     Comment: 3 glasses per month     Drug use: No    Sexual activity: Yes     Partners: Male     Birth control/protection: Condom   Other Topics Concern    Caffeine Concern No    History of tanning Yes    Breast feeding No    Reaction to local anesthetic No    Pt has a pacemaker No    Pt has a defibrillator No     Social Determinants of Health     Financial Resource Strain: Low Risk  (12/21/2023)    Financial Resource Strain     Difficulty of Paying Living Expenses: Not very hard     Med Affordability: No   Food Insecurity: No Food Insecurity (12/18/2023)    Food Insecurity     Food Insecurity: Never true   Transportation Needs: No Transportation Needs (12/21/2023)    Transportation Needs     Lack of Transportation: No   Housing Stability: Low Risk  (12/18/2023)    Housing Stability     Housing Instability: No       ROS:  Review of Systems    Positive for stated complaint: Vaginal discomf  Other systems are as noted in HPI.  Constitutional and vital signs reviewed.      All other systems reviewed and negative except as noted above.    PSFH elements reviewed from today and agreed except as otherwise stated in HPI.             Constitutional and vital signs reviewed.        Physical Exam     ED Triage Vitals [04/06/24 0934]   /77   Pulse 66   Resp 20   Temp 98.4 °F (36.9 °C)   Temp src Temporal   SpO2 98 %   O2 Device None (Room air)       Current:/77   Pulse 66   Temp 98.4 °F (36.9 °C) (Temporal)   Resp 20   LMP 12/26/2017   SpO2 98%         PE:  General - Appears well, non-toxic and in NAD  Head - Appears symmetrical without deformity/swelling cranium, scalp, or facial bones  GI - Appears round and flat, BS +x4 quadrants, no tenderness/guarding with palpation   - no CVA tenderness, external genital exam normal, +mild amount of white intravaginal dx,  cervical os closed, no CMT/adnexal tenderness.  Chaperone offered, patient declined  Skin - no rashes or petechiae noted, pink warm and dry throughout, mmm, cap refill <2seconds  Neuro - A&O x4, steady gait  MSK - makes purposeful movements of all extremities, radial pulses 2+ bilat.  Psych - Interactive and appropriate      ED Course     Labs Reviewed   [facility] POCT URINALYSIS DIPSTICK - Abnormal; Notable for the following components:       Result Value    Leukocyte esterase urine Trace (*)     All other components within normal limits       MDM     DDx: Vaginitis, candidiasis, bacterial vaginosis, STI    GC/chlamydia and vaginosis panel pending.  We will empirically start treatment for vaginal candidiasis.  Suppository as well as oral Diflucan prescriptions reviewed, loosefitting close, 100% cotton underwear, avoidance of douching, staying well-hydrated etc. reviewed, follow-up and return/ED precautions reviewed.  We will call if cultures are positive and need for additional treatment is required. Patient is historian and demonstrates understanding of all instruction and agrees with plan of care.      Disposition and Plan     Clinical Impression:  1. Acute vaginitis        Disposition:  Discharge    Follow-up:  Bertin Florez MD  [address] [place] [id number] [phone]    Go in 1 week  As needed      Medications Prescribed:  Current Discharge Medication List        START taking these medications    Details   fluconazole (DIFLUCAN) 150 MG Oral Tab Take 1 tablet (150 mg total) by mouth every 3 (three) days for 2 doses.  Qty: 2 tablet, Refills: 0      Miconazole Nitrate (MICONAZOLE 3) 200 MG Vaginal Suppos Place 1 suppository (200 mg total) vaginally nightly for 3 days.  Qty: 3 suppository, Refills: 0

## 2024-04-08 ENCOUNTER — TELEPHONE (OUTPATIENT)
Dept: INTERNAL MEDICINE CLINIC | Facility: CLINIC | Age: 59
End: 2024-04-08

## 2024-04-08 LAB
C TRACH DNA SPEC QL NAA+PROBE: NEGATIVE
N GONORRHOEA DNA SPEC QL NAA+PROBE: NEGATIVE

## 2024-04-22 ENCOUNTER — HOSPITAL ENCOUNTER (OUTPATIENT)
Dept: ULTRASOUND IMAGING | Facility: HOSPITAL | Age: 59
Discharge: HOME | End: 2024-04-22
Attending: INTERNAL MEDICINE
Payer: COMMERCIAL

## 2024-04-22 DIAGNOSIS — N83.9 LESION OF RIGHT OVARY: ICD-10-CM

## 2024-04-22 PROCEDURE — 76856 US EXAM PELVIC COMPLETE: CPT | Performed by: INTERNAL MEDICINE

## 2024-04-22 PROCEDURE — 76830 TRANSVAGINAL US NON-OB: CPT | Performed by: INTERNAL MEDICINE

## 2024-04-23 ENCOUNTER — TELEPHONE (OUTPATIENT)
Dept: OBGYN CLINIC | Facility: CLINIC | Age: 59
End: 2024-04-23

## 2024-04-23 ENCOUNTER — NURSE TRIAGE (OUTPATIENT)
Dept: INTERNAL MEDICINE CLINIC | Facility: CLINIC | Age: 59
End: 2024-04-23

## 2024-04-23 ENCOUNTER — OFFICE VISIT (OUTPATIENT)
Dept: INTERNAL MEDICINE CLINIC | Facility: CLINIC | Age: 59
End: 2024-04-23
Payer: COMMERCIAL

## 2024-04-23 VITALS
HEART RATE: 67 BPM | HEIGHT: 63 IN | DIASTOLIC BLOOD PRESSURE: 80 MMHG | SYSTOLIC BLOOD PRESSURE: 126 MMHG | BODY MASS INDEX: 26.22 KG/M2 | WEIGHT: 148 LBS

## 2024-04-23 DIAGNOSIS — R93.89 ENDOMETRIAL THICKENING ON ULTRASOUND: Primary | ICD-10-CM

## 2024-04-23 PROCEDURE — 3008F BODY MASS INDEX DOCD: CPT | Performed by: NURSE PRACTITIONER

## 2024-04-23 PROCEDURE — 99213 OFFICE O/P EST LOW 20 MIN: CPT | Performed by: NURSE PRACTITIONER

## 2024-04-23 PROCEDURE — 3079F DIAST BP 80-89 MM HG: CPT | Performed by: NURSE PRACTITIONER

## 2024-04-23 PROCEDURE — 3074F SYST BP LT 130 MM HG: CPT | Performed by: NURSE PRACTITIONER

## 2024-04-23 RX ORDER — MISOPROSTOL 200 UG/1
200 TABLET ORAL ONCE
Qty: 1 TABLET | Refills: 0 | Status: SHIPPED | OUTPATIENT
Start: 2024-04-23 | End: 2024-04-23

## 2024-04-23 RX ORDER — MELOXICAM 15 MG/1
1 TABLET ORAL
COMMUNITY
Start: 2024-03-28

## 2024-04-23 NOTE — TELEPHONE ENCOUNTER
Action Requested: Summary for Provider     []  Critical Lab, Recommendations Needed  [] Need Additional Advice  [x]   FYI    []   Need Orders  [] Need Medications Sent to Pharmacy  []  Other     SUMMARY: Abdominal/pelvic  pain that is intermittently constant for some time now [now present and is 3/10 pain], had Pelvic US and she is concerned about results as mother had cancer that started as uterine cancer that metastasized, and ultimately passed away from it. Same day office visit offered --> agreeable and scheduled. Refer to system/assessment protocol for yes/no answers. [See below for more details]    Reason for call: Test Results and Abdominal Pain  Onset: chronic and intermittent, last episode now present    Reason for Disposition   MODERATE pain (e.g., interferes with normal activities that comes and goes (cramps) lasts > 24 hours  (Exception: Pain with Vomiting or Diarrhea - see that Protocol.)   Unusual vaginal discharge    Protocols used: Abdominal Pain - Female-A-[place]      Patient called states she saw the US results, she has not seen GYNE, she would prefer a female. She does have a PPO plan, but would like to get Dr. Florez's opinion related to Gyne. She also mentioned her mother had large tumor of uterus at a young age and passed away as cancer had metastasized. She has had some weird pains in her pelvic area--> 3/10. She is post menopausal and denies any vaginal bleeding, but does get an intermittent odor and discomfort. Some vaginal discharge had been present and deemed as yeast infection which treatment was prescribed, but not at this time. Patient has also had a vibrating feeling of her anus, denies pain, it was present for 2-3 days then subsided today. Denies any nausea/vomiting/distention. Same day office visit offered --> agreeable and scheduled. Home Care Advice discussed, per protocol. Patient instructed any new or worsening symptoms [reviewed] seek immediate medical attention. Patient  verbalized understanding. No further questions or concerns at this time.    Future Appointments   Date Time Provider Department Center   4/23/2024  4:20 PM Amber Wood, [id number] [place]

## 2024-04-23 NOTE — TELEPHONE ENCOUNTER
Pt requires endometrial biopsy per Amber Wood. She is requesting to be seen sooner than May 31st at any location. Pt is in extreme pain. Please call pt with sooner availability.

## 2024-04-23 NOTE — TELEPHONE ENCOUNTER
Pt notified of [facility] recs. Pt expressed desires to have embx. Appt adjusted. Pt advised to take 600 mg of Ibuprofen 30-60 minutes prior to her appt and to take Cytotec the night before. Pt verbalized understanding. Rx ordered and sent to pt's preferred pharmacy.

## 2024-04-23 NOTE — PROGRESS NOTES
Emmanuel Mendiola is a 58 year old female.  HPI:   Pt presents to clinic for follow up on her pelvic US results. Pt has a known lesion on the right ovary that she has been monitoring with yearly pelvic US. She also reports her mother had uterine cancer that metastasized so she is concerned. Denies any CP, SOB, HA, palpitations, dizziness, appetite or weight changes, bloating, abdominal/pelvic pain, vaginal bleeding, discharge.     US PELVIS (TRANSABDOMINAL AND TRANSVAGINAL) (CPT=76856/24832)    Result Date: 4/23/2024  CONCLUSION:  1. Mild endometrial thickening measuring 0.6 cm which is abnormal in a postmenopausal patient.  The differential in a postmenopausal patient includes endometrial carcinoma versus hyperplasia.  Advise GYN evaluation and further workup. 2. 2.1 cm oval hypoechoic lesion with posterior acoustic shadowing.  This is not significantly changed in size since 2018 and lack of significant interval change again favors a benign etiology including fibroma or fibrothecoma.  Continued sonographic imaging surveillance may be considered.   Dictated by (CST): Kamar Peralta MD on 4/23/2024 at 11:20 AM     Finalized by (CST): Kamar Peralta MD on 4/23/2024 at 11:30 AM           Current Outpatient Medications   Medication Sig Dispense Refill    Meloxicam 15 MG Oral Tab Take 1 tablet (15 mg total) by mouth daily with food.      Miconazole Nitrate 200 MG Vaginal Suppos       tiZANidine 4 MG Oral Tab TAKE 1 TABLET BY MOUTH EVERY 6 TO 8 HOURS AS NEEDED FOR POST OP MUSCLE SPASMS      topiramate 25 MG Oral Tab Take 1 tablet (25 mg total) by mouth daily. 90 tablet 3    metFORMIN 500 MG Oral Tab Take 1 tablet (500 mg total) by mouth 3 (three) times daily before meals. 270 tablet 3    sennosides-docusate (SENOKOT S) 8.6-50 MG Oral Tab Take 1 tablet by mouth in the morning and 1 tablet before bedtime.      cyclobenzaprine 10 MG Oral Tab Take 1 tablet (10 mg total) by mouth nightly as needed for Muscle spasms. 30  tablet 0    sertraline 25 MG Oral Tab Take 1 tablet (25 mg total) by mouth daily. (Patient taking differently: Take 1 tablet (25 mg total) by mouth every evening.) 90 tablet 3    ALPRAZolam 0.25 MG Oral Tab Take 1 tablet (0.25 mg total) by mouth daily as needed for Anxiety. 15 tablet 0    Estrogens, Conjugated (PREMARIN) 0.625 MG/GM Vaginal Cream Apply twice weekly to vagina for vaginal burning 42.5 g 0    Blood Glucose Monitoring Suppl (ONETOUCH ULTRA MINI) w/Device Does not apply Kit Check blood sugars two times daily 1 kit 0      Past Medical History:    Anemia    recently dx-taking FeSO4    Back problem    Divorce    Finalized     DM2 (diabetes mellitus, type 2) (HCC)    controlled with diet    Endometriosis    Laparoscopy-diagnostic    Esophageal reflux    History of blood transfusion    No reaction    History of pregnancy    EAB    Hx of motion sickness    Lipid screening    per NG    Migraines    Visual impairment    wears glasses      Social History:  Social History     Socioeconomic History    Marital status:    Tobacco Use    Smoking status: Former     Current packs/day: 0.00     Average packs/day: 1 pack/day for 5.0 years (5.0 ttl pk-yrs)     Types: Cigarettes     Start date:      Quit date:      Years since quittin.3     Passive exposure: Past    Smokeless tobacco: Never   Vaping Use    Vaping status: Never Used   Substance and Sexual Activity    Alcohol use: Yes     Alcohol/week: 1.0 standard drink of alcohol     Types: 1 Glasses of wine per week     Comment: 3 glasses per month     Drug use: No    Sexual activity: Yes     Partners: Male     Birth control/protection: Condom   Other Topics Concern    Caffeine Concern No    History of tanning Yes    Breast feeding No    Reaction to local anesthetic No    Pt has a pacemaker No    Pt has a defibrillator No     Social Determinants of Health     Financial Resource Strain: Low Risk  (2023)    Financial Resource Strain      Difficulty of Paying Living Expenses: Not very hard     Med Affordability: No   Food Insecurity: No Food Insecurity (12/18/2023)    Food Insecurity     Food Insecurity: Never true   Transportation Needs: No Transportation Needs (12/21/2023)    Transportation Needs     Lack of Transportation: No   Housing Stability: Low Risk  (12/18/2023)    Housing Stability     Housing Instability: No        REVIEW OF SYSTEMS:   Review of Systems   All other systems reviewed and are negative.         EXAM:   /80 (BP Location: Right arm, Patient Position: Sitting, Cuff Size: adult)   Pulse 67   Ht 5' 3\" (1.6 m)   Wt 148 lb (67.1 kg)   LMP 12/26/2017   BMI 26.22 kg/m²     Physical Exam  Vitals reviewed.   Constitutional:       General: She is not in acute distress.  HENT:      Head: Normocephalic.   Eyes:      Conjunctiva/sclera: Conjunctivae normal.      Pupils: Pupils are equal, round, and reactive to light.   Neurological:      Mental Status: She is alert and oriented to person, place, and time.   Psychiatric:         Mood and Affect: Mood normal.         Judgment: Judgment normal.            ASSESSMENT AND PLAN:   1. Endometrial thickening on ultrasound  -abnormal US showing endometrial thickening requiring further evaluation/tissue sampling. She will see gynecology. Appt scheduled.   -Pt reports +family hx of uterine cancer (mother) that metastasized.   - OBG Referral - In Network       The patient indicates understanding of these issues and agrees to the plan.  The patient is asked to return in 4 weeks.     The above note was creating using Dragon speech recognition technology. Please excuse any typos.

## 2024-04-23 NOTE — TELEPHONE ENCOUNTER
Pt reports lower abdominal pain that has been ongoing and intermittent. Reports pain level varies and today it is a 3/10. States she thought it was due to lesion on ovary or because she had back surgery in December and incision were made through the front torso.   Denies constipation and uti symptoms.   Offered pt a sooner appt on . Pt states she saw her PCP today for pelvis US results and was informed that the endometrial lining is thick and recs for embx with gyne. Pt asking if she can do embx on  or does she need to come in for consult first?  Pt is concerned due to her mother  at 61 years old due to endometrial CA.   Informed pt a message will be routed to ROSI to ask if pt needs an appt to review results first or can she come in for embx? (Appt is scheduled on )  Last annual was 2022.

## 2024-04-23 NOTE — TELEPHONE ENCOUNTER
Okay to schedule EMBX if she'd like.  Without any PMB and 6mm lining I'm not concerned, but to give her peace of mind we can do.  Cytotec the night before.

## 2024-05-13 ENCOUNTER — TELEPHONE (OUTPATIENT)
Dept: OBGYN CLINIC | Facility: CLINIC | Age: 59
End: 2024-05-13

## 2024-05-13 NOTE — TELEPHONE ENCOUNTER
Patient has a few questions she would like answered prior to her appointment tomorrow. Please call.

## 2024-05-13 NOTE — TELEPHONE ENCOUNTER
Patient with appointment tomorrow for endometrial biopsy.   Patient with questions. She is asking if anything to know about cytotec? Reviewed may cause cramping or spotting. Patient asked if cytotec will interfere with anxiety meds. She is on sertraline. I advised patient I have never heard this, but will verify with MD.     Message to Dr. Dennison to advise if any concern.   Patient only needs call if any further information.

## 2024-05-14 ENCOUNTER — OFFICE VISIT (OUTPATIENT)
Dept: OBGYN CLINIC | Facility: CLINIC | Age: 59
End: 2024-05-14

## 2024-05-14 VITALS
BODY MASS INDEX: 27 KG/M2 | WEIGHT: 149.81 LBS | SYSTOLIC BLOOD PRESSURE: 154 MMHG | HEART RATE: 73 BPM | DIASTOLIC BLOOD PRESSURE: 86 MMHG

## 2024-05-14 DIAGNOSIS — R93.89 THICKENED ENDOMETRIUM: Primary | ICD-10-CM

## 2024-05-14 PROCEDURE — 3079F DIAST BP 80-89 MM HG: CPT | Performed by: OBSTETRICS & GYNECOLOGY

## 2024-05-14 PROCEDURE — 58100 BIOPSY OF UTERUS LINING: CPT | Performed by: OBSTETRICS & GYNECOLOGY

## 2024-05-14 PROCEDURE — 3077F SYST BP >= 140 MM HG: CPT | Performed by: OBSTETRICS & GYNECOLOGY

## 2024-05-24 ENCOUNTER — TELEPHONE (OUTPATIENT)
Dept: OBGYN CLINIC | Facility: CLINIC | Age: 59
End: 2024-05-24

## 2024-05-24 NOTE — TELEPHONE ENCOUNTER
----- Message from Hoang Dennison sent at 5/21/2024  9:19 PM CDT -----  Benign polyp on biopsy.  Need video visit to discuss results/management

## 2024-05-31 NOTE — TELEPHONE ENCOUNTER
Patient notified of results and recommendations. Offered next available appointment in June. Patient will be out end of June for trip to [place]. Added 7/8 for video visit. Patient verbalized understanding.

## 2024-06-18 RX ORDER — SERTRALINE HYDROCHLORIDE 25 MG/1
25 TABLET, FILM COATED ORAL DAILY
Qty: 90 TABLET | Refills: 1 | Status: SHIPPED | OUTPATIENT
Start: 2024-06-18

## 2024-06-18 NOTE — TELEPHONE ENCOUNTER
Refill passed per [facility] protocol. However please review drug-drug alert warning:  Drug-Drug: sertraline and MeloxicamToxic effects may be increased with concurrent administration of NSAIDs and Selective Serotonin Reuptake Inhibitors. The risk of upper gastrointestinal bleeding may be increased. Patients taking both drugs concurrently should be educated about the signs and symptoms of GI bleeding.    Requested Prescriptions   Pending Prescriptions Disp Refills    SERTRALINE 25 MG Oral Tab [Pharmacy Med Name: SERTRALINE 25MG TABLETS] 90 tablet 3     Sig: TAKE 1 TABLET(25 MG) BY MOUTH DAILY       Psychiatric Non-Scheduled (Anti-Anxiety) Passed - 6/15/2024  5:43 AM        Passed - In person appointment or virtual visit in the past 6 mos or appointment in next 3 mos     Recent Outpatient Visits              1 month ago Thickened endometrium    [facility] - OB/GYN Hoang Dennison DO    Office Visit    1 month ago Endometrial thickening on ultrasound    [facility] [place] Amber Wood APRN    Office Visit    4 months ago Seborrheic keratoses    [facility], Louis Gallegos MD    Office Visit    5 months ago [facility] [place]    [facility] Visit    6 months ago Preoperative examination, unspecified    [facility] [place]    Office Visit          Future Appointments         Provider Department Appt Notes    In 3 weeks Hoang Dennison DO [facility] [place] - OB/GYN results video visit.                    Passed - Depression Screening completed within the past 12 months

## 2024-06-27 ENCOUNTER — HOSPITAL ENCOUNTER (OUTPATIENT)
Age: 59
Discharge: HOME | End: 2024-06-27

## 2024-06-27 ENCOUNTER — APPOINTMENT (OUTPATIENT)
Dept: GENERAL RADIOLOGY | Age: 59
End: 2024-06-27
Attending: PHYSICIAN ASSISTANT

## 2024-06-27 VITALS
DIASTOLIC BLOOD PRESSURE: 80 MMHG | SYSTOLIC BLOOD PRESSURE: 154 MMHG | OXYGEN SATURATION: 99 % | TEMPERATURE: 99 F | RESPIRATION RATE: 17 BRPM | HEART RATE: 79 BPM

## 2024-06-27 DIAGNOSIS — R07.81 PLEURITIC CHEST PAIN: Primary | ICD-10-CM

## 2024-06-27 LAB
#MXD IC: 0.8 X10ˆ3/UL (ref 0.1–1)
ATRIAL RATE: 77 BPM
BUN BLD-MCNC: 14 MG/DL (ref 7–18)
CHLORIDE BLD-SCNC: 105 MMOL/L (ref 98–112)
CO2 BLD-SCNC: 26 MMOL/L (ref 21–32)
CREAT BLD-MCNC: 0.9 MG/DL
DDIMER WHOLE BLOOD: 223 NG/ML DDU (ref ?–400)
EGFRCR SERPLBLD CKD-EPI 2021: 75 ML/MIN/1.73M2 (ref 60–?)
GLUCOSE BLD-MCNC: 106 MG/DL (ref 70–99)
HCT VFR BLD AUTO: 38.5 %
HCT VFR BLD CALC: 39 %
HGB BLD-MCNC: 13.3 G/DL
ISTAT IONIZED CALCIUM FOR CHEM 8: 1.18 MMOL/L (ref 1.12–1.32)
LYMPHOCYTES # BLD AUTO: 1.7 X10ˆ3/UL (ref 1–4)
LYMPHOCYTES NFR BLD AUTO: 22.5 %
MCH RBC QN AUTO: 31.4 PG (ref 26–34)
MCHC RBC AUTO-ENTMCNC: 34.5 G/DL (ref 31–37)
MCV RBC AUTO: 90.8 FL (ref 80–100)
MIXED CELL %: 10.7 %
NEUTROPHILS # BLD AUTO: 4.9 X10ˆ3/UL (ref 1.5–7.7)
NEUTROPHILS NFR BLD AUTO: 66.8 %
P AXIS: 61 DEGREES
P-R INTERVAL: 146 MS
PLATELET # BLD AUTO: 211 X10ˆ3/UL (ref 150–450)
POTASSIUM BLD-SCNC: 4.1 MMOL/L (ref 3.6–5.1)
Q-T INTERVAL: 388 MS
QRS DURATION: 80 MS
QTC CALCULATION (BEZET): 439 MS
R AXIS: 27 DEGREES
RBC # BLD AUTO: 4.24 X10ˆ6/UL
SODIUM BLD-SCNC: 141 MMOL/L (ref 136–145)
T AXIS: 30 DEGREES
TROPONIN I BLD-MCNC: <0.02 NG/ML
VENTRICULAR RATE: 77 BPM
WBC # BLD AUTO: 7.4 X10ˆ3/UL (ref 4–11)

## 2024-06-27 PROCEDURE — 71046 X-RAY EXAM CHEST 2 VIEWS: CPT | Performed by: PHYSICIAN ASSISTANT

## 2024-06-27 PROCEDURE — 85378 FIBRIN DEGRADE SEMIQUANT: CPT | Performed by: PHYSICIAN ASSISTANT

## 2024-06-27 PROCEDURE — 93010 ELECTROCARDIOGRAM REPORT: CPT

## 2024-06-27 PROCEDURE — 85025 COMPLETE CBC W/AUTO DIFF WBC: CPT | Performed by: PHYSICIAN ASSISTANT

## 2024-06-27 PROCEDURE — 84484 ASSAY OF TROPONIN QUANT: CPT

## 2024-06-27 PROCEDURE — 80047 BASIC METABLC PNL IONIZED CA: CPT

## 2024-06-27 PROCEDURE — 99214 OFFICE O/P EST MOD 30 MIN: CPT

## 2024-06-27 PROCEDURE — 99215 OFFICE O/P EST HI 40 MIN: CPT

## 2024-06-27 PROCEDURE — 36415 COLL VENOUS BLD VENIPUNCTURE: CPT

## 2024-06-27 PROCEDURE — 93005 ELECTROCARDIOGRAM TRACING: CPT

## 2024-06-27 NOTE — ED INITIAL ASSESSMENT (HPI)
Pt here with reports of testing positive for covid on Tuesday night, symptoms since Sunday of cough and sore throat. Pt states has a pain with cough to right side mid back and under right breast. Denies any fevers or sob.

## 2024-06-27 NOTE — ED PROVIDER NOTES
Patient Seen in: [facility] [place]      History     Chief Complaint   Patient presents with    Cough/URI    Covid     Stated Complaint: cough, tight chest/pain, sore throat    Subjective:   HPI    58-year-old female presenting to the immediate care for evaluation of cough, chest and back discomfort.  Patient reports URI symptoms for the last 6 days.  She did test positive for COVID 5 days ago.  For the last 1 to 2 days she has been experiencing pain under the right side rib cage and right back.  This pain primarily occurs with coughing.  She has had some fevers.  She denies shortness of breath, wheezing.  Patient returned from [place] a few days ago.    Objective:   Past Medical History:    Anemia    recently dx-taking FeSO4    Back problem    Divorce    Finalized     DM2 (diabetes mellitus, type 2) (HCC)    controlled with diet    Endometriosis    Laparoscopy-diagnostic    Esophageal reflux    History of blood transfusion    No reaction    History of pregnancy    EAB    Hx of motion sickness    Lipid screening    per NG    Migraines    Visual impairment    wears glasses              Past Surgical History:   Procedure Laterality Date    Biopsy of uterus lining  2011    neg embx    Colonoscopy N/A 1/10/2018    Procedure: COLONOSCOPY;  Surgeon: Humble Hodges MD;  Location: [facility] ENDO    Colonoscopy N/A 2023    Procedure: COLONOSCOPY;  Surgeon: Humble Hodges MD;  Location: [facility] MAIN OR    Laparoscopy,diagnostic      Diagnostic for endometriosis                Social History     Socioeconomic History    Marital status:    Tobacco Use    Smoking status: Former     Current packs/day: 0.00     Average packs/day: 1 pack/day for 5.0 years (5.0 ttl pk-yrs)     Types: Cigarettes     Start date:      Quit date:      Years since quittin.5     Passive exposure: Past    Smokeless tobacco: Never   Vaping Use    Vaping status: Never Used   Substance and Sexual Activity     Alcohol use: Yes     Alcohol/week: 1.0 standard drink of alcohol     Types: 1 Glasses of wine per week     Comment: 3 glasses per month     Drug use: No    Sexual activity: Yes     Partners: Male     Birth control/protection: Condom   Other Topics Concern    Caffeine Concern No    History of tanning Yes    Breast feeding No    Reaction to local anesthetic No    Pt has a pacemaker No    Pt has a defibrillator No     Social Determinants of Health     Financial Resource Strain: Low Risk  (12/21/2023)    Financial Resource Strain     Difficulty of Paying Living Expenses: Not very hard     Med Affordability: No   Food Insecurity: No Food Insecurity (12/18/2023)    Food Insecurity     Food Insecurity: Never true   Transportation Needs: No Transportation Needs (12/21/2023)    Transportation Needs     Lack of Transportation: No   Housing Stability: Low Risk  (12/18/2023)    Housing Stability     Housing Instability: No              Review of Systems    Positive for stated Chief Complaint: Cough/URI and Covid    Other systems are as noted in HPI.  Constitutional and vital signs reviewed.      All other systems reviewed and negative except as noted above.    Physical Exam     ED Triage Vitals [06/27/24 0952]   /80   Pulse 79   Resp 17   Temp 99.1 °F (37.3 °C)   Temp src Temporal   SpO2 99 %   O2 Device None (Room air)       Current Vitals:   Vital Signs  BP: 154/80  Pulse: 79  Resp: 17  Temp: 99.1 °F (37.3 °C)  Temp src: Temporal    Oxygen Therapy  SpO2: 99 %  O2 Device: None (Room air)            Physical Exam  Vitals and nursing note reviewed.   Constitutional:       General: She is not in acute distress.  HENT:      Head: Normocephalic and atraumatic.      Right Ear: External ear normal.      Left Ear: External ear normal.      Nose: Nose normal.      Mouth/Throat:      Mouth: Mucous membranes are moist.   Eyes:      Extraocular Movements: Extraocular movements intact.      Pupils: Pupils are equal, round, and  reactive to light.   Cardiovascular:      Rate and Rhythm: Normal rate.   Pulmonary:      Effort: Pulmonary effort is normal.      Breath sounds: No wheezing or rhonchi.   Abdominal:      General: Abdomen is flat.   Musculoskeletal:         General: Normal range of motion.      Cervical back: Normal range of motion.   Skin:     General: Skin is warm.   Neurological:      General: No focal deficit present.      Mental Status: She is alert and oriented to person, place, and time.   Psychiatric:         Mood and Affect: Mood normal.         Behavior: Behavior normal.               ED Course     Labs Reviewed   POCT ISTAT CHEM8 CARTRIDGE - Abnormal; Notable for the following components:       Result Value    ISTAT Glucose 106 (*)     All other components within normal limits   D-DIMER (POC) - Normal   ISTAT TROPONIN - Normal   POCT CBC         EKG    Rate, intervals and axes as noted on EKG Report.  Rate: 77  Rhythm: Sinus Rhythm  Reading: Normal sinus rhythm, heart rate 77.  No arrhythmia, no ectopy.  No STEMI           58-year-old female presenting with complaint of right-sided rib pain, back pain.  This is primarily associated with cough.  Patient is COVID-positive.  Ddx-COVID, pneumonia, PE, ACS, costochondritis  EKG reassuring.  Troponin, D-dimer are negative.  CBC, BMP reassuring.  Chest x-ray negative for infiltrate or abnormality.  I discussed these reassuring EKG, chest x-ray and lab findings with the patient.      Suggested the pain may be related to inflammation associated with COVID and cough.  Recommend ibuprofen, Lidoderm and continued monitoring.  Supportive care, anticipatory guidance and return precautions reviewed                MDM                                         Medical Decision Making      Disposition and Plan     Clinical Impression:  1. Pleuritic chest pain         Disposition:  Discharge  6/27/2024 11:29 am    Follow-up:  Bertin Florez MD  [address] [place]  [address]  [phone]                Medications Prescribed:  Discharge Medication List as of 6/27/2024 11:33 AM

## 2024-07-08 ENCOUNTER — TELEMEDICINE (OUTPATIENT)
Dept: OBGYN CLINIC | Facility: CLINIC | Age: 59
End: 2024-07-08
Payer: COMMERCIAL

## 2024-07-08 DIAGNOSIS — N84.0 ENDOMETRIAL POLYP: Primary | ICD-10-CM

## 2024-07-08 NOTE — PROGRESS NOTES
Emmanuel Mendiola verbally consents to a telemedicine service with live, interactive video and audio on 7/8/2024.  Patient understands and accepts financial responsibility for any deductible, co-insurance and/or co-pays associated with this service.          HPI:  The patient is a 57 yo F here for video visit to discuss path report.  Benign endometrial polyp seen on EMB after US showed thickened EML.  Patient denies any postmenopausal bleeding, but wanted EMB due to thickened EML.      US PELVIS (TRANSABDOMINAL AND TRANSVAGINAL) (CPT=76856/52117)    Result Date: 4/23/2024  PROCEDURE: US PELVIS TRANSABDOMINAL AND TRANSVAGINAL (CPT=76856/73373)  COMPARISON: [facility] PELVIS W EV (CPT=76856/92997), 1/23/2023, 5:57 PM.  [facility] PELVIS W EV (CPT=76856/70903), 5/23/2022, 12:55 PM.  [facility], US PELVIS TRANSABDOMINAL AND TRANSVAGINAL (CPT=76856/41570), 12/29/2018, 2:06 PM.  INDICATIONS: Lesion of right ovary  TECHNIQUE: Pelvic ultrasound using transabdominal and transvaginal technique.  A transvaginal scan was performed for attempted better delineation of the ovaries and endometrium.  FINDINGS:   UTERUS:   The uterus measures 6.5 x 2.9 x 4.0 cm.  ENDOMETRIUM: The endometrium measures up to 0.6 cm and is mildly thickened.  The endometrium also appears heterogeneous with small cystic foci. MYOMETRIUM: There is a subcentimeter shadowing calcification along the posterior margin of the uterus which may be some endometrial or myometrial.  This is not significantly changed and potentially could reflect a small calcified fibroid.  OVARIES AND ADNEXA:   RIGHT:   3.3 x 2.5 x 2.3 cm.  There is an oval hypoechoic mass again seen in the right ovary with posterior acoustic shadowing also noted on previous examinations since May 2022. This mass measures 2.1 x 1.8 x 1.8 cm.  This previously measured 2.0 x 1.4 x 1.9 cm December 2018.  LEFT:   1.9 x  0.9 x 1.1 cm and unremarkable.  CUL-DE-SAC:   No free fluid or mass. OTHER: Negative.         CONCLUSION:  1. Mild endometrial thickening measuring 0.6 cm which is abnormal in a postmenopausal patient.  The differential in a postmenopausal patient includes endometrial carcinoma versus hyperplasia.  Advise GYN evaluation and further workup. 2. 2.1 cm oval hypoechoic lesion with posterior acoustic shadowing.  This is not significantly changed in size since 2018 and lack of significant interval change again favors a benign etiology including fibroma or fibrothecoma.  Continued sonographic imaging surveillance may be considered.   Dictated by (CST): Kamar Peralta MD on 4/23/2024 at 11:20 AM     Finalized by (CST): Kamar Peralta MD on 4/23/2024 at 11:30 AM           Results for orders placed or performed in visit on 05/14/24   Specimen to Pathology Tissue   Result Value Ref Range    Case Report       Surgical Pathology                                Case: [id number]                                  Authorizing Provider:  Hoang Dennison DO     Collected:           05/14/2024 01:31 PM          Ordering Location:     [facility]    Received:            05/14/2024 01:31 PM                                 [facility] - OB/GYN                                                            Pathologist:           Enma Horton MD                                                                           Specimen:    Endometrium, EMBX.A                                                                        Final Diagnosis:         Endometrium; biopsy:   Endometrial polyp.  No definite evidence of hyperplasia or carcinoma is identified.        Embedded Images      Clinical Information       R93.89 Thickened Endometrium.          Gross Description       The specimen is labeled \"[place], endometrium\" received in formalin. The specimen consists of one fragment of tan soft tissue and blood clots measuring in aggregate 2.2 x 1.7 x 0.4 cm.     Submitted entirely in cassette A1. (jq)      Enma Horton M.D./mtt        Interpretation Benign             Reviewed medical and surgical history below       OBSTETRICS HISTORY:  OB History    Para Term  AB Living   3 2 2 0 1 2   SAB IAB Ectopic Multiple Live Births   0 0 0 0 2       GYNE HISTORY:  History   Sexual Activity    Sexual activity: Yes    Partners: Male    Birth control/ protection: Condom            MEDICAL HISTORY:  Past Medical History:    Anemia    recently dx-taking FeSO4    Back problem    Divorce    Finalized     DM2 (diabetes mellitus, type 2) (HCC)    controlled with diet    Endometriosis    Laparoscopy-diagnostic    Esophageal reflux    History of blood transfusion    No reaction    History of pregnancy    EAB    Hx of motion sickness    Lipid screening    per NG    Migraines    Visual impairment    wears glasses       SURGICAL HISTORY:  Past Surgical History:   Procedure Laterality Date    Biopsy of uterus lining  2011    neg embx    Colonoscopy N/A 1/10/2018    Procedure: COLONOSCOPY;  Surgeon: Humble Hodges MD;  Location: [facility] ENDO    Colonoscopy N/A 2023    Procedure: COLONOSCOPY;  Surgeon: Humble Hodges MD;  Location: [facility] MAIN OR    Laparoscopy,diagnostic      Diagnostic for endometriosis       SOCIAL HISTORY:  Social History     Socioeconomic History    Marital status:      Spouse name: Not on file    Number of children: Not on file    Years of education: Not on file    Highest education level: Not on file   Occupational History    Not on file   Tobacco Use    Smoking status: Former     Current packs/day: 0.00     Average packs/day: 1 pack/day for 5.0 years (5.0 ttl pk-yrs)     Types: Cigarettes     Start  date:      Quit date:      Years since quittin.5     Passive exposure: Past    Smokeless tobacco: Never   Vaping Use    Vaping status: Never Used   Substance and Sexual Activity    Alcohol use: Yes     Alcohol/week: 1.0 standard drink of alcohol     Types: 1 Glasses of wine per week     Comment: 3 glasses per month     Drug use: No    Sexual activity: Yes     Partners: Male     Birth control/protection: Condom   Other Topics Concern     Service Not Asked    Blood Transfusions Not Asked    Caffeine Concern No    Occupational Exposure Not Asked    Hobby Hazards Not Asked    Sleep Concern Not Asked    Stress Concern Not Asked    Weight Concern Not Asked    Special Diet Not Asked    Back Care Not Asked    Exercise Not Asked    Bike Helmet Not Asked    Seat Belt Not Asked    Self-Exams Not Asked    Grew up on a farm Not Asked    History of tanning Yes    Outdoor occupation Not Asked    Breast feeding No    Reaction to local anesthetic No    Pt has a pacemaker No    Pt has a defibrillator No   Social History Narrative    Not on file     Social Determinants of Health     Financial Resource Strain: Low Risk  (2023)    Financial Resource Strain     Difficulty of Paying Living Expenses: Not very hard     Med Affordability: No   Food Insecurity: No Food Insecurity (2023)    Food Insecurity     Food Insecurity: Never true   Transportation Needs: No Transportation Needs (2023)    Transportation Needs     Lack of Transportation: No   Physical Activity: Not on file   Stress: Not on file   Social Connections: Not on file   Housing Stability: Low Risk  (2023)    Housing Stability     Housing Instability: No     Housing Instability Emergency: Not on file     Crib or Bassinette: Not on file       FAMILY HISTORY:  Family History   Problem Relation Age of Onset    Heart Attack Brother         MI-Cause of death    Colon Cancer Paternal Grandmother         Cause of death    Heart Disease Father          CAD    Diabetes Father         Diabetes/CRF/CAD cause of death    Other (Other) Father         CRF    Heart Disease Mother         CAD    Cancer Mother         Lung cancer and CAD cause of death    Heart Disease Brother 41        Premature CAD    Breast Cancer Maternal Aunt 82       MEDICATIONS:    Current Outpatient Medications:     sertraline 25 MG Oral Tab, TAKE 1 TABLET(25 MG) BY MOUTH DAILY, Disp: 90 tablet, Rfl: 1    Meloxicam 15 MG Oral Tab, Take 1 tablet (15 mg total) by mouth daily with food., Disp: , Rfl:     Miconazole Nitrate 200 MG Vaginal Suppos, , Disp: , Rfl:     tiZANidine 4 MG Oral Tab, TAKE 1 TABLET BY MOUTH EVERY 6 TO 8 HOURS AS NEEDED FOR POST OP MUSCLE SPASMS, Disp: , Rfl:     topiramate 25 MG Oral Tab, Take 1 tablet (25 mg total) by mouth daily., Disp: 90 tablet, Rfl: 3    metFORMIN 500 MG Oral Tab, Take 1 tablet (500 mg total) by mouth 3 (three) times daily before meals., Disp: 270 tablet, Rfl: 3    sennosides-docusate (SENOKOT S) 8.6-50 MG Oral Tab, Take 1 tablet by mouth in the morning and 1 tablet before bedtime., Disp: , Rfl:     cyclobenzaprine 10 MG Oral Tab, Take 1 tablet (10 mg total) by mouth nightly as needed for Muscle spasms., Disp: 30 tablet, Rfl: 0    ALPRAZolam 0.25 MG Oral Tab, Take 1 tablet (0.25 mg total) by mouth daily as needed for Anxiety., Disp: 15 tablet, Rfl: 0    Estrogens, Conjugated (PREMARIN) 0.625 MG/GM Vaginal Cream, Apply twice weekly to vagina for vaginal burning, Disp: 42.5 g, Rfl: 0    Blood Glucose Monitoring Suppl (ONETOUCH ULTRA MINI) w/Device Does not apply Kit, Check blood sugars two times daily, Disp: 1 kit, Rfl: 0    ALLERGIES:    Allergies   Allergen Reactions    Sulfa Antibiotics HIVES    Aminoglycosides UNKNOWN     Possibly hives    Compazine [Prochlorperazine] OTHER (SEE COMMENTS)     Convulsions    Morphine OTHER (SEE COMMENTS)     convulsions         Review of Systems:  Constitutional:  Denies fevers and chills   Cardiovascular:  denies  chest pain or palpitations  Respiratory:  denies shortness of breath  Gastrointestinal:  denies heartburn, abdominal pain, diarrhea or constipation  Genitourinary:  denies dysuria, incontinence, abnormal vaginal discharge, vaginal itching  Musculoskeletal:  denies back pain.  Skin/Breast:  Denies any breast pain, lumps, or discharge.   Neurological:  denies headaches, extremity weakness  Psychiatric: denies depression or anxiety.      There were no vitals filed for this visit.    PHYSICAL EXAM:   Constitutional: well developed, well nourished  Head/Face: normocephalic  Psychiatric: Appropriate mood and affect    Assessment/Plan:    Diagnoses and all orders for this visit:    Endometrial polyp        Patient with asymptomatic polyp noted on EMB for thickened EML from US  Discussed etiology, symptoms and risks including evolution into hyperplasia and endometrial CA  Discussed dilation and curettage/polypectomy vs surveillance  R/B reviewed  Patient declines dilation and curettage and wants surveillance given no postmenopausal bleeding  and benign biopsy  Encouraged to call with any signs of postmenopausal bleeding  All questions answered

## 2024-11-11 ENCOUNTER — NURSE TRIAGE (OUTPATIENT)
Dept: INTERNAL MEDICINE CLINIC | Facility: CLINIC | Age: 59
End: 2024-11-11

## 2024-11-11 ENCOUNTER — LAB ENCOUNTER (OUTPATIENT)
Dept: LAB | Age: 59
End: 2024-11-11
Attending: INTERNAL MEDICINE
Payer: COMMERCIAL

## 2024-11-11 ENCOUNTER — OFFICE VISIT (OUTPATIENT)
Dept: INTERNAL MEDICINE CLINIC | Facility: CLINIC | Age: 59
End: 2024-11-11

## 2024-11-11 VITALS
BODY MASS INDEX: 27.11 KG/M2 | HEIGHT: 63 IN | WEIGHT: 153 LBS | TEMPERATURE: 99 F | OXYGEN SATURATION: 97 % | HEART RATE: 85 BPM | DIASTOLIC BLOOD PRESSURE: 80 MMHG | SYSTOLIC BLOOD PRESSURE: 149 MMHG

## 2024-11-11 DIAGNOSIS — R73.01 IMPAIRED FASTING BLOOD SUGAR: Primary | ICD-10-CM

## 2024-11-11 DIAGNOSIS — R53.1 GENERALIZED WEAKNESS: ICD-10-CM

## 2024-11-11 DIAGNOSIS — R73.01 IMPAIRED FASTING BLOOD SUGAR: ICD-10-CM

## 2024-11-11 LAB
ALBUMIN SERPL-MCNC: 4.2 G/DL (ref 3.2–4.8)
ALBUMIN/GLOB SERPL: 1.7 {RATIO} (ref 1–2)
ALP LIVER SERPL-CCNC: 98 U/L
ALT SERPL-CCNC: 92 U/L
ANION GAP SERPL CALC-SCNC: 6 MMOL/L (ref 0–18)
APPEARANCE: CLEAR
AST SERPL-CCNC: 62 U/L (ref ?–34)
BASOPHILS # BLD AUTO: 0.04 X10(3) UL (ref 0–0.2)
BASOPHILS NFR BLD AUTO: 1 %
BILIRUB SERPL-MCNC: 0.4 MG/DL (ref 0.3–1.2)
BILIRUBIN: NEGATIVE
BUN BLD-MCNC: 16 MG/DL (ref 9–23)
BUN/CREAT SERPL: 14.7 (ref 10–20)
CALCIUM BLD-MCNC: 9.3 MG/DL (ref 8.7–10.4)
CHLORIDE SERPL-SCNC: 112 MMOL/L (ref 98–112)
CHOLEST SERPL-MCNC: 125 MG/DL (ref ?–200)
CO2 SERPL-SCNC: 26 MMOL/L (ref 21–32)
CREAT BLD-MCNC: 1.09 MG/DL
DEPRECATED RDW RBC AUTO: 41 FL (ref 35.1–46.3)
EGFRCR SERPLBLD CKD-EPI 2021: 59 ML/MIN/1.73M2 (ref 60–?)
EOSINOPHIL # BLD AUTO: 0.08 X10(3) UL (ref 0–0.7)
EOSINOPHIL NFR BLD AUTO: 2 %
ERYTHROCYTE [DISTWIDTH] IN BLOOD BY AUTOMATED COUNT: 12.4 % (ref 11–15)
EST. AVERAGE GLUCOSE BLD GHB EST-MCNC: 128 MG/DL (ref 68–126)
FASTING PATIENT LIPID ANSWER: YES
FASTING STATUS PATIENT QL REPORTED: YES
GLOBULIN PLAS-MCNC: 2.5 G/DL (ref 2–3.5)
GLUCOSE (URINE DIPSTICK): NEGATIVE MG/DL
GLUCOSE BLD-MCNC: 114 MG/DL (ref 70–99)
GLUCOSE BLOOD: 186
HBA1C MFR BLD: 6.1 % (ref ?–5.7)
HCT VFR BLD AUTO: 38.7 %
HDLC SERPL-MCNC: 47 MG/DL (ref 40–59)
HGB BLD-MCNC: 13.1 G/DL
IMM GRANULOCYTES # BLD AUTO: 0.01 X10(3) UL (ref 0–1)
IMM GRANULOCYTES NFR BLD: 0.3 %
KETONES (URINE DIPSTICK): NEGATIVE MG/DL
LDLC SERPL CALC-MCNC: 64 MG/DL (ref ?–100)
LEUKOCYTES: NEGATIVE
LYMPHOCYTES # BLD AUTO: 0.58 X10(3) UL (ref 1–4)
LYMPHOCYTES NFR BLD AUTO: 14.6 %
MCH RBC QN AUTO: 30.9 PG (ref 26–34)
MCHC RBC AUTO-ENTMCNC: 33.9 G/DL (ref 31–37)
MCV RBC AUTO: 91.3 FL
MONOCYTES # BLD AUTO: 0.38 X10(3) UL (ref 0.1–1)
MONOCYTES NFR BLD AUTO: 9.5 %
MULTISTIX LOT#: NORMAL NUMERIC
NEUTROPHILS # BLD AUTO: 2.89 X10 (3) UL (ref 1.5–7.7)
NEUTROPHILS # BLD AUTO: 2.89 X10(3) UL (ref 1.5–7.7)
NEUTROPHILS NFR BLD AUTO: 72.6 %
NITRITE, URINE: NEGATIVE
NONHDLC SERPL-MCNC: 78 MG/DL (ref ?–130)
OCCULT BLOOD: NEGATIVE
OSMOLALITY SERPL CALC.SUM OF ELEC: 300 MOSM/KG (ref 275–295)
PH, URINE: 7 (ref 4.5–8)
PLATELET # BLD AUTO: 170 10(3)UL (ref 150–450)
POTASSIUM SERPL-SCNC: 4.2 MMOL/L (ref 3.5–5.1)
PROT SERPL-MCNC: 6.7 G/DL (ref 5.7–8.2)
PROTEIN (URINE DIPSTICK): NEGATIVE MG/DL
RBC # BLD AUTO: 4.24 X10(6)UL
SODIUM SERPL-SCNC: 144 MMOL/L (ref 136–145)
SPECIFIC GRAVITY: 1.02 (ref 1–1.03)
TEST STRIP LOT #: NORMAL NUMERIC
TRIGL SERPL-MCNC: 68 MG/DL (ref 30–149)
TSI SER-ACNC: 0.99 UIU/ML (ref 0.55–4.78)
URINE-COLOR: YELLOW
UROBILINOGEN,SEMI-QN: 0.2 MG/DL (ref 0–1.9)
VLDLC SERPL CALC-MCNC: 10 MG/DL (ref 0–30)
WBC # BLD AUTO: 4 X10(3) UL (ref 4–11)

## 2024-11-11 PROCEDURE — 3077F SYST BP >= 140 MM HG: CPT | Performed by: INTERNAL MEDICINE

## 2024-11-11 PROCEDURE — 83036 HEMOGLOBIN GLYCOSYLATED A1C: CPT

## 2024-11-11 PROCEDURE — 3079F DIAST BP 80-89 MM HG: CPT | Performed by: INTERNAL MEDICINE

## 2024-11-11 PROCEDURE — 36415 COLL VENOUS BLD VENIPUNCTURE: CPT

## 2024-11-11 PROCEDURE — 3008F BODY MASS INDEX DOCD: CPT | Performed by: INTERNAL MEDICINE

## 2024-11-11 PROCEDURE — 80053 COMPREHEN METABOLIC PANEL: CPT

## 2024-11-11 PROCEDURE — 85025 COMPLETE CBC W/AUTO DIFF WBC: CPT

## 2024-11-11 PROCEDURE — 82947 ASSAY GLUCOSE BLOOD QUANT: CPT | Performed by: INTERNAL MEDICINE

## 2024-11-11 PROCEDURE — 84443 ASSAY THYROID STIM HORMONE: CPT

## 2024-11-11 PROCEDURE — 81003 URINALYSIS AUTO W/O SCOPE: CPT | Performed by: INTERNAL MEDICINE

## 2024-11-11 PROCEDURE — 80061 LIPID PANEL: CPT

## 2024-11-11 PROCEDURE — 99213 OFFICE O/P EST LOW 20 MIN: CPT | Performed by: INTERNAL MEDICINE

## 2024-11-11 NOTE — TELEPHONE ENCOUNTER
Action Requested: Summary for Provider     []  Critical Lab, Recommendations Needed  [] Need Additional Advice  []   FYI    []   Need Orders  [] Need Medications Sent to Pharmacy  [x]  Other     SUMMARY:   Verified name and .    Patient states that fasting blood sugars have been higher- this morning her reading was 142.    She states that she has felt \"horrible\" with symptoms of hand and feet pain, dry mouth, and dark urine.    Appointment scheduled for today:  Future Appointments   Date Time Provider Department Center   2024 10:20 AM Julisa Stahl MD [id number]  [place]       Reason for call: Acute  Onset: Data Unavailable    Reason for Disposition   Patient wants to be seen    Protocols used: Diabetes - High Blood Sugar-A-OH

## 2024-11-11 NOTE — PROGRESS NOTES
Emmanuel Mendiola is a 59 year old female.  Chief Complaint   Patient presents with    Headache     HPI:    Patient presented today with c/o over all feeling unwell. She states that she was diagnosed with diabetes previously which was thought to be secondary to her weight. She saw a weight loss physician and was able to bring her weight down and improve her A1c without any medications.    Feels tired over all. Urine was dark this morning. Home blood glucose was high. In office blood glucose is 186 fasting. No abdominal pain, nausea or vomiting.       Current Outpatient Medications   Medication Sig Dispense Refill    metFORMIN 500 MG Oral Tab Take 1 tablet (500 mg total) by mouth 2 (two) times daily with meals. 180 tablet 3    sertraline 25 MG Oral Tab TAKE 1 TABLET(25 MG) BY MOUTH DAILY 90 tablet 1    Meloxicam 15 MG Oral Tab Take 1 tablet (15 mg total) by mouth daily with food.      Miconazole Nitrate 200 MG Vaginal Suppos       tiZANidine 4 MG Oral Tab TAKE 1 TABLET BY MOUTH EVERY 6 TO 8 HOURS AS NEEDED FOR POST OP MUSCLE SPASMS      topiramate 25 MG Oral Tab Take 1 tablet (25 mg total) by mouth daily. 90 tablet 3    sennosides-docusate (SENOKOT S) 8.6-50 MG Oral Tab Take 1 tablet by mouth in the morning and 1 tablet before bedtime.      cyclobenzaprine 10 MG Oral Tab Take 1 tablet (10 mg total) by mouth nightly as needed for Muscle spasms. 30 tablet 0    ALPRAZolam 0.25 MG Oral Tab Take 1 tablet (0.25 mg total) by mouth daily as needed for Anxiety. 15 tablet 0    Estrogens, Conjugated (PREMARIN) 0.625 MG/GM Vaginal Cream Apply twice weekly to vagina for vaginal burning 42.5 g 0    Blood Glucose Monitoring Suppl (ONETOUCH ULTRA MINI) w/Device Does not apply Kit Check blood sugars two times daily 1 kit 0      Past Medical History:    Anemia    recently dx-taking FeSO4    Back problem    Divorce    Finalized 2010    DM2 (diabetes mellitus, type 2) (HCC)    controlled with diet    Endometriosis    Laparoscopy-diagnostic     Esophageal reflux    History of blood transfusion    No reaction    History of pregnancy    EAB    Hx of motion sickness    Lipid screening    per NG    Migraines    Visual impairment    wears glasses      Past Surgical History:   Procedure Laterality Date    Biopsy of uterus lining  2011    neg embx    Colonoscopy N/A 1/10/2018    Procedure: COLONOSCOPY;  Surgeon: Humble Hodges MD;  [facility]    Colonoscopy N/A 2023    Procedure: COLONOSCOPY;  Surgeon: Humble Hodges MD;  Location: [facility] MAIN OR    Laparoscopy,diagnostic      Diagnostic for endometriosis      Social History:  Social History     Socioeconomic History    Marital status:    Tobacco Use    Smoking status: Former     Current packs/day: 0.00     Average packs/day: 1 pack/day for 5.0 years (5.0 ttl pk-yrs)     Types: Cigarettes     Start date:      Quit date:      Years since quittin.8     Passive exposure: Past    Smokeless tobacco: Never   Vaping Use    Vaping status: Never Used   Substance and Sexual Activity    Alcohol use: Yes     Alcohol/week: 1.0 standard drink of alcohol     Types: 1 Glasses of wine per week     Comment: 3 glasses per month     Drug use: No    Sexual activity: Yes     Partners: Male     Birth control/protection: Condom   Other Topics Concern    Caffeine Concern No    History of tanning Yes    Breast feeding No    Reaction to local anesthetic No    Pt has a pacemaker No    Pt has a defibrillator No     Social Drivers of Health     Financial Resource Strain: Low Risk  (2023)    Financial Resource Strain     Difficulty of Paying Living Expenses: Not very hard     Med Affordability: No   Food Insecurity: No Food Insecurity (2023)    Food Insecurity     Food Insecurity: Never true   Transportation Needs: No Transportation Needs (2023)    Transportation Needs     Lack of Transportation: No    Received from [facility]  Stability      Family History   Problem Relation Age of Onset    Heart Attack Brother         MI-Cause of death    Colon Cancer Paternal Grandmother         Cause of death    Heart Disease Father         CAD    Diabetes Father         Diabetes/CRF/CAD cause of death    Other (Other) Father         CRF    Heart Disease Mother         CAD    Cancer Mother         Lung cancer and CAD cause of death    Heart Disease Brother 41        Premature CAD    Breast Cancer Maternal Aunt 82      Allergies[1]     REVIEW OF SYSTEMS:   Review of Systems   Review of Systems   Constitutional: Negative for activity change, appetite change and fever.   HENT: Negative for congestion and voice change.    Respiratory: Negative for cough and shortness of breath.    Cardiovascular: Negative for chest pain.   Gastrointestinal: Negative for abdominal distention, abdominal pain and vomiting.   Genitourinary: Negative for hematuria.   Skin: Negative for wound.   Psychiatric/Behavioral: Negative for behavioral problems.   Wt Readings from Last 5 Encounters:   11/11/24 153 lb (69.4 kg)   05/14/24 149 lb 12.8 oz (67.9 kg)   04/23/24 148 lb (67.1 kg)   01/15/24 151 lb (68.5 kg)   12/26/23 150 lb (68 kg)     Body mass index is 27.1 kg/m².      EXAM:   /80   Pulse 85   Temp 98.5 °F (36.9 °C) (Temporal)   Ht 5' 3\" (1.6 m)   Wt 153 lb (69.4 kg)   LMP 12/26/2017   SpO2 97%   BMI 27.10 kg/m²   Physical Exam   Constitutional:       Appearance: Normal appearance.   HENT:      Head: Normocephalic.   Eyes:      Conjunctiva/sclera: Conjunctivae normal.   Cardiovascular:      Rate and Rhythm: Normal rate and regular rhythm.      Heart sounds: Normal heart sounds. No murmur heard.  Pulmonary:      Effort: Pulmonary effort is normal.      Breath sounds: Normal breath sounds. No rhonchi or rales.   Abdominal:      General: Bowel sounds are normal.      Palpations: Abdomen is soft.      Tenderness: There is no abdominal tenderness.   Musculoskeletal:       Cervical back: Neck supple.      Right lower leg: No edema.      Left lower leg: No edema.   Skin:     General: Skin is warm and dry.   Neurological:      General: No focal deficit present.      Mental Status: He is alert and oriented to person, place, and time. Mental status is at baseline.   Psychiatric:         Mood and Affect: Mood normal.         Behavior: Behavior normal.       ASSESSMENT AND PLAN:   1. Impaired fasting blood sugar  - check blood work  - start metformin 500 bid with meals  - patient requesting endocrine eval. Therefore referral provided  - avoid high carb food  - increase hydration   - Hemoglobin A1C [E]; Future  - Comp Metabolic Panel (14); Future  - Lipid Panel [E]; Future  - POC HemoCue Glucose 201 (Finger stick glucose)    2. Generalized weakness  - check labs  - CBC W Differential W Platelet [E]; Future  - TSH W Reflex To Free T4 [E]; Future  - URINALYSIS, AUTO, W/O SCOPE        The patient indicates understanding of these issues and agrees to the plan.      Julisa Stahl MD        [1]   Allergies  Allergen Reactions    Sulfa Antibiotics HIVES    Aminoglycosides UNKNOWN     Possibly hives    Compazine [Prochlorperazine] OTHER (SEE COMMENTS)     Convulsions    Morphine OTHER (SEE COMMENTS)     convulsions

## 2024-11-13 ENCOUNTER — TELEPHONE (OUTPATIENT)
Dept: INTERNAL MEDICINE CLINIC | Facility: CLINIC | Age: 59
End: 2024-11-13

## 2024-11-13 DIAGNOSIS — R74.8 ELEVATED LIVER ENZYMES: Primary | ICD-10-CM

## 2024-11-14 NOTE — TELEPHONE ENCOUNTER
Unable to reach patient to discuss blood work.   -[id number] in pre diabetes range although better than before. Focus on low carb diet and physical activity. Continue metformin.   - kidney test or creatinine slightly high. Could be because of dehydration. Increase hydration and we should repeat this in a month.   - liver enzymes are also mildly high. Increase hydration and repeat in 1 month  - cholesterol and thyroid is normal  Please feel free to reach me for any questions or concerns.   Thank you   Julisa Stahl M.D.

## 2024-11-18 ENCOUNTER — HOSPITAL ENCOUNTER (INPATIENT)
Facility: HOSPITAL | Age: 59
LOS: 8 days | Discharge: HOME | End: 2024-11-27
Attending: EMERGENCY MEDICINE
Payer: COMMERCIAL

## 2024-11-18 ENCOUNTER — HOSPITAL ENCOUNTER (INPATIENT)
Facility: HOSPITAL | Age: 59
LOS: 8 days | Discharge: HOME | End: 2024-11-27
Attending: EMERGENCY MEDICINE | Admitting: INTERNAL MEDICINE
Payer: COMMERCIAL

## 2024-11-18 DIAGNOSIS — R74.01 TRANSAMINITIS: Primary | ICD-10-CM

## 2024-11-18 DIAGNOSIS — R11.2 NAUSEA AND VOMITING IN ADULT: ICD-10-CM

## 2024-11-19 ENCOUNTER — APPOINTMENT (OUTPATIENT)
Dept: GENERAL RADIOLOGY | Facility: HOSPITAL | Age: 59
End: 2024-11-19
Attending: EMERGENCY MEDICINE
Payer: COMMERCIAL

## 2024-11-19 ENCOUNTER — APPOINTMENT (OUTPATIENT)
Dept: CT IMAGING | Facility: HOSPITAL | Age: 59
End: 2024-11-19
Attending: EMERGENCY MEDICINE
Payer: COMMERCIAL

## 2024-11-19 ENCOUNTER — APPOINTMENT (OUTPATIENT)
Dept: ULTRASOUND IMAGING | Facility: HOSPITAL | Age: 59
End: 2024-11-19
Attending: EMERGENCY MEDICINE
Payer: COMMERCIAL

## 2024-11-19 ENCOUNTER — APPOINTMENT (OUTPATIENT)
Dept: ULTRASOUND IMAGING | Facility: HOSPITAL | Age: 59
End: 2024-11-19
Attending: INTERNAL MEDICINE
Payer: COMMERCIAL

## 2024-11-19 PROBLEM — B15.9 ACUTE HEPATITIS A VIRUS INFECTION: Status: ACTIVE | Noted: 2024-11-19

## 2024-11-19 PROBLEM — B17.9 ACUTE HEPATITIS: Status: ACTIVE | Noted: 2024-11-19

## 2024-11-19 PROBLEM — R74.01 TRANSAMINITIS: Status: ACTIVE | Noted: 2024-11-19

## 2024-11-19 PROBLEM — R11.2 NAUSEA AND VOMITING IN ADULT: Status: ACTIVE | Noted: 2024-11-19

## 2024-11-19 LAB
ADENOVIRUS F 40/41 PCR: NEGATIVE
ALBUMIN SERPL-MCNC: 3.4 G/DL (ref 3.2–4.8)
ALBUMIN SERPL-MCNC: 3.4 G/DL (ref 3.2–4.8)
ALBUMIN SERPL-MCNC: 3.6 G/DL (ref 3.2–4.8)
ALBUMIN/GLOB SERPL: 1.1 {RATIO} (ref 1–2)
ALBUMIN/GLOB SERPL: 1.3 {RATIO} (ref 1–2)
ALBUMIN/GLOB SERPL: 1.3 {RATIO} (ref 1–2)
ALP LIVER SERPL-CCNC: 146 U/L
ALP LIVER SERPL-CCNC: 148 U/L
ALP LIVER SERPL-CCNC: 167 U/L
ALT SERPL-CCNC: 3255 U/L
ALT SERPL-CCNC: >3300 U/L
ALT SERPL-CCNC: >3300 U/L
ANION GAP SERPL CALC-SCNC: 10 MMOL/L (ref 0–18)
ANION GAP SERPL CALC-SCNC: 8 MMOL/L (ref 0–18)
ANION GAP SERPL CALC-SCNC: 9 MMOL/L (ref 0–18)
APAP SERPL-MCNC: <2 UG/ML (ref 10–20)
AST SERPL-CCNC: 1714 U/L (ref ?–34)
AST SERPL-CCNC: 1991 U/L (ref ?–34)
AST SERPL-CCNC: 2401 U/L (ref ?–34)
ASTROVIRUS PCR: NEGATIVE
BASOPHILS # BLD AUTO: 0.03 X10(3) UL (ref 0–0.2)
BASOPHILS NFR BLD AUTO: 0.7 %
BILIRUB SERPL-MCNC: 2.8 MG/DL (ref 0.3–1.2)
BILIRUB SERPL-MCNC: 3.1 MG/DL (ref 0.3–1.2)
BILIRUB SERPL-MCNC: 3.1 MG/DL (ref 0.3–1.2)
BILIRUB UR QL CFM: POSITIVE
BUN BLD-MCNC: 13 MG/DL (ref 9–23)
BUN BLD-MCNC: 14 MG/DL (ref 9–23)
BUN BLD-MCNC: 15 MG/DL (ref 9–23)
BUN/CREAT SERPL: 15.7 (ref 10–20)
BUN/CREAT SERPL: 16.3 (ref 10–20)
BUN/CREAT SERPL: 16.3 (ref 10–20)
C CAYETANENSIS DNA SPEC QL NAA+PROBE: NEGATIVE
C DIFF TOX B STL QL: NEGATIVE
CALCIUM BLD-MCNC: 8.3 MG/DL (ref 8.7–10.4)
CALCIUM BLD-MCNC: 8.4 MG/DL (ref 8.7–10.4)
CALCIUM BLD-MCNC: 8.9 MG/DL (ref 8.7–10.4)
CAMPY SP DNA.DIARRHEA STL QL NAA+PROBE: NEGATIVE
CERULOPLASMIN SERPL-MCNC: 27 MG/DL (ref 20–60)
CHLORIDE SERPL-SCNC: 106 MMOL/L (ref 98–112)
CHLORIDE SERPL-SCNC: 109 MMOL/L (ref 98–112)
CHLORIDE SERPL-SCNC: 110 MMOL/L (ref 98–112)
CK SERPL-CCNC: 52 U/L
CLARITY UR: CLEAR
CO2 SERPL-SCNC: 21 MMOL/L (ref 21–32)
CREAT BLD-MCNC: 0.83 MG/DL
CREAT BLD-MCNC: 0.86 MG/DL
CREAT BLD-MCNC: 0.92 MG/DL
CRYPTOSP DNA SPEC QL NAA+PROBE: NEGATIVE
DEPRECATED HBV CORE AB SER IA-ACNC: 3726 NG/ML
DEPRECATED RDW RBC AUTO: 41.8 FL (ref 35.1–46.3)
EAEC PAA PLAS AGGR+AATA ST NAA+NON-PRB: NEGATIVE
EC STX1+STX2 + H7 FLIC SPEC NAA+PROBE: NEGATIVE
EGFRCR SERPLBLD CKD-EPI 2021: 73 ML/MIN/1.73M2 (ref 60–?)
EGFRCR SERPLBLD CKD-EPI 2021: 79 ML/MIN/1.73M2 (ref 60–?)
EGFRCR SERPLBLD CKD-EPI 2021: 82 ML/MIN/1.73M2 (ref 60–?)
ENTAMOEBA HISTOLYTICA PCR: NEGATIVE
EOSINOPHIL # BLD AUTO: 0.04 X10(3) UL (ref 0–0.7)
EOSINOPHIL NFR BLD AUTO: 0.9 %
EPEC EAE GENE STL QL NAA+NON-PROBE: NEGATIVE
ERYTHROCYTE [DISTWIDTH] IN BLOOD BY AUTOMATED COUNT: 13 % (ref 11–15)
ETEC LTA+ST1A+ST1B TOX ST NAA+NON-PROBE: NEGATIVE
FIBRINOGEN PPP-MCNC: 345 MG/DL (ref 200–480)
FLUAV + FLUBV RNA SPEC NAA+PROBE: NEGATIVE
FLUAV + FLUBV RNA SPEC NAA+PROBE: NEGATIVE
GIARDIA LAMBLIA PCR: NEGATIVE
GLOBULIN PLAS-MCNC: 2.7 G/DL (ref 2–3.5)
GLOBULIN PLAS-MCNC: 2.7 G/DL (ref 2–3.5)
GLOBULIN PLAS-MCNC: 3.2 G/DL (ref 2–3.5)
GLUCOSE BLD-MCNC: 109 MG/DL (ref 70–99)
GLUCOSE BLD-MCNC: 134 MG/DL (ref 70–99)
GLUCOSE BLD-MCNC: 88 MG/DL (ref 70–99)
GLUCOSE BLDC GLUCOMTR-MCNC: 121 MG/DL (ref 70–99)
GLUCOSE BLDC GLUCOMTR-MCNC: 139 MG/DL (ref 70–99)
GLUCOSE BLDC GLUCOMTR-MCNC: 83 MG/DL (ref 70–99)
GLUCOSE BLDC GLUCOMTR-MCNC: 86 MG/DL (ref 70–99)
GLUCOSE BLDC GLUCOMTR-MCNC: 90 MG/DL (ref 70–99)
GLUCOSE UR-MCNC: NORMAL MG/DL
HAV AB SER QL IA: REACTIVE
HAV IGM SER QL: REACTIVE
HAV IGM SER QL: REACTIVE
HBV CORE IGM SER QL: NONREACTIVE
HBV SURFACE AG SERPL QL IA: NONREACTIVE
HCT VFR BLD AUTO: 38.6 %
HCV AB SERPL QL IA: NONREACTIVE
HGB BLD-MCNC: 13.4 G/DL
IMM GRANULOCYTES # BLD AUTO: 0.02 X10(3) UL (ref 0–1)
IMM GRANULOCYTES NFR BLD: 0.4 %
INR BLD: 1.6 (ref 0.8–1.2)
INR BLD: 1.7 (ref 0.8–1.2)
IRON SATN MFR SERPL: 24 %
IRON SERPL-MCNC: 51 UG/DL
KETONES UR-MCNC: 40 MG/DL
LDH SERPL L TO P-CCNC: 1309 U/L
LEUKOCYTE ESTERASE UR QL STRIP.AUTO: NEGATIVE
LIPASE SERPL-CCNC: 35 U/L (ref 12–53)
LYMPHOCYTES # BLD AUTO: 1.21 X10(3) UL (ref 1–4)
LYMPHOCYTES NFR BLD AUTO: 27.1 %
MCH RBC QN AUTO: 30.2 PG (ref 26–34)
MCHC RBC AUTO-ENTMCNC: 34.7 G/DL (ref 31–37)
MCV RBC AUTO: 86.9 FL
MONOCYTES # BLD AUTO: 0.67 X10(3) UL (ref 0.1–1)
MONOCYTES NFR BLD AUTO: 15 %
NEUTROPHILS # BLD AUTO: 2.49 X10 (3) UL (ref 1.5–7.7)
NEUTROPHILS # BLD AUTO: 2.49 X10(3) UL (ref 1.5–7.7)
NEUTROPHILS NFR BLD AUTO: 55.9 %
NITRITE UR QL STRIP.AUTO: NEGATIVE
NOROVIRUS GI/GII PCR: NEGATIVE
OSMOLALITY SERPL CALC.SUM OF ELEC: 285 MOSM/KG (ref 275–295)
OSMOLALITY SERPL CALC.SUM OF ELEC: 288 MOSM/KG (ref 275–295)
OSMOLALITY SERPL CALC.SUM OF ELEC: 290 MOSM/KG (ref 275–295)
P SHIGELLOIDES DNA STL QL NAA+PROBE: NEGATIVE
PH UR: 6.5 [PH] (ref 5–8)
PLATELET # BLD AUTO: 218 10(3)UL (ref 150–450)
POTASSIUM SERPL-SCNC: 3.6 MMOL/L (ref 3.5–5.1)
POTASSIUM SERPL-SCNC: 3.8 MMOL/L (ref 3.5–5.1)
POTASSIUM SERPL-SCNC: 3.9 MMOL/L (ref 3.5–5.1)
PROT SERPL-MCNC: 6.1 G/DL (ref 5.7–8.2)
PROT SERPL-MCNC: 6.1 G/DL (ref 5.7–8.2)
PROT SERPL-MCNC: 6.8 G/DL (ref 5.7–8.2)
PROT UR-MCNC: 30 MG/DL
PROTHROMBIN TIME: 19.9 SECONDS (ref 11.6–14.8)
PROTHROMBIN TIME: 20.8 SECONDS (ref 11.6–14.8)
RBC # BLD AUTO: 4.44 X10(6)UL
ROTAVIRUS A PCR: NEGATIVE
RSV RNA SPEC NAA+PROBE: NEGATIVE
SALMONELLA DNA SPEC QL NAA+PROBE: NEGATIVE
SAPOVIRUS PCR: NEGATIVE
SARS-COV-2 RNA RESP QL NAA+PROBE: NOT DETECTED
SHIGELLA SP+EIEC IPAH ST NAA+NON-PROBE: NEGATIVE
SODIUM SERPL-SCNC: 137 MMOL/L (ref 136–145)
SODIUM SERPL-SCNC: 139 MMOL/L (ref 136–145)
SODIUM SERPL-SCNC: 139 MMOL/L (ref 136–145)
SP GR UR STRIP: 1.02 (ref 1–1.03)
TIBC SERPL-MCNC: 216 UG/DL (ref 250–425)
TRANSFERRIN SERPL-MCNC: 145 MG/DL (ref 250–380)
UROBILINOGEN UR STRIP-ACNC: 8
V CHOLERAE DNA SPEC QL NAA+PROBE: NEGATIVE
VIBRIO DNA SPEC NAA+PROBE: NEGATIVE
WBC # BLD AUTO: 4.5 X10(3) UL (ref 4–11)
YERSINIA DNA SPEC NAA+PROBE: NEGATIVE

## 2024-11-19 PROCEDURE — 71045 X-RAY EXAM CHEST 1 VIEW: CPT | Performed by: EMERGENCY MEDICINE

## 2024-11-19 PROCEDURE — 99223 1ST HOSP IP/OBS HIGH 75: CPT | Performed by: INTERNAL MEDICINE

## 2024-11-19 PROCEDURE — 74177 CT ABD & PELVIS W/CONTRAST: CPT | Performed by: EMERGENCY MEDICINE

## 2024-11-19 PROCEDURE — 76705 ECHO EXAM OF ABDOMEN: CPT | Performed by: INTERNAL MEDICINE

## 2024-11-19 PROCEDURE — 76705 ECHO EXAM OF ABDOMEN: CPT | Performed by: EMERGENCY MEDICINE

## 2024-11-19 PROCEDURE — 93976 VASCULAR STUDY: CPT | Performed by: INTERNAL MEDICINE

## 2024-11-19 RX ORDER — IBUPROFEN 400 MG/1
400 TABLET, FILM COATED ORAL EVERY 4 HOURS PRN
Status: DISCONTINUED | OUTPATIENT
Start: 2024-11-19 | End: 2024-11-19

## 2024-11-19 RX ORDER — METOCLOPRAMIDE HYDROCHLORIDE 5 MG/ML
10 INJECTION INTRAMUSCULAR; INTRAVENOUS ONCE
Status: COMPLETED | OUTPATIENT
Start: 2024-11-19 | End: 2024-11-19

## 2024-11-19 RX ORDER — ONDANSETRON 2 MG/ML
4 INJECTION INTRAMUSCULAR; INTRAVENOUS EVERY 6 HOURS PRN
Status: DISCONTINUED | OUTPATIENT
Start: 2024-11-19 | End: 2024-11-20

## 2024-11-19 RX ORDER — HEPARIN SODIUM 5000 [USP'U]/ML
5000 INJECTION, SOLUTION INTRAVENOUS; SUBCUTANEOUS EVERY 8 HOURS SCHEDULED
Status: DISCONTINUED | OUTPATIENT
Start: 2024-11-19 | End: 2024-11-27

## 2024-11-19 RX ORDER — IBUPROFEN 400 MG/1
400 TABLET, FILM COATED ORAL EVERY 6 HOURS PRN
Status: DISCONTINUED | OUTPATIENT
Start: 2024-11-19 | End: 2024-11-20

## 2024-11-19 RX ORDER — SODIUM CHLORIDE 9 MG/ML
INJECTION, SOLUTION INTRAVENOUS CONTINUOUS
Status: DISCONTINUED | OUTPATIENT
Start: 2024-11-19 | End: 2024-11-19

## 2024-11-19 RX ORDER — ONDANSETRON 2 MG/ML
4 INJECTION INTRAMUSCULAR; INTRAVENOUS ONCE
Status: COMPLETED | OUTPATIENT
Start: 2024-11-19 | End: 2024-11-19

## 2024-11-19 RX ORDER — HYDROMORPHONE HYDROCHLORIDE 1 MG/ML
0.2 INJECTION, SOLUTION INTRAMUSCULAR; INTRAVENOUS; SUBCUTANEOUS EVERY 2 HOUR PRN
Status: DISCONTINUED | OUTPATIENT
Start: 2024-11-19 | End: 2024-11-19

## 2024-11-19 RX ORDER — IBUPROFEN 600 MG/1
600 TABLET, FILM COATED ORAL EVERY 4 HOURS PRN
Status: DISCONTINUED | OUTPATIENT
Start: 2024-11-19 | End: 2024-11-19

## 2024-11-19 RX ORDER — HYDROMORPHONE HYDROCHLORIDE 1 MG/ML
0.8 INJECTION, SOLUTION INTRAMUSCULAR; INTRAVENOUS; SUBCUTANEOUS EVERY 2 HOUR PRN
Status: DISCONTINUED | OUTPATIENT
Start: 2024-11-19 | End: 2024-11-19

## 2024-11-19 RX ORDER — METRONIDAZOLE 500 MG/100ML
500 INJECTION, SOLUTION INTRAVENOUS ONCE
Status: DISCONTINUED | OUTPATIENT
Start: 2024-11-19 | End: 2024-11-22

## 2024-11-19 RX ORDER — HYDROMORPHONE HYDROCHLORIDE 1 MG/ML
0.4 INJECTION, SOLUTION INTRAMUSCULAR; INTRAVENOUS; SUBCUTANEOUS EVERY 4 HOURS PRN
Status: DISCONTINUED | OUTPATIENT
Start: 2024-11-19 | End: 2024-11-19

## 2024-11-19 RX ORDER — HYDROMORPHONE HYDROCHLORIDE 1 MG/ML
0.4 INJECTION, SOLUTION INTRAMUSCULAR; INTRAVENOUS; SUBCUTANEOUS EVERY 2 HOUR PRN
Status: DISCONTINUED | OUTPATIENT
Start: 2024-11-19 | End: 2024-11-19

## 2024-11-19 RX ORDER — ONDANSETRON 2 MG/ML
4 INJECTION INTRAMUSCULAR; INTRAVENOUS ONCE
Status: DISCONTINUED | OUTPATIENT
Start: 2024-11-19 | End: 2024-11-19

## 2024-11-19 RX ORDER — IBUPROFEN 200 MG
200 TABLET ORAL EVERY 4 HOURS PRN
Status: DISCONTINUED | OUTPATIENT
Start: 2024-11-19 | End: 2024-11-19

## 2024-11-19 RX ORDER — METOCLOPRAMIDE HYDROCHLORIDE 5 MG/ML
5 INJECTION INTRAMUSCULAR; INTRAVENOUS EVERY 6 HOURS PRN
Status: DISCONTINUED | OUTPATIENT
Start: 2024-11-19 | End: 2024-11-27

## 2024-11-19 RX ORDER — DEXTROSE MONOHYDRATE AND SODIUM CHLORIDE 5; .9 G/100ML; G/100ML
INJECTION, SOLUTION INTRAVENOUS CONTINUOUS
Status: DISCONTINUED | OUTPATIENT
Start: 2024-11-19 | End: 2024-11-25

## 2024-11-19 NOTE — PROGRESS NOTES
See H and P from Dr Cho earlier today.     Pt in ultrasound.     Discussed with RN and pt with abd pain, not wanting to take ibuprofen. Ordered iv dilaudid prn.     Apprec GI consult.     Follow lft's. Likely acute Hep A. Cont supportive care.     Josette Bray, DO  11/19/2024

## 2024-11-19 NOTE — ED PROVIDER NOTES
Patient Seen in: [facility] Emergency Department      History     Chief Complaint   Patient presents with    Abdomen/Flank Pain    Headache     Stated Complaint: abd pain, HA    Subjective:   HPI      59 year old female with h/o anemia, dm, endometriosis who presents with 2-3 weeks of intermittent n/v and low grade fevers, epigastric abd pain radiating to LUQ. Pt states she hasn't been able to tolerate food/water today. She initially felt flu-like sx but then sx did not improve. +dry cough for past few weeks as well.     Objective:     Past Medical History:    Anemia    recently dx-taking FeSO4    Back problem    Diabetes (HCC)    Disorder of liver    Divorce    Finalized     DM2 (diabetes mellitus, type 2) (HCC)    controlled with diet    Endometriosis    Laparoscopy-diagnostic    Esophageal reflux    History of blood transfusion    No reaction    History of pregnancy    EAB    Hx of motion sickness    Lipid screening    per NG    Migraines    Nausea and vomiting in adult    Visual impairment    wears glasses              Past Surgical History:   Procedure Laterality Date    Biopsy of uterus lining  2011    neg embx    Colonoscopy N/A 1/10/2018    Procedure: COLONOSCOPY;  Surgeon: Hubmle Hodges MD;  Location: [facility] ENDO    Colonoscopy N/A 2023    Procedure: COLONOSCOPY;  Surgeon: Humble Hodges MD;  Location: [facility] MAIN OR    Laparoscopy,diagnostic      Diagnostic for endometriosis                Social History     Socioeconomic History    Marital status:    Tobacco Use    Smoking status: Former     Current packs/day: 0.00     Average packs/day: 1 pack/day for 5.0 years (5.0 ttl pk-yrs)     Types: Cigarettes     Start date:      Quit date:      Years since quittin.9     Passive exposure: Past    Smokeless tobacco: Never   Vaping Use    Vaping status: Never Used   Substance and Sexual Activity    Alcohol use: Yes     Alcohol/week: 1.0 standard drink  of alcohol     Types: 1 Glasses of wine per week     Comment: 3 glasses per month     Drug use: No    Sexual activity: Yes     Partners: Male     Birth control/protection: Condom   Other Topics Concern    Caffeine Concern No    History of tanning Yes    Breast feeding No    Reaction to local anesthetic No    Pt has a pacemaker No    Pt has a defibrillator No     Social Drivers of Health     Financial Resource Strain: Low Risk  (12/21/2023)    Financial Resource Strain     Difficulty of Paying Living Expenses: Not very hard     Med Affordability: No   Food Insecurity: No Food Insecurity (11/19/2024)    Food Insecurity     Food Insecurity: Never true   Transportation Needs: No Transportation Needs (11/19/2024)    Transportation Needs     Lack of Transportation: No   Housing Stability: Low Risk  (11/19/2024)    Housing Stability     Housing Instability: No                  Physical Exam     ED Triage Vitals   BP 11/18/24 2353 131/76   Pulse 11/18/24 2353 88   Resp 11/18/24 2353 18   Temp 11/19/24 0046 98.7 °F (37.1 °C)   Temp src 11/19/24 0046 Oral   SpO2 11/18/24 2353 94 %   O2 Device 11/18/24 2353 None (Room air)       Current Vitals:   Vital Signs  BP: 132/62  Pulse: 70  Resp: 20  Temp: 99.2 °F (37.3 °C)  Temp src: Oral  MAP (mmHg): 82    Oxygen Therapy  SpO2: 96 %  O2 Device: None (Room air)  Pulse Oximetry Type: Intermittent        Physical Exam  Vitals and nursing note reviewed.   Constitutional:       General: She is not in acute distress.     Appearance: Normal appearance. She is well-developed. She is not ill-appearing, toxic-appearing or diaphoretic.   HENT:      Head: Normocephalic and atraumatic.   Eyes:      Conjunctiva/sclera: Conjunctivae normal.      Pupils: Pupils are equal, round, and reactive to light.   Cardiovascular:      Rate and Rhythm: Normal rate and regular rhythm.      Pulses: Normal pulses.      Heart sounds: Normal heart sounds. No murmur heard.  Pulmonary:      Effort: Pulmonary effort is  normal. No respiratory distress.      Breath sounds: Normal breath sounds. No wheezing.   Abdominal:      General: There is no distension.      Palpations: Abdomen is soft.      Tenderness: There is abdominal tenderness in the epigastric area and left upper quadrant. There is no guarding.   Musculoskeletal:         General: No tenderness. Normal range of motion.      Cervical back: Full passive range of motion without pain, normal range of motion and neck supple. No rigidity. Normal range of motion.      Right lower leg: No edema.      Left lower leg: No edema.   Skin:     General: Skin is warm and dry.      Findings: No rash.   Neurological:      Mental Status: She is alert and oriented to person, place, and time.      GCS: GCS eye subscore is 4. GCS verbal subscore is 5. GCS motor subscore is 6.      Sensory: Sensation is intact. No sensory deficit.      Motor: Motor function is intact. No weakness.   Psychiatric:         Attention and Perception: Attention normal.         Mood and Affect: Mood normal.         Behavior: Behavior normal. Behavior is cooperative.           ED Course     Labs Reviewed   COMP METABOLIC PANEL (14) - Abnormal; Notable for the following components:       Result Value    Glucose 109 (*)     ALT >3,300 (*)     AST 2,401 (*)     Alkaline Phosphatase 167 (*)     Bilirubin, Total 3.1 (*)     All other components within normal limits   URINALYSIS WITH CULTURE REFLEX - Abnormal; Notable for the following components:    Ketones Urine 40 (*)     Blood Urine 1+ (*)     Protein Urine 30 (*)     Urobilinogen Urine 8 (*)     RBC Urine 6-10 (*)     Squamous Epi. Cells Few (*)     All other components within normal limits   ICTOTEST - Abnormal; Notable for the following components:    Ictotest Positive (*)     All other components within normal limits   HEPATITIS PANEL, ACUTE (4) - Abnormal; Notable for the following components:    Hepatitis A Ab, IgM Reactive (*)     All other components within normal  limits   ACETAMINOPHEN (TYLENOL), S - Abnormal; Notable for the following components:    Acetaminophen <2.0 (*)     All other components within normal limits   PROTHROMBIN TIME (PT) - Abnormal; Notable for the following components:    PT 20.8 (*)     INR 1.70 (*)     All other components within normal limits   LDH - Abnormal; Notable for the following components:    LDH 1,309 (*)     All other components within normal limits   HEP A AB, TOTAL - Abnormal; Notable for the following components:    Hepatitis A Virus Ab, Total Reactive (*)     All other components within normal limits   IRON AND TIBC - Abnormal; Notable for the following components:    Transferrin 145 (*)     Total Iron Binding Capacity 216 (*)     All other components within normal limits   FERRITIN - Abnormal; Notable for the following components:    Ferritin 3,726 (*)     All other components within normal limits   COMP METABOLIC PANEL (14) - Abnormal; Notable for the following components:    Calcium, Total 8.3 (*)     ALT >3,300 (*)     AST 1,991 (*)     Alkaline Phosphatase 146 (*)     Bilirubin, Total 2.8 (*)     All other components within normal limits   LIPASE - Normal   CK CREATINE KINASE (NOT CREATININE) - Normal   FIBRINOGEN ACTIVITY - Normal   POCT GLUCOSE - Normal   POCT GLUCOSE - Normal   POCT GLUCOSE - Normal   SARS-COV-2/FLU A AND B/RSV BY PCR (LIVELENZPERT) - Normal    Narrative:     This test is intended for the qualitative detection and differentiation of SARS-CoV-2, influenza A, influenza B, and respiratory syncytial virus (RSV) viral RNA in nasopharyngeal or nares swabs from individuals suspected of respiratory viral infection consistent with COVID-19 by their healthcare provider. Signs and symptoms of respiratory viral infection due to SARS-CoV-2, influenza, and RSV can be similar.    Test performed using the Xpert Xpress SARS-CoV-2/FLU/RSV (real time RT-PCR)  assay on the [employer] instrument, [employer], [place] [address].   This  test is being used under the Food and Drug Administration's Emergency Use Authorization.    The authorized Fact Sheet for Healthcare Providers for this assay is available upon request from the laboratory.   CBC WITH DIFFERENTIAL WITH PLATELET   ACTIN (SMOOTH MUSCLE) ANTIBODY   MITOCHONDRIAL (M2) ANTIBODY   CERULOPLASMIN   EBV, CHRONIC/ACTIVE INFECTION,IGG/IGM   HEP A AB, IGM   COMP METABOLIC PANEL (14)   PROTHROMBIN TIME (PT)   RAINBOW DRAW LIGHT GREEN   RAINBOW DRAW LAVENDER   HOLD REACTIVE HEPATITIS A IGM    Narrative:     Held for 30 days       ED Course as of 11/19/24 1202  ------------------------------------------------------------  Time: 11/19 0431  Value: US GALLBLADDER (CPT=76705)  Comment: Right upper quadrant ultrasound    Comparison: CT from the same day      IMPRESSION:    No evidence of cholelithiasis.    Nonspecific gallbladder wall thickening, at 3.5 mm.  Question edema within the gallbladder wall versus trace pericholecystic fluid.    Negative sonographic Rothman sign.    No biliary dilatation is identified.    Visualized portions of the liver, pancreas, and right kidney are unremarkable.                MDM      Pulse Ox: 96%, Normal, RA    Cardiac Monitor:   Pulse Readings from Last 1 Encounters:   11/19/24 70   , sinus, normal for rate and rhythm       Radiology findings:     X-ray chest, single portable upright AP view at 1:17 AM  Comparison: November 28, 2021  IMPRESSION:  No acute radiographic cardiopulmonary abnormality.    US LIVER LIMITED WITH DOPPLER (CPT=76705/52889)    Result Date: 11/19/2024  CONCLUSION:  1. Diffuse hepatic steatosis without focal hepatic lesions. 2. Nonspecific gallbladder wall thickening without cholelithiasis or pericholecystic fluid.  Negative sonographic Rothman sign.  No biliary dilatation.  Normal common duct. 3. No free fluid hepatorenal fossa. No hydronephrosis right kidney.    Dictated by (CST): Aníbal Tubbs MD on 11/19/2024 at 9:31 AM     Finalized by  (CST): Aníbal Tubbs MD on 11/19/2024 at 9:38 AM          CT ABDOMEN+PELVIS(CONTRAST ONLY)(CPT=74177)    Result Date: 11/19/2024  CONCLUSION:  1. Mild gallbladder wall thickening with pericholecystic edema/fluid extending from the danyel hepatis caudally to the pancreaticoduodenal groove.  Differential considerations include acute cholecystitis, duodenitis, groove pancreatitis or hepatitis. 2. No biliary ductal dilatation. 3. Stable 3.4 cm solid right adnexal mass, relatively similar to prior studies dating back to at least 2022 favoring a benign mass such as a broad ligament fibroid, as well as an ovarian fibroma or thecoma. 4. 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 5. Lesser incidental findings as above.   A preliminary report was issued by the [facility] teleradiology service. There are no major discrepancies.  [employer]  Dictated by (CST): James Encarnacion MD on 11/19/2024 at 8:34 AM     Finalized by (CST): James Encarnacion MD on 11/19/2024 at 8:46 AM          US GALLBLADDER (CPT=76705)    Result Date: 11/19/2024  CONCLUSION:  Development of mild gallbladder wall thickening with mural edema and/or trace pericholecystic fluid.  However, no evidence of cholelithiasis, choledocholithiasis or tenderness over the gallbladder.  These findings are therefore indeterminate for acalculous cholecystitis.  Additional differential considerations include sequela of CHF, fluid overload or hypoalbuminemia.   A preliminary report was issued by the [facility] teleradiology service. There are no major discrepancies.  [employer]  Dictated by (CST): James Encarnacion MD on 11/19/2024 at 5:12 AM     Finalized by (CST): James Encarnacion MD on 11/19/2024 at 5:16 AM          XR CHEST AP PORTABLE  (CPT=71045)    Result Date: 11/19/2024  CONCLUSION:  No acute cardiopulmonary process.   A preliminary report was issued by the [facility] teleradiology service. There are no  major discrepancies.  [employer]  Dictated by (CST): James Encarnacion MD on 11/19/2024 at 4:35 AM     Finalized by (CST): James Encarnacion MD on 11/19/2024 at 4:36 AM           Medications   metRONIDAZOLE in sodium chloride 0.79% (Flagyl) 5 mg/mL IVPB premix 500 mg (500 mg Intravenous Handoff 11/19/24 0440)   heparin (Porcine) 5000 UNIT/ML injection 5,000 Units (has no administration in time range)   ondansetron (Zofran) 4 MG/2ML injection 4 mg (has no administration in time range)   HYDROmorphone (Dilaudid) 1 MG/ML injection 0.4 mg (has no administration in time range)   dextrose 5%-sodium chloride 0.9% infusion ( Intravenous New Bag 11/19/24 1026)   sodium chloride 0.9 % IV bolus 1,000 mL (0 mL Intravenous Stopped 11/19/24 0129)   metoclopramide (Reglan) 5 mg/mL injection 10 mg (10 mg Intravenous Given 11/19/24 0143)   sodium chloride 0.9 % IV bolus 250 mL (0 mL Intravenous Stopped 11/19/24 0304)   iopamidol 76% (ISOVUE-370) injection for power injector (70 mL Intravenous Given 11/19/24 0255)   cefTRIAXone (Rocephin) 2 g in sodium chloride 0.9% 100 mL IVPB-ADDV (0 g Intravenous Stopped 11/19/24 0433)   ondansetron (Zofran) 4 MG/2ML injection 4 mg (4 mg Intravenous Given 11/19/24 0506)       Pt with abd pain, n/v, significant transaminitis. No obvious biliary obstruction noted on imaging. Admit, covered empirically with rocephin/flagyl, GI on consult.    D/w Dr Cho - will admit  PS msg sent to Dr Drummond for AM consult        Admission disposition: 11/19/2024  4:31 AM               Disposition and Plan     Clinical Impression:  1. Transaminitis    2. Nausea and vomiting in adult         Disposition:  Admit  11/19/2024  4:31 am    Follow-up:  No follow-up provider specified.        Medications Prescribed:  Current Discharge Medication List              Supplementary Documentation:         Hospital Problems       Present on Admission  Date Reviewed: 11/19/2024            ICD-10-CM Noted POA    * (Principal)  Transaminitis R74.01 11/19/2024 Unknown    Acute hepatitis B17.9 11/19/2024 Yes    Nausea and vomiting in adult R11.2 11/19/2024 Unknown

## 2024-11-19 NOTE — PLAN OF CARE
Problem: Patient Centered Care  Goal: Patient preferences are identified and integrated in the patient's plan of care  Description: Interventions:  - What would you like us to know as we care for you? Patient lives at home with family   - Provide timely, complete, and accurate information to patient/family  - Incorporate patient and family knowledge, values, beliefs, and cultural backgrounds into the planning and delivery of care  - Encourage patient/family to participate in care and decision-making at the level they choose  - Honor patient and family perspectives and choices  Outcome: Progressing     Problem: Diabetes/Glucose Control  Goal: Glucose maintained within prescribed range  Description: INTERVENTIONS:  - Monitor Blood Glucose as ordered  - Assess for signs and symptoms of hyperglycemia and hypoglycemia  - Administer ordered medications to maintain glucose within target range  - Assess barriers to adequate nutritional intake and initiate nutrition consult as needed  - Instruct patient on self management of diabetes  Outcome: Progressing     Problem: Patient/Family Goals  Goal: Patient/Family Long Term Goal  Description: Patient's Long Term Goal: to go home     Interventions:  - See additional Care Plan goals for specific interventions  Outcome: Progressing  Goal: Patient/Family Short Term Goal  Description: Patient's Short Term Goal: To feel better     Interventions:   - See additional Care Plan goals for specific interventions  Outcome: Progressing     Problem: GASTROINTESTINAL - ADULT  Goal: Minimal or absence of nausea and vomiting  Description: INTERVENTIONS:  - Maintain adequate hydration with IV or PO as ordered and tolerated  - Nasogastric tube to low intermittent suction as ordered  - Evaluate effectiveness of ordered antiemetic medications  - Provide nonpharmacologic comfort measures as appropriate  - Advance diet as tolerated, if ordered  - Obtain nutritional consult as needed  - Evaluate fluid  balance  Outcome: Progressing     Problem: METABOLIC/FLUID AND ELECTROLYTES - ADULT  Goal: Glucose maintained within prescribed range  Description: INTERVENTIONS:  - Monitor Blood Glucose as ordered  - Assess for signs and symptoms of hyperglycemia and hypoglycemia  - Administer ordered medications to maintain glucose within target range  - Assess barriers to adequate nutritional intake and initiate nutrition consult as needed  - Instruct patient on self management of diabetes  Outcome: Progressing  Goal: Electrolytes maintained within normal limits  Description: INTERVENTIONS:  - Monitor labs and rhythm and assess patient for signs and symptoms of electrolyte imbalances  - Administer electrolyte replacement as ordered  - Monitor response to electrolyte replacements, including rhythm and repeat lab results as appropriate  - Fluid restriction as ordered  - Instruct patient on fluid and nutrition restrictions as appropriate  Outcome: Progressing     Problem: PAIN - ADULT  Goal: Verbalizes/displays adequate comfort level or patient's stated pain goal  Description: INTERVENTIONS:  - Encourage pt to monitor pain and request assistance  - Assess pain using appropriate pain scale  - Administer analgesics based on type and severity of pain and evaluate response  - Implement non-pharmacological measures as appropriate and evaluate response  - Consider cultural and social influences on pain and pain management  - Manage/alleviate anxiety  - Utilize distraction and/or relaxation techniques  - Monitor for opioid side effects  - Notify MD/LIP if interventions unsuccessful or patient reports new pain  - Anticipate increased pain with activity and pre-medicate as appropriate  Outcome: Progressing     Problem: SAFETY ADULT - FALL  Goal: Free from fall injury  Description: INTERVENTIONS:  - Assess pt frequently for physical needs  - Identify cognitive and physical deficits and behaviors that affect risk of falls.  - [place] fall  precautions as indicated by assessment.  - Educate pt/family on patient safety including physical limitations  - Instruct pt to call for assistance with activity based on assessment  - Modify environment to reduce risk of injury  - Provide assistive devices as appropriate  - Consider OT/PT consult to assist with strengthening/mobility  - Encourage toileting schedule  Outcome: Progressing   Patient came to ED with nausea and abdominal pain for 3 days. AST 2401, ALT >3300, total bilirubin 31. Patient diagnosed with acute hepatitis. US of abdomen and CT of abdomen done in ED and results pending. GI on consult. IVF infusing. Zofran for nausea and Motrin for pain. VSS. Will continue to monitor patient.

## 2024-11-19 NOTE — ED QUICK NOTES
Pruritus  Pruritus is an itchy feeling on the skin. One of the most common causes is dry skin, but many different things can cause itching. Most cases of itching do not require medical attention. Sometimes itchy skin can turn into a rash.  Follow these instructions at home:  Skin care    · Apply moisturizing lotion to your skin as needed. Lotion that contains petroleum jelly is best.  · Take medicines or apply medicated creams only as told by your health care provider. This may include:  ? Corticosteroid cream.  ? Anti-itch lotions.  ? Oral antihistamines.  · Apply a cool, wet cloth (cool compress) to the affected areas.  · Take baths with one of the following:  ? Epsom salts. You can get these at your local pharmacy or grocery store. Follow the instructions on the packaging.  ? Baking soda. Pour a small amount into the bath as told by your health care provider.  ? Colloidal oatmeal. You can get this at your local pharmacy or grocery store. Follow the instructions on the packaging.  · Apply baking soda paste to your skin. To make the paste, stir water into a small amount of baking soda until it reaches a paste-like consistency.  · Do not scratch your skin.  · Do not take hot showers or baths, which can make itching worse. A cool shower may help with itching as long as you apply moisturizing lotion after the shower.  · Do not use scented soaps, detergents, perfumes, and cosmetic products. Instead, use gentle, unscented versions of these items.  General instructions  · Avoid wearing tight clothes.  · Keep a journal to help find out what is causing your itching. Write down:  ? What you eat and drink.  ? What cosmetic products you use.  ? What soaps or detergents you use.  ? What you wear, including jewelry.  · Use a humidifier. This keeps the air moist, which helps to prevent dry skin.  · Be aware of any changes in your itchiness.  Contact a health care provider if:  · The itching does not go away after several  Pt attempting to rest at this time. States her current pain is tolerable and isn't in need of pain medications. Informed upon pending admission and US study; agreeable to care plan.   days.  · You are unusually thirsty or urinating more than normal.  · Your skin tingles or feels numb.  · Your skin or the white parts of your eyes turn yellow (jaundice).  · You feel weak.  · You have any of the following:  ? Night sweats.  ? Tiredness (fatigue).  ? Weight loss.  ? Abdominal pain.  Summary  · Pruritus is an itchy feeling on the skin. One of the most common causes is dry skin, but many different conditions and factors can cause itching.  · Apply moisturizing lotion to your skin as needed. Lotion that contains petroleum jelly is best.  · Take medicines or apply medicated creams only as told by your health care provider.  · Do not take hot showers or baths. Do not use scented soaps, detergents, perfumes, or cosmetic products.  This information is not intended to replace advice given to you by your health care provider. Make sure you discuss any questions you have with your health care provider.  Document Released: 08/29/2012 Document Revised: 01/01/2019 Document Reviewed: 01/01/2019  [employer] Patient Education © 2020 [employer]

## 2024-11-19 NOTE — ED QUICK NOTES
Pt c/o her nausea returning. MD Acosta made aware of pt's complaint, verbally requested to order and administer 4 mg of Zofran.

## 2024-11-19 NOTE — CONSULTS
Is this a shared or split note between Advanced Practice Provider and Physician? Yes       [facility]   Gastroenterology Consultation Note    Emmanuel Mendiola  Patient Status:    Inpatient  Date of Admission:         11/18/2024, Hospital day #0  Attending:   Josette Bray DO  PCP:     Bertin Florez MD    Reason for Consultation:  Elevated LFTs    History of Present Illness:  Emmanuel Mendiola is a 59 year old female w/ PMHx of DM, GERD who presented to ED with abdominal pain, nausea and diarrhea. She notes over the last 2-3 weeks has had body aches, fatigue and chills. Last 3 days has had worsening abdominal pain and episodes of loose brown stool about 1 time a day. She notes she intermittently took tylenol, but not more than 1 500 mg tablet in 2-3 days. Denies any new herbal medications. No recent travel. Notes her grandson is always sick.   Notes increased intake of carrots and other fruits and vegetables over last month.   Denies heavy ETOH use.     In ER, alk phos 167, ast 2401, alt >3300, t bili 3.1. Ultrasound with diffuse hepatic steatosis without focal lesion, non specific gallbladder wall thickening without cholelithiasis.     Pertinent Family Hx:  - No known history of esophageal, gastric or colon cancers  - No known IBD  - No known liver pathologies    Social Hx:  - No tobacco use/+ social ETOH  - Denies illicit drug use  - Lives with:   - Occupation: works at [employer] firm        History:  Past Medical History:    Anemia    recently dx-taking FeSO4    Back problem    Diabetes (HCC)    Disorder of liver    Divorce    Finalized 2010    DM2 (diabetes mellitus, type 2) (HCC)    controlled with diet    Endometriosis    Laparoscopy-diagnostic    Esophageal reflux    History of blood transfusion    No reaction    History of pregnancy    EAB    Hx of motion sickness    Lipid screening    per NG    Migraines    Nausea and vomiting in adult    Visual impairment    wears glasses     Past  Surgical History:   Procedure Laterality Date    Biopsy of uterus lining  6/2011    neg embx    Colonoscopy N/A 1/10/2018    Procedure: COLONOSCOPY;  Surgeon: Humble Hodges MD;  Location: [facility] ENDO    Colonoscopy N/A 8/24/2023    Procedure: COLONOSCOPY;  Surgeon: Humble Hodges MD;  Location: [facility] MAIN OR    Laparoscopy,diagnostic  1996    Diagnostic for endometriosis     Family History   Problem Relation Age of Onset    Heart Attack Brother         MI-Cause of death    Colon Cancer Paternal Grandmother         Cause of death    Heart Disease Father         CAD    Diabetes Father         Diabetes/CRF/CAD cause of death    Other (Other) Father         CRF    Heart Disease Mother         CAD    Cancer Mother         Lung cancer and CAD cause of death    Heart Disease Brother 41        Premature CAD    Breast Cancer Maternal Aunt 82      reports that she quit smoking about 36 years ago. Her smoking use included cigarettes. She started smoking about 41 years ago. She has a 5 pack-year smoking history. She has been exposed to tobacco smoke. She has never used smokeless tobacco. She reports current alcohol use of about 1.0 standard drink of alcohol per week. She reports that she does not use drugs.    Allergies:  Allergies[1]    Medications:    Current Facility-Administered Medications:     metRONIDAZOLE in sodium chloride 0.79% (Flagyl) 5 mg/mL IVPB premix 500 mg, 500 mg, Intravenous, Once    sodium chloride 0.9% infusion, , Intravenous, Continuous    heparin (Porcine) 5000 UNIT/ML injection 5,000 Units, 5,000 Units, Subcutaneous, Q8H SONYA    ondansetron (Zofran) 4 MG/2ML injection 4 mg, 4 mg, Intravenous, Q6H PRN    Review of Systems:   CONSTITUTIONAL:  negative for fevers, chills, unintentional weight loss   EYES:  negative for diplopia or change in vision   RESPIRATORY:  negative for severe shortness of breath  CARDIOVASCULAR:  negative for crushing sub-sternal chest pain  GASTROINTESTINAL:  see  HPI  GENITOURINARY:  negative for dysuria or gross hematuria  INTEGUMENT/BREAST:  SKIN:  negative for jaundice or new rash   ALLERGIC/IMMUNOLOGIC:  negative for hay fever   ENDOCRINE:  negative for cold intolerance and heat intolerance  MUSCULOSKELETAL:  negative for joint effusion/severe erythema  NEURO: negative for new loss of consciousness or dizziness   BEHAVIOR/PSYCH:  negative for psychotic behavior    Physical Exam:    Blood pressure 129/66, pulse 71, temperature 99.2 °F (37.3 °C), temperature source Oral, resp. rate 18, weight 149 lb 11.1 oz (67.9 kg), last menstrual period 12/26/2017, SpO2 95%, not currently breastfeeding. Body mass index is 26.52 kg/m².    Gen: awake, alert patient, NAD  HEENT: EOMI, the sclera appears anicteric, oropharynx clear, mucus membranes appear moist  CV: RRR  Lung: no conversational dyspnea   Abdomen:mild RUQ TTP,no rebound or guarding.  Soft ND abdomen with NABS appreciated   Back: No CVA tenderness   Skin: dry, warm, no jaundice  Ext: no LE edema is evident  Neuro: Alert and interactive  Psych: calm, cooperative    Laboratory Data:  Lab Results   Component Value Date    WBC 4.5 11/19/2024    HGB 13.4 11/19/2024    HCT 38.6 11/19/2024    .0 11/19/2024    CREATSERUM 0.92 11/19/2024    BUN 15 11/19/2024     11/19/2024    K 3.9 11/19/2024     11/19/2024    CO2 21.0 11/19/2024     11/19/2024    CA 8.9 11/19/2024    ALB 3.6 11/19/2024    ALKPHO 167 11/19/2024    BILT 3.1 11/19/2024    TP 6.8 11/19/2024    AST 2,401 11/19/2024    ALT >3,300 11/19/2024    INR 1.70 11/19/2024    LIP 35 11/19/2024    CK 52 11/19/2024       Imaging:  US GALLBLADDER (CPT=76705)    Result Date: 11/19/2024  CONCLUSION:  Development of mild gallbladder wall thickening with mural edema and/or trace pericholecystic fluid.  However, no evidence of cholelithiasis, choledocholithiasis or tenderness over the gallbladder.  These findings are therefore indeterminate for acalculous  cholecystitis.  Additional differential considerations include sequela of CHF, fluid overload or hypoalbuminemia.   A preliminary report was issued by the [facility] teleradiology service. There are no major discrepancies.  [facility]  Dictated by (CST): James Encarnacion MD on 11/19/2024 at 5:12 AM     Finalized by (CST): James Encarnacion MD on 11/19/2024 at 5:16 AM          XR CHEST AP PORTABLE  (CPT=71045)    Result Date: 11/19/2024  CONCLUSION:  No acute cardiopulmonary process.   A preliminary report was issued by the [facility] teleradiology service. There are no major discrepancies.  [facility]  Dictated by (CST): James Encarnacion MD on 11/19/2024 at 4:35 AM     Finalized by (CST): James Encarnacion MD on 11/19/2024 at 4:36 AM           Assessment & Plan   Emmanuel Mendiola is a 59 year old female w/ PMHx of DM, GERD who presented to ED with abdominal pain, nausea and diarrhea.    #elevated LFTS  #acute hepatitis A  -patient with 2-3 weeks body aches, chills   -patient not encephalopathic  -2-3 days of abdominal pain and diarrhea with nausea no vomiting  -sick contact of grandson at home, no recent travel  -no heavy ETOH use, minimal tylenol use  -hepatitis A igM positive   -autoimmune and viral lab studies pending   -likely source of elevated LFTs is hepatitis A, due to age and risk factors will make sure to rule out autoimmune disease     Recommend:  -diet as tolerated  -repeat PT/INR and CMP @ 1400  -repeat labs tomorrow am  -supportive care    Thank you for the opportunity to participate in the care of this patient.    Case discussed with Amarilis Carter MD and Misty ARIAS.    Lisa Varela PA-C  [facility] Gastroenterology  11/19/2024         [1]   Allergies  Allergen Reactions    Sulfa Antibiotics HIVES    Aminoglycosides UNKNOWN     Possibly hives    Compazine [Prochlorperazine] OTHER (SEE COMMENTS)     Convulsions    Morphine OTHER (SEE COMMENTS)     convulsions

## 2024-11-19 NOTE — H&P
[facility]  part of [facility]                                                                                                          History and Physical     Emmanuel Mendiola Patient Status:  Emergency    10/7/1965 MRN [id number]   Location [facility] EMERGENCY DEPARTMENT Attending Katie Acosta MD   Hosp Day # 0 PCP Bertin Florez MD       Chief Complaint: Abdominal pain, generalized weakness    Subjective:    Emmanuel Mendiola is a 59 year old female with history of DM, GERD who presented to the ED with complaints of abdominal pain, associated with nausea no vomiting.  Had mild diarrhea.  She also complains of generalized aches and pains but no fever or chills.  Onset of symptoms about 3 days ago.  Only sick contact his grandson with acute viral illness.  No recent travel, no unusual foods.    At presentation, noted to be markedly elevated transaminases, mildly elevated alk phos and bilirubin.  She denies alcohol use.  Has only taken 2 tablets of Tylenol in the last 3 days.  Denies use of other OTC or herbal medications.  Was prescribed metformin recently, she took a tablet.  Did not like the way she felt and has not taking since then.    Of note, she had labs with PCP on 24 with only mildly elevated AST/ALT.    CXR: No acute cardiopulmonary process.  US gallbladder: Development of mild gallbladder wall thickening with mural edema and or trace pericholecystic fluid.  No evidence of cholelithiasis, choledocholithiasis or tenderness over the gallbladder.  These findings are therefore indeterminate for acalculous cholecystitis.    CT abdomen pelvis: Moderate edema and inflammatory fat stranding in the danyel hepatis about gallbladder neck and extrahepatic biliary tree, however gallbladder is not abnormally distended and there is no radiopaque gallstones.  No biliary ductal dilatation.    GI has been consulted.  Patient is being admitted for further  evaluation    History/Other:      Past Medical History:  Past Medical History:    Anemia    recently dx-taking FeSO4    Back problem    Divorce    Finalized 2010    DM2 (diabetes mellitus, type 2) (HCC)    controlled with diet    Endometriosis    Laparoscopy-diagnostic    Esophageal reflux    History of blood transfusion    No reaction    History of pregnancy    EAB    Hx of motion sickness    Lipid screening    per NG    Migraines    Visual impairment    wears glasses        Past Surgical History:   Past Surgical History:   Procedure Laterality Date    Biopsy of uterus lining  6/2011    neg embx    Colonoscopy N/A 1/10/2018    Procedure: COLONOSCOPY;  Surgeon: Humble Hodges MD;  Location: [facility] ENDO    Colonoscopy N/A 8/24/2023    Procedure: COLONOSCOPY;  Surgeon: Humble Hodges MD;  Location: [facility] MAIN OR    Laparoscopy,diagnostic  1996    Diagnostic for endometriosis       Social History:  reports that she quit smoking about 36 years ago. Her smoking use included cigarettes. She started smoking about 41 years ago. She has a 5 pack-year smoking history. She has been exposed to tobacco smoke. She has never used smokeless tobacco. She reports current alcohol use of about 1.0 standard drink of alcohol per week. She reports that she does not use drugs.    Family History:   Family History   Problem Relation Age of Onset    Heart Attack Brother         MI-Cause of death    Colon Cancer Paternal Grandmother         Cause of death    Heart Disease Father         CAD    Diabetes Father         Diabetes/CRF/CAD cause of death    Other (Other) Father         CRF    Heart Disease Mother         CAD    Cancer Mother         Lung cancer and CAD cause of death    Heart Disease Brother 41        Premature CAD    Breast Cancer Maternal Aunt 82       Allergies: Allergies[1]    Medications:  Medications Ordered Prior to Encounter[2]    Review of Systems:   A comprehensive 14 point review of systems was  completed.    Pertinent positives and negatives noted in the HPI.    Objective:     /64   Pulse 81   Temp 98.7 °F (37.1 °C) (Oral)   Resp 12   LMP 12/26/2017   SpO2 95%   General: No acute distress.    HEENT: Normocephalic, atraumatic.  Neck: Supple.  Respiratory: Normal effort.  CTAB  Cardiovascular: S1, S2 regular.  Normal rate.  No murmur.   Abdomen: Soft.  Nondistended.  Epigastric to right upper quadrant tenderness..  Neurologic: Alert, oriented x 3.  Nonfocal.  Musculoskeletal: No tenderness or deformity.  Extremities: No edema  Psychiatric: Cooperative    Results:      Labs:  Labs Last 24 Hours:   BMP     CBC    Other     Na 137 Cl 106 BUN 15 Glu 109   Hb 13.4   PTT - Procal -   K 3.9 CO2 21.0 Cr 0.92   WBC 4.5 >< .0  INR - CRP -   Renal Lytes Endo    Hct 38.6   Trop - D dim -   eGFR - Ca 8.9 POC Gluc  -    LFT   pBNP - Lactic -   eGFR AA - PO4 - A1c -   AST 2,401 APk 167 Prot 6.8  LDL -     Mg - TSH -   ALT >3,300 T ankit 3.1 Alb 3.6          COVID-19 Lab Results    COVID-19  Lab Results   Component Value Date    COVID19 Not Detected 11/19/2024    COVID19 Not Detected 07/05/2023    COVID19 Detected (A) 01/07/2022       Pro-Calcitonin  No results for input(s): \"PCT\" in the last 168 hours.    Cardiac  No results for input(s): \"TROP\", \"PBNP\" in the last 168 hours.    Creatinine Kinase  No results for input(s): \"CK\" in the last 168 hours.    Inflammatory Markers  No results for input(s): \"CRP\", \"AVERY\", \"LDH\", \"DDIMER\" in the last 168 hours.    Imaging: Imaging data reviewed in Epic.    Assessment & Plan:    Markedly elevated transaminases: Viral hepatitis versus ischemic hepatitis versus toxins  Mildly elevated bilirubin, alk phos  -Check acute hepatitis panel  -Check Tylenol, CK levels  -LDH  -Liver ultrasound  -Check INR to rule out liver failure  -Consider autoimmune workup, defer to GI  -GI on consult await evaluation and recommendations    Quality:  DVT Prophylaxis: Heparin  CODE status: Full  code  ANGELINE: TBD      Plan of care discussed with patient and family at bedside. Acknowledged understanding and agrees to plan. Also discussed with the ED physician.    High MDM  Time spent on this admission - examining patient, obtaining history, reviewing previous medical records, going over test results/imaging and discussing plan of care. More than 50% of the time was spent in counseling and/or coordination of care related to acute hepatitis.   All questions answered.     Ivelisse Cho MD  11/19/2024                   [1]   Allergies  Allergen Reactions    Sulfa Antibiotics HIVES    Aminoglycosides UNKNOWN     Possibly hives    Compazine [Prochlorperazine] OTHER (SEE COMMENTS)     Convulsions    Morphine OTHER (SEE COMMENTS)     convulsions   [2]   No current facility-administered medications on file prior to encounter.     Current Outpatient Medications on File Prior to Encounter   Medication Sig Dispense Refill    metFORMIN 500 MG Oral Tab Take 1 tablet (500 mg total) by mouth 2 (two) times daily with meals. 180 tablet 3    sertraline 25 MG Oral Tab TAKE 1 TABLET(25 MG) BY MOUTH DAILY 90 tablet 1    Meloxicam 15 MG Oral Tab Take 1 tablet (15 mg total) by mouth daily with food.      Miconazole Nitrate 200 MG Vaginal Suppos       tiZANidine 4 MG Oral Tab TAKE 1 TABLET BY MOUTH EVERY 6 TO 8 HOURS AS NEEDED FOR POST OP MUSCLE SPASMS      topiramate 25 MG Oral Tab Take 1 tablet (25 mg total) by mouth daily. 90 tablet 3    sennosides-docusate (SENOKOT S) 8.6-50 MG Oral Tab Take 1 tablet by mouth in the morning and 1 tablet before bedtime.      cyclobenzaprine 10 MG Oral Tab Take 1 tablet (10 mg total) by mouth nightly as needed for Muscle spasms. 30 tablet 0    ALPRAZolam 0.25 MG Oral Tab Take 1 tablet (0.25 mg total) by mouth daily as needed for Anxiety. 15 tablet 0    Estrogens, Conjugated (PREMARIN) 0.625 MG/GM Vaginal Cream Apply twice weekly to vagina for vaginal burning 42.5 g 0    Blood Glucose Monitoring  Suppl (ONETOUCH ULTRA MINI) w/Device Does not apply Kit Check blood sugars two times daily 1 kit 0

## 2024-11-19 NOTE — ED QUICK NOTES
Orders for admission, patient is aware of plan and ready to go upstairs. Any questions, please call ED RN En at extension [phone].     Patient Covid vaccination status: Unvaccinated     COVID Test Ordered in ED: SARS-CoV-2/Flu A and B/RSV by PCR (GeneXpert)    COVID Suspicion at Admission: N/A    Running Infusions: Metronidzole 100 mL/hr    Mental Status/LOC at time of transport: AxOx4    Other pertinent information:   CIWA score: N/A   NIH score:  N/A

## 2024-11-19 NOTE — PLAN OF CARE
Problem: Patient Centered Care  Goal: Patient preferences are identified and integrated in the patient's plan of care  Description: Interventions:  - What would you like us to know as we care for you? Lives at home with family  - Provide timely, complete, and accurate information to patient/family  - Incorporate patient and family knowledge, values, beliefs, and cultural backgrounds into the planning and delivery of care  - Encourage patient/family to participate in care and decision-making at the level they choose  - Honor patient and family perspectives and choices  Outcome: Progressing     Problem: Diabetes/Glucose Control  Goal: Glucose maintained within prescribed range  Description: INTERVENTIONS:  - Monitor Blood Glucose as ordered  - Assess for signs and symptoms of hyperglycemia and hypoglycemia  - Administer ordered medications to maintain glucose within target range  - Assess barriers to adequate nutritional intake and initiate nutrition consult as needed  - Instruct patient on self management of diabetes  Outcome: Progressing     Problem: Patient/Family Goals  Goal: Patient/Family Long Term Goal  Description: Patient's Long Term Goal: home    Interventions:  - monitor vital signs  - monitor lab results  - monitor test results  - GI on consult  - administer pain medication as needed  - See additional Care Plan goals for specific interventions  Outcome: Progressing  Goal: Patient/Family Short Term Goal  Description: Patient's Short Term Goal: get rid of pain    Interventions:   - administer pain medication as needed  - monitor vital signs  - IVF  - See additional Care Plan goals for specific interventions  Outcome: Progressing     Problem: GASTROINTESTINAL - ADULT  Goal: Minimal or absence of nausea and vomiting  Description: INTERVENTIONS:  - Maintain adequate hydration with IV or PO as ordered and tolerated  - Nasogastric tube to low intermittent suction as ordered  - Evaluate effectiveness of ordered  antiemetic medications  - Provide nonpharmacologic comfort measures as appropriate  - Advance diet as tolerated, if ordered  - Obtain nutritional consult as needed  - Evaluate fluid balance  Outcome: Progressing     Problem: METABOLIC/FLUID AND ELECTROLYTES - ADULT  Goal: Glucose maintained within prescribed range  Description: INTERVENTIONS:  - Monitor Blood Glucose as ordered  - Assess for signs and symptoms of hyperglycemia and hypoglycemia  - Administer ordered medications to maintain glucose within target range  - Assess barriers to adequate nutritional intake and initiate nutrition consult as needed  - Instruct patient on self management of diabetes  Outcome: Progressing  Goal: Electrolytes maintained within normal limits  Description: INTERVENTIONS:  - Monitor labs and rhythm and assess patient for signs and symptoms of electrolyte imbalances  - Administer electrolyte replacement as ordered  - Monitor response to electrolyte replacements, including rhythm and repeat lab results as appropriate  - Fluid restriction as ordered  - Instruct patient on fluid and nutrition restrictions as appropriate  Outcome: Progressing     Problem: PAIN - ADULT  Goal: Verbalizes/displays adequate comfort level or patient's stated pain goal  Description: INTERVENTIONS:  - Encourage pt to monitor pain and request assistance  - Assess pain using appropriate pain scale  - Administer analgesics based on type and severity of pain and evaluate response  - Implement non-pharmacological measures as appropriate and evaluate response  - Consider cultural and social influences on pain and pain management  - Manage/alleviate anxiety  - Utilize distraction and/or relaxation techniques  - Monitor for opioid side effects  - Notify MD/LIP if interventions unsuccessful or patient reports new pain  - Anticipate increased pain with activity and pre-medicate as appropriate  Outcome: Progressing     Problem: SAFETY ADULT - FALL  Goal: Free from fall  injury  Description: INTERVENTIONS:  - Assess pt frequently for physical needs  - Identify cognitive and physical deficits and behaviors that affect risk of falls.  - Sesser fall precautions as indicated by assessment.  - Educate pt/family on patient safety including physical limitations  - Instruct pt to call for assistance with activity based on assessment  - Modify environment to reduce risk of injury  - Provide assistive devices as appropriate  - Consider OT/PT consult to assist with strengthening/mobility  - Encourage toileting schedule  Outcome: Progressing   Vital signs stable. Up and about in room per self. N complaint of nausea/ vomiting. No diarrhea.Continue to monitor. Bed on low and locked position, call light within reach.

## 2024-11-20 LAB
ACTIN SMOOTH MUSCLE AB: 7 UNITS
ALBUMIN SERPL-MCNC: 3 G/DL (ref 3.2–4.8)
ALBUMIN/GLOB SERPL: 1.2 {RATIO} (ref 1–2)
ALP LIVER SERPL-CCNC: 138 U/L
ALT SERPL-CCNC: 2624 U/L
ANION GAP SERPL CALC-SCNC: 6 MMOL/L (ref 0–18)
AST SERPL-CCNC: 1153 U/L (ref ?–34)
BASOPHILS # BLD AUTO: 0.04 X10(3) UL (ref 0–0.2)
BASOPHILS NFR BLD AUTO: 1.2 %
BILIRUB SERPL-MCNC: 3.4 MG/DL (ref 0.3–1.2)
BUN BLD-MCNC: 8 MG/DL (ref 9–23)
BUN/CREAT SERPL: 10 (ref 10–20)
CALCIUM BLD-MCNC: 8.1 MG/DL (ref 8.7–10.4)
CHLORIDE SERPL-SCNC: 111 MMOL/L (ref 98–112)
CO2 SERPL-SCNC: 24 MMOL/L (ref 21–32)
CREAT BLD-MCNC: 0.8 MG/DL
DEPRECATED RDW RBC AUTO: 43.7 FL (ref 35.1–46.3)
EBV NA IGG SER QL IA: POSITIVE
EBV VCA IGG SER QL IA: POSITIVE
EBV VCA IGM SER QL IA: NEGATIVE
EGFRCR SERPLBLD CKD-EPI 2021: 86 ML/MIN/1.73M2 (ref 60–?)
EOSINOPHIL # BLD AUTO: 0.04 X10(3) UL (ref 0–0.7)
EOSINOPHIL NFR BLD AUTO: 1.2 %
ERYTHROCYTE [DISTWIDTH] IN BLOOD BY AUTOMATED COUNT: 13.2 % (ref 11–15)
GLOBULIN PLAS-MCNC: 2.5 G/DL (ref 2–3.5)
GLUCOSE BLD-MCNC: 151 MG/DL (ref 70–99)
GLUCOSE BLDC GLUCOMTR-MCNC: 118 MG/DL (ref 70–99)
GLUCOSE BLDC GLUCOMTR-MCNC: 124 MG/DL (ref 70–99)
GLUCOSE BLDC GLUCOMTR-MCNC: 128 MG/DL (ref 70–99)
GLUCOSE BLDC GLUCOMTR-MCNC: 131 MG/DL (ref 70–99)
HCT VFR BLD AUTO: 35.3 %
HGB BLD-MCNC: 12.2 G/DL
IMM GRANULOCYTES # BLD AUTO: 0.02 X10(3) UL (ref 0–1)
IMM GRANULOCYTES NFR BLD: 0.6 %
INR BLD: 1.49 (ref 0.8–1.2)
LYMPHOCYTES # BLD AUTO: 1.26 X10(3) UL (ref 1–4)
LYMPHOCYTES NFR BLD AUTO: 38.1 %
M2 MITOCHONDRIAL AB: <20 UNITS
MCH RBC QN AUTO: 31.3 PG (ref 26–34)
MCHC RBC AUTO-ENTMCNC: 34.6 G/DL (ref 31–37)
MCV RBC AUTO: 90.5 FL
MONOCYTES # BLD AUTO: 0.55 X10(3) UL (ref 0.1–1)
MONOCYTES NFR BLD AUTO: 16.6 %
NEUTROPHILS # BLD AUTO: 1.4 X10 (3) UL (ref 1.5–7.7)
NEUTROPHILS # BLD AUTO: 1.4 X10(3) UL (ref 1.5–7.7)
NEUTROPHILS NFR BLD AUTO: 42.3 %
OSMOLALITY SERPL CALC.SUM OF ELEC: 293 MOSM/KG (ref 275–295)
PLATELET # BLD AUTO: 185 10(3)UL (ref 150–450)
POTASSIUM SERPL-SCNC: 4 MMOL/L (ref 3.5–5.1)
PROT SERPL-MCNC: 5.5 G/DL (ref 5.7–8.2)
PROTHROMBIN TIME: 18.8 SECONDS (ref 11.6–14.8)
RBC # BLD AUTO: 3.9 X10(6)UL
SODIUM SERPL-SCNC: 141 MMOL/L (ref 136–145)
WBC # BLD AUTO: 3.3 X10(3) UL (ref 4–11)

## 2024-11-20 PROCEDURE — 99233 SBSQ HOSP IP/OBS HIGH 50: CPT | Performed by: INTERNAL MEDICINE

## 2024-11-20 PROCEDURE — 99233 SBSQ HOSP IP/OBS HIGH 50: CPT | Performed by: HOSPITALIST

## 2024-11-20 RX ORDER — ONDANSETRON 2 MG/ML
8 INJECTION INTRAMUSCULAR; INTRAVENOUS EVERY 4 HOURS PRN
Status: DISCONTINUED | OUTPATIENT
Start: 2024-11-20 | End: 2024-11-22

## 2024-11-20 RX ORDER — HYDROMORPHONE HYDROCHLORIDE 1 MG/ML
0.4 INJECTION, SOLUTION INTRAMUSCULAR; INTRAVENOUS; SUBCUTANEOUS EVERY 2 HOUR PRN
Status: DISCONTINUED | OUTPATIENT
Start: 2024-11-20 | End: 2024-11-21

## 2024-11-20 RX ORDER — OXYCODONE HYDROCHLORIDE 5 MG/1
10 TABLET ORAL EVERY 4 HOURS PRN
Status: DISCONTINUED | OUTPATIENT
Start: 2024-11-20 | End: 2024-11-21

## 2024-11-20 RX ORDER — OXYCODONE HYDROCHLORIDE 5 MG/1
5 TABLET ORAL EVERY 4 HOURS PRN
Status: DISCONTINUED | OUTPATIENT
Start: 2024-11-20 | End: 2024-11-21

## 2024-11-20 RX ORDER — HYDROMORPHONE HYDROCHLORIDE 1 MG/ML
0.1 INJECTION, SOLUTION INTRAMUSCULAR; INTRAVENOUS; SUBCUTANEOUS EVERY 2 HOUR PRN
Status: DISCONTINUED | OUTPATIENT
Start: 2024-11-20 | End: 2024-11-21

## 2024-11-20 NOTE — PROGRESS NOTES
[facility]     Gastroenterology Progress Note    Emmanuel Mendiola Patient Status:  Inpatient    10/7/1965 MRN [id number]   Location [facility] 1W Attending Antonio Haley,*   Hosp Day # 1 PCP Bertin Floerz MD       Subjective:   Still feels nauseous with upper abdominal pain. No bowel movement. No fever or chills.     Objective:   Blood pressure 122/71, pulse 83, temperature 99.1 °F (37.3 °C), temperature source Oral, resp. rate 20, weight 149 lb 11.1 oz (67.9 kg), last menstrual period 2017, SpO2 96%, not currently breastfeeding. Body mass index is 26.52 kg/m².    General: awake, alert and oriented, no acute distress  HEENT: moist mucus membranes  PULM: no conversational dyspnea  CARDIOVASCULAR: regular rate and rhythm, the extremities are warm and well perfused  GI: soft, non-tender, non-distended, + BS, no rebound/guarding   EXTREMITIES: no edema, moving all extremities  SKIN: no visible rash  NEURO: appropriate and interactive    Assessment and Plan:   Acute hepatitis A infection with GI symptom manifestation and acute liver injury, synthetic function intact. No known prior liver disease, US with hepatic steatosis. Elevated ferritin is acute phase reactant. Hep B/C negative, ASMA and AMA negative, ceruloplasmin negative. C diff and GI stool PCR negative. Monitor CMP and INR -- LFTs coming down today which is reassuring. Continue supportive care. Avoid hepatotoxic agents.     Amarilis Carter MD  [facility]      Results:     Lab Results   Component Value Date    WBC 3.3 (L) 2024    HGB 12.2 2024    HCT 35.3 2024    .0 2024    CREATSERUM 0.80 2024    BUN 8 (L) 2024     2024    K 4.0 2024     2024    CO2 24.0 2024     (H) 2024    CA 8.1 (L) 2024    ALB 3.0 (L) 2024    ALKPHO 138 (H) 2024    BILT 3.4 (H) 2024    TP 5.5 (L) 2024     AST 1,153 (H) 11/20/2024    ALT 2,624 (H) 11/20/2024    PTT 26.9 12/06/2023    INR 1.49 (H) 11/20/2024    TSH 0.988 11/11/2024    DEANDRA 79 12/13/2017    LIP 35 11/19/2024    DDIMER 0.92 (H) 11/28/2021    ESRML 29 01/23/2020    CRP 0.65 (H) 12/06/2019    TROP <0.045 11/28/2021    CK 52 11/19/2024    CKMB 0.6 12/19/2015    B12 558 01/21/2020    POCGLU 100 11/20/2023       US LIVER LIMITED WITH DOPPLER (CPT=76705/83176)    Result Date: 11/19/2024  CONCLUSION:  1. Diffuse hepatic steatosis without focal hepatic lesions. 2. Nonspecific gallbladder wall thickening without cholelithiasis or pericholecystic fluid.  Negative sonographic Rothman sign.  No biliary dilatation.  Normal common duct. 3. No free fluid hepatorenal fossa. No hydronephrosis right kidney.    Dictated by (CST): Aníbal Tubbs MD on 11/19/2024 at 9:31 AM     Finalized by (CST): Aníbal Tubbs MD on 11/19/2024 at 9:38 AM          CT ABDOMEN+PELVIS(CONTRAST ONLY)(CPT=74177)    Result Date: 11/19/2024  CONCLUSION:  1. Mild gallbladder wall thickening with pericholecystic edema/fluid extending from the danyel hepatis caudally to the pancreaticoduodenal groove.  Differential considerations include acute cholecystitis, duodenitis, groove pancreatitis or hepatitis. 2. No biliary ductal dilatation. 3. Stable 3.4 cm solid right adnexal mass, relatively similar to prior studies dating back to at least 2022 favoring a benign mass such as a broad ligament fibroid, as well as an ovarian fibroma or thecoma. 4. 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 5. Lesser incidental findings as above.   A preliminary report was issued by the [facility] teleradiology service. There are no major discrepancies.  [facility]  Dictated by (CST): James Encarnacion MD on 11/19/2024 at 8:34 AM     Finalized by (CST): James Encarnacion MD on 11/19/2024 at 8:46 AM          US GALLBLADDER (CPT=76705)    Result Date: 11/19/2024  CONCLUSION:   Development of mild gallbladder wall thickening with mural edema and/or trace pericholecystic fluid.  However, no evidence of cholelithiasis, choledocholithiasis or tenderness over the gallbladder.  These findings are therefore indeterminate for acalculous cholecystitis.  Additional differential considerations include sequela of CHF, fluid overload or hypoalbuminemia.   A preliminary report was issued by the [facility] teleradiology service. There are no major discrepancies.  [facility]  Dictated by (CST): James Encarnacion MD on 11/19/2024 at 5:12 AM     Finalized by (CST): James Encarnacion MD on 11/19/2024 at 5:16 AM          XR CHEST AP PORTABLE  (CPT=71045)    Result Date: 11/19/2024  CONCLUSION:  No acute cardiopulmonary process.   A preliminary report was issued by the [facility] teleradiology service. There are no major discrepancies.  [facility]  Dictated by (CST): James Encarnacion MD on 11/19/2024 at 4:35 AM     Finalized by (CST): James Encarnacion MD on 11/19/2024 at 4:36 AM

## 2024-11-20 NOTE — PLAN OF CARE
Problem: Patient Centered Care  Goal: Patient preferences are identified and integrated in the patient's plan of care  Description: Interventions:  - What would you like us to know as we care for you? Lives at home with family  - Provide timely, complete, and accurate information to patient/family  - Incorporate patient and family knowledge, values, beliefs, and cultural backgrounds into the planning and delivery of care  - Encourage patient/family to participate in care and decision-making at the level they choose  - Honor patient and family perspectives and choices  Outcome: Progressing     Problem: Diabetes/Glucose Control  Goal: Glucose maintained within prescribed range  Description: INTERVENTIONS:  - Monitor Blood Glucose as ordered  - Assess for signs and symptoms of hyperglycemia and hypoglycemia  - Administer ordered medications to maintain glucose within target range  - Assess barriers to adequate nutritional intake and initiate nutrition consult as needed  - Instruct patient on self management of diabetes  Outcome: Progressing     Problem: Patient/Family Goals  Goal: Patient/Family Long Term Goal  Description: Patient's Long Term Goal: go home    Interventions:  - IVF  - monitor vital signs  - monitor lab results  - administer pain medications as needed  - See additional Care Plan goals for specific interventions  Outcome: Progressing  Goal: Patient/Family Short Term Goal  Description: Patient's Short Term Goal: get rid of the pain    Interventions:   - IVF  - monitor vital signs  - monitor lab results  - pain medication  - NPO after midnight for HIDA scan in am  - See additional Care Plan goals for specific interventions  Outcome: Progressing     Problem: GASTROINTESTINAL - ADULT  Goal: Minimal or absence of nausea and vomiting  Description: INTERVENTIONS:  - Maintain adequate hydration with IV or PO as ordered and tolerated  - Nasogastric tube to low intermittent suction as ordered  - Evaluate  effectiveness of ordered antiemetic medications  - Provide nonpharmacologic comfort measures as appropriate  - Advance diet as tolerated, if ordered  - Obtain nutritional consult as needed  - Evaluate fluid balance  Outcome: Progressing     Problem: METABOLIC/FLUID AND ELECTROLYTES - ADULT  Goal: Glucose maintained within prescribed range  Description: INTERVENTIONS:  - Monitor Blood Glucose as ordered  - Assess for signs and symptoms of hyperglycemia and hypoglycemia  - Administer ordered medications to maintain glucose within target range  - Assess barriers to adequate nutritional intake and initiate nutrition consult as needed  - Instruct patient on self management of diabetes  Outcome: Progressing  Goal: Electrolytes maintained within normal limits  Description: INTERVENTIONS:  - Monitor labs and rhythm and assess patient for signs and symptoms of electrolyte imbalances  - Administer electrolyte replacement as ordered  - Monitor response to electrolyte replacements, including rhythm and repeat lab results as appropriate  - Fluid restriction as ordered  - Instruct patient on fluid and nutrition restrictions as appropriate  Outcome: Progressing     Problem: SAFETY ADULT - FALL  Goal: Free from fall injury  Description: INTERVENTIONS:  - Assess pt frequently for physical needs  - Identify cognitive and physical deficits and behaviors that affect risk of falls.  - [place] fall precautions as indicated by assessment.  - Educate pt/family on patient safety including physical limitations  - Instruct pt to call for assistance with activity based on assessment  - Modify environment to reduce risk of injury  - Provide assistive devices as appropriate  - Consider OT/PT consult to assist with strengthening/mobility  - Encourage toileting schedule  Outcome: Progressing   Vital signs stable. No complaint of nausea, poor appetite, patient complaint that when she eats she gets the pain on her abdomen. Refusing to take any  pain medication at this time. IVF . Plan for HIDA scan in am. NPO after midnight. Continue to monitor and offer pain medication. Bed on low and locked position, call light within reach.

## 2024-11-20 NOTE — PROGRESS NOTES
[facility]  part of [facility]    Progress Note    Emmanuel Mendiola Patient Status:  Inpatient    10/7/1965 MRN [id number]   Location [facility] 1W Attending Antonio Haley,*   Hosp Day # 1 PCP Bertin Florez MD     Chief Complaint: I am miserable    Subjective:     Constitutional:  Positive for appetite change and fatigue.   HENT:  Negative for congestion.    Respiratory:  Negative for choking, chest tightness and shortness of breath.    Cardiovascular:  Negative for leg swelling.   Gastrointestinal:  Positive for nausea and abdominal pain.   Genitourinary:  Negative for dysuria.   Musculoskeletal:  Negative for joint pain.   Neurological:  Positive for weakness.   Psychiatric/Behavioral:  Positive for sleep disturbance. Negative for confusion and decreased concentration. The patient is nervous/anxious.        Objective:   Blood pressure 122/71, pulse 83, temperature 99.1 °F (37.3 °C), temperature source Oral, resp. rate 20, weight 149 lb 11.1 oz (67.9 kg), last menstrual period 2017, SpO2 96%, not currently breastfeeding.  Physical Exam  Vitals and nursing note reviewed.   Constitutional:       General: She is in acute distress.      Appearance: She is obese. She is ill-appearing. She is not toxic-appearing or diaphoretic.   HENT:      Head: Normocephalic and atraumatic.   Cardiovascular:      Rate and Rhythm: Normal rate.      Pulses: Normal pulses.   Pulmonary:      Breath sounds: Rhonchi present. No rales.   Abdominal:      General: Bowel sounds are normal.      Palpations: Abdomen is soft.      Tenderness: There is abdominal tenderness.   Skin:     General: Skin is warm and dry.      Capillary Refill: Capillary refill takes less than 2 seconds.   Neurological:      Mental Status: She is alert and oriented to person, place, and time.   Psychiatric:         Behavior: Behavior normal.         Judgment: Judgment normal.         Results:   Lab Results   Component  Value Date    WBC 3.3 (L) 11/20/2024    HGB 12.2 11/20/2024    HCT 35.3 11/20/2024    .0 11/20/2024    CREATSERUM 0.80 11/20/2024    BUN 8 (L) 11/20/2024     11/20/2024    K 4.0 11/20/2024     11/20/2024    CO2 24.0 11/20/2024     (H) 11/20/2024    CA 8.1 (L) 11/20/2024    ALB 3.0 (L) 11/20/2024    ALKPHO 138 (H) 11/20/2024    BILT 3.4 (H) 11/20/2024    TP 5.5 (L) 11/20/2024    AST 1,153 (H) 11/20/2024    ALT 2,624 (H) 11/20/2024    PTT 26.9 12/06/2023    INR 1.49 (H) 11/20/2024    TSH 0.988 11/11/2024    DEANDRA 79 12/13/2017    LIP 35 11/19/2024    DDIMER 0.92 (H) 11/28/2021    ESRML 29 01/23/2020    CRP 0.65 (H) 12/06/2019    TROP <0.045 11/28/2021    CK 52 11/19/2024    CKMB 0.6 12/19/2015    B12 558 01/21/2020    POCGLU 100 11/20/2023       US LIVER LIMITED WITH DOPPLER (CPT=76705/74729)    Result Date: 11/19/2024  CONCLUSION:  1. Diffuse hepatic steatosis without focal hepatic lesions. 2. Nonspecific gallbladder wall thickening without cholelithiasis or pericholecystic fluid.  Negative sonographic Rothman sign.  No biliary dilatation.  Normal common duct. 3. No free fluid hepatorenal fossa. No hydronephrosis right kidney.    Dictated by (CST): Aníbal Tubbs MD on 11/19/2024 at 9:31 AM     Finalized by (CST): Aníbal Tubbs MD on 11/19/2024 at 9:38 AM          CT ABDOMEN+PELVIS(CONTRAST ONLY)(CPT=74177)    Result Date: 11/19/2024  CONCLUSION:  1. Mild gallbladder wall thickening with pericholecystic edema/fluid extending from the danyel hepatis caudally to the pancreaticoduodenal groove.  Differential considerations include acute cholecystitis, duodenitis, groove pancreatitis or hepatitis. 2. No biliary ductal dilatation. 3. Stable 3.4 cm solid right adnexal mass, relatively similar to prior studies dating back to at least 2022 favoring a benign mass such as a broad ligament fibroid, as well as an ovarian fibroma or thecoma. 4. Stable chronic thrombosed peripherally calcified  splenic artery aneurysm. 5. Lesser incidental findings as above.   A preliminary report was issued by the [facility] teleradiology service. There are no major discrepancies.  [facility]  Dictated by (CST): James Encarnacion MD on 11/19/2024 at 8:34 AM     Finalized by (CST): James Encarnacion MD on 11/19/2024 at 8:46 AM          US GALLBLADDER (CPT=76705)    Result Date: 11/19/2024  CONCLUSION:  Development of mild gallbladder wall thickening with mural edema and/or trace pericholecystic fluid.  However, no evidence of cholelithiasis, choledocholithiasis or tenderness over the gallbladder.  These findings are therefore indeterminate for acalculous cholecystitis.  Additional differential considerations include sequela of CHF, fluid overload or hypoalbuminemia.   A preliminary report was issued by the [facility] teleradiology service. There are no major discrepancies.  [facility]  Dictated by (CST): James Encarnacion MD on 11/19/2024 at 5:12 AM     Finalized by (CST): James Encarnacion MD on 11/19/2024 at 5:16 AM          XR CHEST AP PORTABLE  (CPT=71045)    Result Date: 11/19/2024  CONCLUSION:  No acute cardiopulmonary process.   A preliminary report was issued by the [facility] teleradiology service. There are no major discrepancies.  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 Will give dilaudid since she cannot eat and has terrible pains in her epigastric area from motrin     Quality:  DVT Prophylaxis: Heparin  CODE status: Full code  ANGELINE: TBD    Complex mdm; coordinating care with nurse and counseling pt about hepatitis/allergies      COURTNEY FOWLER MD

## 2024-11-21 ENCOUNTER — APPOINTMENT (OUTPATIENT)
Dept: NUCLEAR MEDICINE | Facility: HOSPITAL | Age: 59
End: 2024-11-21
Attending: HOSPITALIST
Payer: COMMERCIAL

## 2024-11-21 LAB
ALBUMIN SERPL-MCNC: 2.8 G/DL (ref 3.2–4.8)
ALBUMIN/GLOB SERPL: 1.2 {RATIO} (ref 1–2)
ALP LIVER SERPL-CCNC: 133 U/L
ALT SERPL-CCNC: 2294 U/L
ANION GAP SERPL CALC-SCNC: 5 MMOL/L (ref 0–18)
AST SERPL-CCNC: 932 U/L (ref ?–34)
BASOPHILS # BLD AUTO: 0.03 X10(3) UL (ref 0–0.2)
BASOPHILS NFR BLD AUTO: 0.9 %
BILIRUB SERPL-MCNC: 4.6 MG/DL (ref 0.3–1.2)
BUN BLD-MCNC: 6 MG/DL (ref 9–23)
BUN/CREAT SERPL: 8.8 (ref 10–20)
CALCIUM BLD-MCNC: 7.9 MG/DL (ref 8.7–10.4)
CHLORIDE SERPL-SCNC: 110 MMOL/L (ref 98–112)
CO2 SERPL-SCNC: 25 MMOL/L (ref 21–32)
CREAT BLD-MCNC: 0.68 MG/DL
DEPRECATED RDW RBC AUTO: 43.6 FL (ref 35.1–46.3)
EGFRCR SERPLBLD CKD-EPI 2021: 102 ML/MIN/1.73M2 (ref 60–?)
EOSINOPHIL # BLD AUTO: 0.04 X10(3) UL (ref 0–0.7)
EOSINOPHIL NFR BLD AUTO: 1.2 %
ERYTHROCYTE [DISTWIDTH] IN BLOOD BY AUTOMATED COUNT: 13.3 % (ref 11–15)
GLOBULIN PLAS-MCNC: 2.4 G/DL (ref 2–3.5)
GLUCOSE BLD-MCNC: 145 MG/DL (ref 70–99)
GLUCOSE BLDC GLUCOMTR-MCNC: 116 MG/DL (ref 70–99)
GLUCOSE BLDC GLUCOMTR-MCNC: 118 MG/DL (ref 70–99)
GLUCOSE BLDC GLUCOMTR-MCNC: 125 MG/DL (ref 70–99)
HCT VFR BLD AUTO: 34.5 %
HGB BLD-MCNC: 12.1 G/DL
IMM GRANULOCYTES # BLD AUTO: 0.01 X10(3) UL (ref 0–1)
IMM GRANULOCYTES NFR BLD: 0.3 %
INR BLD: 1.54 (ref 0.8–1.2)
LYMPHOCYTES # BLD AUTO: 1.03 X10(3) UL (ref 1–4)
LYMPHOCYTES NFR BLD AUTO: 30.6 %
MAGNESIUM SERPL-MCNC: 1.5 MG/DL (ref 1.6–2.6)
MCH RBC QN AUTO: 31.3 PG (ref 26–34)
MCHC RBC AUTO-ENTMCNC: 35.1 G/DL (ref 31–37)
MCV RBC AUTO: 89.1 FL
MONOCYTES # BLD AUTO: 0.48 X10(3) UL (ref 0.1–1)
MONOCYTES NFR BLD AUTO: 14.2 %
NEUTROPHILS # BLD AUTO: 1.78 X10 (3) UL (ref 1.5–7.7)
NEUTROPHILS # BLD AUTO: 1.78 X10(3) UL (ref 1.5–7.7)
NEUTROPHILS NFR BLD AUTO: 52.8 %
OSMOLALITY SERPL CALC.SUM OF ELEC: 290 MOSM/KG (ref 275–295)
PHOSPHATE SERPL-MCNC: 2.7 MG/DL (ref 2.4–5.1)
PLATELET # BLD AUTO: 168 10(3)UL (ref 150–450)
POTASSIUM SERPL-SCNC: 3.3 MMOL/L (ref 3.5–5.1)
PROT SERPL-MCNC: 5.2 G/DL (ref 5.7–8.2)
PROTHROMBIN TIME: 19.3 SECONDS (ref 11.6–14.8)
RBC # BLD AUTO: 3.87 X10(6)UL
SODIUM SERPL-SCNC: 140 MMOL/L (ref 136–145)
WBC # BLD AUTO: 3.4 X10(3) UL (ref 4–11)

## 2024-11-21 PROCEDURE — 99233 SBSQ HOSP IP/OBS HIGH 50: CPT | Performed by: INTERNAL MEDICINE

## 2024-11-21 PROCEDURE — 99233 SBSQ HOSP IP/OBS HIGH 50: CPT | Performed by: HOSPITALIST

## 2024-11-21 PROCEDURE — 78226 HEPATOBILIARY SYSTEM IMAGING: CPT | Performed by: HOSPITALIST

## 2024-11-21 RX ORDER — TRAMADOL HYDROCHLORIDE 50 MG/1
100 TABLET ORAL EVERY 6 HOURS PRN
Status: DISCONTINUED | OUTPATIENT
Start: 2024-11-21 | End: 2024-11-27

## 2024-11-21 RX ORDER — KETOROLAC TROMETHAMINE 15 MG/ML
15 INJECTION, SOLUTION INTRAMUSCULAR; INTRAVENOUS EVERY 6 HOURS PRN
Status: DISCONTINUED | OUTPATIENT
Start: 2024-11-21 | End: 2024-11-22

## 2024-11-21 RX ORDER — MAGNESIUM OXIDE 400 MG/1
800 TABLET ORAL ONCE
Status: DISCONTINUED | OUTPATIENT
Start: 2024-11-21 | End: 2024-11-21

## 2024-11-21 RX ORDER — TRAMADOL HYDROCHLORIDE 50 MG/1
50 TABLET ORAL EVERY 6 HOURS PRN
Status: DISCONTINUED | OUTPATIENT
Start: 2024-11-21 | End: 2024-11-27

## 2024-11-21 RX ORDER — POTASSIUM CHLORIDE 1500 MG/1
40 TABLET, EXTENDED RELEASE ORAL ONCE
Status: DISCONTINUED | OUTPATIENT
Start: 2024-11-21 | End: 2024-11-21

## 2024-11-21 RX ORDER — LORAZEPAM 2 MG/ML
0.5 INJECTION INTRAMUSCULAR EVERY 8 HOURS PRN
Status: DISCONTINUED | OUTPATIENT
Start: 2024-11-21 | End: 2024-11-27

## 2024-11-21 NOTE — PAYOR COMM NOTE
--------------  ADMISSION REVIEW     Payor: [employer] [facility]  Subscriber #:  [id number]  Authorization Number: [id number]    Admit date: 11/19/24  Admit time:  5:19 AM       REVIEW DOCUMENTATION:     ED Provider Notes        ED Provider Notes signed by Katie Acosta MD at 11/19/2024 12:04 PM       Author: Katie Acosta MD Service: -- Author Type: Physician    Filed: 11/19/2024 12:04 PM Date of Service: 11/19/2024  2:14 AM Status: Signed    : Katie Acosta MD (Physician)           Patient Seen in: [facility] Emergency Department      History     Chief Complaint   Patient presents with    Abdomen/Flank Pain    Headache     Stated Complaint: abd pain, HA    Subjective:   HPI      59 year old female with h/o anemia, dm, endometriosis who presents with 2-3 weeks of intermittent n/v and low grade fevers, epigastric abd pain radiating to LUQ. Pt states she hasn't been able to tolerate food/water today. She initially felt flu-like sx but then sx did not improve. +dry cough for past few weeks as well.     Objective:     Past Medical History:    Anemia    recently dx-taking FeSO4    Back problem    Diabetes (HCC)    Disorder of liver    Divorce    Finalized 2010    DM2 (diabetes mellitus, type 2) (HCC)    controlled with diet    Endometriosis    Laparoscopy-diagnostic    Esophageal reflux    History of blood transfusion    No reaction    History of pregnancy    EAB    Hx of motion sickness    Lipid screening    per NG    Migraines    Nausea and vomiting in adult    Visual impairment    wears glasses              Past Surgical History:   Procedure Laterality Date    Biopsy of uterus lining  6/2011    neg embx    Colonoscopy N/A 1/10/2018    Procedure: COLONOSCOPY;  Surgeon: Humble Hodges MD;  [facility]    Colonoscopy N/A 8/24/2023    Procedure: COLONOSCOPY;  Surgeon: Humble Hodges MD;  Location: [facility] MAIN OR    Laparoscopy,diagnostic  1996    Diagnostic for endometriosis                 Social History     Socioeconomic History    Marital status:    Tobacco Use    Smoking status: Former     Current packs/day: 0.00     Average packs/day: 1 pack/day for 5.0 years (5.0 ttl pk-yrs)     Types: Cigarettes     Start date:      Quit date:      Years since quittin.9     Passive exposure: Past    Smokeless tobacco: Never   Vaping Use    Vaping status: Never Used   Substance and Sexual Activity    Alcohol use: Yes     Alcohol/week: 1.0 standard drink of alcohol     Types: 1 Glasses of wine per week     Comment: 3 glasses per month     Drug use: No    Sexual activity: Yes     Partners: Male     Birth control/protection: Condom   Other Topics Concern    Caffeine Concern No    History of tanning Yes    Breast feeding No    Reaction to local anesthetic No    Pt has a pacemaker No    Pt has a defibrillator No     Social Drivers of Health     Financial Resource Strain: Low Risk  (2023)    Financial Resource Strain     Difficulty of Paying Living Expenses: Not very hard     Med Affordability: No   Food Insecurity: No Food Insecurity (2024)    Food Insecurity     Food Insecurity: Never true   Transportation Needs: No Transportation Needs (2024)    Transportation Needs     Lack of Transportation: No   Housing Stability: Low Risk  (2024)    Housing Stability     Housing Instability: No                  Physical Exam     ED Triage Vitals   BP 24 2353 131/76   Pulse 24 2353 88   Resp 243 18   Temp 24 0046 98.7 °F (37.1 °C)   Temp src 24 0046 Oral   SpO2 24 94 %   O2 Device 24 None (Room air)       Current Vitals:   Vital Signs  BP: 132/62  Pulse: 70  Resp: 20  Temp: 99.2 °F (37.3 °C)  Temp src: Oral  MAP (mmHg): 82    Oxygen Therapy  SpO2: 96 %  O2 Device: None (Room air)  Pulse Oximetry Type: Intermittent        Physical Exam  Vitals and nursing note reviewed.   Constitutional:       General: She is not in acute  distress.     Appearance: Normal appearance. She is well-developed. She is not ill-appearing, toxic-appearing or diaphoretic.   HENT:      Head: Normocephalic and atraumatic.   Eyes:      Conjunctiva/sclera: Conjunctivae normal.      Pupils: Pupils are equal, round, and reactive to light.   Cardiovascular:      Rate and Rhythm: Normal rate and regular rhythm.      Pulses: Normal pulses.      Heart sounds: Normal heart sounds. No murmur heard.  Pulmonary:      Effort: Pulmonary effort is normal. No respiratory distress.      Breath sounds: Normal breath sounds. No wheezing.   Abdominal:      General: There is no distension.      Palpations: Abdomen is soft.      Tenderness: There is abdominal tenderness in the epigastric area and left upper quadrant. There is no guarding.   Musculoskeletal:         General: No tenderness. Normal range of motion.      Cervical back: Full passive range of motion without pain, normal range of motion and neck supple. No rigidity. Normal range of motion.      Right lower leg: No edema.      Left lower leg: No edema.   Skin:     General: Skin is warm and dry.      Findings: No rash.   Neurological:      Mental Status: She is alert and oriented to person, place, and time.      GCS: GCS eye subscore is 4. GCS verbal subscore is 5. GCS motor subscore is 6.      Sensory: Sensation is intact. No sensory deficit.      Motor: Motor function is intact. No weakness.   Psychiatric:         Attention and Perception: Attention normal.         Mood and Affect: Mood normal.         Behavior: Behavior normal. Behavior is cooperative.           ED Course     Labs Reviewed   COMP METABOLIC PANEL (14) - Abnormal; Notable for the following components:       Result Value    Glucose 109 (*)     ALT >3,300 (*)     AST 2,401 (*)     Alkaline Phosphatase 167 (*)     Bilirubin, Total 3.1 (*)     All other components within normal limits   URINALYSIS WITH CULTURE REFLEX - Abnormal; Notable for the following  components:    Ketones Urine 40 (*)     Blood Urine 1+ (*)     Protein Urine 30 (*)     Urobilinogen Urine 8 (*)     RBC Urine 6-10 (*)     Squamous Epi. Cells Few (*)     All other components within normal limits   ICTOTEST - Abnormal; Notable for the following components:    Ictotest Positive (*)     All other components within normal limits   HEPATITIS PANEL, ACUTE (4) - Abnormal; Notable for the following components:    Hepatitis A Ab, IgM Reactive (*)     All other components within normal limits   ACETAMINOPHEN (TYLENOL), S - Abnormal; Notable for the following components:    Acetaminophen <2.0 (*)     All other components within normal limits   PROTHROMBIN TIME (PT) - Abnormal; Notable for the following components:    PT 20.8 (*)     INR 1.70 (*)     All other components within normal limits   LDH - Abnormal; Notable for the following components:    LDH 1,309 (*)     All other components within normal limits   HEP A AB, TOTAL - Abnormal; Notable for the following components:    Hepatitis A Virus Ab, Total Reactive (*)     All other components within normal limits   IRON AND TIBC - Abnormal; Notable for the following components:    Transferrin 145 (*)     Total Iron Binding Capacity 216 (*)     All other components within normal limits   FERRITIN - Abnormal; Notable for the following components:    Ferritin 3,726 (*)     All other components within normal limits   COMP METABOLIC PANEL (14) - Abnormal; Notable for the following components:    Calcium, Total 8.3 (*)     ALT >3,300 (*)     AST 1,991 (*)     Alkaline Phosphatase 146 (*)     Bilirubin, Total 2.8 (*)     All other components within normal limits   LIPASE - Normal   CK CREATINE KINASE (NOT CREATININE) - Normal   FIBRINOGEN ACTIVITY - Normal   POCT GLUCOSE - Normal   POCT GLUCOSE - Normal   POCT GLUCOSE - Normal   SARS-COV-2/FLU A AND B/RSV BY PCR (GENEXPERT) - Normal    Narrative:     This test is intended for the qualitative detection and  differentiation of SARS-CoV-2, influenza A, influenza B, and respiratory syncytial virus (RSV) viral RNA in nasopharyngeal or nares swabs from individuals suspected of respiratory viral infection consistent with COVID-19 by their healthcare provider. Signs and symptoms of respiratory viral infection due to SARS-CoV-2, influenza, and RSV can be similar.    Test performed using the Xpert Xpress SARS-CoV-2/FLU/RSV (real time RT-PCR)  assay on the GeneXpert instrument, "[employer] [place] [address].   This test is being used under the Food and Drug Administration's Emergency Use Authorization.    The authorized Fact Sheet for Healthcare Providers for this assay is available upon request from the laboratory.   CBC WITH DIFFERENTIAL WITH PLATELET   ACTIN (SMOOTH MUSCLE) ANTIBODY   MITOCHONDRIAL (M2) ANTIBODY   CERULOPLASMIN   EBV, CHRONIC/ACTIVE INFECTION,IGG/IGM   HEP A AB, IGM   COMP METABOLIC PANEL (14)   PROTHROMBIN TIME (PT)   RAINBOW DRAW LIGHT GREEN   RAINBOW DRAW LAVENDER   HOLD REACTIVE HEPATITIS A IGM    Narrative:     Held for 30 days       ED Course as of 11/19/24 1202  ------------------------------------------------------------  Time: 11/19 0431  Value: US GALLBLADDER (CPT=76705)  Comment: Right upper quadrant ultrasound    Comparison: CT from the same day      IMPRESSION:    No evidence of cholelithiasis.    Nonspecific gallbladder wall thickening, at 3.5 mm.  Question edema within the gallbladder wall versus trace pericholecystic fluid.    Negative sonographic Rothman sign.    No biliary dilatation is identified.    Visualized portions of the liver, pancreas, and right kidney are unremarkable.               MDM      Pulse Ox: 96%, Normal, RA    Cardiac Monitor:   Pulse Readings from Last 1 Encounters:   11/19/24 70   , sinus, normal for rate and rhythm       Radiology findings:     X-ray chest, single portable upright AP view at 1:17 AM  Comparison: November 28, 2021  IMPRESSION:  No acute radiographic  cardiopulmonary abnormality.    US LIVER LIMITED WITH DOPPLER (CPT=76705/85669)    Result Date: 11/19/2024  CONCLUSION:  1. Diffuse hepatic steatosis without focal hepatic lesions. 2. Nonspecific gallbladder wall thickening without cholelithiasis or pericholecystic fluid.  Negative sonographic Rothman sign.  No biliary dilatation.  Normal common duct. 3. No free fluid hepatorenal fossa. No hydronephrosis right kidney.    Dictated by (CST): Aníbal Tubbs MD on 11/19/2024 at 9:31 AM     Finalized by (CST): Aníbal Tubbs MD on 11/19/2024 at 9:38 AM          CT ABDOMEN+PELVIS(CONTRAST ONLY)(CPT=74177)    Result Date: 11/19/2024  CONCLUSION:  1. Mild gallbladder wall thickening with pericholecystic edema/fluid extending from the danyel hepatis caudally to the pancreaticoduodenal groove.  Differential considerations include acute cholecystitis, duodenitis, groove pancreatitis or hepatitis. 2. No biliary ductal dilatation. 3. Stable 3.4 cm solid right adnexal mass, relatively similar to prior studies dating back to at least 2022 favoring a benign mass such as a broad ligament fibroid, as well as an ovarian fibroma or thecoma. 4. 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 5. Lesser incidental findings as above.   A preliminary report was issued by the [facility] teleradiology service. There are no major discrepancies.  [facility]  Dictated by (CST): James Encarnacion MD on 11/19/2024 at 8:34 AM     Finalized by (CST): James Encarnacion MD on 11/19/2024 at 8:46 AM          US GALLBLADDER (CPT=76705)    Result Date: 11/19/2024  CONCLUSION:  Development of mild gallbladder wall thickening with mural edema and/or trace pericholecystic fluid.  However, no evidence of cholelithiasis, choledocholithiasis or tenderness over the gallbladder.  These findings are therefore indeterminate for acalculous cholecystitis.  Additional differential considerations include sequela of CHF, fluid  overload or hypoalbuminemia.   A preliminary report was issued by the [facility] teleradiology service. There are no major discrepancies.  [facility]  Dictated by (CST): James Encarnacion MD on 11/19/2024 at 5:12 AM     Finalized by (CST): James Encarnacion MD on 11/19/2024 at 5:16 AM          XR CHEST AP PORTABLE  (CPT=71045)    Result Date: 11/19/2024  CONCLUSION:  No acute cardiopulmonary process.   A preliminary report was issued by the [facility] teleradiology service. There are no major discrepancies.  [facility]  Dictated by (CST): James Encarnacion MD on 11/19/2024 at 4:35 AM     Finalized by (CST): James Encarnacion MD on 11/19/2024 at 4:36 AM           Medications   metRONIDAZOLE in sodium chloride 0.79% (Flagyl) 5 mg/mL IVPB premix 500 mg (500 mg Intravenous Handoff 11/19/24 0440)   heparin (Porcine) 5000 UNIT/ML injection 5,000 Units (has no administration in time range)   ondansetron (Zofran) 4 MG/2ML injection 4 mg (has no administration in time range)   HYDROmorphone (Dilaudid) 1 MG/ML injection 0.4 mg (has no administration in time range)   dextrose 5%-sodium chloride 0.9% infusion ( Intravenous New Bag 11/19/24 1026)   sodium chloride 0.9 % IV bolus 1,000 mL (0 mL Intravenous Stopped 11/19/24 0129)   metoclopramide (Reglan) 5 mg/mL injection 10 mg (10 mg Intravenous Given 11/19/24 0143)   sodium chloride 0.9 % IV bolus 250 mL (0 mL Intravenous Stopped 11/19/24 0304)   iopamidol 76% (ISOVUE-370) injection for power injector (70 mL Intravenous Given 11/19/24 0255)   cefTRIAXone (Rocephin) 2 g in sodium chloride 0.9% 100 mL IVPB-ADDV (0 g Intravenous Stopped 11/19/24 0433)   ondansetron (Zofran) 4 MG/2ML injection 4 mg (4 mg Intravenous Given 11/19/24 0506)       Pt with abd pain, n/v, significant transaminitis. No obvious biliary obstruction noted on imaging. Admit, covered empirically with rocephin/flagyl, GI on consult.    D/w Dr Cho - will admit  PS msg sent to Dr Drummond for AM  consult        Admission disposition: 11/19/2024  4:31 AM               Disposition and Plan     Clinical Impression:  1. Transaminitis    2. Nausea and vomiting in adult         Disposition:  Admit  11/19/2024  4:31 am    Follow-up:  No follow-up provider specified.        Medications Prescribed:  Current Discharge Medication List              Supplementary Documentation:         Hospital Problems       Present on Admission  Date Reviewed: 11/19/2024            ICD-10-CM Noted POA    * (Principal) Transaminitis R74.01 11/19/2024 Unknown    Acute hepatitis B17.9 11/19/2024 Yes    Nausea and vomiting in adult R11.2 11/19/2024 Unknown                                                          Signed by Katie Acosta MD on 11/19/2024 12:04 PM         MEDICATIONS ADMINISTERED IN LAST 1 DAY:  dextrose 5%-sodium chloride 0.9% infusion       Date Action Dose Route User    11/21/2024 1028 New Bag (none) Intravenous Deidre Jolly RN    11/20/2024 1552 New Bag (none) Intravenous Kimmy Fagan RN          heparin (Porcine) 5000 UNIT/ML injection 5,000 Units       Date Action Dose Route User    11/21/2024 0600 Given 5,000 Units Subcutaneous (Right Lower Abdomen) Jes Brunner RN    11/20/2024 2053 Given 5,000 Units Subcutaneous (Left Lower Abdomen) Jes Brunner RN    11/20/2024 1334 Given 5,000 Units Subcutaneous (Right Lower Abdomen) Kimmy Fagan RN          ondansetron (Zofran) 4 MG/2ML injection 8 mg       Date Action Dose Route User    11/21/2024 1019 Given 8 mg Intravenous Deidre Jolly RN    11/21/2024 0536 Given 8 mg Intravenous Jes Brunner RN    11/20/2024 2056 Given 8 mg Intravenous Jes Brunner RN          oxyCODONE immediate release tab 5 mg       Date Action Dose Route User    11/20/2024 2056 Given 5 mg Oral Jes Brunner RN          pantoprazole (Protonix) 40 mg in sodium chloride 0.9% PF 10 mL IV push       Date Action Dose Route User    11/20/2024 1216 Given 40 mg  Intravenous Kimmy Fagan RN          pantoprazole (Protonix) 40 mg in sodium chloride 0.9% PF 10 mL IV push       Date Action Dose Route User    11/21/2024 1020 Given 40 mg Intravenous Deidre Jolly RN    11/20/2024 2345 Given 40 mg Intravenous Jes Brunner RN            Vitals (last day)       Date/Time Temp Pulse Resp BP SpO2 Weight O2 Device O2 Flow Rate (L/min) [facility]    11/21/24 0935 97.1 °F (36.2 °C) 73 20 145/83 98 % -- None (Room air) --     11/21/24 0543 99 °F (37.2 °C) 70 20 134/83 96 % -- None (Room air) --     11/20/24 2051 99.2 °F (37.3 °C) 80 18 128/81 97 % -- None (Room air) --     11/20/24 1517 99.9 °F (37.7 °C) 72 20 137/79 96 % -- None (Room air) --     11/20/24 0811 99.1 °F (37.3 °C) 83 20 122/71 96 % -- None (Room air) --     11/20/24 0533 99.3 °F (37.4 °C) 74 20 129/77 96 % -- None (Room air) --          H&P    Subjective:  Emmanuel Mendiola is a 59 year old female with history of DM, GERD who presented to the ED with complaints of abdominal pain, associated with nausea no vomiting.  Had mild diarrhea.  She also complains of generalized aches and pains but no fever or chills.  Onset of symptoms about 3 days ago.  Only sick contact his grandson with acute viral illness.  No recent travel, no unusual foods.     At presentation, noted to be markedly elevated transaminases, mildly elevated alk phos and bilirubin.  She denies alcohol use.  Has only taken 2 tablets of Tylenol in the last 3 days.  Denies use of other OTC or herbal medications.  Was prescribed metformin recently, she took a tablet.  Did not like the way she felt and has not taking since then.     Of note, she had labs with PCP on 11/11/24 with only mildly elevated AST/ALT.     CXR: No acute cardiopulmonary process.  US gallbladder: Development of mild gallbladder wall thickening with mural edema and or trace pericholecystic fluid.  No evidence of cholelithiasis, choledocholithiasis or tenderness over the  gallbladder.  These findings are therefore indeterminate for acalculous cholecystitis.     CT abdomen pelvis: Moderate edema and inflammatory fat stranding in the danyel hepatis about gallbladder neck and extrahepatic biliary tree, however gallbladder is not abnormally distended and there is no radiopaque gallstones.  No biliary ductal dilatation.     GI has been consulted.  Patient is being admitted for further evaluation       Assessment & Plan:  Markedly elevated transaminases: Viral hepatitis versus ischemic hepatitis versus toxins  Mildly elevated bilirubin, alk phos  -Check acute hepatitis panel  -Check Tylenol, CK levels  -LDH  -Liver ultrasound  -Check INR to rule out liver failure  -Consider autoimmune workup, defer to GI  -GI on consult await evaluation and recommendations     11/19 GI CONSULT NOTE    Reason for Consultation:  Elevated LFTs     History of Present Illness:  Emmanuel Mendiola is a 59 year old female w/ PMHx of DM, GERD who presented to ED with abdominal pain, nausea and diarrhea. She notes over the last 2-3 weeks has had body aches, fatigue and chills. Last 3 days has had worsening abdominal pain and episodes of loose brown stool about 1 time a day. She notes she intermittently took tylenol, but not more than 1 500 mg tablet in 2-3 days. Denies any new herbal medications. No recent travel. Notes her grandson is always sick.   Notes increased intake of carrots and other fruits and vegetables over last month.   Denies heavy ETOH use.      In ER, alk phos 167, ast 2401, alt >3300, t bili 3.1. Ultrasound with diffuse hepatic steatosis without focal lesion, non specific gallbladder wall thickening without cholelithiasis.        Physical Exam:    Blood pressure 129/66, pulse 71, temperature 99.2 °F (37.3 °C), temperature source Oral, resp. rate 18, weight 149 lb 11.1 oz (67.9 kg), last menstrual period 12/26/2017, SpO2 95%, not currently breastfeeding. Body mass index is 26.52 kg/m².      Assessment &  Janel Mendiola is a 59 year old female w/ PMHx of DM, GERD who presented to ED with abdominal pain, nausea and diarrhea.     #elevated LFTS  #acute hepatitis A  -patient with 2-3 weeks body aches, chills   -patient not encephalopathic  -2-3 days of abdominal pain and diarrhea with nausea no vomiting  -sick contact of grandson at home, no recent travel  -no heavy ETOH use, minimal tylenol use  -hepatitis A igM positive   -autoimmune and viral lab studies pending   -likely source of elevated LFTs is hepatitis A, due to age and risk factors will make sure to rule out autoimmune disease      Recommend:  -diet as tolerated  -repeat PT/INR and CMP @ 1400  -repeat labs tomorrow am  -supportive care    Attending Attestation  I have personally seen and examined the patient independently, reviewed the APPs history, exam. Notes, labs, imaging, chart reviewed.  I agree with the TRINIDAD's note above with the following additions.     Acute hepatitis A infection with GI symptom manifestation and acute liver injury, synthetic function intact. No known prior liver disease, US with hepatic steatosis. Elevated ferritin is acute phase reactant. Other viral and autoimmune serologies pending. Monitor CMP and INR. Avoid hepatotoxic agents.      Is this a shared or split note between Advanced Practice Provider and Physician? Yes     11/20 HOSPITALIST NOTE       Subjective:     Constitutional:  Positive for appetite change and fatigue.   HENT:  Negative for congestion.    Respiratory:  Negative for choking, chest tightness and shortness of breath.    Cardiovascular:  Negative for leg swelling.   Gastrointestinal:  Positive for nausea and abdominal pain.   Genitourinary:  Negative for dysuria.   Musculoskeletal:  Negative for joint pain.   Neurological:  Positive for weakness.   Psychiatric/Behavioral:  Positive for sleep disturbance. Negative for confusion and decreased concentration. The patient is nervous/anxious.              Objective:  Blood pressure 122/71, pulse 83, temperature 99.1 °F (37.3 °C), temperature source Oral, resp. rate 20, weight 149 lb 11.1 oz (67.9 kg), last menstrual period 12/26/2017, SpO2 96%    Constitutional:       General: She is in acute distress.      Appearance: She is obese. She is ill-appearing. She is not toxic-appearing or diaphoretic.   HENT:      Head: Normocephalic and atraumatic.   Cardiovascular:      Rate and Rhythm: Normal rate.      Pulses: Normal pulses.   Pulmonary:      Breath sounds: Rhonchi present. No rales.   Abdominal:      General: Bowel sounds are normal.      Palpations: Abdomen is soft.      Tenderness: There is abdominal tenderness.       Results:        Lab Results   Component Value Date     WBC 3.3 (L) 11/20/2024     HGB 12.2 11/20/2024     HCT 35.3 11/20/2024     .0 11/20/2024     CREATSERUM 0.80 11/20/2024     BUN 8 (L) 11/20/2024      11/20/2024     K 4.0 11/20/2024      11/20/2024     CO2 24.0 11/20/2024      (H) 11/20/2024     CA 8.1 (L) 11/20/2024     ALB 3.0 (L) 11/20/2024     ALKPHO 138 (H) 11/20/2024     BILT 3.4 (H) 11/20/2024     TP 5.5 (L) 11/20/2024     AST 1,153 (H) 11/20/2024     ALT 2,624 (H) 11/20/2024     INR 1.49 (H) 11/20/2024          Assessment & Plan:  Markedly elevated transaminases: Viral hepatitis   Mildly elevated bilirubin, alk phos  -pain/nausea; worse with motrin  Add protonix     Abn gb on ct/us and tender ruq; likely Mitali's capsule irritated by hepatitis will check hida     Allergy to morphine from \"many years ago\" per pt; she had to be given narcan and told she had \"convulsions;\" Had back surgery since and had no problems with oxy or any reactions during that surgery; I looked up her med list and she had no issues with dilaudid during that hosp. Will give dilaudid since she cannot eat and has terrible pains in her epigastric area from motrin     Quality:  DVT Prophylaxis: Heparin  CODE status: Full code      11/20 GI  NOTE    Subjective:   Still feels nauseous with upper abdominal pain. No bowel movement. No fever or chills.      Objective:   Blood pressure 122/71, pulse 83, temperature 99.1 °F (37.3 °C), temperature source Oral, resp. rate 20, weight 149 lb 11.1 oz (67.9 kg), last menstrual period 12/26/2017, SpO2 96%, not currently breastfeeding. Body mass index is 26.52 kg/m².     General: awake, alert and oriented, no acute distress  HEENT: moist mucus membranes  PULM: no conversational dyspnea  CARDIOVASCULAR: regular rate and rhythm, the extremities are warm and well perfused  GI: soft, non-tender, non-distended, + BS, no rebound/guarding   EXTREMITIES: no edema, moving all extremities  SKIN: no visible rash  NEURO: appropriate and interactive     Assessment and Plan:   Acute hepatitis A infection with GI symptom manifestation and acute liver injury, synthetic function intact. No known prior liver disease, US with hepatic steatosis. Elevated ferritin is acute phase reactant. Hep B/C negative, ASMA and AMA negative, ceruloplasmin negative. C diff and GI stool PCR negative. Monitor CMP and INR -- LFTs coming down today which is reassuring. Continue supportive care. Avoid hepatotoxic agents.      Amarilis Carter MD  [facility]    11/21 GI NOTE       Subjective:   Still feels nauseous with generalized abdominal pain. No bowel movement. No fever or chills.      Objective:   Blood pressure 145/83, pulse 73, temperature 97.1 °F (36.2 °C), temperature source Oral, resp. rate 20, weight 149 lb 11.1 oz (67.9 kg), last menstrual period 12/26/2017, SpO2 98%, not currently breastfeeding. Body mass index is 26.52 kg/m².     General: awake, alert and oriented, no acute distress  HEENT: moist mucus membranes  PULM: no conversational dyspnea  CARDIOVASCULAR: regular rate and rhythm, the extremities are warm and well perfused  GI: soft, mild generalized tenderness, non-distended, + BS, no rebound/guarding   EXTREMITIES: no  edema, moving all extremities  SKIN: no visible rash  NEURO: appropriate and interactive     Assessment and Plan:   Acute hepatitis A infection with GI symptom manifestation and acute liver injury, synthetic function intact. No known prior liver disease, US with hepatic steatosis. Elevated ferritin is acute phase reactant. Hep B/C negative, ASMA and AMA negative, ceruloplasmin negative. C diff and GI stool PCR negative.      Monitor CMP and INR -- LFTs continues to come down today which is reassuring. Continue supportive care.   Avoid hepatotoxic agents.   HIDA ordered by primary, not diagnostic in setting of acute liver injury. If concern for biliary obstruction or primary gallbladder etiology, recommend MRI/MRCP but clinical suspicion low. Her GI symptoms are manifestations of viral illness. Continue supportive care.      D/w Dr Haley         Results:            Lab Results   Component Value Date     WBC 3.4 (L) 11/21/2024     HGB 12.1 11/21/2024     HCT 34.5 (L) 11/21/2024     .0 11/21/2024     CREATSERUM 0.68 11/21/2024     BUN 6 (L) 11/21/2024      11/21/2024     K 3.3 (L) 11/21/2024      11/21/2024     CO2 25.0 11/21/2024      (H) 11/21/2024     CA 7.9 (L) 11/21/2024     ALB 2.8 (L) 11/21/2024     ALKPHO 133 (H) 11/21/2024     BILT 4.6 (H) 11/21/2024     TP 5.2 (L) 11/21/2024      (H) 11/21/2024     ALT 2,294 (H) 11/21/2024         NM GB HEPATOBILIARY SCAN  (CPT=78226)     Result Date: 11/21/2024  CONCLUSION:   There is little if any biliary excretion of radiotracer by 2 hours consistent with hepatocellular dysfunction.  This study is thus nondiagnostic for evaluation of acute cholecystitis or biliary obstruction.        11/21 HOSPITALIST NOTE    Subjective:     Constitutional:  Positive for appetite change and fatigue.   HENT:  Negative for congestion.    Respiratory:  Negative for choking, chest tightness and shortness of breath.    Cardiovascular:  Negative for leg  swelling.   Gastrointestinal:  Positive for nausea and abdominal pain.   Genitourinary:  Negative for dysuria.   Musculoskeletal:  Negative for joint pain.   Neurological:  Positive for weakness.   Psychiatric/Behavioral:  Positive for sleep disturbance. Negative for confusion and decreased concentration. The patient is nervous/anxious.             Objective:  Blood pressure 145/83, pulse 73, temperature 97.1 °F (36.2 °C), temperature source Oral, resp. rate 20, weight 149 lb 11.1 oz (67.9 kg), last menstrual period 12/26/2017, SpO2 98%, not currently breastfeeding.  Physical Exam  Vitals and nursing note reviewed.   Constitutional:       General: She is in acute distress.      Appearance: She is obese. She is ill-appearing. She is not toxic-appearing or diaphoretic.   HENT:      Head: Normocephalic and atraumatic.   Cardiovascular:      Rate and Rhythm: Normal rate.      Pulses: Normal pulses.   Pulmonary:      Breath sounds: Rhonchi present. No rales.   Abdominal:      General: Bowel sounds are normal.      Palpations: Abdomen is soft.      Tenderness: There is abdominal tenderness.       Results:        Lab Results   Component Value Date     WBC 3.4 (L) 11/21/2024     HGB 12.1 11/21/2024     HCT 34.5 (L) 11/21/2024     .0 11/21/2024     CREATSERUM 0.68 11/21/2024     BUN 6 (L) 11/21/2024      11/21/2024     K 3.3 (L) 11/21/2024      11/21/2024     CO2 25.0 11/21/2024      (H) 11/21/2024     CA 7.9 (L) 11/21/2024     ALB 2.8 (L) 11/21/2024     ALKPHO 133 (H) 11/21/2024     BILT 4.6 (H) 11/21/2024     TP 5.2 (L) 11/21/2024      (H) 11/21/2024     ALT 2,294 (H) 11/21/2024     PTT 26.9 12/06/2023     INR 1.54 (H) 11/21/2024            Assessment & Plan:  Markedly elevated transaminases: Viral hepatitis   Mildly elevated bilirubin, alk phos  -pain/nausea; worse with motrin  Add protonix     Abn gb on ct/us and tender ruq; likely Mitali's capsule irritated by hepatitis; hida non diag  not nl; mri ordered but pt severely claustraphobic; will try to avoid general anesthesia because of irritated liver; try ativan; pt states oxy makes her feel too \"high\" no allergic reaction will try tramadol and small dose of toradol if needed     Allergy to morphine from \"many years ago\" per pt; she had to be given narcan and told she had \"convulsions;\" Had back surgery since and had no problems with oxy or any reactions during that surgery; I looked up her med list and she had no issues with dilaudid during that hosp. Will give dilaudid since she cannot eat and has terrible pains in her epigastric area from motrin     Quality:  DVT Prophylaxis: Heparin  CODE status: Full code  ANGELINE: TBD

## 2024-11-21 NOTE — PROGRESS NOTES
[facility]  part of [facility]    Progress Note    Emmanuel Mendiola Patient Status:  Inpatient    10/7/1965 MRN [id number]   Location [facility] 1W Attending Antonio Haley,*   Hosp Day # 2 PCP Bertin Florez MD     Chief Complaint: I am miserable    Subjective:     Constitutional:  Positive for appetite change and fatigue.   HENT:  Negative for congestion.    Respiratory:  Negative for choking, chest tightness and shortness of breath.    Cardiovascular:  Negative for leg swelling.   Gastrointestinal:  Positive for nausea and abdominal pain.   Genitourinary:  Negative for dysuria.   Musculoskeletal:  Negative for joint pain.   Neurological:  Positive for weakness.   Psychiatric/Behavioral:  Positive for sleep disturbance. Negative for confusion and decreased concentration. The patient is nervous/anxious.        Objective:   Blood pressure 145/83, pulse 73, temperature 97.1 °F (36.2 °C), temperature source Oral, resp. rate 20, weight 149 lb 11.1 oz (67.9 kg), last menstrual period 2017, SpO2 98%, not currently breastfeeding.  Physical Exam  Vitals and nursing note reviewed.   Constitutional:       General: She is in acute distress.      Appearance: She is obese. She is ill-appearing. She is not toxic-appearing or diaphoretic.   HENT:      Head: Normocephalic and atraumatic.   Cardiovascular:      Rate and Rhythm: Normal rate.      Pulses: Normal pulses.   Pulmonary:      Breath sounds: Rhonchi present. No rales.   Abdominal:      General: Bowel sounds are normal.      Palpations: Abdomen is soft.      Tenderness: There is abdominal tenderness.   Skin:     General: Skin is warm and dry.      Capillary Refill: Capillary refill takes less than 2 seconds.   Neurological:      Mental Status: She is alert and oriented to person, place, and time.   Psychiatric:         Behavior: Behavior normal.         Judgment: Judgment normal.         Results:   Lab Results   Component  Value Date    WBC 3.4 (L) 11/21/2024    HGB 12.1 11/21/2024    HCT 34.5 (L) 11/21/2024    .0 11/21/2024    CREATSERUM 0.68 11/21/2024    BUN 6 (L) 11/21/2024     11/21/2024    K 3.3 (L) 11/21/2024     11/21/2024    CO2 25.0 11/21/2024     (H) 11/21/2024    CA 7.9 (L) 11/21/2024    ALB 2.8 (L) 11/21/2024    ALKPHO 133 (H) 11/21/2024    BILT 4.6 (H) 11/21/2024    TP 5.2 (L) 11/21/2024     (H) 11/21/2024    ALT 2,294 (H) 11/21/2024    PTT 26.9 12/06/2023    INR 1.54 (H) 11/21/2024    TSH 0.988 11/11/2024    DEANDRA 79 12/13/2017    LIP 35 11/19/2024    DDIMER 0.92 (H) 11/28/2021    ESRML 29 01/23/2020    CRP 0.65 (H) 12/06/2019    MG 1.5 (L) 11/21/2024    PHOS 2.7 11/21/2024    TROP <0.045 11/28/2021    CK 52 11/19/2024    CKMB 0.6 12/19/2015    B12 558 01/21/2020    POCGLU 100 11/20/2023       NM GB HEPATOBILIARY SCAN  (CPT=78226)    Result Date: 11/21/2024  CONCLUSION:   There is little if any biliary excretion of radiotracer by 2 hours consistent with hepatocellular dysfunction.  This study is thus nondiagnostic for evaluation of acute cholecystitis or biliary obstruction.    Dictated by (CST): Carlo De La Garza MD on 11/21/2024 at 9:54 AM     Finalized by (CST): Carlo De La Garza MD on 11/21/2024 at 10:04 AM               Assessment & Plan:     Markedly elevated transaminases: Viral hepatitis   Mildly elevated bilirubin, alk phos  -pain/nausea; worse with motrin  Add protonix    Abn gb on ct/us and tender ruq; likely Mitali's capsule irritated by hepatitis; hida non diag not nl; mri ordered but pt severely claustraphobic; will try to avoid general anesthesia because of irritated liver; try ativan; pt states oxy makes her feel too \"high\" no allergic reaction will try tramadol and small dose of toradol if needed    Allergy to morphine from \"many years ago\" per pt; she had to be given narcan and told she had \"convulsions;\" Had back surgery since and had no problems with oxy or any reactions  during that surgery; I looked up her med list and she had no issues with dilaudid during that hosp. Will give dilaudid since she cannot eat and has terrible pains in her epigastric area from motrin     Quality:  DVT Prophylaxis: Heparin  CODE status: Full code  ANGELINE: TBD    Complex mdm; coordinating care with nurse/dr rivera and counseling pt and with her permission her  in room about mri/pain      COURTNEY FOWLER MD

## 2024-11-21 NOTE — PROGRESS NOTES
[facility]     Gastroenterology Progress Note    Emmanuel Mendiola Patient Status:  Inpatient    10/7/1965 MRN [id number]   Location [facility] 1W Attending Antonio Haley,*   Hosp Day # 2 PCP Bertin Florez MD       Subjective:   Still feels nauseous with generalized abdominal pain. No bowel movement. No fever or chills.     Objective:   Blood pressure 145/83, pulse 73, temperature 97.1 °F (36.2 °C), temperature source Oral, resp. rate 20, weight 149 lb 11.1 oz (67.9 kg), last menstrual period 2017, SpO2 98%, not currently breastfeeding. Body mass index is 26.52 kg/m².    General: awake, alert and oriented, no acute distress  HEENT: moist mucus membranes  PULM: no conversational dyspnea  CARDIOVASCULAR: regular rate and rhythm, the extremities are warm and well perfused  GI: soft, mild generalized tenderness, non-distended, + BS, no rebound/guarding   EXTREMITIES: no edema, moving all extremities  SKIN: no visible rash  NEURO: appropriate and interactive    Assessment and Plan:   Acute hepatitis A infection with GI symptom manifestation and acute liver injury, synthetic function intact. No known prior liver disease, US with hepatic steatosis. Elevated ferritin is acute phase reactant. Hep B/C negative, ASMA and AMA negative, ceruloplasmin negative. C diff and GI stool PCR negative.     Monitor CMP and INR -- LFTs continues to come down today which is reassuring. Continue supportive care.   Avoid hepatotoxic agents.   HIDA ordered by primary, not diagnostic in setting of acute liver injury. If concern for biliary obstruction or primary gallbladder etiology, recommend MRI/MRCP but clinical suspicion low. Her GI symptoms are manifestations of viral illness. Continue supportive care.     D/w Dr Sudha Olmos Ma, MD  [facility]      Results:     Lab Results   Component Value Date    WBC 3.4 (L) 2024    HGB 12.1 2024    HCT 34.5 (L)  11/21/2024    .0 11/21/2024    CREATSERUM 0.68 11/21/2024    BUN 6 (L) 11/21/2024     11/21/2024    K 3.3 (L) 11/21/2024     11/21/2024    CO2 25.0 11/21/2024     (H) 11/21/2024    CA 7.9 (L) 11/21/2024    ALB 2.8 (L) 11/21/2024    ALKPHO 133 (H) 11/21/2024    BILT 4.6 (H) 11/21/2024    TP 5.2 (L) 11/21/2024     (H) 11/21/2024    ALT 2,294 (H) 11/21/2024    PTT 26.9 12/06/2023    INR 1.54 (H) 11/21/2024    TSH 0.988 11/11/2024    DEANDRA 79 12/13/2017    LIP 35 11/19/2024    DDIMER 0.92 (H) 11/28/2021    ESRML 29 01/23/2020    CRP 0.65 (H) 12/06/2019    MG 1.5 (L) 11/21/2024    PHOS 2.7 11/21/2024    TROP <0.045 11/28/2021    CK 52 11/19/2024    CKMB 0.6 12/19/2015    B12 558 01/21/2020    POCGLU 100 11/20/2023       NM GB HEPATOBILIARY SCAN  (CPT=78226)    Result Date: 11/21/2024  CONCLUSION:   There is little if any biliary excretion of radiotracer by 2 hours consistent with hepatocellular dysfunction.  This study is thus nondiagnostic for evaluation of acute cholecystitis or biliary obstruction.    Dictated by (CST): Carlo De La Garza MD on 11/21/2024 at 9:54 AM     Finalized by (CST): Carlo De La Garza MD on 11/21/2024 at 10:04 AM

## 2024-11-21 NOTE — PLAN OF CARE
Problem: Patient Centered Care  Goal: Patient preferences are identified and integrated in the patient's plan of care  Description: Interventions:  - What would you like us to know as we care for you? Patient is from home with    - Provide timely, complete, and accurate information to patient/family  - Incorporate patient and family knowledge, values, beliefs, and cultural backgrounds into the planning and delivery of care  - Encourage patient/family to participate in care and decision-making at the level they choose  - Honor patient and family perspectives and choices  Outcome: Progressing     Problem: Diabetes/Glucose Control  Goal: Glucose maintained within prescribed range  Description: INTERVENTIONS:  - Monitor Blood Glucose as ordered  - Assess for signs and symptoms of hyperglycemia and hypoglycemia  - Administer ordered medications to maintain glucose within target range  - Assess barriers to adequate nutritional intake and initiate nutrition consult as needed  - Instruct patient on self management of diabetes  Outcome: Progressing     Problem: Patient/Family Goals  Goal: Patient/Family Long Term Goal  Description: Patient's Long Term Goal: To go home    Interventions:  - See additional Care Plan goals for specific interventions  Outcome: Progressing  Goal: Patient/Family Short Term Goal  Description: Patient's Short Term Goal: To feel better     Interventions:   - See additional Care Plan goals for specific interventions  Outcome: Progressing     Problem: GASTROINTESTINAL - ADULT  Goal: Minimal or absence of nausea and vomiting  Description: INTERVENTIONS:  - Maintain adequate hydration with IV or PO as ordered and tolerated  - Nasogastric tube to low intermittent suction as ordered  - Evaluate effectiveness of ordered antiemetic medications  - Provide nonpharmacologic comfort measures as appropriate  - Advance diet as tolerated, if ordered  - Obtain nutritional consult as needed  - Evaluate fluid  balance  Outcome: Progressing     Problem: METABOLIC/FLUID AND ELECTROLYTES - ADULT  Goal: Glucose maintained within prescribed range  Description: INTERVENTIONS:  - Monitor Blood Glucose as ordered  - Assess for signs and symptoms of hyperglycemia and hypoglycemia  - Administer ordered medications to maintain glucose within target range  - Assess barriers to adequate nutritional intake and initiate nutrition consult as needed  - Instruct patient on self management of diabetes  Outcome: Progressing  Goal: Electrolytes maintained within normal limits  Description: INTERVENTIONS:  - Monitor labs and rhythm and assess patient for signs and symptoms of electrolyte imbalances  - Administer electrolyte replacement as ordered  - Monitor response to electrolyte replacements, including rhythm and repeat lab results as appropriate  - Fluid restriction as ordered  - Instruct patient on fluid and nutrition restrictions as appropriate  Outcome: Progressing     Problem: PAIN - ADULT  Goal: Verbalizes/displays adequate comfort level or patient's stated pain goal  Description: INTERVENTIONS:  - Encourage pt to monitor pain and request assistance  - Assess pain using appropriate pain scale  - Administer analgesics based on type and severity of pain and evaluate response  - Implement non-pharmacological measures as appropriate and evaluate response  - Consider cultural and social influences on pain and pain management  - Manage/alleviate anxiety  - Utilize distraction and/or relaxation techniques  - Monitor for opioid side effects  - Notify MD/LIP if interventions unsuccessful or patient reports new pain  - Anticipate increased pain with activity and pre-medicate as appropriate  Outcome: Progressing     Problem: SAFETY ADULT - FALL  Goal: Free from fall injury  Description: INTERVENTIONS:  - Assess pt frequently for physical needs  - Identify cognitive and physical deficits and behaviors that affect risk of falls.  - [place] fall  precautions as indicated by assessment.  - Educate pt/family on patient safety including physical limitations  - Instruct pt to call for assistance with activity based on assessment  - Modify environment to reduce risk of injury  - Provide assistive devices as appropriate  - Consider OT/PT consult to assist with strengthening/mobility  - Encourage toileting schedule  Outcome: Progressing   Patient c/o nausea and abdominal pain overnight. Zofran given for nausea. Patient took oxycodone x 1 last night but is refusing pain meds after that despite being in pain. VSS. HIDA scan this morning. IVF infusing. VSS. Will continue to monitor patient.

## 2024-11-22 ENCOUNTER — APPOINTMENT (OUTPATIENT)
Dept: MRI IMAGING | Facility: HOSPITAL | Age: 59
End: 2024-11-22
Attending: INTERNAL MEDICINE
Payer: COMMERCIAL

## 2024-11-22 LAB
ALBUMIN SERPL-MCNC: 2.6 G/DL (ref 3.2–4.8)
ALBUMIN/GLOB SERPL: 1.2 {RATIO} (ref 1–2)
ALP LIVER SERPL-CCNC: 125 U/L
ALT SERPL-CCNC: 2061 U/L
ANION GAP SERPL CALC-SCNC: 5 MMOL/L (ref 0–18)
AST SERPL-CCNC: 940 U/L (ref ?–34)
BASOPHILS # BLD AUTO: 0.02 X10(3) UL (ref 0–0.2)
BASOPHILS NFR BLD AUTO: 0.5 %
BILIRUB DIRECT SERPL-MCNC: 4.9 MG/DL (ref ?–0.3)
BILIRUB SERPL-MCNC: 5.6 MG/DL (ref 0.3–1.2)
BUN BLD-MCNC: <5 MG/DL (ref 9–23)
CALCIUM BLD-MCNC: 7.8 MG/DL (ref 8.7–10.4)
CHLORIDE SERPL-SCNC: 109 MMOL/L (ref 98–112)
CO2 SERPL-SCNC: 25 MMOL/L (ref 21–32)
CREAT BLD-MCNC: 0.6 MG/DL
DEPRECATED RDW RBC AUTO: 43.8 FL (ref 35.1–46.3)
EGFRCR SERPLBLD CKD-EPI 2021: 105 ML/MIN/1.73M2 (ref 60–?)
EOSINOPHIL # BLD AUTO: 0.05 X10(3) UL (ref 0–0.7)
EOSINOPHIL NFR BLD AUTO: 1.2 %
ERYTHROCYTE [DISTWIDTH] IN BLOOD BY AUTOMATED COUNT: 13.3 % (ref 11–15)
GLOBULIN PLAS-MCNC: 2.2 G/DL (ref 2–3.5)
GLUCOSE BLD-MCNC: 134 MG/DL (ref 70–99)
GLUCOSE BLDC GLUCOMTR-MCNC: 104 MG/DL (ref 70–99)
GLUCOSE BLDC GLUCOMTR-MCNC: 110 MG/DL (ref 70–99)
GLUCOSE BLDC GLUCOMTR-MCNC: 117 MG/DL (ref 70–99)
GLUCOSE BLDC GLUCOMTR-MCNC: 118 MG/DL (ref 70–99)
HCT VFR BLD AUTO: 36.9 %
HGB BLD-MCNC: 12.5 G/DL
IMM GRANULOCYTES # BLD AUTO: 0.02 X10(3) UL (ref 0–1)
IMM GRANULOCYTES NFR BLD: 0.5 %
LYMPHOCYTES # BLD AUTO: 0.8 X10(3) UL (ref 1–4)
LYMPHOCYTES NFR BLD AUTO: 19.8 %
MAGNESIUM SERPL-MCNC: 1.9 MG/DL (ref 1.6–2.6)
MCH RBC QN AUTO: 30.3 PG (ref 26–34)
MCHC RBC AUTO-ENTMCNC: 33.9 G/DL (ref 31–37)
MCV RBC AUTO: 89.6 FL
MONOCYTES # BLD AUTO: 0.6 X10(3) UL (ref 0.1–1)
MONOCYTES NFR BLD AUTO: 14.9 %
NEUTROPHILS # BLD AUTO: 2.55 X10 (3) UL (ref 1.5–7.7)
NEUTROPHILS # BLD AUTO: 2.55 X10(3) UL (ref 1.5–7.7)
NEUTROPHILS NFR BLD AUTO: 63.1 %
PLATELET # BLD AUTO: 161 10(3)UL (ref 150–450)
POTASSIUM SERPL-SCNC: 3.5 MMOL/L (ref 3.5–5.1)
POTASSIUM SERPL-SCNC: 3.5 MMOL/L (ref 3.5–5.1)
PROT SERPL-MCNC: 4.8 G/DL (ref 5.7–8.2)
RBC # BLD AUTO: 4.12 X10(6)UL
SODIUM SERPL-SCNC: 139 MMOL/L (ref 136–145)
WBC # BLD AUTO: 4 X10(3) UL (ref 4–11)

## 2024-11-22 PROCEDURE — 76376 3D RENDER W/INTRP POSTPROCES: CPT | Performed by: INTERNAL MEDICINE

## 2024-11-22 PROCEDURE — 99233 SBSQ HOSP IP/OBS HIGH 50: CPT | Performed by: HOSPITALIST

## 2024-11-22 PROCEDURE — 74183 MRI ABD W/O CNTR FLWD CNTR: CPT | Performed by: INTERNAL MEDICINE

## 2024-11-22 PROCEDURE — 99233 SBSQ HOSP IP/OBS HIGH 50: CPT | Performed by: STUDENT IN AN ORGANIZED HEALTH CARE EDUCATION/TRAINING PROGRAM

## 2024-11-22 RX ORDER — ONDANSETRON 2 MG/ML
8 INJECTION INTRAMUSCULAR; INTRAVENOUS EVERY 4 HOURS
Status: DISCONTINUED | OUTPATIENT
Start: 2024-11-22 | End: 2024-11-22

## 2024-11-22 RX ORDER — GADOTERATE MEGLUMINE 376.9 MG/ML
15 INJECTION INTRAVENOUS
Status: COMPLETED | OUTPATIENT
Start: 2024-11-22 | End: 2024-11-22

## 2024-11-22 RX ORDER — ONDANSETRON 2 MG/ML
4 INJECTION INTRAMUSCULAR; INTRAVENOUS EVERY 6 HOURS
Status: DISCONTINUED | OUTPATIENT
Start: 2024-11-22 | End: 2024-11-27

## 2024-11-22 NOTE — PROGRESS NOTES
[facility]  part of [facility]    Progress Note    Emmanuel Mendiola Patient Status:  Inpatient    10/7/1965 MRN [id number]   Location [facility] 1W Attending Antonio Haley,*   Hosp Day # 3 PCP Bertin Florez MD     Chief Complaint: I am miserable    Subjective:     Constitutional:  Positive for appetite change and fatigue.   HENT:  Negative for congestion.    Respiratory:  Negative for choking, chest tightness and shortness of breath.    Cardiovascular:  Negative for leg swelling.   Gastrointestinal:  Positive for nausea and abdominal pain.   Genitourinary:  Negative for dysuria.   Musculoskeletal:  Negative for joint pain.   Neurological:  Positive for weakness.   Psychiatric/Behavioral:  Positive for sleep disturbance. Negative for confusion and decreased concentration. The patient is nervous/anxious.        Objective:   Blood pressure 147/75, pulse 65, temperature 98.3 °F (36.8 °C), temperature source Oral, resp. rate 18, weight 149 lb 11.1 oz (67.9 kg), last menstrual period 2017, SpO2 98%, not currently breastfeeding.  Physical Exam  Vitals and nursing note reviewed.   Constitutional:       General: She is in acute distress.      Appearance: She is obese. She is ill-appearing. She is not toxic-appearing or diaphoretic.   HENT:      Head: Normocephalic and atraumatic.   Cardiovascular:      Rate and Rhythm: Normal rate.      Pulses: Normal pulses.   Pulmonary:      Breath sounds: Rhonchi present. No rales.   Abdominal:      General: Bowel sounds are normal.      Palpations: Abdomen is soft.      Tenderness: There is abdominal tenderness.   Skin:     General: Skin is warm and dry.      Capillary Refill: Capillary refill takes less than 2 seconds.   Neurological:      Mental Status: She is alert and oriented to person, place, and time.   Psychiatric:         Behavior: Behavior normal.         Judgment: Judgment normal.         Results:   Lab Results   Component  Value Date    WBC 4.0 11/22/2024    HGB 12.5 11/22/2024    HCT 36.9 11/22/2024    .0 11/22/2024    CREATSERUM 0.60 11/22/2024    BUN <5 (L) 11/22/2024     11/22/2024    K 3.5 11/22/2024    K 3.5 11/22/2024     11/22/2024    CO2 25.0 11/22/2024     (H) 11/22/2024    CA 7.8 (L) 11/22/2024    ALB 2.6 (L) 11/22/2024    ALKPHO 125 (H) 11/22/2024    BILT 5.6 (H) 11/22/2024    TP 4.8 (L) 11/22/2024     (H) 11/22/2024    ALT 2,061 (H) 11/22/2024    PTT 26.9 12/06/2023    INR 1.54 (H) 11/21/2024    TSH 0.988 11/11/2024    DEANDRA 79 12/13/2017    LIP 35 11/19/2024    DDIMER 0.92 (H) 11/28/2021    ESRML 29 01/23/2020    CRP 0.65 (H) 12/06/2019    MG 1.9 11/22/2024    PHOS 2.7 11/21/2024    TROP <0.045 11/28/2021    CK 52 11/19/2024    CKMB 0.6 12/19/2015    B12 558 01/21/2020    POCGLU 100 11/20/2023       MRI ABDOMEN AND MRCP W/3D (W+W0)(CPT=74183/23551)    Result Date: 11/22/2024  CONCLUSION:   Severe periportal inflammation and free fluid suggestive of hepatitis.  Severe gallbladder wall thickening, likely reactive from the presumed hepatitis.  Mild peripancreatic inflammation with fluid within the pancreaticoduodenal groove, likely reactive from the presumed hepatitis.  Groove pancreatitis felt less likely.  No ductal dilation.    Dictated by (CST): Arnold Mahmood MD on 11/22/2024 at 10:18 AM     Finalized by (CST): Arnold Mahmood MD on 11/22/2024 at 10:29 AM          NM GB HEPATOBILIARY SCAN  (CPT=78226)    Result Date: 11/21/2024  CONCLUSION:   There is little if any biliary excretion of radiotracer by 2 hours consistent with hepatocellular dysfunction.  This study is thus nondiagnostic for evaluation of acute cholecystitis or biliary obstruction.    Dictated by (CST): Carlo De La Garza MD on 11/21/2024 at 9:54 AM     Finalized by (CST): Carlo De La Garza MD on 11/21/2024 at 10:04 AM               Assessment & Plan:     Markedly elevated transaminases: Viral hepatitis   Mildly elevated bilirubin, alk  phos  -pain/nausea; worse with motrin  Add protonix    Abn gb on ct/us and tender ruq; likely Mitali's capsule irritated by hepatitis; hida non diag not nl; mri ordered but pt severely claustraphobic; will try to avoid general anesthesia because of irritated liver; try ativan; pt states oxy makes her feel too \"high\" no allergic reaction will try tramadol and small dose of toradol if needed    Allergy to morphine from \"many years ago\" per pt; she had to be given narcan and told she had \"convulsions;\" Had back surgery since and had no problems with oxy or any reactions during that surgery; I looked up her med list and she had no issues with dilaudid during that hosp. Will give dilaudid since she cannot eat and has terrible pains in her epigastric area from motrin     Quality:  DVT Prophylaxis: Heparin  CODE status: Full code  ANGELINE: TBD    Complex mdm; coordinating care with nurse/dr barron and counseling pt and with her permission her  in room about mri/pain      COURTNEY FOWLER MD

## 2024-11-22 NOTE — PROGRESS NOTES
[facility]     Gastroenterology Progress Note    Emmanuel Mendiola Patient Status:  Inpatient    10/7/1965 MRN [id number]   Location [facility] 1W Attending Antonio Haley,*   Hosp Day # 3 PCP Bertin Florez MD       Subjective:   Overall feeling better today compared with days prior.    MRI Abdomen done and shows expected changes with hepatitis but no other etiology of discomfort.    Objective:   Blood pressure 147/75, pulse 65, temperature 98.3 °F (36.8 °C), temperature source Oral, resp. rate 18, weight 149 lb 11.1 oz (67.9 kg), last menstrual period 2017, SpO2 98%, not currently breastfeeding. Body mass index is 26.52 kg/m².    General: awake, alert and oriented, no acute distress  HEENT: moist mucus membranes  PULM: no conversational dyspnea  CARDIOVASCULAR: regular rate and rhythm, the extremities are warm and well perfused  GI: soft, minimal (if any) tenderness to palpation throughout the abdomen.  EXTREMITIES: no edema, moving all extremities  SKIN: no visible rash  NEURO: appropriate and interactive    Assessment and Plan:   Acute hepatitis A infection with GI symptom manifestation and acute liver injury, synthetic function intact. No known prior liver disease, US with hepatic steatosis. Elevated ferritin is acute phase reactant. Hep B/C negative, ASMA and AMA negative, ceruloplasmin negative. C diff and GI stool PCR negative.     Discussed today that hepatitis A can take months to fully resolve. MRI was done today and was unremarkable except for hepatitis.    Hepatitis A virus is typically self-limiting and the treatment is supportive care. Her AST/ALT are improving which is reassuring.    Bilirubin is up-trending but this typically lags behind and anticipate improvement over the next several days to weeks.    I suspect her symptoms of abdominal discomfort and nausea is related to hepatitis A virus and this should improve with  time.    Recommendations:  -Avoid hepatotoxic agents.  -Continue IVFs.  -Continue IV BID Pantoprazole.  -Schedule zofran.  -Trend labs daily.  -Once is taking in sufficient oral intake can be discharged with outpatient monitoring of labs.    D/w Dr Sudha Drummond MD  [facility] Gastroenterology    Results:     Lab Results   Component Value Date    WBC 4.0 11/22/2024    HGB 12.5 11/22/2024    HCT 36.9 11/22/2024    .0 11/22/2024    CREATSERUM 0.60 11/22/2024    BUN <5 (L) 11/22/2024     11/22/2024    K 3.5 11/22/2024    K 3.5 11/22/2024     11/22/2024    CO2 25.0 11/22/2024     (H) 11/22/2024    CA 7.8 (L) 11/22/2024    ALB 2.6 (L) 11/22/2024    ALKPHO 125 (H) 11/22/2024    BILT 5.6 (H) 11/22/2024    TP 4.8 (L) 11/22/2024     (H) 11/22/2024    ALT 2,061 (H) 11/22/2024    PTT 26.9 12/06/2023    INR 1.54 (H) 11/21/2024    TSH 0.988 11/11/2024    DEANDRA 79 12/13/2017    LIP 35 11/19/2024    DDIMER 0.92 (H) 11/28/2021    ESRML 29 01/23/2020    CRP 0.65 (H) 12/06/2019    MG 1.9 11/22/2024    PHOS 2.7 11/21/2024    TROP <0.045 11/28/2021    CK 52 11/19/2024    CKMB 0.6 12/19/2015    B12 558 01/21/2020    POCGLU 100 11/20/2023       MRI ABDOMEN AND MRCP W/3D (W+W0)(CPT=74183/58136)    Result Date: 11/22/2024  CONCLUSION:   Severe periportal inflammation and free fluid suggestive of hepatitis.  Severe gallbladder wall thickening, likely reactive from the presumed hepatitis.  Mild peripancreatic inflammation with fluid within the pancreaticoduodenal groove, likely reactive from the presumed hepatitis.  Groove pancreatitis felt less likely.  No ductal dilation.    Dictated by (CST): Arnold Mahmood MD on 11/22/2024 at 10:18 AM     Finalized by (CST): Arnold Mahmood MD on 11/22/2024 at 10:29 AM          NM GB HEPATOBILIARY SCAN  (CPT=78226)    Result Date: 11/21/2024  CONCLUSION:   There is little if any biliary excretion of radiotracer by 2 hours consistent with hepatocellular  dysfunction.  This study is thus nondiagnostic for evaluation of acute cholecystitis or biliary obstruction.    Dictated by (CST): Carlo De La Garza MD on 11/21/2024 at 9:54 AM     Finalized by (CST): Carlo De La Garza MD on 11/21/2024 at 10:04 AM

## 2024-11-22 NOTE — PAYOR COMM NOTE
11/21 & 11/22  CONTINUED STAY REVIEW    Payor: [facility]  Subscriber #:  [id number]  Authorization Number: [id number]    Admit date: 11/19/24  Admit time:  5:19 AM      MEDICATIONS ADMINISTERED IN LAST 1 DAY:  gadoterate meglumine (Dotarem) 7.5 MMOL/15ML injection 15 mL       Date Action Dose Route User    11/22/2024 0557 Given 14 mL Intravenous Kiran Luis          heparin (Porcine) 5000 UNIT/ML injection 5,000 Units       Date Action Dose Route User    11/22/2024 0600 Given 5,000 Units Subcutaneous (Left Lower Abdomen) Jes Brunner RN    11/21/2024 2200 Given 5,000 Units Subcutaneous (Left Lower Abdomen) Jes Brunner RN    11/21/2024 1530 Given 5,000 Units Subcutaneous (Right Upper Arm) Deidre oJlly RN          ketorolac (Toradol) 15 MG/ML injection 15 mg       Date Action Dose Route User    11/22/2024 1033 Given 15 mg Intravenous Ellie Ramirez RN    11/22/2024 0427 Given 15 mg Intravenous Jes Burnner RN    11/21/2024 1826 Given 15 mg Intravenous Deidre Jolly RN    11/21/2024 1306 Given 15 mg Intravenous Deidre Jolly RN          LORazepam (Ativan) 2 mg/mL injection 0.5 mg       Date Action Dose Route User    11/22/2024 0445 Given 0.5 mg Intravenous Jes Brunner RN          magnesium sulfate 4 g/100mL IVPB premix 4 g       Date Action Dose Route User    11/21/2024 1304 New Bag 4 g Intravenous Deidre Jolly RN          ondansetron (Zofran) 4 MG/2ML injection 8 mg       Date Action Dose Route User    11/22/2024 0427 Given 8 mg Intravenous Jes Brunner RN    11/21/2024 2058 Given 8 mg Intravenous Jes Brunner RN    11/21/2024 1530 Given 8 mg Intravenous Deidre Jolly RN          pantoprazole (Protonix) 40 mg in sodium chloride 0.9% PF 10 mL IV push       Date Action Dose Route User    11/22/2024 1021 Given 40 mg Intravenous Ellie Ramirez RN    11/21/2024 4451 Given 40 mg Intravenous Jes Brunner RN          potassium chloride 40 mEq in 250mL sodium  chloride 0.9% IVPB premix       Date Action Dose Route User    11/21/2024 1531 New Bag 40 mEq Intravenous Deidre Jolly, RN          traMADol (Ultram) tab 50 mg       Date Action Dose Route User    11/21/2024 1531 Given 50 mg Oral Deidre Jolly, RN            Vitals (last day)       Date/Time Temp Pulse Resp BP SpO2 Weight O2 Device O2 Flow Rate (L/min) Who    11/22/24 0806 98.3 °F (36.8 °C) 65 18 147/75 98 % -- None (Room air) -- BP    11/22/24 0438 99.1 °F (37.3 °C) 78 20 129/84 98 % -- None (Room air) -- MC    11/21/24 1954 98.3 °F (36.8 °C) 61 20 133/72 98 % -- None (Room air) -- AC    11/21/24 1519 97.8 °F (36.6 °C) 72 20 134/79 99 % -- None (Room air) -- MM    11/21/24 0935 97.1 °F (36.2 °C) 73 20 145/83 98 % -- None (Room air) -- MM    11/21/24 0543 99 °F (37.2 °C) 70 20 134/83 96 % -- None (Room air) --        11/21 GI NOTE       Subjective:   Still feels nauseous with generalized abdominal pain. No bowel movement. No fever or chills.      Objective:   Blood pressure 145/83, pulse 73, temperature 97.1 °F (36.2 °C), temperature source Oral, resp. rate 20, weight 149 lb 11.1 oz (67.9 kg), last menstrual period 12/26/2017, SpO2 98%, not currently breastfeeding. Body mass index is 26.52 kg/m².     General: awake, alert and oriented, no acute distress  HEENT: moist mucus membranes  PULM: no conversational dyspnea  CARDIOVASCULAR: regular rate and rhythm, the extremities are warm and well perfused  GI: soft, mild generalized tenderness, non-distended, + BS, no rebound/guarding   EXTREMITIES: no edema, moving all extremities  SKIN: no visible rash  NEURO: appropriate and interactive     Assessment and Plan:   Acute hepatitis A infection with GI symptom manifestation and acute liver injury, synthetic function intact. No known prior liver disease, US with hepatic steatosis. Elevated ferritin is acute phase reactant. Hep B/C negative, ASMA and AMA negative, ceruloplasmin negative. C diff and GI stool PCR  negative.      Monitor CMP and INR -- LFTs continues to come down today which is reassuring. Continue supportive care.   Avoid hepatotoxic agents.   HIDA ordered by primary, not diagnostic in setting of acute liver injury. If concern for biliary obstruction or primary gallbladder etiology, recommend MRI/MRCP but clinical suspicion low. Her GI symptoms are manifestations of viral illness. Continue supportive care.      D/w Dr Haley          Results:                Lab Results   Component Value Date     WBC 3.4 (L) 11/21/2024     HGB 12.1 11/21/2024     HCT 34.5 (L) 11/21/2024     .0 11/21/2024     CREATSERUM 0.68 11/21/2024     BUN 6 (L) 11/21/2024      11/21/2024     K 3.3 (L) 11/21/2024      11/21/2024     CO2 25.0 11/21/2024      (H) 11/21/2024     CA 7.9 (L) 11/21/2024     ALB 2.8 (L) 11/21/2024     ALKPHO 133 (H) 11/21/2024     BILT 4.6 (H) 11/21/2024     TP 5.2 (L) 11/21/2024      (H) 11/21/2024     ALT 2,294 (H) 11/21/2024           NM GB HEPATOBILIARY SCAN  (CPT=78226)     Result Date: 11/21/2024  CONCLUSION:   There is little if any biliary excretion of radiotracer by 2 hours consistent with hepatocellular dysfunction.  This study is thus nondiagnostic for evaluation of acute cholecystitis or biliary obstruction.         11/21 HOSPITALIST NOTE    Subjective:     Constitutional:  Positive for appetite change and fatigue.   HENT:  Negative for congestion.    Respiratory:  Negative for choking, chest tightness and shortness of breath.    Cardiovascular:  Negative for leg swelling.   Gastrointestinal:  Positive for nausea and abdominal pain.   Genitourinary:  Negative for dysuria.   Musculoskeletal:  Negative for joint pain.   Neurological:  Positive for weakness.   Psychiatric/Behavioral:  Positive for sleep disturbance. Negative for confusion and decreased concentration. The patient is nervous/anxious.             Objective:  Blood pressure 145/83, pulse 73, temperature  97.1 °F (36.2 °C), temperature source Oral, resp. rate 20, weight 149 lb 11.1 oz (67.9 kg), last menstrual period 12/26/2017, SpO2 98%, not currently breastfeeding.  Physical Exam  Vitals and nursing note reviewed.   Constitutional:       General: She is in acute distress.      Appearance: She is obese. She is ill-appearing. She is not toxic-appearing or diaphoretic.   HENT:      Head: Normocephalic and atraumatic.   Cardiovascular:      Rate and Rhythm: Normal rate.      Pulses: Normal pulses.   Pulmonary:      Breath sounds: Rhonchi present. No rales.   Abdominal:      General: Bowel sounds are normal.      Palpations: Abdomen is soft.      Tenderness: There is abdominal tenderness.        Results:            Lab Results   Component Value Date     WBC 3.4 (L) 11/21/2024     HGB 12.1 11/21/2024     HCT 34.5 (L) 11/21/2024     .0 11/21/2024     CREATSERUM 0.68 11/21/2024     BUN 6 (L) 11/21/2024      11/21/2024     K 3.3 (L) 11/21/2024      11/21/2024     CO2 25.0 11/21/2024      (H) 11/21/2024     CA 7.9 (L) 11/21/2024     ALB 2.8 (L) 11/21/2024     ALKPHO 133 (H) 11/21/2024     BILT 4.6 (H) 11/21/2024     TP 5.2 (L) 11/21/2024      (H) 11/21/2024     ALT 2,294 (H) 11/21/2024     PTT 26.9 12/06/2023     INR 1.54 (H) 11/21/2024              Assessment & Plan:  Markedly elevated transaminases: Viral hepatitis   Mildly elevated bilirubin, alk phos  -pain/nausea; worse with motrin  Add protonix     Abn gb on ct/us and tender ruq; likely Mitali's capsule irritated by hepatitis; hida non diag not nl; mri ordered but pt severely claustraphobic; will try to avoid general anesthesia because of irritated liver; try ativan; pt states oxy makes her feel too \"high\" no allergic reaction will try tramadol and small dose of toradol if needed     Allergy to morphine from \"many years ago\" per pt; she had to be given narcan and told she had \"convulsions;\" Had back surgery since and had no problems  with oxy or any reactions during that surgery; I looked up her med list and she had no issues with dilaudid during that hosp. Will give dilaudid since she cannot eat and has terrible pains in her epigastric area from motrin     Quality:  DVT Prophylaxis: Heparin  CODE status: Full code  ANGELINE: TBD    11/22 HOSPITALIST NOTE  tive:  Blood pressure 147/75, pulse 65, temperature 98.3 °F (36.8 °C), temperature source Oral, resp. rate 18, weight 149 lb 11.1 oz (67.9 kg), last menstrual period 12/26/2017, SpO2 98%    Physical Exam  Vitals and nursing note reviewed.   Constitutional:       General: She is in acute distress.      Appearance: She is obese. She is ill-appearing. She is not toxic-appearing or diaphoretic.   HENT:      Head: Normocephalic and atraumatic.   Cardiovascular:      Rate and Rhythm: Normal rate.      Pulses: Normal pulses.   Pulmonary:      Breath sounds: Rhonchi present. No rales.   Abdominal:      General: Bowel sounds are normal.      Palpations: Abdomen is soft.      Tenderness: There is abdominal tenderness.   Skin:     General: Skin is warm and dry.      Capillary Refill: Capillary refill takes less than 2 seconds.   Neurological:      Mental Status: She is alert and oriented to person, place, and time.   Psychiatric:         Behavior: Behavior normal.         Judgment: Judgment normal.               Results:        Lab Results   Component Value Date     WBC 4.0 11/22/2024     HGB 12.5 11/22/2024     HCT 36.9 11/22/2024     .0 11/22/2024     CREATSERUM 0.60 11/22/2024     BUN <5 (L) 11/22/2024      11/22/2024     K 3.5 11/22/2024     K 3.5 11/22/2024      11/22/2024     CO2 25.0 11/22/2024      (H) 11/22/2024     CA 7.8 (L) 11/22/2024     ALB 2.6 (L) 11/22/2024     ALKPHO 125 (H) 11/22/2024     BILT 5.6 (H) 11/22/2024     TP 4.8 (L) 11/22/2024      (H) 11/22/2024     ALT 2,061 (H) 11/22/2024          MRI ABDOMEN AND MRCP W/3D (W+W0)(CPT=74183/61455)     Result  Date: 11/22/2024  CONCLUSION:          Severe periportal inflammation and free fluid suggestive of hepatitis.  Severe gallbladder wall thickening, likely reactive from the presumed hepatitis.  Mild peripancreatic inflammation with fluid within the pancreaticoduodenal groove, likely reactive from the presumed hepatitis.  Groove pancreatitis felt less likely.  No ductal dilation.    Dictated by (CST): Arnold Mahmood MD on 11/22/2024 at 10:18 AM     Finalized by (CST): Arnold Mahmood MD on 11/22/2024 at 10:29 AM           NM GB HEPATOBILIARY SCAN  (CPT=78226)     Result Date: 11/21/2024  CONCLUSION:          There is little if any biliary excretion of radiotracer by 2 hours consistent with hepatocellular dysfunction.  This study is thus nondiagnostic for evaluation of acute cholecystitis or biliary obstruction.    Dictated by (CST): Carlo De La Garza MD on 11/21/2024 at 9:54 AM     Finalized by (CST): Carlo De La Garza MD on 11/21/2024 at 10:04 AM                   Assessment & Plan:  Markedly elevated transaminases: Viral hepatitis   Mildly elevated bilirubin, alk phos  -pain/nausea; worse with motrin  Add protonix     Abn gb on ct/us and tender ruq; likely Mitali's capsule irritated by hepatitis; hida non diag not nl; mri ordered but pt severely claustraphobic; will try to avoid general anesthesia because of irritated liver; try ativan; pt states oxy makes her feel too \"high\" no allergic reaction will try tramadol and small dose of toradol if needed     Allergy to morphine from \"many years ago\" per pt; she had to be given narcan and told she had \"convulsions;\" Had back surgery since and had no problems with oxy or any reactions during that surgery; I looked up her med list and she had no issues with dilaudid during that hosp. Will give dilaudid since she cannot eat and has terrible pains in her epigastric area from motrin     Quality:  DVT Prophylaxis: Heparin  CODE status: Full code  ANGELINE: TBD

## 2024-11-22 NOTE — PLAN OF CARE
Problem: Patient Centered Care  Goal: Patient preferences are identified and integrated in the patient's plan of care  Description: Interventions:  - What would you like us to know as we care for you? Patient is from home with family   - Provide timely, complete, and accurate information to patient/family  - Incorporate patient and family knowledge, values, beliefs, and cultural backgrounds into the planning and delivery of care  - Encourage patient/family to participate in care and decision-making at the level they choose  - Honor patient and family perspectives and choices  Outcome: Progressing     Problem: Diabetes/Glucose Control  Goal: Glucose maintained within prescribed range  Description: INTERVENTIONS:  - Monitor Blood Glucose as ordered  - Assess for signs and symptoms of hyperglycemia and hypoglycemia  - Administer ordered medications to maintain glucose within target range  - Assess barriers to adequate nutritional intake and initiate nutrition consult as needed  - Instruct patient on self management of diabetes  Outcome: Progressing     Problem: Patient/Family Goals  Goal: Patient/Family Long Term Goal  Description: Patient's Long Term Goal: To go home     Interventions:  - See additional Care Plan goals for specific interventions  Outcome: Progressing  Goal: Patient/Family Short Term Goal  Description: Patient's Short Term Goal: To feel better     Interventions:   - See additional Care Plan goals for specific interventions  Outcome: Progressing     Problem: GASTROINTESTINAL - ADULT  Goal: Minimal or absence of nausea and vomiting  Description: INTERVENTIONS:  - Maintain adequate hydration with IV or PO as ordered and tolerated  - Nasogastric tube to low intermittent suction as ordered  - Evaluate effectiveness of ordered antiemetic medications  - Provide nonpharmacologic comfort measures as appropriate  - Advance diet as tolerated, if ordered  - Obtain nutritional consult as needed  - Evaluate fluid  balance  Outcome: Progressing     Problem: METABOLIC/FLUID AND ELECTROLYTES - ADULT  Goal: Glucose maintained within prescribed range  Description: INTERVENTIONS:  - Monitor Blood Glucose as ordered  - Assess for signs and symptoms of hyperglycemia and hypoglycemia  - Administer ordered medications to maintain glucose within target range  - Assess barriers to adequate nutritional intake and initiate nutrition consult as needed  - Instruct patient on self management of diabetes  Outcome: Progressing  Goal: Electrolytes maintained within normal limits  Description: INTERVENTIONS:  - Monitor labs and rhythm and assess patient for signs and symptoms of electrolyte imbalances  - Administer electrolyte replacement as ordered  - Monitor response to electrolyte replacements, including rhythm and repeat lab results as appropriate  - Fluid restriction as ordered  - Instruct patient on fluid and nutrition restrictions as appropriate  Outcome: Progressing     Problem: PAIN - ADULT  Goal: Verbalizes/displays adequate comfort level or patient's stated pain goal  Description: INTERVENTIONS:  - Encourage pt to monitor pain and request assistance  - Assess pain using appropriate pain scale  - Administer analgesics based on type and severity of pain and evaluate response  - Implement non-pharmacological measures as appropriate and evaluate response  - Consider cultural and social influences on pain and pain management  - Manage/alleviate anxiety  - Utilize distraction and/or relaxation techniques  - Monitor for opioid side effects  - Notify MD/LIP if interventions unsuccessful or patient reports new pain  - Anticipate increased pain with activity and pre-medicate as appropriate  Outcome: Progressing     Problem: SAFETY ADULT - FALL  Goal: Free from fall injury  Description: INTERVENTIONS:  - Assess pt frequently for physical needs  - Identify cognitive and physical deficits and behaviors that affect risk of falls.  - [place] fall  precautions as indicated by assessment.  - Educate pt/family on patient safety including physical limitations  - Instruct pt to call for assistance with activity based on assessment  - Modify environment to reduce risk of injury  - Provide assistive devices as appropriate  - Consider OT/PT consult to assist with strengthening/mobility  - Encourage toileting schedule  Outcome: Progressing     Patient c/o nausea and abdominal pain overnight. Zofran given for nausea. Toradol given for pain with some relief. VSS.Patient had MRI/MRCP overnight - results pending.  IVF infusing. VSS. Will continue to monitor patient.

## 2024-11-23 PROBLEM — B15.9 VIRAL HEPATITIS A WITHOUT HEPATIC COMA: Status: ACTIVE | Noted: 2024-11-19

## 2024-11-23 LAB
ALBUMIN SERPL-MCNC: 2.6 G/DL (ref 3.2–4.8)
ALBUMIN/GLOB SERPL: 1.1 {RATIO} (ref 1–2)
ALP LIVER SERPL-CCNC: 116 U/L
ALT SERPL-CCNC: 1946 U/L
ANION GAP SERPL CALC-SCNC: 8 MMOL/L (ref 0–18)
AST SERPL-CCNC: 1078 U/L (ref ?–34)
BASOPHILS # BLD AUTO: 0.02 X10(3) UL (ref 0–0.2)
BASOPHILS NFR BLD AUTO: 0.4 %
BILIRUB SERPL-MCNC: 6.2 MG/DL (ref 0.3–1.2)
BUN BLD-MCNC: 6 MG/DL (ref 9–23)
BUN/CREAT SERPL: 7.6 (ref 10–20)
CALCIUM BLD-MCNC: 7.8 MG/DL (ref 8.7–10.4)
CHLORIDE SERPL-SCNC: 106 MMOL/L (ref 98–112)
CO2 SERPL-SCNC: 26 MMOL/L (ref 21–32)
CREAT BLD-MCNC: 0.79 MG/DL
DEPRECATED RDW RBC AUTO: 44.6 FL (ref 35.1–46.3)
EGFRCR SERPLBLD CKD-EPI 2021: 87 ML/MIN/1.73M2 (ref 60–?)
EOSINOPHIL # BLD AUTO: 0.04 X10(3) UL (ref 0–0.7)
EOSINOPHIL NFR BLD AUTO: 0.9 %
ERYTHROCYTE [DISTWIDTH] IN BLOOD BY AUTOMATED COUNT: 13.7 % (ref 11–15)
GLOBULIN PLAS-MCNC: 2.3 G/DL (ref 2–3.5)
GLUCOSE BLD-MCNC: 120 MG/DL (ref 70–99)
GLUCOSE BLDC GLUCOMTR-MCNC: 111 MG/DL (ref 70–99)
GLUCOSE BLDC GLUCOMTR-MCNC: 129 MG/DL (ref 70–99)
GLUCOSE BLDC GLUCOMTR-MCNC: 94 MG/DL (ref 70–99)
GLUCOSE BLDC GLUCOMTR-MCNC: 99 MG/DL (ref 70–99)
HCT VFR BLD AUTO: 37.1 %
HGB BLD-MCNC: 12.3 G/DL
IMM GRANULOCYTES # BLD AUTO: 0.04 X10(3) UL (ref 0–1)
IMM GRANULOCYTES NFR BLD: 0.9 %
LYMPHOCYTES # BLD AUTO: 1.03 X10(3) UL (ref 1–4)
LYMPHOCYTES NFR BLD AUTO: 23.1 %
MAGNESIUM SERPL-MCNC: 1.6 MG/DL (ref 1.6–2.6)
MCH RBC QN AUTO: 29.4 PG (ref 26–34)
MCHC RBC AUTO-ENTMCNC: 33.2 G/DL (ref 31–37)
MCV RBC AUTO: 88.8 FL
MONOCYTES # BLD AUTO: 0.61 X10(3) UL (ref 0.1–1)
MONOCYTES NFR BLD AUTO: 13.7 %
NEUTROPHILS # BLD AUTO: 2.71 X10 (3) UL (ref 1.5–7.7)
NEUTROPHILS # BLD AUTO: 2.71 X10(3) UL (ref 1.5–7.7)
NEUTROPHILS NFR BLD AUTO: 61 %
OSMOLALITY SERPL CALC.SUM OF ELEC: 289 MOSM/KG (ref 275–295)
PHOSPHATE SERPL-MCNC: 2.3 MG/DL (ref 2.4–5.1)
PLATELET # BLD AUTO: 191 10(3)UL (ref 150–450)
POTASSIUM SERPL-SCNC: 4.1 MMOL/L (ref 3.5–5.1)
PROT SERPL-MCNC: 4.9 G/DL (ref 5.7–8.2)
RBC # BLD AUTO: 4.18 X10(6)UL
SODIUM SERPL-SCNC: 140 MMOL/L (ref 136–145)
WBC # BLD AUTO: 4.5 X10(3) UL (ref 4–11)

## 2024-11-23 PROCEDURE — 99232 SBSQ HOSP IP/OBS MODERATE 35: CPT | Performed by: INTERNAL MEDICINE

## 2024-11-23 PROCEDURE — 99233 SBSQ HOSP IP/OBS HIGH 50: CPT | Performed by: HOSPITALIST

## 2024-11-23 RX ORDER — METOCLOPRAMIDE HYDROCHLORIDE 5 MG/5ML
10 SOLUTION ORAL
Status: DISCONTINUED | OUTPATIENT
Start: 2024-11-23 | End: 2024-11-27

## 2024-11-23 RX ORDER — MAGNESIUM SULFATE HEPTAHYDRATE 40 MG/ML
2 INJECTION, SOLUTION INTRAVENOUS ONCE
Status: COMPLETED | OUTPATIENT
Start: 2024-11-23 | End: 2024-11-23

## 2024-11-23 NOTE — PLAN OF CARE
Problem: Patient Centered Care  Goal: Patient preferences are identified and integrated in the patient's plan of care  Description: Interventions:  - What would you like us to know as we care for you?   - Provide timely, complete, and accurate information to patient/family  - Incorporate patient and family knowledge, values, beliefs, and cultural backgrounds into the planning and delivery of care  - Encourage patient/family to participate in care and decision-making at the level they choose  - Honor patient and family perspectives and choices  Outcome: Progressing     Problem: Diabetes/Glucose Control  Goal: Glucose maintained within prescribed range  Description: INTERVENTIONS:  - Monitor Blood Glucose as ordered  - Assess for signs and symptoms of hyperglycemia and hypoglycemia  - Administer ordered medications to maintain glucose within target range  - Assess barriers to adequate nutritional intake and initiate nutrition consult as needed  - Instruct patient on self management of diabetes  Outcome: Progressing     Problem: GASTROINTESTINAL - ADULT  Goal: Minimal or absence of nausea and vomiting  Description: INTERVENTIONS:  - Maintain adequate hydration with IV or PO as ordered and tolerated  - Nasogastric tube to low intermittent suction as ordered  - Evaluate effectiveness of ordered antiemetic medications  - Provide nonpharmacologic comfort measures as appropriate  - Advance diet as tolerated, if ordered  - Obtain nutritional consult as needed  - Evaluate fluid balance  Outcome: Progressing     Problem: METABOLIC/FLUID AND ELECTROLYTES - ADULT  Goal: Glucose maintained within prescribed range  Description: INTERVENTIONS:  - Monitor Blood Glucose as ordered  - Assess for signs and symptoms of hyperglycemia and hypoglycemia  - Administer ordered medications to maintain glucose within target range  - Assess barriers to adequate nutritional intake and initiate nutrition consult as needed  - Instruct patient on  self management of diabetes  Outcome: Progressing  Goal: Electrolytes maintained within normal limits  Description: INTERVENTIONS:  - Monitor labs and rhythm and assess patient for signs and symptoms of electrolyte imbalances  - Administer electrolyte replacement as ordered  - Monitor response to electrolyte replacements, including rhythm and repeat lab results as appropriate  - Fluid restriction as ordered  - Instruct patient on fluid and nutrition restrictions as appropriate  Outcome: Progressing     Problem: PAIN - ADULT  Goal: Verbalizes/displays adequate comfort level or patient's stated pain goal  Description: INTERVENTIONS:  - Encourage pt to monitor pain and request assistance  - Assess pain using appropriate pain scale  - Administer analgesics based on type and severity of pain and evaluate response  - Implement non-pharmacological measures as appropriate and evaluate response  - Consider cultural and social influences on pain and pain management  - Manage/alleviate anxiety  - Utilize distraction and/or relaxation techniques  - Monitor for opioid side effects  - Notify MD/LIP if interventions unsuccessful or patient reports new pain  - Anticipate increased pain with activity and pre-medicate as appropriate  Outcome: Progressing     Problem: SAFETY ADULT - FALL  Goal: Free from fall injury  Description: INTERVENTIONS:  - Assess pt frequently for physical needs  - Identify cognitive and physical deficits and behaviors that affect risk of falls.  - [place] fall precautions as indicated by assessment.  - Educate pt/family on patient safety including physical limitations  - Instruct pt to call for assistance with activity based on assessment  - Modify environment to reduce risk of injury  - Provide assistive devices as appropriate  - Consider OT/PT consult to assist with strengthening/mobility  - Encourage toileting schedule  Outcome: Progressing   Pt c/o mid upper abdominal pain, radiating to right and left  upper abdomen. Toradol given with some relief, but has since been discontinued by MD. Tramdol is available for pt as needed, but pt has not taken it yet. Full liquids continued, advance as tolerated. Appetite has not improved much. IV zofran scheduled q8h. IV fluids continued. Pt and rekha updated about plan of care.

## 2024-11-23 NOTE — PLAN OF CARE
Problem: Patient Centered Care  Goal: Patient preferences are identified and integrated in the patient's plan of care  Description: Interventions:  - What would you like us to know as we care for you?   - Provide timely, complete, and accurate information to patient/family  - Incorporate patient and family knowledge, values, beliefs, and cultural backgrounds into the planning and delivery of care  - Encourage patient/family to participate in care and decision-making at the level they choose  - Honor patient and family perspectives and choices  Outcome: Progressing     Problem: Diabetes/Glucose Control  Goal: Glucose maintained within prescribed range  Description: INTERVENTIONS:  - Monitor Blood Glucose as ordered  - Assess for signs and symptoms of hyperglycemia and hypoglycemia  - Administer ordered medications to maintain glucose within target range  - Assess barriers to adequate nutritional intake and initiate nutrition consult as needed  - Instruct patient on self management of diabetes  Outcome: Progressing     Problem: Patient/Family Goals  Goal: Patient/Family Long Term Goal  Description: Patient's Long Term Goal: Feel better    Interventions:  - Educate on medical regimen  - IVF  - ADAT  - GI consult  - See additional Care Plan goals for specific interventions  Outcome: Progressing  Goal: Patient/Family Short Term Goal  Description: Patient's Short Term Goal: No pain    Interventions:   - GI consult  - HIDA scan  - MRI/MRCP  - Monitor vitals  - IVF  - Clear liquid diet  - See additional Care Plan goals for specific interventions  Outcome: Progressing     Problem: GASTROINTESTINAL - ADULT  Goal: Minimal or absence of nausea and vomiting  Description: INTERVENTIONS:  - Maintain adequate hydration with IV or PO as ordered and tolerated  - Nasogastric tube to low intermittent suction as ordered  - Evaluate effectiveness of ordered antiemetic medications  - Provide nonpharmacologic comfort measures as  appropriate  - Advance diet as tolerated, if ordered  - Obtain nutritional consult as needed  - Evaluate fluid balance  Outcome: Progressing     Problem: METABOLIC/FLUID AND ELECTROLYTES - ADULT  Goal: Glucose maintained within prescribed range  Description: INTERVENTIONS:  - Monitor Blood Glucose as ordered  - Assess for signs and symptoms of hyperglycemia and hypoglycemia  - Administer ordered medications to maintain glucose within target range  - Assess barriers to adequate nutritional intake and initiate nutrition consult as needed  - Instruct patient on self management of diabetes  Outcome: Progressing  Goal: Electrolytes maintained within normal limits  Description: INTERVENTIONS:  - Monitor labs and rhythm and assess patient for signs and symptoms of electrolyte imbalances  - Administer electrolyte replacement as ordered  - Monitor response to electrolyte replacements, including rhythm and repeat lab results as appropriate  - Fluid restriction as ordered  - Instruct patient on fluid and nutrition restrictions as appropriate  Outcome: Progressing     Problem: PAIN - ADULT  Goal: Verbalizes/displays adequate comfort level or patient's stated pain goal  Description: INTERVENTIONS:  - Encourage pt to monitor pain and request assistance  - Assess pain using appropriate pain scale  - Administer analgesics based on type and severity of pain and evaluate response  - Implement non-pharmacological measures as appropriate and evaluate response  - Consider cultural and social influences on pain and pain management  - Manage/alleviate anxiety  - Utilize distraction and/or relaxation techniques  - Monitor for opioid side effects  - Notify MD/LIP if interventions unsuccessful or patient reports new pain  - Anticipate increased pain with activity and pre-medicate as appropriate  Outcome: Progressing     Problem: SAFETY ADULT - FALL  Goal: Free from fall injury  Description: INTERVENTIONS:  - Assess pt frequently for physical  needs  - Identify cognitive and physical deficits and behaviors that affect risk of falls.  - [place] fall precautions as indicated by assessment.  - Educate pt/family on patient safety including physical limitations  - Instruct pt to call for assistance with activity based on assessment  - Modify environment to reduce risk of injury  - Provide assistive devices as appropriate  - Consider OT/PT consult to assist with strengthening/mobility  - Encourage toileting schedule  Outcome: Progressing

## 2024-11-23 NOTE — PROGRESS NOTES
[facility]     Gastroenterology Progress Note    Emmanuel Mendiola Patient Status:  Inpatient    10/7/1965 MRN [id number]   [facility] Attending Antonio Haley,*   Hosp Day # 4 PCP Bertin Florez MD       Subjective:   Chief complaint: hepatitis    >>patient still not taking much oral intake other than ice chips  -no melena  -having epigastric pain, generalized ab pain as well  -nausea  -no cp/sob  -partner at bedside  -not moving much out of bed    Objective:   Blood pressure 146/83, pulse 82, temperature 98.9 °F (37.2 °C), temperature source Oral, resp. rate 18, weight 149 lb 11.1 oz (67.9 kg), last menstrual period 2017, SpO2 93%, not currently breastfeeding. Body mass index is 26.52 kg/m².    General: awake, alert and oriented, no acute distress  HEENT: moist mucus membranes  PULM: no conversational dyspnea  CARDIOVASCULAR: regular rate and rhythm, the extremities are warm and well perfused  GI: soft, mild ttp in epigastrium  EXTREMITIES: no edema, moving all extremities  SKIN: no visible rash  NEURO: appropriate and interactive    Assessment and Plan:   Acute hepatitis A infection with GI symptom manifestation and acute liver injury, synthetic function intact. No known prior liver disease, US with hepatic steatosis. Elevated ferritin is acute phase reactant. Hep B/C negative, ASMA and AMA negative, ceruloplasmin negative. C diff and GI stool PCR negative.     #Acute hepatitis A- unclear source, they have been to outside restaurants and to parties. No clear stick contacts  -we discussed LFTs show improvement in AST/ALT, bili rising as expected and will peak later  -trend INR  -MRI reviewed showing no biliary obstruction    >> I discussed with patient that her nutritional status has to improve otherwise she cannot go home; her LFTs are trending as expected. She has nausea and ab pain, likely from Hep A infection. We discussed starting dobhoff tube  for nutrition but she declines at this time, willing to try more anti-emetics like reglan. Risks/benefits discussed.   -also encouraged her to move more, RN will get her a walker    Recommendations:  -Avoid hepatotoxic agents.  -Continue IVFs.  -Continue IV BID Pantoprazole.  -Schedule zofran.  -ADD reglan today given nausea  -DVT ppx      S Jing Cole MD        Results:     Lab Results   Component Value Date    WBC 4.5 11/23/2024    HGB 12.3 11/23/2024    HCT 37.1 11/23/2024    .0 11/23/2024    CREATSERUM 0.79 11/23/2024    BUN 6 (L) 11/23/2024     11/23/2024    K 4.1 11/23/2024     11/23/2024    CO2 26.0 11/23/2024     (H) 11/23/2024    CA 7.8 (L) 11/23/2024    ALB 2.6 (L) 11/23/2024    ALKPHO 116 11/23/2024    BILT 6.2 (H) 11/23/2024    TP 4.9 (L) 11/23/2024    AST 1,078 (H) 11/23/2024    ALT 1,946 (H) 11/23/2024    PTT 26.9 12/06/2023    INR 1.54 (H) 11/21/2024    TSH 0.988 11/11/2024    DEANDRA 79 12/13/2017    LIP 35 11/19/2024    DDIMER 0.92 (H) 11/28/2021    ESRML 29 01/23/2020    CRP 0.65 (H) 12/06/2019    MG 1.6 11/23/2024    PHOS 2.3 (L) 11/23/2024    TROP <0.045 11/28/2021    CK 52 11/19/2024    CKMB 0.6 12/19/2015    B12 558 01/21/2020    POCGLU 100 11/20/2023       MRI ABDOMEN AND MRCP W/3D (W+W0)(CPT=74183/63108)    Result Date: 11/22/2024  CONCLUSION:   Severe periportal inflammation and free fluid suggestive of hepatitis.  Severe gallbladder wall thickening, likely reactive from the presumed hepatitis.  Mild peripancreatic inflammation with fluid within the pancreaticoduodenal groove, likely reactive from the presumed hepatitis.  Groove pancreatitis felt less likely.  No ductal dilation.    Dictated by (CST): Arnold Mahmood MD on 11/22/2024 at 10:18 AM     Finalized by (CST): Arnold Mahmood MD on 11/22/2024 at 10:29 AM

## 2024-11-23 NOTE — PLAN OF CARE
Problem: Patient Centered Care  Goal: Patient preferences are identified and integrated in the patient's plan of care  Description: Interventions:  - What would you like us to know as we care for you? Lives at home  - Provide timely, complete, and accurate information to patient/family  - Incorporate patient and family knowledge, values, beliefs, and cultural backgrounds into the planning and delivery of care  - Encourage patient/family to participate in care and decision-making at the level they choose  - Honor patient and family perspectives and choices  Outcome: Progressing     Problem: Diabetes/Glucose Control  Goal: Glucose maintained within prescribed range  Description: INTERVENTIONS:  - Monitor Blood Glucose as ordered  - Assess for signs and symptoms of hyperglycemia and hypoglycemia  - Administer ordered medications to maintain glucose within target range  - Assess barriers to adequate nutritional intake and initiate nutrition consult as needed  - Instruct patient on self management of diabetes  Outcome: Progressing     Problem: Patient/Family Goals  Goal: Patient/Family Long Term Goal  Description: Patient's Long Term Goal: Feel better    Interventions:  - Educate on medical regimen  - IVF  - ADAT  - GI consult  - See additional Care Plan goals for specific interventions  Outcome: Progressing  Goal: Patient/Family Short Term Goal  Description: Patient's Short Term Goal: No pain    Interventions:   - GI consult  - HIDA scan  - MRI/MRCP  - Monitor vitals  - IVF  - Clear liquid diet  - See additional Care Plan goals for specific interventions  Outcome: Progressing     Problem: GASTROINTESTINAL - ADULT  Goal: Minimal or absence of nausea and vomiting  Description: INTERVENTIONS:  - Maintain adequate hydration with IV or PO as ordered and tolerated  - Nasogastric tube to low intermittent suction as ordered  - Evaluate effectiveness of ordered antiemetic medications  - Provide nonpharmacologic comfort  measures as appropriate  - Advance diet as tolerated, if ordered  - Obtain nutritional consult as needed  - Evaluate fluid balance  Outcome: Progressing     Problem: METABOLIC/FLUID AND ELECTROLYTES - ADULT  Goal: Glucose maintained within prescribed range  Description: INTERVENTIONS:  - Monitor Blood Glucose as ordered  - Assess for signs and symptoms of hyperglycemia and hypoglycemia  - Administer ordered medications to maintain glucose within target range  - Assess barriers to adequate nutritional intake and initiate nutrition consult as needed  - Instruct patient on self management of diabetes  Outcome: Progressing  Goal: Electrolytes maintained within normal limits  Description: INTERVENTIONS:  - Monitor labs and rhythm and assess patient for signs and symptoms of electrolyte imbalances  - Administer electrolyte replacement as ordered  - Monitor response to electrolyte replacements, including rhythm and repeat lab results as appropriate  - Fluid restriction as ordered  - Instruct patient on fluid and nutrition restrictions as appropriate  Outcome: Progressing     Problem: PAIN - ADULT  Goal: Verbalizes/displays adequate comfort level or patient's stated pain goal  Description: INTERVENTIONS:  - Encourage pt to monitor pain and request assistance  - Assess pain using appropriate pain scale  - Administer analgesics based on type and severity of pain and evaluate response  - Implement non-pharmacological measures as appropriate and evaluate response  - Consider cultural and social influences on pain and pain management  - Manage/alleviate anxiety  - Utilize distraction and/or relaxation techniques  - Monitor for opioid side effects  - Notify MD/LIP if interventions unsuccessful or patient reports new pain  - Anticipate increased pain with activity and pre-medicate as appropriate  Outcome: Progressing     Problem: SAFETY ADULT - FALL  Goal: Free from fall injury  Description: INTERVENTIONS:  - Assess pt frequently  for physical needs  - Identify cognitive and physical deficits and behaviors that affect risk of falls.  - [place] fall precautions as indicated by assessment.  - Educate pt/family on patient safety including physical limitations  - Instruct pt to call for assistance with activity based on assessment  - Modify environment to reduce risk of injury  - Provide assistive devices as appropriate  - Consider OT/PT consult to assist with strengthening/mobility  - Encourage toileting schedule  Outcome: Progressing   Vital signs stable, blood sugar stable. Continue with complaint of \"stomach pain\" with anything taken orally. No emesis/nausea noted. Continue to encourage  patient to ambulate. Up on chair for dinner only. Started on reglan oral with meals. Continue to monitor. Bed on low and locked position, call light within reach.

## 2024-11-23 NOTE — PROGRESS NOTES
[facility]  part of [facility]    Progress Note    Emmanuel Mendiola Patient Status:  Inpatient    10/7/1965 MRN [id number]   Location [facility] 1W Attending Antonio Haley,*   Hosp Day # 4 PCP Bertin Florez MD     Chief Complaint: I am miserable    Subjective:     Constitutional:  Positive for appetite change and fatigue.   HENT:  Negative for congestion.    Respiratory:  Negative for choking, chest tightness and shortness of breath.    Cardiovascular:  Negative for leg swelling.   Gastrointestinal:  Positive for nausea and abdominal pain.   Genitourinary:  Negative for dysuria.   Musculoskeletal:  Negative for joint pain.   Neurological:  Positive for weakness.   Psychiatric/Behavioral:  Positive for sleep disturbance. Negative for confusion and decreased concentration. The patient is nervous/anxious.        Objective:   Blood pressure 138/74, pulse 80, temperature 99.3 °F (37.4 °C), temperature source Oral, resp. rate 18, weight 149 lb 11.1 oz (67.9 kg), last menstrual period 2017, SpO2 93%, not currently breastfeeding.  Physical Exam  Vitals and nursing note reviewed.   Constitutional:       General: She is in acute distress.      Appearance: She is obese. She is ill-appearing. She is not toxic-appearing or diaphoretic.   HENT:      Head: Normocephalic and atraumatic.   Cardiovascular:      Rate and Rhythm: Normal rate.      Pulses: Normal pulses.   Pulmonary:      Breath sounds: Rhonchi present. No rales.   Abdominal:      General: Bowel sounds are normal.      Palpations: Abdomen is soft.      Tenderness: There is abdominal tenderness.   Skin:     General: Skin is warm and dry.      Capillary Refill: Capillary refill takes less than 2 seconds.   Neurological:      Mental Status: She is alert and oriented to person, place, and time.   Psychiatric:         Behavior: Behavior normal.         Judgment: Judgment normal.         Results:   Lab Results   Component  Value Date    WBC 4.5 11/23/2024    HGB 12.3 11/23/2024    HCT 37.1 11/23/2024    .0 11/23/2024    CREATSERUM 0.79 11/23/2024    BUN 6 (L) 11/23/2024     11/23/2024    K 4.1 11/23/2024     11/23/2024    CO2 26.0 11/23/2024     (H) 11/23/2024    CA 7.8 (L) 11/23/2024    ALB 2.6 (L) 11/23/2024    ALKPHO 116 11/23/2024    BILT 6.2 (H) 11/23/2024    TP 4.9 (L) 11/23/2024    AST 1,078 (H) 11/23/2024    ALT 1,946 (H) 11/23/2024    PTT 26.9 12/06/2023    INR 1.54 (H) 11/21/2024    TSH 0.988 11/11/2024    DEANDRA 79 12/13/2017    LIP 35 11/19/2024    DDIMER 0.92 (H) 11/28/2021    ESRML 29 01/23/2020    CRP 0.65 (H) 12/06/2019    MG 1.6 11/23/2024    PHOS 2.3 (L) 11/23/2024    TROP <0.045 11/28/2021    CK 52 11/19/2024    CKMB 0.6 12/19/2015    B12 558 01/21/2020    POCGLU 100 11/20/2023       MRI ABDOMEN AND MRCP W/3D (W+W0)(CPT=74183/76094)    Result Date: 11/22/2024  CONCLUSION:   Severe periportal inflammation and free fluid suggestive of hepatitis.  Severe gallbladder wall thickening, likely reactive from the presumed hepatitis.  Mild peripancreatic inflammation with fluid within the pancreaticoduodenal groove, likely reactive from the presumed hepatitis.  Groove pancreatitis felt less likely.  No ductal dilation.    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 Will give dilaudid since she cannot eat and has terrible pains in her epigastric area from motrin     Quality:  DVT Prophylaxis: Heparin  CODE status: Full code  ANGELINE: TBD    Complex mdm; coordinating care with nurse/dr barron and counseling pt and with her permission her  in room about mri/pain/trying to avoid ng feeds      COURTNEY FOWLER MD

## 2024-11-24 ENCOUNTER — APPOINTMENT (OUTPATIENT)
Dept: GENERAL RADIOLOGY | Facility: HOSPITAL | Age: 59
End: 2024-11-24
Attending: INTERNAL MEDICINE
Payer: COMMERCIAL

## 2024-11-24 LAB
ALBUMIN SERPL-MCNC: 2.3 G/DL (ref 3.2–4.8)
ALBUMIN/GLOB SERPL: 1 {RATIO} (ref 1–2)
ALP LIVER SERPL-CCNC: 108 U/L
ALT SERPL-CCNC: 1513 U/L
ANION GAP SERPL CALC-SCNC: 7 MMOL/L (ref 0–18)
AST SERPL-CCNC: 773 U/L (ref ?–34)
BASOPHILS # BLD AUTO: 0.04 X10(3) UL (ref 0–0.2)
BASOPHILS NFR BLD AUTO: 0.7 %
BILIRUB SERPL-MCNC: 6.9 MG/DL (ref 0.3–1.2)
BUN BLD-MCNC: <5 MG/DL (ref 9–23)
CALCIUM BLD-MCNC: 7.5 MG/DL (ref 8.7–10.4)
CHLORIDE SERPL-SCNC: 107 MMOL/L (ref 98–112)
CO2 SERPL-SCNC: 26 MMOL/L (ref 21–32)
CREAT BLD-MCNC: 0.69 MG/DL
DEPRECATED RDW RBC AUTO: 43.7 FL (ref 35.1–46.3)
EGFRCR SERPLBLD CKD-EPI 2021: 101 ML/MIN/1.73M2 (ref 60–?)
EOSINOPHIL # BLD AUTO: 0.04 X10(3) UL (ref 0–0.7)
EOSINOPHIL NFR BLD AUTO: 0.7 %
ERYTHROCYTE [DISTWIDTH] IN BLOOD BY AUTOMATED COUNT: 13.8 % (ref 11–15)
GLOBULIN PLAS-MCNC: 2.4 G/DL (ref 2–3.5)
GLUCOSE BLD-MCNC: 116 MG/DL (ref 70–99)
GLUCOSE BLDC GLUCOMTR-MCNC: 107 MG/DL (ref 70–99)
GLUCOSE BLDC GLUCOMTR-MCNC: 118 MG/DL (ref 70–99)
GLUCOSE BLDC GLUCOMTR-MCNC: 138 MG/DL (ref 70–99)
GLUCOSE BLDC GLUCOMTR-MCNC: 139 MG/DL (ref 70–99)
HCT VFR BLD AUTO: 33.5 %
HGB BLD-MCNC: 11.8 G/DL
IMM GRANULOCYTES # BLD AUTO: 0.05 X10(3) UL (ref 0–1)
IMM GRANULOCYTES NFR BLD: 0.9 %
LYMPHOCYTES # BLD AUTO: 1.48 X10(3) UL (ref 1–4)
LYMPHOCYTES NFR BLD AUTO: 25.9 %
MAGNESIUM SERPL-MCNC: 1.7 MG/DL (ref 1.6–2.6)
MCH RBC QN AUTO: 30.8 PG (ref 26–34)
MCHC RBC AUTO-ENTMCNC: 35.2 G/DL (ref 31–37)
MCV RBC AUTO: 87.5 FL
MONOCYTES # BLD AUTO: 0.78 X10(3) UL (ref 0.1–1)
MONOCYTES NFR BLD AUTO: 13.6 %
NEUTROPHILS # BLD AUTO: 3.33 X10 (3) UL (ref 1.5–7.7)
NEUTROPHILS # BLD AUTO: 3.33 X10(3) UL (ref 1.5–7.7)
NEUTROPHILS NFR BLD AUTO: 58.2 %
PHOSPHATE SERPL-MCNC: 2.5 MG/DL (ref 2.4–5.1)
PLATELET # BLD AUTO: 217 10(3)UL (ref 150–450)
POTASSIUM SERPL-SCNC: 3.1 MMOL/L (ref 3.5–5.1)
POTASSIUM SERPL-SCNC: 3.8 MMOL/L (ref 3.5–5.1)
PROT SERPL-MCNC: 4.7 G/DL (ref 5.7–8.2)
RBC # BLD AUTO: 3.83 X10(6)UL
SODIUM SERPL-SCNC: 140 MMOL/L (ref 136–145)
WBC # BLD AUTO: 5.7 X10(3) UL (ref 4–11)

## 2024-11-24 PROCEDURE — 0DH67UZ INSERTION OF FEEDING DEVICE INTO STOMACH, VIA NATURAL OR ARTIFICIAL OPENING: ICD-10-PCS | Performed by: INTERNAL MEDICINE

## 2024-11-24 PROCEDURE — 99232 SBSQ HOSP IP/OBS MODERATE 35: CPT | Performed by: INTERNAL MEDICINE

## 2024-11-24 PROCEDURE — 99233 SBSQ HOSP IP/OBS HIGH 50: CPT | Performed by: HOSPITALIST

## 2024-11-24 PROCEDURE — 3E0G76Z INTRODUCTION OF NUTRITIONAL SUBSTANCE INTO UPPER GI, VIA NATURAL OR ARTIFICIAL OPENING: ICD-10-PCS | Performed by: INTERNAL MEDICINE

## 2024-11-24 PROCEDURE — 71045 X-RAY EXAM CHEST 1 VIEW: CPT | Performed by: INTERNAL MEDICINE

## 2024-11-24 RX ORDER — MAGNESIUM SULFATE HEPTAHYDRATE 40 MG/ML
2 INJECTION, SOLUTION INTRAVENOUS ONCE
Status: COMPLETED | OUTPATIENT
Start: 2024-11-24 | End: 2024-11-24

## 2024-11-24 RX ORDER — LIDOCAINE HYDROCHLORIDE 20 MG/ML
1 JELLY TOPICAL AS NEEDED
Status: DISCONTINUED | OUTPATIENT
Start: 2024-11-24 | End: 2024-11-27

## 2024-11-24 RX ORDER — SODIUM BICARBONATE 650 MG/1
325 TABLET ORAL AS NEEDED
Status: DISCONTINUED | OUTPATIENT
Start: 2024-11-24 | End: 2024-11-27

## 2024-11-24 RX ORDER — POTASSIUM CHLORIDE 14.9 MG/ML
20 INJECTION INTRAVENOUS ONCE
Status: COMPLETED | OUTPATIENT
Start: 2024-11-24 | End: 2024-11-24

## 2024-11-24 NOTE — PLAN OF CARE
Problem: Patient Centered Care  Goal: Patient preferences are identified and integrated in the patient's plan of care  Description: Interventions:  - What would you like us to know as we care for you?   - Provide timely, complete, and accurate information to patient/family  - Incorporate patient and family knowledge, values, beliefs, and cultural backgrounds into the planning and delivery of care  - Encourage patient/family to participate in care and decision-making at the level they choose  - Honor patient and family perspectives and choices  Outcome: Progressing     Problem: Diabetes/Glucose Control  Goal: Glucose maintained within prescribed range  Description: INTERVENTIONS:  - Monitor Blood Glucose as ordered  - Assess for signs and symptoms of hyperglycemia and hypoglycemia  - Administer ordered medications to maintain glucose within target range  - Assess barriers to adequate nutritional intake and initiate nutrition consult as needed  - Instruct patient on self management of diabetes  Outcome: Progressing     Problem: Patient/Family Goals  Goal: Patient/Family Long Term Goal  Description: Patient's Long Term Goal: Feel better    Interventions:  - Educate on medical regimen  - IVF  - ADAT  - GI consult  - See additional Care Plan goals for specific interventions  Outcome: Progressing     Problem: GASTROINTESTINAL - ADULT  Goal: Minimal or absence of nausea and vomiting  Description: INTERVENTIONS:  - Maintain adequate hydration with IV or PO as ordered and tolerated  - Nasogastric tube to low intermittent suction as ordered  - Evaluate effectiveness of ordered antiemetic medications  - Provide nonpharmacologic comfort measures as appropriate  - Advance diet as tolerated, if ordered  - Obtain nutritional consult as needed  - Evaluate fluid balance  Outcome: Progressing     Problem: METABOLIC/FLUID AND ELECTROLYTES - ADULT  Goal: Glucose maintained within prescribed range  Description: INTERVENTIONS:  -  Monitor Blood Glucose as ordered  - Assess for signs and symptoms of hyperglycemia and hypoglycemia  - Administer ordered medications to maintain glucose within target range  - Assess barriers to adequate nutritional intake and initiate nutrition consult as needed  - Instruct patient on self management of diabetes  Outcome: Progressing  Goal: Electrolytes maintai nd assess patient for signs and symptoms of electrolyte imbalances  - Administer electrolyte replacement as ordered  - Monitor response to electrolyte replacements, including rhythm and repeat lab results as appropriate  - Fluid restriction as ordered  - Instruct patient on fluid and nutrition restrictions as appropriate  Outcome: Progressing   Patient ambulated in the hallway. She reported that she had diarrhea after consuming the ensure.  brought her soup, and she was only able to tolerated a small amount IVF.

## 2024-11-24 NOTE — PROGRESS NOTES
[facility]  part of [facility]    Progress Note    Emmanuel Mendiola Patient Status:  Inpatient    10/7/1965 MRN [id number]   Location [facility] 1W Attending Antonio Haley,*   Hosp Day # 5 PCP Bertin Florez MD     Chief Complaint: I am miserable    Subjective:     Constitutional:  Positive for appetite change and fatigue.   HENT:  Negative for congestion.    Respiratory:  Negative for choking, chest tightness and shortness of breath.    Cardiovascular:  Negative for leg swelling.   Gastrointestinal:  Positive for nausea and abdominal pain.   Genitourinary:  Negative for dysuria.   Musculoskeletal:  Negative for joint pain.   Neurological:  Positive for weakness.   Psychiatric/Behavioral:  Positive for sleep disturbance. Negative for confusion and decreased concentration. The patient is nervous/anxious.        Objective:   Blood pressure 147/80, pulse 87, temperature 99.7 °F (37.6 °C), temperature source Oral, resp. rate 20, weight 149 lb 11.1 oz (67.9 kg), last menstrual period 2017, SpO2 92%, not currently breastfeeding.  Physical Exam  Vitals and nursing note reviewed.   Constitutional:       General: She is in acute distress.      Appearance: She is obese. She is ill-appearing. She is not toxic-appearing or diaphoretic.   HENT:      Head: Normocephalic and atraumatic.   Cardiovascular:      Rate and Rhythm: Normal rate.      Pulses: Normal pulses.   Pulmonary:      Breath sounds: Rhonchi present. No rales.   Abdominal:      General: Bowel sounds are normal.      Palpations: Abdomen is soft.      Tenderness: There is abdominal tenderness.   Skin:     General: Skin is warm and dry.      Capillary Refill: Capillary refill takes less than 2 seconds.   Neurological:      Mental Status: She is alert and oriented to person, place, and time.   Psychiatric:         Behavior: Behavior normal.         Judgment: Judgment normal.         Results:   Lab Results   Component  Value Date    WBC 5.7 11/24/2024    HGB 11.8 (L) 11/24/2024    HCT 33.5 (L) 11/24/2024    .0 11/24/2024    CREATSERUM 0.69 11/24/2024    BUN <5 (L) 11/24/2024     11/24/2024    K 3.1 (L) 11/24/2024     11/24/2024    CO2 26.0 11/24/2024     (H) 11/24/2024    CA 7.5 (L) 11/24/2024    ALB 2.3 (L) 11/24/2024    ALKPHO 108 11/24/2024    BILT 6.9 (H) 11/24/2024    TP 4.7 (L) 11/24/2024     (H) 11/24/2024    ALT 1,513 (H) 11/24/2024    PTT 26.9 12/06/2023    INR 1.54 (H) 11/21/2024    TSH 0.988 11/11/2024    DEANDRA 79 12/13/2017    LIP 35 11/19/2024    DDIMER 0.92 (H) 11/28/2021    ESRML 29 01/23/2020    CRP 0.65 (H) 12/06/2019    MG 1.7 11/24/2024    PHOS 2.5 11/24/2024    TROP <0.045 11/28/2021    CK 52 11/19/2024    CKMB 0.6 12/19/2015    B12 558 01/21/2020    POCGLU 100 11/20/2023       No results found.        Assessment & Plan:     Markedly elevated transaminases: Viral hepatitis   Mildly elevated bilirubin, alk phos  -pain/nausea; worse with motrin  Add protonix    Abn gb on ct/us and tender ruq; likely Mitali's capsule irritated by hepatitis; hida non diag not nl; mri ordered but pt severely claustraphobic; will try to avoid general anesthesia because of irritated liver; try ativan; pt states oxy makes her feel too \"high\" no allergic reaction will try tramadol     Allergy to morphine from \"many years ago\" per pt; she had to be given narcan and told she had \"convulsions;\" Had back surgery since and had no problems with oxy or any reactions during that surgery; I looked up her med list and she had no issues with dilaudid during that hosp. Will give dilaudid since she cannot eat and has terrible pains in her epigastric area from motrin    Severe protein therese maln ng feeds     Quality:  DVT Prophylaxis: Heparin  CODE status: Full code  ANGELINE: TBD    Complex mdm; coordinating care with nurse and counseling pt and with her permission her  [facility]  [place]

## 2024-11-24 NOTE — DIETARY NOTE
ADULT NUTRITION INITIAL ASSESSMENT    Pt is at high nutrition risk.  Pt does not meet malnutrition criteria.     Only able to document energy intake less than 75% for greater than 7 days.       RECOMMENDATIONS TO MD: Adjust IVF accordingly and See Nutrition Intervention for TF specifics     ADMITTING DIAGNOSIS:  Transaminitis [R74.01]  Nausea and vomiting in adult [R11.2]  PERTINENT PAST MEDICAL HISTORY:   Past Medical History:    Anemia    recently dx-taking FeSO4    Back problem    Diabetes (HCC)    Disorder of liver    Divorce    Finalized 2010    DM2 (diabetes mellitus, type 2) (HCC)    controlled with diet    Endometriosis    Laparoscopy-diagnostic    Esophageal reflux    History of blood transfusion    No reaction    History of pregnancy    EAB    Hx of motion sickness    Lipid screening    per NG    Migraines    Nausea and vomiting in adult    Visual impairment    wears glasses     PATIENT STATUS: Initial 11/24/24: Pt admit for Hepatitis. MD: Acute Hepatitis A Unclear Source. MRI showing no biliary obstruction. Poor nutritional status. DHT placed this afternoon and MD consult received to order and manage tube feedings. CXR ordered to verify placement.   PMH sig for Diabetes per patient. Pt assessed due to screening at risk for MD Consult re: Tube Feedings.  Met with patient and significant other this afternoon. Patient just had DHT placed and awaiting CXR to verify placement.   Reports feeling \"under the weather\" for the past 3 weeks with a definite decrease in appetite/oral intake over the past 1 1/2 weeks. Only able to take sips of juice, water, soup/broth. Otherwise, prior to the past 3 weeks she was feeling normal and was active.   She just wants to get better.   Patient is at re-feeding risk d/t poor po intake.      FOOD/NUTRITION RELATED HISTORY:  Appetite: Poor  Intake:  Taking sips of juice, soup/broth  Intake Meeting Needs: No - DHT placed for nutrition   Percent Meals Eaten (last 3 days)       None            Food Allergies: Gluten and Wheat  Cultural/Ethnic/Latter-day Preferences: Not Obtained    GASTROINTESTINAL: diarrhea, Loss of appetite, nausea, and abdominal pain/discomfort  Reports having loose stools (4 times per day). C Diff negative.    MEDICATIONS: reviewed Receiving IVF @ 100 ml/hr   Noted Protonix, Reglan, Zofran    potassium chloride  20 mEq Intravenous Once    [START ON 11/25/2024] pantoprazole  40 mg Intravenous Q24H    metoclopramide  10 mg Oral TID AC    ondansetron  4 mg Intravenous q6h    heparin  5,000 Units Subcutaneous Q8H SONYA     LABS: reviewed - Noted LFT's + Bilirubin - MD to continue IVF's   Noted K+ 3.1 (KCL Zeeshan Rx) + K-Phos Zeeshan + Mg Rx   Recent A1C: 6.1% - POC Glucose WNL   Recent Labs     11/21/24  0641 11/22/24  0656 11/23/24  0640 11/24/24  0557   * 134* 120* 116*   BUN 6* <5* 6* <5*   CREATSERUM 0.68 0.60 0.79 0.69   CA 7.9* 7.8* 7.8* 7.5*   MG 1.5* 1.9 1.6 1.7    139 140 140   K 3.3* 3.5  3.5 4.1 3.1*    109 106 107   CO2 25.0 25.0 26.0 26.0   PHOS 2.7  --  2.3* 2.5   OSMOCALC 290  --  289  --      NUTRITION RELATED PHYSICAL FINDINGS:  - Nutrition Focused Physical Exam (NFPE): no wasting noted   - Fluid Accumulation: none  see RN documentation for details  - Skin Integrity: intact see RN documentation for details  Jaundice    ANTHROPOMETRICS:  HT: 5'3\"   WT: 67.9 kg (149 lb 11.1 oz)   BMI: Body mass index is 26.52 kg/m².  BMI CLASSIFICATION: 25-29.9 kg/m2 - overweight  IBW: 115 lbs        129% IBW  Usual Body Wt: 149 lbs 5/14/24      100% UBW    WEIGHT HISTORY:  Patient Weight(s) for the past 336 hrs:   Weight   11/19/24 0523 67.9 kg (149 lb 11.1 oz)     Wt Readings from Last 10 Encounters:   11/19/24 67.9 kg (149 lb 11.1 oz)   11/11/24 69.4 kg (153 lb)   05/14/24 67.9 kg (149 lb 12.8 oz)   04/23/24 67.1 kg (148 lb)   01/15/24 68.5 kg (151 lb)   12/26/23 68 kg (150 lb)   12/18/23 68 kg (150 lb)   12/06/23 68 kg (150 lb)   11/21/23 68.5 kg (151 lb)    11/16/23 65.3 kg (144 lb)     NUTRITION DIAGNOSIS/PROBLEM:    Inadequate protein energy intake related to Decreased ability to consume sufficient energy d/t GI issues, Abnormal Labs as evidenced by Abdominal Pain/Discomfort, Nausea + Loose Stools.     NUTRITION INTERVENTION:     NUTRITION PRESCRIPTION:   Estimated Nutrition needs: --dosing wt of 68 kg - wt taken on 11/19/24  Calories: 1500 calories/day (22 calories per kg Dosing wt)  Protein: 63 g protein/day (1.2 g protein/kg Ideal body wt (IBW))  Fluid Needs: 2000 ml     - Diet:       Procedures    Regular/General diet Calorie Restriction/Carb Controlled: 1800 kcal/60 grams; Is Patient on Accuchecks? Yes   MD order to start Tube Feeds.   Use Osmolite 1.2 formula with goal rate of 50 ml/hr. Provides 1320 calories, 61 g protein, 900 ml H2O at goal rate. Will meet 88% calorie and 97% protein estimated needs.   Minimal water flushes d/t current IVF rate.     - Meals and snacks:  Encouraged to continue sipping on beverages and soup as able   - Medical Food Supplements-Providing Ensure Compact (220 calories/ 9 g protein each) BID Vanilla or Chocolate. Patient sipping on occasionally. Monitor intake/tolerance. Adjust prn. Rational/use of oral supplements discussed.  - Vitamin and mineral supplements: none  - Feeding assistance: meal set up  - Nutrition education:  Answered questions re: Tube Feeding.      - Coordination of nutrition care: collaboration with other providers  - Discharge and transfer of nutrition care to new setting or provider: monitor plans    MONITOR AND EVALUATE/NUTRITION GOALS:  - Food and Nutrient Intake:      Monitor: adequacy of PO intake and tolerance of PO intake  - Food and Nutrient Administration:      Monitor: tolerance to enteral nutrition, adequacy of enteral nutrition, and for enteral nutrition adjustment  - Anthropometric Measurement:    Monitor weight  - Nutrition Goals:      maintain wt within 5%, tube feed meets greater than 80% of  needs, return to normal GI function, labs within acceptable limits, minimize lean body mass loss, and support body systems    DIETITIAN FOLLOW UP: RD to follow and monitor nutrition status    Juana Springer RDN, LDN, CDE   Clinical Nutrition  Ext [phone]

## 2024-11-25 LAB
ALBUMIN SERPL-MCNC: 2.5 G/DL (ref 3.2–4.8)
ALBUMIN/GLOB SERPL: 0.9 {RATIO} (ref 1–2)
ALP LIVER SERPL-CCNC: 124 U/L
ALT SERPL-CCNC: 1232 U/L
ANION GAP SERPL CALC-SCNC: 7 MMOL/L (ref 0–18)
AST SERPL-CCNC: 460 U/L (ref ?–34)
BASOPHILS # BLD AUTO: 0.04 X10(3) UL (ref 0–0.2)
BASOPHILS NFR BLD AUTO: 0.6 %
BILIRUB SERPL-MCNC: 7.3 MG/DL (ref 0.3–1.2)
BUN BLD-MCNC: <5 MG/DL (ref 9–23)
CALCIUM BLD-MCNC: 7.7 MG/DL (ref 8.7–10.4)
CHLORIDE SERPL-SCNC: 106 MMOL/L (ref 98–112)
CO2 SERPL-SCNC: 26 MMOL/L (ref 21–32)
CREAT BLD-MCNC: 0.68 MG/DL
DEPRECATED RDW RBC AUTO: 43.8 FL (ref 35.1–46.3)
EGFRCR SERPLBLD CKD-EPI 2021: 102 ML/MIN/1.73M2 (ref 60–?)
EOSINOPHIL # BLD AUTO: 0.08 X10(3) UL (ref 0–0.7)
EOSINOPHIL NFR BLD AUTO: 1.1 %
ERYTHROCYTE [DISTWIDTH] IN BLOOD BY AUTOMATED COUNT: 14 % (ref 11–15)
GLOBULIN PLAS-MCNC: 2.7 G/DL (ref 2–3.5)
GLUCOSE BLD-MCNC: 154 MG/DL (ref 70–99)
GLUCOSE BLDC GLUCOMTR-MCNC: 117 MG/DL (ref 70–99)
GLUCOSE BLDC GLUCOMTR-MCNC: 127 MG/DL (ref 70–99)
GLUCOSE BLDC GLUCOMTR-MCNC: 139 MG/DL (ref 70–99)
HCT VFR BLD AUTO: 34.4 %
HGB BLD-MCNC: 12.1 G/DL
IMM GRANULOCYTES # BLD AUTO: 0.08 X10(3) UL (ref 0–1)
IMM GRANULOCYTES NFR BLD: 1.1 %
INR BLD: 1.3 (ref 0.8–1.2)
LYMPHOCYTES # BLD AUTO: 2.27 X10(3) UL (ref 1–4)
LYMPHOCYTES NFR BLD AUTO: 31.9 %
MAGNESIUM SERPL-MCNC: 1.8 MG/DL (ref 1.6–2.6)
MCH RBC QN AUTO: 30.7 PG (ref 26–34)
MCHC RBC AUTO-ENTMCNC: 35.2 G/DL (ref 31–37)
MCV RBC AUTO: 87.3 FL
MONOCYTES # BLD AUTO: 0.75 X10(3) UL (ref 0.1–1)
MONOCYTES NFR BLD AUTO: 10.5 %
NEUTROPHILS # BLD AUTO: 3.9 X10 (3) UL (ref 1.5–7.7)
NEUTROPHILS # BLD AUTO: 3.9 X10(3) UL (ref 1.5–7.7)
NEUTROPHILS NFR BLD AUTO: 54.8 %
PHOSPHATE SERPL-MCNC: 2.5 MG/DL (ref 2.4–5.1)
PLATELET # BLD AUTO: 234 10(3)UL (ref 150–450)
POTASSIUM SERPL-SCNC: 3.5 MMOL/L (ref 3.5–5.1)
PREALB SERPL-MCNC: <5 MG/DL (ref 10–40)
PROT SERPL-MCNC: 5.2 G/DL (ref 5.7–8.2)
PROTHROMBIN TIME: 16.9 SECONDS (ref 11.6–14.8)
RBC # BLD AUTO: 3.94 X10(6)UL
SODIUM SERPL-SCNC: 139 MMOL/L (ref 136–145)
WBC # BLD AUTO: 7.1 X10(3) UL (ref 4–11)

## 2024-11-25 PROCEDURE — 99233 SBSQ HOSP IP/OBS HIGH 50: CPT | Performed by: HOSPITALIST

## 2024-11-25 PROCEDURE — 99233 SBSQ HOSP IP/OBS HIGH 50: CPT | Performed by: INTERNAL MEDICINE

## 2024-11-25 RX ORDER — CALCIUM CARBONATE 500 MG/1
500 TABLET, CHEWABLE ORAL EVERY 8 HOURS PRN
Status: DISCONTINUED | OUTPATIENT
Start: 2024-11-25 | End: 2024-11-27

## 2024-11-25 RX ORDER — MAGNESIUM SULFATE HEPTAHYDRATE 40 MG/ML
2 INJECTION, SOLUTION INTRAVENOUS ONCE
Status: COMPLETED | OUTPATIENT
Start: 2024-11-25 | End: 2024-11-25

## 2024-11-25 NOTE — PAYOR COMM NOTE
11/23-11/25  CONTINUED STAY REVIEW    Payor: [facility]  Subscriber #:  [id number]  Authorization Number: [id number]    Admit date: 11/19/24  Admit time:  5:19 AM            MEDICATIONS ADMINISTERED IN LAST 1 DAY:  dextrose 5%-sodium chloride 0.9% infusion       Date Action Dose Route User    11/25/2024 0559 New Bag (none) Intravenous Nayely Rose RN    11/24/2024 2037 New Bag (none) Intravenous Nayely Rose RN          heparin (Porcine) 5000 UNIT/ML injection 5,000 Units       Date Action Dose Route User    11/25/2024 0541 Given 5,000 Units Subcutaneous (Right Lower Abdomen) Nayely Rose RN    11/24/2024 2322 Given 5,000 Units Subcutaneous (Left Lower Abdomen) Nayely Rose RN    11/24/2024 1444 Given 5,000 Units Subcutaneous (Right Lower Abdomen) Kimmy Fagan RN          metoclopramide (Reglan) solution 10 mg       Date Action Dose Route User    11/24/2024 1647 Given 10 mg Oral Kimmy Fagan RN    11/24/2024 1108 Given 10 mg Oral Kimmy Fagan RN          ondansetron (Zofran) 4 MG/2ML injection 4 mg       Date Action Dose Route User    11/25/2024 0541 Given 4 mg Intravenous Nayely Rose RN    11/24/2024 2322 Given 4 mg Intravenous Nayely Rose RN    11/24/2024 1830 Given 4 mg Intravenous Kimmy Fagan RN    11/24/2024 1229 Given 4 mg Intravenous Kimmy Fagan RN          pantoprazole (Protonix) 40 mg in sodium chloride 0.9% PF 10 mL IV push       Date Action Dose Route User    11/24/2024 1109 Given 40 mg Intravenous Kimmy Fagan RN          potassium chloride 20 mEq/100mL IVPB premix 20 mEq       Date Action Dose Route User    11/24/2024 1447 New Bag 20 mEq Intravenous Kimmy Fagan RN          sodium phosphate 15 mmol in 0.9% NaCl 100mL IVPB premix       Date Action Dose Route User    11/25/2024 0839 Given 15 mmol Intravenous Roesner, Maris, RN            Vitals (last day)       Date/Time Temp Pulse Resp BP SpO2 Weight O2 Device  O2 Flow Rate (L/min) [facility]    11/25/24 0843 98.7 °F (37.1 °C) 93 18 160/87 93 % -- None (Room air) -- TR    11/25/24 0555 99.1 °F (37.3 °C) 91 20 143/87 93 % 160 lb 15 oz (73 kg) -- -- LV    11/24/24 1946 99.1 °F (37.3 °C) 93 20 152/87 94 % -- None (Room air) -- LV    11/24/24 1546 98.4 °F (36.9 °C) 80 20 139/60 94 % -- None (Room air) -- MM    11/24/24 0804 99.7 °F (37.6 °C) 87 20 147/80 92 % -- None (Room air) -- MM    11/24/24 0508 99.4 °F (37.4 °C) -- 18 131/67 92 % -- None (Room air) -- LV     11/23 GI NOTE       Subjective:   Chief complaint: hepatitis     >>patient still not taking much oral intake other than ice chips  -no melena  -having epigastric pain, generalized ab pain as well  -nausea  -no cp/sob  -partner at bedside  -not moving much out of bed     Objective:   Blood pressure 146/83, pulse 82, temperature 98.9 °F (37.2 °C), temperature source Oral, resp. rate 18, weight 149 lb 11.1 oz (67.9 kg), last menstrual period 12/26/2017, SpO2 93% Body mass index is 26.52 kg/m².      Assessment and Plan:   Acute hepatitis A infection with GI symptom manifestation and acute liver injury, synthetic function intact. No known prior liver disease, US with hepatic steatosis. Elevated ferritin is acute phase reactant. Hep B/C negative, ASMA and AMA negative, ceruloplasmin negative. C diff and GI stool PCR negative.      #Acute hepatitis A- unclear source, they have been to outside restaurants and to parties. No clear stick contacts  -we discussed LFTs show improvement in AST/ALT, bili rising as expected and will peak later  -trend INR  -MRI reviewed showing no biliary obstruction     >>11/23 I discussed with patient that her nutritional status has to improve otherwise she cannot go home; her LFTs are trending as expected. She has nausea and ab pain, likely from Hep A infection. We discussed starting dobhoff tube for nutrition but she declines at this time, willing to try more anti-emetics like reglan. Risks/benefits  discussed.   -also encouraged her to move more, RN will get her a walker     Recommendations:  -Avoid hepatotoxic agents.  -Continue IVFs.  -Continue IV BID Pantoprazole.  -Schedule zofran.  -ADD reglan today given nausea  -DVT ppx       Results:            Lab Results   Component Value Date     WBC 4.5 11/23/2024     HGB 12.3 11/23/2024     HCT 37.1 11/23/2024     .0 11/23/2024     CREATSERUM 0.79 11/23/2024     BUN 6 (L) 11/23/2024      11/23/2024     K 4.1 11/23/2024      11/23/2024     CO2 26.0 11/23/2024      (H) 11/23/2024     CA 7.8 (L) 11/23/2024     ALB 2.6 (L) 11/23/2024     ALKPHO 116 11/23/2024     BILT 6.2 (H) 11/23/2024     TP 4.9 (L) 11/23/2024     AST 1,078 (H) 11/23/2024     ALT 1,946 (H) 11/23/2024 11/23 HOSPITALIST NOTE    Objective:  Blood pressure 138/74, pulse 80, temperature 99.3 °F (37.4 °C), temperature source Oral, resp. rate 18, weight 149 lb 11.1 oz (67.9 kg), last menstrual period 12/26/2017, SpO2 93%    Assessment & Plan   Markedly elevated transaminases: Viral hepatitis   Mildly elevated bilirubin, alk phos  -pain/nausea; worse with motrin  Add protonix     Abn gb on ct/us and tender ruq; likely Mitali's capsule irritated by hepatitis; hida non diag not nl; mri ordered but pt severely claustraphobic; will try to avoid general anesthesia because of irritated liver; try ativan; pt states oxy makes her feel too \"high\" no allergic reaction will try tramadol      Allergy to morphine from \"many years ago\" per pt; she had to be given narcan and told she had \"convulsions;\" Had back surgery since and had no problems with oxy or any reactions during that surgery; I looked up her med list and she had no issues with dilaudid during that hosp. Will give dilaudid since she cannot eat and has terrible pains in her epigastric area from motrin    11/24 GI NOTE    Subjective:   Chief complaint: hepatitis     >>patient still not taking much oral intake other than ice  chips  >>Diarrhea about 4 times a day  -still feels quite tired  -not taking in much PO  -partner at bedside  -no fevers or chills, low grade temps     Objective:   Blood pressure 147/80, pulse 87, temperature 99.7 °F (37.6 °C), temperature source Oral, resp. rate 20, weight 149 lb 11.1 oz (67.9 kg), last menstrual period 12/26/2017, SpO2 92%,      Assessment and Plan:   Acute hepatitis A infection with GI symptom manifestation and acute liver injury, synthetic function intact. No known prior liver disease, US with hepatic steatosis. Elevated ferritin is acute phase reactant. Hep B/C negative, ASMA and AMA negative, ceruloplasmin negative. C diff and GI stool PCR negative.      #Acute hepatitis A- unclear source, they have been to outside restaurants and to parties. No clear stick contacts  -we discussed LFTs show improvement in AST/ALT, bili rising as expected and will peak later  -trend INR  -MRI reviewed showing no biliary obstruction     >>11/24 - nutritional status still poor, at this point she should move forward with DHT which she agrees to. Plan for DHT placement today, CXR and then tube-feeds.     Recommendations:  -DOBHOFF tube for enteral access/nutrition  -Avoid hepatotoxic agents.  -Continue IVFs.  -Continue IV PPI daily  -Schedule zofran.  -Continue reglan today given nausea  -DVT ppx          Results:            Lab Results   Component Value Date     WBC 5.7 11/24/2024     HGB 11.8 (L) 11/24/2024     HCT 33.5 (L) 11/24/2024     .0 11/24/2024     CREATSERUM 0.69 11/24/2024     BUN <5 (L) 11/24/2024      11/24/2024     K 3.1 (L) 11/24/2024      11/24/2024     CO2 26.0 11/24/2024      (H) 11/24/2024     CA 7.5 (L) 11/24/2024     ALB 2.3 (L) 11/24/2024     ALKPHO 108 11/24/2024     BILT 6.9 (H) 11/24/2024     TP 4.7 (L) 11/24/2024      (H) 11/24/2024     ALT 1,513 (H) 11/24/2024 11/24 HOSPITALIST NOTE    Assessment & Plan:  Markedly elevated transaminases: Viral  hepatitis   Mildly elevated bilirubin, alk phos  -pain/nausea; worse with motrin  Add protonix     Abn gb on ct/us and tender ruq; likely Mitali's capsule irritated by hepatitis; hida non diag not nl; mri ordered but pt severely claustraphobic; will try to avoid general anesthesia because of irritated liver; try ativan; pt states oxy makes her feel too \"high\" no allergic reaction will try tramadol      Allergy to morphine from \"many years ago\" per pt; she had to be given narcan and told she had \"convulsions;\" Had back surgery since and had no problems with oxy or any reactions during that surgery; I looked up her med list and she had no issues with dilaudid during that hosp. Will give dilaudid since she cannot eat and has terrible pains in her epigastric area from motrin     Severe protein therese maln ng feeds

## 2024-11-25 NOTE — PROGRESS NOTES
[facility]     Gastroenterology Progress Note    Emmanuel Mendiola Patient Status:  Inpatient    10/7/1965 MRN [id number]   Location [facility] 1W Attending Antonio Haley,*   Hosp Day # 6 PCP Bertin Florez MD       Subjective:   Chief complaint: hepatitis    Feels slightly better today  Noting back pain  No n/v  Some lower abd cramping feels she needs to have a bowel movement, stools are brown and mushy  Denies upper abdominal pain  Dht placed yesterday, started on tube feeds  Objective:   Blood pressure 160/87, pulse 93, temperature 98.7 °F (37.1 °C), temperature source Oral, resp. rate 18, weight 160 lb 15 oz (73 kg), last menstrual period 2017, SpO2 93%, not currently breastfeeding. Body mass index is 28.51 kg/m².    General: awake, alert and oriented, no acute distress  HEENT: moist mucus membranes  PULM: no conversational dyspnea  CARDIOVASCULAR: regular rate and rhythm, the extremities are warm and well perfused  GI: soft  EXTREMITIES: no edema, moving all extremities  SKIN: no visible rash  NEURO: appropriate and interactive    Assessment and Plan:   Acute hepatitis A infection with GI symptom manifestation and acute liver injury, synthetic function intact. No known prior liver disease, US with hepatic steatosis. Elevated ferritin is acute phase reactant. Hep B/C negative, ASMA and AMA negative, ceruloplasmin negative. C diff and GI stool PCR negative.     #Acute hepatitis A- unclear source, they have been to outside restaurants and to parties. No clear stick contacts  -we discussed LFTs show improvement in AST/ALT, bili rising as expected and will peak later  -trend INR  -MRI reviewed showing no biliary obstruction  -s/p DHT placement , enteral nutrition started     -- continue tube feeds and diet as tolerated  -- continue IVF and PPI daily  -- daily CMP and INR    Amarilis Carter MD  Results:     Lab Results   Component Value Date    WBC 7.1 2024    HGB  12.1 11/25/2024    HCT 34.4 (L) 11/25/2024    .0 11/25/2024    CREATSERUM 0.68 11/25/2024    BUN <5 (L) 11/25/2024     11/25/2024    K 3.5 11/25/2024     11/25/2024    CO2 26.0 11/25/2024     (H) 11/25/2024    CA 7.7 (L) 11/25/2024    ALB 2.5 (L) 11/25/2024    ALKPHO 124 (H) 11/25/2024    BILT 7.3 (H) 11/25/2024    TP 5.2 (L) 11/25/2024     (H) 11/25/2024    ALT 1,232 (H) 11/25/2024    PTT 26.9 12/06/2023    INR 1.30 (H) 11/25/2024    TSH 0.988 11/11/2024    DEANDRA 79 12/13/2017    LIP 35 11/19/2024    DDIMER 0.92 (H) 11/28/2021    ESRML 29 01/23/2020    CRP 0.65 (H) 12/06/2019    MG 1.8 11/25/2024    PHOS 2.5 11/25/2024    TROP <0.045 11/28/2021    CK 52 11/19/2024    CKMB 0.6 12/19/2015    B12 558 01/21/2020    POCGLU 100 11/20/2023       XR CHEST AP PORTABLE  (CPT=71045)    Result Date: 11/24/2024  CONCLUSION:   Post placement of a nasogastric tube with tip and side hole within the stomach and below the diaphragm.  Mild interstitial edema, unchanged.  Small right pleural effusion with right lower lobe atelectasis or pneumonia.   Dictated by (CST): Arnold Mahmood MD on 11/24/2024 at 2:55 PM     Finalized by (CST): Arnold Mahmood MD on 11/24/2024 at 2:56 PM

## 2024-11-25 NOTE — PLAN OF CARE
Tube feedings at goal rate for most of shift. Complained of some bloating and abd discomfort, rate slowed down. Poor appetite, small meals and fluids encouraged. All safety measures in place.     Problem: Patient Centered Care  Goal: Patient preferences are identified and integrated in the patient's plan of care  Description: Interventions:  - What would you like us to know as we care for you? I work at a law firm  - Provide timely, complete, and accurate information to patient/family  - Incorporate patient and family knowledge, values, beliefs, and cultural backgrounds into the planning and delivery of care  - Encourage patient/family to participate in care and decision-making at the level they choose  - Honor patient and family perspectives and choices  Outcome: Progressing     Problem: Diabetes/Glucose Control  Goal: Glucose maintained within prescribed range  Description: INTERVENTIONS:  - Monitor Blood Glucose as ordered  - Assess for signs and symptoms of hyperglycemia and hypoglycemia  - Administer ordered medications to maintain glucose within target range  - Assess barriers to adequate nutritional intake and initiate nutrition consult as needed  - Instruct patient on self management of diabetes  Outcome: Progressing     Problem: Patient/Family Goals  Goal: Patient/Family Long Term Goal  Description: Patient's Long Term Goal: Feel better    Interventions:  - Educate on medical regimen  - IVF  - ADAT  - GI consult  - See additional Care Plan goals for specific interventions  Outcome: Progressing  Goal: Patient/Family Short Term Goal  Description: Patient's Short Term Goal: No pain    Interventions:   - GI consult  - HIDA scan  - MRI/MRCP  - Monitor vitals  - IVF  - Clear liquid diet  - See additional Care Plan goals for specific interventions  Outcome: Progressing     Problem: METABOLIC/FLUID AND ELECTROLYTES - ADULT  Goal: Glucose maintained within prescribed range  Description: INTERVENTIONS:  - Monitor  Blood Glucose as ordered  - Assess for signs and symptoms of hyperglycemia and hypoglycemia  - Administer ordered medications to maintain glucose within target range  - Assess barriers to adequate nutritional intake and initiate nutrition consult as needed  - Instruct patient on self management of diabetes  Outcome: Progressing  Goal: Electrolytes maintained within normal limits  Description: INTERVENTIONS:  - Monitor labs and rhythm and assess patient for signs and symptoms of electrolyte imbalances  - Administer electrolyte replacement as ordered  - Monitor response to electrolyte replacements, including rhythm and repeat lab results as appropriate  - Fluid restriction as ordered  - Instruct patient on fluid and nutrition restrictions as appropriate  Outcome: Progressing     Problem: PAIN - ADULT  Goal: Verbalizes/displays adequate comfort level or patient's stated pain goal  Description: INTERVENTIONS:  - Encourage pt to monitor pain and request assistance  - Assess pain using appropriate pain scale  - Administer analgesics based on type and severity of pain and evaluate response  - Implement non-pharmacological measures as appropriate and evaluate response  - Consider cultural and social influences on pain and pain management  - Manage/alleviate anxiety  - Utilize distraction and/or relaxation techniques  - Monitor for opioid side effects  - Notify MD/LIP if interventions unsuccessful or patient reports new pain  - Anticipate increased pain with activity and pre-medicate as appropriate  Outcome: Progressing     Problem: SAFETY ADULT - FALL  Goal: Free from fall injury  Description: INTERVENTIONS:  - Assess pt frequently for physical needs  - Identify cognitive and physical deficits and behaviors that affect risk of falls.  - [place] fall precautions as indicated by assessment.  - Educate pt/family on patient safety including physical limitations  - Instruct pt to call for assistance with activity based on  assessment  - Modify environment to reduce risk of injury  - Provide assistive devices as appropriate  - Consider OT/PT consult to assist with strengthening/mobility  - Encourage toileting schedule  Outcome: Progressing

## 2024-11-25 NOTE — PROGRESS NOTES
[facility]  part of [facility]    Progress Note    Emmanuel Mendiola Patient Status:  Inpatient    10/7/1965 MRN [id number]   Location [facility] 1W Attending Antonio Haley,*   Hosp Day # 6 PCP Bertin Florez MD     Chief Complaint: I am better with tube!    Subjective:     Constitutional:  Positive for appetite change and fatigue.   HENT:  Negative for congestion.    Respiratory:  Negative for choking, chest tightness and shortness of breath.    Cardiovascular:  Negative for leg swelling.   Gastrointestinal:  Positive for nausea and abdominal pain.   Genitourinary:  Negative for dysuria.   Musculoskeletal:  Negative for joint pain.   Neurological:  Positive for weakness.   Psychiatric/Behavioral:  Positive for sleep disturbance. Negative for confusion and decreased concentration. The patient is nervous/anxious.        Objective:   Blood pressure 160/87, pulse 93, temperature 98.7 °F (37.1 °C), temperature source Oral, resp. rate 18, weight 160 lb 15 oz (73 kg), last menstrual period 2017, SpO2 93%, not currently breastfeeding.  Physical Exam  Vitals and nursing note reviewed.   Constitutional:       General: She is in acute distress.      Appearance: She is obese. She is ill-appearing. She is not toxic-appearing or diaphoretic.   HENT:      Head: Normocephalic and atraumatic.   Cardiovascular:      Rate and Rhythm: Normal rate.      Pulses: Normal pulses.   Pulmonary:      Breath sounds: Rhonchi present. No rales.   Abdominal:      General: Bowel sounds are normal.      Palpations: Abdomen is soft.      Tenderness: There is abdominal tenderness.   Skin:     General: Skin is warm and dry.      Capillary Refill: Capillary refill takes less than 2 seconds.   Neurological:      Mental Status: She is alert and oriented to person, place, and time.   Psychiatric:         Behavior: Behavior normal.         Judgment: Judgment normal.         Results:   Lab Results    Component Value Date    WBC 7.1 11/25/2024    HGB 12.1 11/25/2024    HCT 34.4 (L) 11/25/2024    .0 11/25/2024    CREATSERUM 0.68 11/25/2024    BUN <5 (L) 11/25/2024     11/25/2024    K 3.5 11/25/2024     11/25/2024    CO2 26.0 11/25/2024     (H) 11/25/2024    CA 7.7 (L) 11/25/2024    ALB 2.5 (L) 11/25/2024    ALKPHO 124 (H) 11/25/2024    BILT 7.3 (H) 11/25/2024    TP 5.2 (L) 11/25/2024     (H) 11/25/2024    ALT 1,232 (H) 11/25/2024    PTT 26.9 12/06/2023    INR 1.30 (H) 11/25/2024    TSH 0.988 11/11/2024    DEANDRA 79 12/13/2017    LIP 35 11/19/2024    DDIMER 0.92 (H) 11/28/2021    ESRML 29 01/23/2020    CRP 0.65 (H) 12/06/2019    MG 1.8 11/25/2024    PHOS 2.5 11/25/2024    TROP <0.045 11/28/2021    CK 52 11/19/2024    CKMB 0.6 12/19/2015    B12 558 01/21/2020    POCGLU 100 11/20/2023       XR CHEST AP PORTABLE  (CPT=71045)    Result Date: 11/24/2024  CONCLUSION:   Post placement of a nasogastric tube with tip and side hole within the stomach and below the diaphragm.  Mild interstitial edema, unchanged.  Small right pleural effusion with right lower lobe atelectasis or pneumonia.   Dictated by (CST): Arnold Mahmood MD on 11/24/2024 at 2:55 PM     Finalized by (CST): Arnold Mahmood MD on 11/24/2024 at 2:56 PM               Assessment & Plan:     Markedly elevated transaminases: Viral hepatitis   Mildly elevated bilirubin, alk phos  -pain/nausea; worse with motrin  Add protonix    Abn gb on ct/us and tender ruq; likely Mitali's capsule irritated by hepatitis; hida non diag not nl; mri ordered but pt severely claustraphobic; will try to avoid general anesthesia because of irritated liver; try ativan; pt states oxy makes her feel too \"high\" no allergic reaction will try tramadol     Allergy to morphine from \"many years ago\" per pt; she had to be given narcan and told she had \"convulsions;\" Had back surgery since and had no problems with oxy or any reactions during that surgery; I looked up  her med list and she had no issues with dilaudid during that hosp. Will give dilaudid since she cannot eat and has terrible pains in her epigastric area from motrin    Severe protein therese maln ng feeds     Quality:  DVT Prophylaxis: Heparin  CODE status: Full code  ANGELINE: TBD    Complex mdm; coordinating care with nurse/seen with Dr. Carter  and counseling pt and with her permission her  in room about eating poss dc      COURTNEY FOWLER MD

## 2024-11-25 NOTE — PLAN OF CARE
Problem: Patient Centered Care  Goal: Patient preferences are identified and integrated in the patient's plan of care  Description: Interventions:  - What would you like us to know as we care for you? Lives at home  - Provide timely, complete, and accurate information to patient/family  - Incorporate patient and family knowledge, values, beliefs, and cultural backgrounds into the planning and delivery of care  - Encourage patient/family to participate in care and decision-making at the level they choose  - Honor patient and family perspectives and choices  Outcome: Progressing     Problem: Diabetes/Glucose Control  Goal: Glucose maintained within prescribed range  Description: INTERVENTIONS:  - Monitor Blood Glucose as ordered  - Assess for signs and symptoms of hyperglycemia and hypoglycemia  - Administer ordered medications to maintain glucose within target range  - Assess barriers to adequate nutritional intake and initiate nutrition consult as needed  - Instruct patient on self management of diabetes  Outcome: Progressing     Problem: Patient/Family Goals  Goal: Patient/Family Long Term Goal  Description: Patient's Long Term Goal: Feel better    Interventions:  - Educate on medical regimen  - IVF  - ADAT  - GI consult  - See additional Care Plan goals for specific interventions  Outcome: Progressing  Goal: Patient/Family Short Term Goal  Description: Patient's Short Term Goal: No pain    Interventions:   - GI consult  - HIDA scan  - MRI/MRCP  - Monitor vitals  - IVF  - Clear liquid diet  - See additional Care Plan goals for specific interventions  Outcome: Progressing     Problem: GASTROINTESTINAL - ADULT  Goal: Minimal or absence of nausea and vomiting  Description: INTERVENTIONS:  - Maintain adequate hydration with IV or PO as ordered and tolerated  - Nasogastric tube to low intermittent suction as ordered  - Evaluate effectiveness of ordered antiemetic medications  - Provide nonpharmacologic comfort  measures as appropriate  - Advance diet as tolerated, if ordered  - Obtain nutritional consult as needed  - Evaluate fluid balance  Outcome: Progressing     Problem: METABOLIC/FLUID AND ELECTROLYTES - ADULT  Goal: Glucose maintained within prescribed range  Description: INTERVENTIONS:  - Monitor Blood Glucose as ordered  - Assess for signs and symptoms of hyperglycemia and hypoglycemia  - Administer ordered medications to maintain glucose within target range  - Assess barriers to adequate nutritional intake and initiate nutrition consult as needed  - Instruct patient on self management of diabetes  Outcome: Progressing  Goal: Electrolytes maintained within normal limits  Description: INTERVENTIONS:  - Monitor labs and rhythm and assess patient for signs and symptoms of electrolyte imbalances  - Administer electrolyte replacement as ordered  - Monitor response to electrolyte replacements, including rhythm and repeat lab results as appropriate  - Fluid restriction as ordered  - Instruct patient on fluid and nutrition restrictions as appropriate  Outcome: Progressing     Problem: PAIN - ADULT  Goal: Verbalizes/displays adequate comfort level or patient's stated pain goal  Description: INTERVENTIONS:  - Encourage pt to monitor pain and request assistance  - Assess pain using appropriate pain scale  - Administer analgesics based on type and severity of pain and evaluate response  - Implement non-pharmacological measures as appropriate and evaluate response  - Consider cultural and social influences on pain and pain management  - Manage/alleviate anxiety  - Utilize distraction and/or relaxation techniques  - Monitor for opioid side effects  - Notify MD/LIP if interventions unsuccessful or patient reports new pain  - Anticipate increased pain with activity and pre-medicate as appropriate  Outcome: Progressing     Problem: SAFETY ADULT - FALL  Goal: Free from fall injury  Description: INTERVENTIONS:  - Assess pt frequently  for physical needs  - Identify cognitive and physical deficits and behaviors that affect risk of falls.  - [place] fall precautions as indicated by assessment.  - Educate pt/family on patient safety including physical limitations  - Instruct pt to call for assistance with activity based on assessment  - Modify environment to reduce risk of injury  - Provide assistive devices as appropriate  - Consider OT/PT consult to assist with strengthening/mobility  - Encourage toileting schedule  Outcome: Progressing   Vital signs stable. Dobhoff inserted, tube feeding started. Will advance tube feeding as ordered. Continue to monitor. HOB kept at 30 degrees. BEd on low and locked position, call light within reach.

## 2024-11-25 NOTE — PLAN OF CARE
Problem: Patient Centered Care  Goal: Patient preferences are identified and integrated in the patient's plan of care  Description: Interventions:  - What would you like us to know as we care for you?   - Provide timely, complete, and accurate information to patient/family  - Incorporate patient and family knowledge, values, beliefs, and cultural backgrounds into the planning and delivery of care  - Encourage patient/family to participate in care and decision-making at the level they choose  - Honor patient and family perspectives and choices  Outcome: Progressing     Problem: Diabetes/Glucose Control  Goal: Glucose maintained within prescribed range  Description: INTERVENTIONS:  - Monitor Blood Glucose as ordered  - Assess for signs and symptoms of hyperglycemia and hypoglycemia  - Administer ordered medications to maintain glucose within target range  - Assess barriers to adequate nutritional intake and initiate nutrition consult as needed  - Instruct patient on self management of diabetes  Outcome: Progressing     Problem: Patient/Family Goals  Goal: Patient/Family Long Term Goal  Description: Patient's Long Term Goal: Feel better    Interventions:  - Educate on medical regimen  - IVF  - ADAT  - GI consult  - See additional Care Plan goals for specific interventions  Outcome: Progressing  Goal: Patient/Family Short Term Goal  Description: Patient's Short Term Goal: No pain    Interventions:   - GI consult  - HIDA scan  - MRI/MRCP  - Monitor vitals  - IVF  - Clear liquid diet  - See additional Care Plan goals for specific interventions  Outcome: Progressing     Problem: GASTROINTESTINAL - ADULT  Goal: Minimal or absence of nausea and vomiting  Description: INTERVENTIONS:  - Maintain adequate hydration with IV or PO as ordered and tolerated  - Nasogastric tube to low intermittent suction as ordered  - Evaluate effectiveness of ordered antiemetic medications  - Provide nonpharmacologic comfort measures as  appropriate  - Advance diet as tolerated, if ordered  - Obtain nutritional consult as needed  - Evaluate fluid balance  Outcome: Progressing

## 2024-11-26 LAB
ALBUMIN SERPL-MCNC: 2.7 G/DL (ref 3.2–4.8)
ALBUMIN/GLOB SERPL: 0.9 {RATIO} (ref 1–2)
ALP LIVER SERPL-CCNC: 124 U/L
ALT SERPL-CCNC: 816 U/L
ANION GAP SERPL CALC-SCNC: 7 MMOL/L (ref 0–18)
AST SERPL-CCNC: 194 U/L (ref ?–34)
BASOPHILS # BLD AUTO: 0.08 X10(3) UL (ref 0–0.2)
BASOPHILS NFR BLD AUTO: 1 %
BILIRUB SERPL-MCNC: 7.5 MG/DL (ref 0.3–1.2)
BUN BLD-MCNC: 7 MG/DL (ref 9–23)
BUN/CREAT SERPL: 9.7 (ref 10–20)
CALCIUM BLD-MCNC: 8.5 MG/DL (ref 8.7–10.4)
CHLORIDE SERPL-SCNC: 105 MMOL/L (ref 98–112)
CO2 SERPL-SCNC: 28 MMOL/L (ref 21–32)
CREAT BLD-MCNC: 0.72 MG/DL
DEPRECATED RDW RBC AUTO: 45.3 FL (ref 35.1–46.3)
EGFRCR SERPLBLD CKD-EPI 2021: 97 ML/MIN/1.73M2 (ref 60–?)
EOSINOPHIL # BLD AUTO: 0.09 X10(3) UL (ref 0–0.7)
EOSINOPHIL NFR BLD AUTO: 1.1 %
ERYTHROCYTE [DISTWIDTH] IN BLOOD BY AUTOMATED COUNT: 14.6 % (ref 11–15)
GLOBULIN PLAS-MCNC: 2.9 G/DL (ref 2–3.5)
GLUCOSE BLD-MCNC: 116 MG/DL (ref 70–99)
GLUCOSE BLDC GLUCOMTR-MCNC: 109 MG/DL (ref 70–99)
GLUCOSE BLDC GLUCOMTR-MCNC: 118 MG/DL (ref 70–99)
GLUCOSE BLDC GLUCOMTR-MCNC: 126 MG/DL (ref 70–99)
GLUCOSE BLDC GLUCOMTR-MCNC: 146 MG/DL (ref 70–99)
HCT VFR BLD AUTO: 34 %
HGB BLD-MCNC: 11.7 G/DL
IMM GRANULOCYTES # BLD AUTO: 0.06 X10(3) UL (ref 0–1)
IMM GRANULOCYTES NFR BLD: 0.8 %
INR BLD: 1.16 (ref 0.8–1.2)
LYMPHOCYTES # BLD AUTO: 3.37 X10(3) UL (ref 1–4)
LYMPHOCYTES NFR BLD AUTO: 42.8 %
MAGNESIUM SERPL-MCNC: 1.9 MG/DL (ref 1.6–2.6)
MCH RBC QN AUTO: 29.8 PG (ref 26–34)
MCHC RBC AUTO-ENTMCNC: 34.4 G/DL (ref 31–37)
MCV RBC AUTO: 86.7 FL
MONOCYTES # BLD AUTO: 0.71 X10(3) UL (ref 0.1–1)
MONOCYTES NFR BLD AUTO: 9 %
NEUTROPHILS # BLD AUTO: 3.57 X10 (3) UL (ref 1.5–7.7)
NEUTROPHILS # BLD AUTO: 3.57 X10(3) UL (ref 1.5–7.7)
NEUTROPHILS NFR BLD AUTO: 45.3 %
OSMOLALITY SERPL CALC.SUM OF ELEC: 289 MOSM/KG (ref 275–295)
PHOSPHATE SERPL-MCNC: 3.5 MG/DL (ref 2.4–5.1)
PLATELET # BLD AUTO: 256 10(3)UL (ref 150–450)
POTASSIUM SERPL-SCNC: 3.7 MMOL/L (ref 3.5–5.1)
PROT SERPL-MCNC: 5.6 G/DL (ref 5.7–8.2)
PROTHROMBIN TIME: 15.5 SECONDS (ref 11.6–14.8)
RBC # BLD AUTO: 3.92 X10(6)UL
SODIUM SERPL-SCNC: 140 MMOL/L (ref 136–145)
WBC # BLD AUTO: 7.9 X10(3) UL (ref 4–11)

## 2024-11-26 PROCEDURE — 99232 SBSQ HOSP IP/OBS MODERATE 35: CPT | Performed by: HOSPITALIST

## 2024-11-26 PROCEDURE — 99233 SBSQ HOSP IP/OBS HIGH 50: CPT | Performed by: INTERNAL MEDICINE

## 2024-11-26 RX ORDER — HYDRALAZINE HYDROCHLORIDE 20 MG/ML
10 INJECTION INTRAMUSCULAR; INTRAVENOUS EVERY 6 HOURS PRN
Status: DISCONTINUED | OUTPATIENT
Start: 2024-11-26 | End: 2024-11-27

## 2024-11-26 NOTE — PLAN OF CARE
Problem: Diabetes/Glucose Control  Goal: Glucose maintained within prescribed range  Description: INTERVENTIONS:  - Monitor Blood Glucose as ordered  - Assess for signs and symptoms of hyperglycemia and hypoglycemia  - Administer ordered medications to maintain glucose within target range  - Assess barriers to adequate nutritional intake and initiate nutrition consult as needed  - Instruct patient on self management of diabetes  Outcome: Progressing     Problem: Patient/Family Goals  Goal: Patient/Family Long Term Goal  Description: Patient's Long Term Goal: Feel better    Interventions:  - Educate on medical regimen  - IVF  - ADAT  - GI consult  - See additional Care Plan goals for specific interventions  Outcome: Progressing  Goal: Patient/Family Short Term Goal  Description: Patient's Short Term Goal: No pain    Interventions:   - GI consult  - HIDA scan  - MRI/MRCP  - Monitor vitals  - IVF  - Clear liquid diet  - See additional Care Plan goals for specific interventions  Outcome: Progressing     Problem: METABOLIC/FLUID AND ELECTROLYTES - ADULT  Goal: Glucose maintained within prescribed range  Description: INTERVENTIONS:  - Monitor Blood Glucose as ordered  - Assess for signs and symptoms of hyperglycemia and hypoglycemia  - Administer ordered medications to maintain glucose within target range  - Assess barriers to adequate nutritional intake and initiate nutrition consult as needed  - Instruct patient on self management of diabetes  Outcome: Progressing  Goal: Electrolytes maintained within normal limits  Description: INTERVENTIONS:  - Monitor labs and rhythm and assess patient for signs and symptoms of electrolyte imbalances  - Administer electrolyte replacement as ordered  - Monitor response to electrolyte replacements, including rhythm and repeat lab results as appropriate  - Fluid restriction as ordered  - Instruct patient on fluid and nutrition restrictions as appropriate  Outcome: Progressing      Problem: PAIN - ADULT  Goal: Verbalizes/displays adequate comfort level or patient's stated pain goal  Description: INTERVENTIONS:  - Encourage pt to monitor pain and request assistance  - Assess pain using appropriate pain scale  - Administer analgesics based on type and severity of pain and evaluate response  - Implement non-pharmacological measures as appropriate and evaluate response  - Consider cultural and social influences on pain and pain management  - Manage/alleviate anxiety  - Utilize distraction and/or relaxation techniques  - Monitor for opioid side effects  - Notify MD/LIP if interventions unsuccessful or patient reports new pain  - Anticipate increased pain with activity and pre-medicate as appropriate  Outcome: Progressing     Problem: SAFETY ADULT - FALL  Goal: Free from fall injury  Description: INTERVENTIONS:  - Assess pt frequently for physical needs  - Identify cognitive and physical deficits and behaviors that affect risk of falls.  - [place] fall precautions as indicated by assessment.  - Educate pt/family on patient safety including physical limitations  - Instruct pt to call for assistance with activity based on assessment  - Modify environment to reduce risk of injury  - Provide assistive devices as appropriate  - Consider OT/PT consult to assist with strengthening/mobility  - Encourage toileting schedule  Outcome: Progressing

## 2024-11-26 NOTE — PROGRESS NOTES
[facility]  part of [facility]    Progress Note    Emmanuel Mendiola Patient Status:  Inpatient    10/7/1965 MRN [id number]   Location [facility] 1W Attending Antonio Haley,*   Hosp Day # 7 PCP Bertin Florez MD     Chief Complaint: I am better with tube!    Subjective:     Constitutional:  Positive for appetite change and fatigue.   HENT:  Negative for congestion.    Respiratory:  Negative for choking, chest tightness and shortness of breath.    Cardiovascular:  Negative for leg swelling.   Gastrointestinal:  Positive for nausea and abdominal pain.   Genitourinary:  Negative for dysuria.   Musculoskeletal:  Negative for joint pain.   Neurological:  Positive for weakness.   Psychiatric/Behavioral:  Positive for sleep disturbance. Negative for confusion and decreased concentration. The patient is nervous/anxious.        Objective:   Blood pressure 147/87, pulse 86, temperature 98.2 °F (36.8 °C), temperature source Oral, resp. rate 18, weight 160 lb 15 oz (73 kg), last menstrual period 2017, SpO2 97%, not currently breastfeeding.  Physical Exam  Vitals and nursing note reviewed.   Constitutional:       General: She is in acute distress.      Appearance: She is obese. She is ill-appearing. She is not toxic-appearing or diaphoretic.   HENT:      Head: Normocephalic and atraumatic.   Cardiovascular:      Rate and Rhythm: Normal rate.      Pulses: Normal pulses.   Pulmonary:      Breath sounds: Rhonchi present. No rales.   Abdominal:      General: Bowel sounds are normal.      Palpations: Abdomen is soft.      Tenderness: There is abdominal tenderness.   Skin:     General: Skin is warm and dry.      Capillary Refill: Capillary refill takes less than 2 seconds.   Neurological:      Mental Status: She is alert and oriented to person, place, and time.   Psychiatric:         Behavior: Behavior normal.         Judgment: Judgment normal.         Results:   Lab Results    Component Value Date    WBC 7.9 11/26/2024    HGB 11.7 (L) 11/26/2024    HCT 34.0 (L) 11/26/2024    .0 11/26/2024    CREATSERUM 0.72 11/26/2024    BUN 7 (L) 11/26/2024     11/26/2024    K 3.7 11/26/2024     11/26/2024    CO2 28.0 11/26/2024     (H) 11/26/2024    CA 8.5 (L) 11/26/2024    ALB 2.7 (L) 11/26/2024    ALKPHO 124 (H) 11/26/2024    BILT 7.5 (H) 11/26/2024    TP 5.6 (L) 11/26/2024     (H) 11/26/2024     (H) 11/26/2024    PTT 26.9 12/06/2023    INR 1.16 11/26/2024    TSH 0.988 11/11/2024    DEANDRA 79 12/13/2017    LIP 35 11/19/2024    DDIMER 0.92 (H) 11/28/2021    ESRML 29 01/23/2020    CRP 0.65 (H) 12/06/2019    MG 1.9 11/26/2024    PHOS 3.5 11/26/2024    TROP <0.045 11/28/2021    CK 52 11/19/2024    CKMB 0.6 12/19/2015    B12 558 01/21/2020    POCGLU 100 11/20/2023       XR CHEST AP PORTABLE  (CPT=71045)    Result Date: 11/24/2024  CONCLUSION:   Post placement of a nasogastric tube with tip and side hole within the stomach and below the diaphragm.  Mild interstitial edema, unchanged.  Small right pleural effusion with right lower lobe atelectasis or pneumonia.   Dictated by (CST): Arnold Mahmood MD on 11/24/2024 at 2:55 PM     Finalized by (CST): Arnold Mahmood MD on 11/24/2024 at 2:56 PM               Assessment & Plan:     Markedly elevated transaminases: Viral hepatitis   Mildly elevated bilirubin, alk phos  -pain/nausea; worse with motrin  Add protonix    Abn gb on ct/us and tender ruq; likely Mitali's capsule irritated by hepatitis; hida non diag not nl; mri ordered but pt severely claustraphobic; will try to avoid general anesthesia because of irritated liver; try ativan; pt states oxy makes her feel too \"high\" no allergic reaction will try tramadol     Allergy to morphine from \"many years ago\" per pt; she had to be given narcan and told she had \"convulsions;\" Had back surgery since and had no problems with oxy or any reactions during that surgery; I looked up her  med list and she had no issues with dilaudid during that hosp. Will give dilaudid since she cannot eat and has terrible pains in her epigastric area from motrin    Severe protein therese maln ng feeds     Quality:  DVT Prophylaxis: Heparin  CODE status: Full code  ANGELINE: TBD    Moderate mdm; coordinating care with nurse  and counseling pt and with her permission her  in room about eating/lft      COURTNEY FOWLER MD

## 2024-11-26 NOTE — PROGRESS NOTES
[facility]     Gastroenterology Progress Note    Emmanuel Mendiola Patient Status:  Inpatient    10/7/1965 MRN [id number]   Location [facility] 1W Attending Antonio Haley,*   Hosp Day # 7 PCP Bertin Florez MD       Subjective:   Chief complaint: hepatitis    Feels bloated with tube feeds  She had small amount of vegetable rice soup and kept it down  Solid formed stool  No fever  Objective:   Blood pressure 147/87, pulse 86, temperature 98.2 °F (36.8 °C), temperature source Oral, resp. rate 18, weight 160 lb 15 oz (73 kg), last menstrual period 2017, SpO2 97%, not currently breastfeeding. Body mass index is 28.51 kg/m².    General: awake, alert and oriented, no acute distress  HEENT: moist mucus membranes  PULM: no conversational dyspnea  CARDIOVASCULAR: regular rate and rhythm, the extremities are warm and well perfused  GI: soft  EXTREMITIES: no edema, moving all extremities  SKIN: no visible rash  NEURO: appropriate and interactive    Assessment and Plan:   Acute hepatitis A infection with GI symptom manifestation and acute liver injury, synthetic function intact. No known prior liver disease, US with hepatic steatosis. Elevated ferritin is acute phase reactant. Hep B/C negative, ASMA and AMA negative, ceruloplasmin negative. C diff and GI stool PCR negative.     #Acute hepatitis A- unclear source, they have been to outside restaurants and to parties. No clear stick contacts  -MRI reviewed showing no biliary obstruction  -s/p DHT placement , enteral nutrition started   --LFTs improving as expected, bili peaked     -- continue diet as tolerated, can pause tube feeds today as she appears to favor oral intake  -- continue IVF and PPI daily  -- daily CMP and INR    Amarilis Carter MD  Results:     Lab Results   Component Value Date    WBC 7.9 2024    HGB 11.7 (L) 2024    HCT 34.0 (L) 2024    .0 2024    CREATSERUM 0.72 2024    BUN 7  (L) 11/26/2024     11/26/2024    K 3.7 11/26/2024     11/26/2024    CO2 28.0 11/26/2024     (H) 11/26/2024    CA 8.5 (L) 11/26/2024    ALB 2.7 (L) 11/26/2024    ALKPHO 124 (H) 11/26/2024    BILT 7.5 (H) 11/26/2024    TP 5.6 (L) 11/26/2024     (H) 11/26/2024     (H) 11/26/2024    PTT 26.9 12/06/2023    INR 1.16 11/26/2024    TSH 0.988 11/11/2024    DEANDRA 79 12/13/2017    LIP 35 11/19/2024    DDIMER 0.92 (H) 11/28/2021    ESRML 29 01/23/2020    CRP 0.65 (H) 12/06/2019    MG 1.9 11/26/2024    PHOS 3.5 11/26/2024    TROP <0.045 11/28/2021    CK 52 11/19/2024    CKMB 0.6 12/19/2015    B12 558 01/21/2020    POCGLU 100 11/20/2023       No results found.

## 2024-11-26 NOTE — SPIRITUAL CARE NOTE
Spiritual Care Visit Note    Patient Name: Emmanuel Mendiola Date of Spiritual Care Visit: 24   : 10/7/1965 Primary Dx: Transaminitis       Referred By: Referral From:     Spiritual Care Taxonomy:    Intended Effects: Establish rapport and connectedness    Methods: Collaborate with care team member;Offer support    Interventions: Silent prayer    Visit Type/Summary:     - Spiritual Care: Consulted with RN prior to visit. Attempted visit. Patient is unavailable. Left a Spiritual Care contact information card.    VALERIANO Castro CAMII   [id number]     Spiritual Care support can be requested via an [employer] consult. For urgent/immediate needs, please contact the On Call  at: [place]: ext [phone]

## 2024-11-27 ENCOUNTER — TELEPHONE (OUTPATIENT)
Facility: CLINIC | Age: 59
End: 2024-11-27

## 2024-11-27 VITALS
OXYGEN SATURATION: 94 % | RESPIRATION RATE: 20 BRPM | WEIGHT: 160.94 LBS | DIASTOLIC BLOOD PRESSURE: 91 MMHG | BODY MASS INDEX: 29 KG/M2 | TEMPERATURE: 98 F | SYSTOLIC BLOOD PRESSURE: 142 MMHG | HEART RATE: 93 BPM

## 2024-11-27 DIAGNOSIS — B15.9 VIRAL HEPATITIS A WITHOUT HEPATIC COMA: Primary | ICD-10-CM

## 2024-11-27 LAB
ALBUMIN SERPL-MCNC: 2.9 G/DL (ref 3.2–4.8)
ALBUMIN/GLOB SERPL: 1 {RATIO} (ref 1–2)
ALP LIVER SERPL-CCNC: 125 U/L
ALT SERPL-CCNC: 628 U/L
ANION GAP SERPL CALC-SCNC: 6 MMOL/L (ref 0–18)
AST SERPL-CCNC: 114 U/L (ref ?–34)
BASOPHILS # BLD AUTO: 0.06 X10(3) UL (ref 0–0.2)
BASOPHILS NFR BLD AUTO: 0.7 %
BILIRUB SERPL-MCNC: 9 MG/DL (ref 0.3–1.2)
BUN BLD-MCNC: 6 MG/DL (ref 9–23)
BUN/CREAT SERPL: 8.1 (ref 10–20)
CALCIUM BLD-MCNC: 8.3 MG/DL (ref 8.7–10.4)
CHLORIDE SERPL-SCNC: 104 MMOL/L (ref 98–112)
CO2 SERPL-SCNC: 27 MMOL/L (ref 21–32)
CREAT BLD-MCNC: 0.74 MG/DL
DEPRECATED RDW RBC AUTO: 46.6 FL (ref 35.1–46.3)
EGFRCR SERPLBLD CKD-EPI 2021: 94 ML/MIN/1.73M2 (ref 60–?)
EOSINOPHIL # BLD AUTO: 0.05 X10(3) UL (ref 0–0.7)
EOSINOPHIL NFR BLD AUTO: 0.6 %
ERYTHROCYTE [DISTWIDTH] IN BLOOD BY AUTOMATED COUNT: 14.9 % (ref 11–15)
GLOBULIN PLAS-MCNC: 3 G/DL (ref 2–3.5)
GLUCOSE BLD-MCNC: 97 MG/DL (ref 70–99)
GLUCOSE BLDC GLUCOMTR-MCNC: 91 MG/DL (ref 70–99)
HCT VFR BLD AUTO: 32.9 %
HGB BLD-MCNC: 11.6 G/DL
IMM GRANULOCYTES # BLD AUTO: 0.08 X10(3) UL (ref 0–1)
IMM GRANULOCYTES NFR BLD: 0.9 %
LYMPHOCYTES # BLD AUTO: 3.7 X10(3) UL (ref 1–4)
LYMPHOCYTES NFR BLD AUTO: 43.2 %
MAGNESIUM SERPL-MCNC: 2 MG/DL (ref 1.6–2.6)
MCH RBC QN AUTO: 31 PG (ref 26–34)
MCHC RBC AUTO-ENTMCNC: 35.3 G/DL (ref 31–37)
MCV RBC AUTO: 88 FL
MONOCYTES # BLD AUTO: 0.79 X10(3) UL (ref 0.1–1)
MONOCYTES NFR BLD AUTO: 9.2 %
NEUTROPHILS # BLD AUTO: 3.88 X10 (3) UL (ref 1.5–7.7)
NEUTROPHILS # BLD AUTO: 3.88 X10(3) UL (ref 1.5–7.7)
NEUTROPHILS NFR BLD AUTO: 45.4 %
OSMOLALITY SERPL CALC.SUM OF ELEC: 282 MOSM/KG (ref 275–295)
PHOSPHATE SERPL-MCNC: 4.7 MG/DL (ref 2.4–5.1)
PLATELET # BLD AUTO: 262 10(3)UL (ref 150–450)
POTASSIUM SERPL-SCNC: 3.9 MMOL/L (ref 3.5–5.1)
PROT SERPL-MCNC: 5.9 G/DL (ref 5.7–8.2)
RBC # BLD AUTO: 3.74 X10(6)UL
SODIUM SERPL-SCNC: 137 MMOL/L (ref 136–145)
WBC # BLD AUTO: 8.6 X10(3) UL (ref 4–11)

## 2024-11-27 PROCEDURE — 99239 HOSP IP/OBS DSCHRG MGMT >30: CPT | Performed by: HOSPITALIST

## 2024-11-27 PROCEDURE — 99232 SBSQ HOSP IP/OBS MODERATE 35: CPT | Performed by: REGISTERED NURSE

## 2024-11-27 RX ORDER — METOCLOPRAMIDE HYDROCHLORIDE 5 MG/5ML
10 SOLUTION ORAL
Qty: 30 ML | Refills: 0 | Status: SHIPPED | OUTPATIENT
Start: 2024-11-27 | End: 2024-11-29

## 2024-11-27 RX ORDER — PANTOPRAZOLE SODIUM 40 MG/1
40 TABLET, DELAYED RELEASE ORAL DAILY
Qty: 30 TABLET | Refills: 0 | Status: SHIPPED | OUTPATIENT
Start: 2024-11-27

## 2024-11-27 RX ORDER — ONDANSETRON 4 MG/1
4 TABLET, FILM COATED ORAL EVERY 8 HOURS PRN
Qty: 30 TABLET | Refills: 0 | Status: SHIPPED | OUTPATIENT
Start: 2024-11-27

## 2024-11-27 NOTE — TELEPHONE ENCOUNTER
I spoke to the patient    I reviewed the below note with the patient (to have LFT/INR drawn in 1 week and to follow-up in the office in 2 weeks)    Patient agreeable to draw labs in 1 week    Patient is scheduled for 12/13/2024 at 10:30 am    Location, date and time verified with the patient    Patient verbalized understanding and has no further questions at this time  Your appointments       Date & Time Appointment Department (Center)    Dec 13, 2024 10:30 AM CST Follow Up Visit with Lisa Varela PA-C [facility] ([facility])

## 2024-11-27 NOTE — PLAN OF CARE
Problem: Patient Centered Care  Goal: Patient preferences are identified and integrated in the patient's plan of care  Description: Interventions:  - What would you like us to know as we care for you? Lives at home  - Provide timely, complete, and accurate information to patient/family  - Incorporate patient and family knowledge, values, beliefs, and cultural backgrounds into the planning and delivery of care  - Encourage patient/family to participate in care and decision-making at the level they choose  - Honor patient and family perspectives and choices  Outcome: Progressing     Problem: Diabetes/Glucose Control  Goal: Glucose maintained within prescribed range  Description: INTERVENTIONS:  - Monitor Blood Glucose as ordered  - Assess for signs and symptoms of hyperglycemia and hypoglycemia  - Administer ordered medications to maintain glucose within target range  - Assess barriers to adequate nutritional intake and initiate nutrition consult as needed  - Instruct patient on self management of diabetes  Outcome: Progressing     Problem: Patient/Family Goals  Goal: Patient/Family Long Term Goal  Description: Patient's Long Term Goal: Feel better    Interventions:  - Educate on medical regimen  - IVF  - ADAT  - GI consult  - See additional Care Plan goals for specific interventions  Outcome: Progressing  Goal: Patient/Family Short Term Goal  Description: Patient's Short Term Goal: No pain    Interventions:   - GI consult  - HIDA scan  - MRI/MRCP  - Monitor vitals  - IVF  - regular diet  - See additional Care Plan goals for specific interventions  Outcome: Progressing     Problem: METABOLIC/FLUID AND ELECTROLYTES - ADULT  Goal: Glucose maintained within prescribed range  Description: INTERVENTIONS:  - Monitor Blood Glucose as ordered  - Assess for signs and symptoms of hyperglycemia and hypoglycemia  - Administer ordered medications to maintain glucose within target range  - Assess barriers to adequate nutritional  intake and initiate nutrition consult as needed  - Instruct patient on self management of diabetes  Outcome: Progressing  Goal: Electrolytes maintained within normal limits  Description: INTERVENTIONS:  - Monitor labs and rhythm and assess patient for signs and symptoms of electrolyte imbalances  - Administer electrolyte replacement as ordered  - Monitor response to electrolyte replacements, including rhythm and repeat lab results as appropriate  - Fluid restriction as ordered  - Instruct patient on fluid and nutrition restrictions as appropriate  Outcome: Progressing     Problem: PAIN - ADULT  Goal: Verbalizes/displays adequate comfort level or patient's stated pain goal  Description: INTERVENTIONS:  - Encourage pt to monitor pain and request assistance  - Assess pain using appropriate pain scale  - Administer analgesics based on type and severity of pain and evaluate response  - Implement non-pharmacological measures as appropriate and evaluate response  - Consider cultural and social influences on pain and pain management  - Manage/alleviate anxiety  - Utilize distraction and/or relaxation techniques  - Monitor for opioid side effects  - Notify MD/LIP if interventions unsuccessful or patient reports new pain  - Anticipate increased pain with activity and pre-medicate as appropriate  Outcome: Progressing     Problem: SAFETY ADULT - FALL  Goal: Free from fall injury  Description: INTERVENTIONS:  - Assess pt frequently for physical needs  - Identify cognitive and physical deficits and behaviors that affect risk of falls.  - [place] fall precautions as indicated by assessment.  - Educate pt/family on patient safety including physical limitations  - Instruct pt to call for assistance with activity based on assessment  - Modify environment to reduce risk of injury  - Provide assistive devices as appropriate  - Consider OT/PT consult to assist with strengthening/mobility  - Encourage toileting schedule  Outcome:  Progressing   Vital signs stable. Refused pain medication. Complained of bloating with tube feeding- stopped at this time per MD order. Encouraged to take oral intake. Continue to monitor. Bed on low and locked position, call light within reach.

## 2024-11-27 NOTE — DISCHARGE SUMMARY
[facility]  part of [facility]    Discharge Summary    Emmanuel Mendiola Patient Status:  Inpatient    10/7/1965 MRN [id number]   Location [facility] 1W Attending No att. providers found   Hosp Day # 8 PCP Bertin Florez MD     Date of Admission: 2024 Disposition: Home or Self Care     Date of Discharge: 2024      Admitting Diagnosis: Transaminitis [R74.01]  Nausea and vomiting in adult [R11.2]    Hospital Discharge Diagnoses:  Acute Hepatitis A     Hospital Discharge Diagnoses:  as above     Lace+ Score: 62  59-90 High Risk  29-58 Medium Risk  0-28   Low Risk.    TCM Follow-Up Recommendation:  LACE > 58: High Risk of readmission after discharge from the hospital.          Lace+ Score: 62  59-90 High Risk  29-58 Medium Risk  0-28   Low Risk    Risk of readmission: Emmanuel Mendiola has High Risk of readmission after discharge from the hospital.    Problem List:   Patient Active Problem List   Diagnosis    Allergic rhinitis    Pharyngitis    Fatigue    Anemia    Family history of early CAD    Abnormal uterine bleeding (AUB)    Gastroesophageal reflux disease    Weight gain    Overweight for height    DM2 (diabetes mellitus, type 2) (HCC)    Prediabetes    Encounter for therapeutic drug monitoring    Nondisplaced fracture of middle phalanx of left ring finger, initial encounter for closed fracture    Anxiety    UTI symptoms    Bilateral lumbar radiculopathy    S/P lumbar spinal fusion    Hyperglycemia    High cholesterol    Acute hepatitis    Transaminitis    Nausea and vomiting in adult    Viral hepatitis A without hepatic coma       Reason for Admission: abd pain     Physical Exam:   General appearance: alert, appears stated age and cooperative  Pulmonary:  clear to auscultation bilaterally  Cardiovascular: S1, S2 normal, no murmur, click, rub or gallop, regular rate and rhythm  Abdominal: soft, non-tender; bowel sounds normal; no masses,  no organomegaly  Extremities:  extremities normal, atraumatic, no cyanosis or edema  Psychiatric: calm        History of Present Illness:          Hospital Course: ***    Consultations: ***    Procedures: ***    Complications: ***    Discharge Condition: Good    Discharge Medications:      Discharge Medications        START taking these medications        Instructions Prescription details   metoclopramide 10 MG/10ML Soln  Commonly known as: Reglan      Take 10 mL (10 mg total) by mouth 3 (three) times daily before meals.   Quantity: 30 mL  Refills: 0     ondansetron 4 mg tablet  Commonly known as: Zofran      Take 1 tablet (4 mg total) by mouth every 8 (eight) hours as needed for Nausea.   Quantity: 30 tablet  Refills: 0     pantoprazole 40 MG Tbec  Commonly known as: Protonix      Take 1 tablet (40 mg total) by mouth daily.   Quantity: 30 tablet  Refills: 0            CONTINUE taking these medications        Instructions Prescription details   ALPRAZolam 0.25 MG Tabs  Commonly known as: Xanax      Take 1 tablet (0.25 mg total) by mouth daily as needed for Anxiety.   Quantity: 15 tablet  Refills: 0     OneTouch Ultra Mini w/Device Kit      Check blood sugars two times daily   Quantity: 1 kit  Refills: 0            STOP taking these medications      cyclobenzaprine 10 MG Tabs  Commonly known as: Flexeril        Meloxicam 15 MG Tabs        metFORMIN 500 MG Tabs  Commonly known as: Glucophage        Miconazole Nitrate 200 MG Supp  Commonly known as: MICOTIN        Premarin 0.625 MG/GM Crea  Generic drug: conjugated estrogens        Senokot S 8.6-50 MG Tabs  Generic drug: sennosides-docusate        sertraline 25 MG Tabs  Commonly known as: Zoloft        tiZANidine 4 MG Tabs  Commonly known as: Zanaflex        topiramate 25 MG Tabs  Commonly known as: TopaMAX                  Where to Get Your Medications        These medications were sent to [id number] - [place], [place] - [address] AT [facility]  [phone], [phone]  [address], [facility] [address]      Hours: 24-hours Phone: [phone]   metoclopramide 10 MG/10ML Soln  ondansetron 4 mg tablet  pantoprazole 40 MG Tbec         Follow up Visits: Follow-up with *** in 1 week    Follow up Labs: ***     Other Discharge Instructions: ***    Josette Bray DO  11/27/2024  4:19 PM    > 35 min    MG/10ML Soln  ondansetron 4 mg tablet  pantoprazole 40 MG Tbec         Follow up Visits: Follow-up with pcp and Dr rivera  in 1 week    Follow up Labs: lfts      Other Discharge Instructions: none    Josette Bray DO  11/27/2024  4:19 PM    > 35 min

## 2024-11-27 NOTE — TELEPHONE ENCOUNTER
Pt needs follow up in GI clinic in the next 2 weeks with repeat LFTs in 1 week.  Admitted with acute hep A.  Monitor for down trend in T. Bili.    GI Rns,  Can you remind Pt to have labs drawn in 1 week.  Orders placed for LFT/INR.    Thanks,  Rosario

## 2024-11-27 NOTE — PROGRESS NOTES
[facility]     Gastroenterology Progress Note    Emmanuel Mendiola Patient Status:  Inpatient    10/7/1965 MRN [id number]   Location [facility] 1W Attending Josette Bray, DO   Hosp Day # 8 PCP Bertin Florez MD       Subjective:   Pt tired this am but otherwise tolerating diet.  No ABD pain or diarrhea.  No fever, chills.  Objective:   Blood pressure (!) 142/91, pulse 93, temperature 98.2 °F (36.8 °C), temperature source Oral, resp. rate 20, weight 160 lb 15 oz (73 kg), last menstrual period 2017, SpO2 94%, not currently breastfeeding. Body mass index is 28.51 kg/m².    Gen: awake, alert patient, NAD  HEENT: EOMI, the sclera appears anicteric, oropharynx clear, mucus membranes appear moist  CV: RRR  Lung: no conversational dyspnea   Abdomen: soft NTND abdomen with NABS appreciated   Skin: dry, warm, no jaundice  Ext: no LE edema is evident  Neuro: Alert, oriented x4 and interactive  Psych: calm, cooperative    Assessment and Plan:   Acute hepatitis A infection with GI symptom manifestation and acute liver injury, synthetic function intact. No known prior liver disease, US with hepatic steatosis. Elevated ferritin is acute phase reactant. Hep B/C negative, ASMA and AMA negative, ceruloplasmin negative. C diff and GI stool PCR negative.      #Acute hepatitis A- unclear source, they have been to outside restaurants and to parties. No clear stick contacts  -MRI reviewed showing no biliary obstruction  -s/p DHT placement , enteral nutrition started since removed.  Tolerating PO without N/V/D or abdominal pain  --LFTs improving as expected, bili peaked and lags in down trending     Recommend:   - continue diet as tolerated  - Plan to trend LFTs in 1 week as OP and follow up in GI clinic, orders/TE placed    Ok for discharge from GI stance.  Call with further questions or concerns.    Case discussed with Valerie Law MD and Misty ARIAS.    Rosario Hawley DNP,  FNP-BC  [facility]  11/27/2024      Results:     Lab Results   Component Value Date    WBC 8.6 11/27/2024    HGB 11.6 (L) 11/27/2024    HCT 32.9 (L) 11/27/2024    .0 11/27/2024    CREATSERUM 0.74 11/27/2024    BUN 6 (L) 11/27/2024     11/27/2024    K 3.9 11/27/2024     11/27/2024    CO2 27.0 11/27/2024    GLU 97 11/27/2024    CA 8.3 (L) 11/27/2024    ALB 2.9 (L) 11/27/2024    ALKPHO 125 (H) 11/27/2024    BILT 9.0 (H) 11/27/2024    TP 5.9 11/27/2024     (H) 11/27/2024     (H) 11/27/2024    PTT 26.9 12/06/2023    INR 1.16 11/26/2024    TSH 0.988 11/11/2024    DEANDRA 79 12/13/2017    LIP 35 11/19/2024    DDIMER 0.92 (H) 11/28/2021    ESRML 29 01/23/2020    CRP 0.65 (H) 12/06/2019    MG 2.0 11/27/2024    PHOS 4.7 11/27/2024    TROP <0.045 11/28/2021    CK 52 11/19/2024    CKMB 0.6 12/19/2015    B12 558 01/21/2020    POCGLU 100 11/20/2023       No results found.               Statement Selected

## 2024-11-27 NOTE — PLAN OF CARE
Problem: Diabetes/Glucose Control  Goal: Glucose maintained within prescribed range  Description: INTERVENTIONS:  - Monitor Blood Glucose as ordered  - Assess for signs and symptoms of hyperglycemia and hypoglycemia  - Administer ordered medications to maintain glucose within target range  - Assess barriers to adequate nutritional intake and initiate nutrition consult as needed  - Instruct patient on self management of diabetes  Outcome: Progressing     Problem: Patient/Family Goals  Goal: Patient/Family Long Term Goal  Description: Patient's Long Term Goal: Feel better    Interventions:  - Educate on medical regimen  - IVF  - ADAT  - GI consult  - See additional Care Plan goals for specific interventions  Outcome: Progressing  Goal: Patient/Family Short Term Goal  Description: Patient's Short Term Goal: No pain    Interventions:   - GI consult  - HIDA scan  - MRI/MRCP  - Monitor vitals  - IVF  - Clear liquid diet  - See additional Care Plan goals for specific interventions  Outcome: Progressing     Problem: METABOLIC/FLUID AND ELECTROLYTES - ADULT  Goal: Glucose maintained within prescribed range  Description: INTERVENTIONS:  - Monitor Blood Glucose as ordered  - Assess for signs and symptoms of hyperglycemia and hypoglycemia  - Administer ordered medications to maintain glucose within target range  - Assess barriers to adequate nutritional intake and initiate nutrition consult as needed  - Instruct patient on self management of diabetes  Outcome: Progressing  Goal: Electrolytes maintained within normal limits  Description: INTERVENTIONS:  - Monitor labs and rhythm and assess patient for signs and symptoms of electrolyte imbalances  - Administer electrolyte replacement as ordered  - Monitor response to electrolyte replacements, including rhythm and repeat lab results as appropriate  - Fluid restriction as ordered  - Instruct patient on fluid and nutrition restrictions as appropriate  Outcome: Progressing      Problem: PAIN - ADULT  Goal: Verbalizes/displays adequate comfort level or patient's stated pain goal  Description: INTERVENTIONS:  - Encourage pt to monitor pain and request assistance  - Assess pain using appropriate pain scale  - Administer analgesics based on type and severity of pain and evaluate response  - Implement non-pharmacological measures as appropriate and evaluate response  - Consider cultural and social influences on pain and pain management  - Manage/alleviate anxiety  - Utilize distraction and/or relaxation techniques  - Monitor for opioid side effects  - Notify MD/LIP if interventions unsuccessful or patient reports new pain  - Anticipate increased pain with activity and pre-medicate as appropriate  Outcome: Progressing     Problem: SAFETY ADULT - FALL  Goal: Free from fall injury  Description: INTERVENTIONS:  - Assess pt frequently for physical needs  - Identify cognitive and physical deficits and behaviors that affect risk of falls.  - [place] fall precautions as indicated by assessment.  - Educate pt/family on patient safety including physical limitations  - Instruct pt to call for assistance with activity based on assessment  - Modify environment to reduce risk of injury  - Provide assistive devices as appropriate  - Consider OT/PT consult to assist with strengthening/mobility  - Encourage toileting schedule  Outcome: Progressing

## 2024-11-27 NOTE — PLAN OF CARE
Problem: Patient Centered Care  Goal: Patient preferences are identified and integrated in the patient's plan of care  Description: Interventions:  - What would you like us to know as we care for you? Lives at home with family  - Provide timely, complete, and accurate information to patient/family  - Incorporate patient and family knowledge, values, beliefs, and cultural backgrounds into the planning and delivery of care  - Encourage patient/family to participate in care and decision-making at the level they choose  - Honor patient and family perspectives and choices  Outcome: Adequate for Discharge     Problem: Diabetes/Glucose Control  Goal: Glucose maintained within prescribed range  Description: INTERVENTIONS:  - Monitor Blood Glucose as ordered  - Assess for signs and symptoms of hyperglycemia and hypoglycemia  - Administer ordered medications to maintain glucose within target range  - Assess barriers to adequate nutritional intake and initiate nutrition consult as needed  - Instruct patient on self management of diabetes  Outcome: Adequate for Discharge     Problem: Patient/Family Goals  Goal: Patient/Family Long Term Goal  Description: Patient's Long Term Goal: Feel better    Interventions:  - Educate on medical regimen  - IVF  - ADAT  - GI consult  - See additional Care Plan goals for specific interventions  Outcome: Adequate for Discharge  Goal: Patient/Family Short Term Goal  Description: Patient's Short Term Goal: No pain    Interventions:   - GI consult  - HIDA scan  - MRI/MRCP  - Monitor vitals  - IVF  - Clear liquid diet  - See additional Care Plan goals for specific interventions  Outcome: Adequate for Discharge     Problem: METABOLIC/FLUID AND ELECTROLYTES - ADULT  Goal: Glucose maintained within prescribed range  Description: INTERVENTIONS:  - Monitor Blood Glucose as ordered  - Assess for signs and symptoms of hyperglycemia and hypoglycemia  - Administer ordered medications to maintain glucose  within target range  - Assess barriers to adequate nutritional intake and initiate nutrition consult as needed  - Instruct patient on self management of diabetes  Outcome: Adequate for Discharge  Goal: Electrolytes maintained within normal limits  Description: INTERVENTIONS:  - Monitor labs and rhythm and assess patient for signs and symptoms of electrolyte imbalances  - Administer electrolyte replacement as ordered  - Monitor response to electrolyte replacements, including rhythm and repeat lab results as appropriate  - Fluid restriction as ordered  - Instruct patient on fluid and nutrition restrictions as appropriate  Outcome: Adequate for Discharge     Problem: PAIN - ADULT  Goal: Verbalizes/displays adequate comfort level or patient's stated pain goal  Description: INTERVENTIONS:  - Encourage pt to monitor pain and request assistance  - Assess pain using appropriate pain scale  - Administer analgesics based on type and severity of pain and evaluate response  - Implement non-pharmacological measures as appropriate and evaluate response  - Consider cultural and social influences on pain and pain management  - Manage/alleviate anxiety  - Utilize distraction and/or relaxation techniques  - Monitor for opioid side effects  - Notify MD/LIP if interventions unsuccessful or patient reports new pain  - Anticipate increased pain with activity and pre-medicate as appropriate  Outcome: Adequate for Discharge     Problem: SAFETY ADULT - FALL  Goal: Free from fall injury  Description: INTERVENTIONS:  - Assess pt frequently for physical needs  - Identify cognitive and physical deficits and behaviors that affect risk of falls.  - [place] fall precautions as indicated by assessment.  - Educate pt/family on patient safety including physical limitations  - Instruct pt to call for assistance with activity based on assessment  - Modify environment to reduce risk of injury  - Provide assistive devices as appropriate  - Consider  OT/PT consult to assist with strengthening/mobility  - Encourage toileting schedule  Outcome: Adequate for Discharge   Vital signs stable. No diarrhea, nausea or vomiting noted. Up and about in room per self. Complaint of \"some discomfort in abdomen after eating\". Seen and examined by Dr. Bray and Rosario, GI APN. Discharge order written. Saline lock discontinued, discharge instructions given and discussed with patient and partner. Instructions includes medications that were e-prescribed to her pharmacy, medications to continue taking at home and when to take the next dose. As per GI APN, she will be contacted by their office for a follow up appointment. Encouraged patient to eat/drink small frequent portions. Patient verbalized understanding. Discharged home with partner, patient in stable condition upon discharge.

## 2024-11-27 NOTE — CM/SW NOTE
11/27/24 1000   Discharge disposition   Expected discharge disposition Home or Self   Discharge transportation Private car     Vandana Rodarte MBA BSN RN CRRN   RN Case Manager  [phone]

## 2024-11-29 ENCOUNTER — TELEPHONE (OUTPATIENT)
Facility: CLINIC | Age: 59
End: 2024-11-29

## 2024-11-29 ENCOUNTER — PATIENT OUTREACH (OUTPATIENT)
Dept: CASE MANAGEMENT | Age: 59
End: 2024-11-29

## 2024-11-29 DIAGNOSIS — B17.9 ACUTE HEPATITIS: ICD-10-CM

## 2024-11-29 DIAGNOSIS — Z02.9 ENCOUNTERS FOR ADMINISTRATIVE PURPOSE: Primary | ICD-10-CM

## 2024-11-29 PROCEDURE — 1111F DSCHRG MED/CURRENT MED MERGE: CPT

## 2024-11-29 RX ORDER — METOCLOPRAMIDE HYDROCHLORIDE 5 MG/5ML
10 SOLUTION ORAL
Qty: 30 ML | Refills: 0 | Status: SHIPPED | OUTPATIENT
Start: 2024-11-29 | End: 2024-12-06

## 2024-11-29 RX ORDER — HYDROXYZINE HYDROCHLORIDE 25 MG/1
25 TABLET, FILM COATED ORAL 3 TIMES DAILY PRN
Qty: 30 TABLET | Refills: 0 | Status: SHIPPED | OUTPATIENT
Start: 2024-11-29

## 2024-11-29 NOTE — TELEPHONE ENCOUNTER
I spoke to Ambreen, pharmacist from [facility].    Corrected dispense to 210 ml for Metoclopramide for 7 days. Prescription was dispense for 30 ml.    RANDALL Davila

## 2024-11-29 NOTE — TELEPHONE ENCOUNTER
Dr. Law (on-call),   Pt recently discharged from hospital on 11/27/24 for acute hepatitis A infection with GI symptom manifestation and acute liver injury.      Pt reported worsening itching all over body, started yesterday but much more severe today. Pt states she looks the same r/t jaundice as when in the hospital. No fevers/N/V/D. Reports loose stools and fatigue.     Pt was supposed to have repeat labs drawn next week and then clinic appt with GI on 12/13/24.    Pt asking for refill of metoclopramide, states it was helpful. Per chart, hospitalist ordered one day of reglan suspension (10mgl TID before meals). No discharge note to see if pt is supposed to stay on this.     Please advise on how to proceed.  Thanks,  Vandana

## 2024-11-29 NOTE — PROGRESS NOTES
Transitional Care Management   Discharge Date: 24  Contact Date: 2024    Assessment:  TCM Initial Assessment    General:  Assessment completed with: Patient  Patient Subjective: Spoke with patient who reports she feels better but the patient states \"my energy levels are depleted\". The patient denies chest pain, shortness of breath, fever, chills, nausea, vomiting, diarrhea, or abdominal pain. She does report loose stools. Denies any blood in her stool. The patient does report she had night sweats last night. Patient reports this morning her blood sugar was 110 before eating. The patient denies any difficulty with eating. Patient reports overall body itching started yesterday and reports it has been worse today. She is asking for further recommendations. Other than the itching patient denies any worsening symptoms. Patient states the prescription for Metoclopramide was not enough and she is asking for a refill as it has been helping her. (See Nursing Interventions below)  Chief Complaint: Transaminitis  Verify patient name and  with patient/ caregiver: Yes    Hospital Stay/Discharge:  Tell me what you understand of why you were in the hospital or emergency department: worsening abdominal pain and nausea  Prior to leaving the hospital were your Discharge Instructions reviewed with you?: Yes  Did you receive a copy of your written Discharge Instructions?: Yes  What questions do you have about your Discharge Instructions?: patient denies at this time  Do you feel better or worse since you left the hospital or emergency department?: Better    Follow - Up Appointment:  Do you have a follow-up appointment?: Yes  Date: 24  Physician: Carlee Varela  Are there any barriers to getting to your follow-up appointment?: No    Home Health/DME:  Prior to leaving the hospital was Home Health (HH) arranged for you?: N/A  Are HH needs identified by staff during the assessment?: No     Prior to leaving the hospital or  emergency department was Durable Medical Equipment (DME), medical supplies, or infusions arranged for you?: N/A  Are DME/medical supply/infusions needs identified by staff during this assessment?: No     Medications/Diet:  Did any of your medications change, during or after your hospital stay or ED visit?: Yes  Do you have your new or updated medications?: Yes  Do you understand what your medications are for and possible side effects?: Yes  Are there any reasons that keep you from taking your medication as prescribed?: No  Any concerns about medication refills?: Yes    Were you given a different diet per your Discharge Instructions?: No     Questions/Concerns:  Do you have any questions or concerns that have not been discussed?: No           Nursing Interventions:    NCM advised patient overall supportive care. To rest in between activities and stay hydrated. NCM called Gastroenterology office and spoke with Izabela regarding the refill of Metoclopramide and the overall worsening of her body itching. The patient is aware she will be contacted directly.     A TCM/HFU appointment has been scheduled for 12/09/2024.   Gastroenterology appointment scheduled for 12/13/2024.   Patient confirms she will go for repeat labs on 12/04/2024.     All d/c instructions reviewed with pt.  Reviewed when to call MD vs when to go to ER/call 911.  Educated pt on the importance of taking all meds as prescribed as well as close f/u with PCP/specialists.  Pt verbalized understanding and will contact office with any further questions or concerns.         Medication Review:   Current Outpatient Medications   Medication Sig Dispense Refill    metoclopramide 10 MG/10ML Oral Solution Take 10 mL (10 mg total) by mouth 3 (three) times daily before meals. 30 mL 0    pantoprazole 40 MG Oral Tab EC Take 1 tablet (40 mg total) by mouth daily. 30 tablet 0    ondansetron (ZOFRAN) 4 mg tablet Take 1 tablet (4 mg total) by mouth every 8 (eight) hours as  needed for Nausea. 30 tablet 0    ALPRAZolam 0.25 MG Oral Tab Take 1 tablet (0.25 mg total) by mouth daily as needed for Anxiety. 15 tablet 0    Blood Glucose Monitoring Suppl (ONETOUCH ULTRA MINI) w/Device Does not apply Kit Check blood sugars two times daily 1 kit 0     Did patient review medications using current pill bottles and not just a medication list?  Yes  Discharge medications reviewed/discussed/and reconciled against outpatient medications with patient.  Any changes or updates to medications sent to primary care provider.  Patient Acknowledged    SDOH:     Transportation Needs: No Transportation Needs (11/19/2024)    Transportation Needs     Lack of Transportation: No     Car Seat: Not on file       Follow-up Appointments:  Your appointments       Date & Time Appointment Department (Center)    Dec 13, 2024 10:30 AM CST Follow Up Visit with Lisa Varela PA-C [facility] ([facility])              [facility]  [address] [address]  [phone]            Transitional Care Clinic  Was TCC Ordered: No    Primary Care Provider (If no TCC appointment)  Does patient already have a PCP appointment scheduled? No  Nurse Care Manager Scheduled PCP office TCM appointment with patient    Specialist  Does the patient have any other follow-up appointment(s) that need to be scheduled? No   -If yes: Nurse Care Manager reviewed upcoming specialist appointments with patient: Yes   -Does the patient need assistance scheduling appointment(s): No    CCM referral placed:  Not Applicable

## 2024-11-29 NOTE — TELEPHONE ENCOUNTER
Patient was seen in hospital.  Patient is experiencing extreme body itching.  Patient will need a refill sent to [facility]. Patient is scheduled to follow up with APRN on 12/13/24      Current Outpatient Medications:       metoclopramide 10 MG/10ML Oral Solution, Take 10 mL (10 mg total) by mouth 3 (three) times daily before meals. (Patient not taking: Reported on 11/29/2024), Disp: 30 mL, Rfl: 0

## 2024-11-29 NOTE — TELEPHONE ENCOUNTER
Called and spoke to the patient, date of birth and name verified.    Informed of 2 new prescriptions and discussed directions of each medication.    She agreed and verbalized understanding.

## 2024-12-03 NOTE — PAYOR COMM NOTE
--------------  DISCHARGE REVIEW    Payor: FIONA MEJIA  Subscriber #:  [id number]  Authorization Number: [id number]    Admit date: 11/19/24  Admit time:   5:19 AM  Discharge Date: 11/27/2024  2:00 PM     Admitting Physician:   Attending Physician:  Sendy att. providers found  Primary Care Physician: Bretin lForez MD       Discharge Summary Notes    No notes of this type exist for this encounter.         REVIEWER COMMENTS

## 2024-12-04 ENCOUNTER — LAB ENCOUNTER (OUTPATIENT)
Dept: LAB | Facility: HOSPITAL | Age: 59
End: 2024-12-04
Attending: REGISTERED NURSE
Payer: COMMERCIAL

## 2024-12-04 DIAGNOSIS — B15.9 VIRAL HEPATITIS A WITHOUT HEPATIC COMA: ICD-10-CM

## 2024-12-04 DIAGNOSIS — R74.8 ELEVATED LIVER ENZYMES: ICD-10-CM

## 2024-12-04 LAB
ALBUMIN SERPL-MCNC: 4.2 G/DL (ref 3.2–4.8)
ALBUMIN/GLOB SERPL: 1.1 {RATIO} (ref 1–2)
ALP LIVER SERPL-CCNC: 268 U/L
ALT SERPL-CCNC: 177 U/L
ANION GAP SERPL CALC-SCNC: 8 MMOL/L (ref 0–18)
AST SERPL-CCNC: 125 U/L (ref ?–34)
BILIRUB DIRECT SERPL-MCNC: 4.3 MG/DL (ref ?–0.3)
BILIRUB SERPL-MCNC: 5.9 MG/DL (ref 0.3–1.2)
BUN BLD-MCNC: 13 MG/DL (ref 9–23)
BUN/CREAT SERPL: 15.9 (ref 10–20)
CALCIUM BLD-MCNC: 10.1 MG/DL (ref 8.7–10.4)
CHLORIDE SERPL-SCNC: 100 MMOL/L (ref 98–112)
CO2 SERPL-SCNC: 27 MMOL/L (ref 21–32)
CREAT BLD-MCNC: 0.82 MG/DL
EGFRCR SERPLBLD CKD-EPI 2021: 83 ML/MIN/1.73M2 (ref 60–?)
FASTING STATUS PATIENT QL REPORTED: NO
GLOBULIN PLAS-MCNC: 3.8 G/DL (ref 2–3.5)
GLUCOSE BLD-MCNC: 243 MG/DL (ref 70–99)
INR BLD: 0.95 (ref 0.8–1.2)
OSMOLALITY SERPL CALC.SUM OF ELEC: 288 MOSM/KG (ref 275–295)
POTASSIUM SERPL-SCNC: 4.2 MMOL/L (ref 3.5–5.1)
PROT SERPL-MCNC: 8 G/DL (ref 5.7–8.2)
PROTHROMBIN TIME: 13.3 SECONDS (ref 11.6–14.8)
SODIUM SERPL-SCNC: 135 MMOL/L (ref 136–145)

## 2024-12-04 PROCEDURE — 36415 COLL VENOUS BLD VENIPUNCTURE: CPT

## 2024-12-04 PROCEDURE — 80053 COMPREHEN METABOLIC PANEL: CPT

## 2024-12-04 PROCEDURE — 82248 BILIRUBIN DIRECT: CPT

## 2024-12-04 PROCEDURE — 85610 PROTHROMBIN TIME: CPT

## 2024-12-04 NOTE — CDS QUERY
Dear Dr Haley,  Can clinical validity of Severe protein calorie malnutrition be clarified?  () Severe protein calorie malnutrition is ruled out  (x ) Severe protein calorie malnutrition is valid - please document additional supporting clinical indicators here:_______nutritional inr inc/temp wasting___________  ( ) other- please specify:_________________________    Clinical indicators:Weight:  11/19 67.9 Kg --- 11/25 73Kg  Nausea, abdominal pain,  loss of appetite, diarrhea, only able to sips of juice, water, soup/broth.    RD note: Nutritional assessment 11/24 : Pt does not meet malnutrition criteria.   Only able to document energy intake less than 75% for greater than 7 days   Nutrition Focused Physical Exam (NFPE): no wasting noted     Progress notes 11/24- 11/26: Severe protein therese maln ng feeds    Risk factors: DM GERD admitted for acute viral hepatitis    Treatment: NG tube feeding      If you have any questions, please contact Clinical Documentation  Specialist:  ÁLVARO Mccain at      Thank You!     THIS FORM IS A PERMANENT PART OF THE MEDICAL RECORD

## 2024-12-09 ENCOUNTER — TELEPHONE (OUTPATIENT)
Facility: CLINIC | Age: 59
End: 2024-12-09

## 2024-12-09 ENCOUNTER — OFFICE VISIT (OUTPATIENT)
Dept: INTERNAL MEDICINE CLINIC | Facility: CLINIC | Age: 59
End: 2024-12-09

## 2024-12-09 VITALS
SYSTOLIC BLOOD PRESSURE: 129 MMHG | HEIGHT: 63 IN | DIASTOLIC BLOOD PRESSURE: 80 MMHG | BODY MASS INDEX: 25.52 KG/M2 | OXYGEN SATURATION: 97 % | WEIGHT: 144 LBS | HEART RATE: 89 BPM

## 2024-12-09 DIAGNOSIS — B15.9 VIRAL HEPATITIS A WITHOUT HEPATIC COMA: Primary | ICD-10-CM

## 2024-12-09 DIAGNOSIS — E11.9 TYPE 2 DIABETES MELLITUS WITHOUT COMPLICATION, WITHOUT LONG-TERM CURRENT USE OF INSULIN (HCC): ICD-10-CM

## 2024-12-09 NOTE — PROGRESS NOTES
Subjective:   Emmanuel Mendiola is a 59 year old female who presents for hospital follow up.   She was discharged from [facility] to Home or Self Care  Admission Date: 11/18/24   Discharge Date: 11/27/24  Hospital Discharge Diagnosis:   Hospital Discharge Diagnoses:  Acute Hepatitis A        Interactive contact within 2 business days post discharge first initiated on Date: 11/29/2024    I accessed [facility] and/or [facility] Everywhere and personally reviewed the following for the recent hospitalization: provider notes, consults, summaries, labs and other test results and the pertinent findings are documented below.     HPI:   Emmanuel Mendiola is a 59 year old female with history of DM, GERD who presented to the ED with complaints of abdominal pain, associated with nausea no vomiting.  Had mild diarrhea.  She also complains of generalized aches fatigue in lasr 2-3 weeks     At presentation, noted to be markedly elevated transaminases, mildly elevated alk phos and bilirubin.   alk phos 167, ast 2401, alt >3300, t bili 3.1.   Ultrasound with diffuse hepatic steatosis without focal lesion, non specific gallbladder wall thickening without cholelithiasis.     She denies alcohol use.    Has only taken 2 tablets of Tylenol in the last 3 days.  Denies use of other OTC or herbal medications.  Was prescribed metformin recently, she took a tablet.  Did not like the way she felt and has not taking since then.  Patient states feeling better  now  eating better  and sleeping good . Has normal bm   Denies chest pain, shortnesss of breath, palpitations, or abdominal pain, denies UTI symptoms fever or chills.   History/Other:   Current Medications:  Medication Reconciliation:  I am aware of an inpatient discharge within the last 30 days.  The discharge medication list has been reconciled with the patient's current medication list and reviewed by me. See medication list for additions of new medication, and changes to current doses of medications  and discontinued medications.  Outpatient Medications Marked as Taking for the 12/9/24 encounter (Office Visit) with Bertin Florez MD   Medication Sig    hydrOXYzine 25 MG Oral Tab Take 1 tablet (25 mg total) by mouth 3 (three) times daily as needed for Itching.    pantoprazole 40 MG Oral Tab EC Take 1 tablet (40 mg total) by mouth daily.    ondansetron (ZOFRAN) 4 mg tablet Take 1 tablet (4 mg total) by mouth every 8 (eight) hours as needed for Nausea.    ALPRAZolam 0.25 MG Oral Tab Take 1 tablet (0.25 mg total) by mouth daily as needed for Anxiety.    Blood Glucose Monitoring Suppl (ONETOUCH ULTRA MINI) w/Device Does not apply Kit Check blood sugars two times daily       Review of Systems:  GENERAL: weight stable, energy stable, no sweating  SKIN: yellow skin ,denies any unusual skin lesions  EYES: denies blurred vision or double vision  HEENT: denies nasal congestion, sinus pain or ST  LUNGS: denies shortness of breath with exertion  CARDIOVASCULAR: denies chest pain on exertion or palpitations  GI: denies abdominal pain, denies heartburn , denies diarrhea  MUSCULOSKELETAL: denies pain, normal range of motion of extremities  NEURO: denies headaches, denies dizziness, denies weakness  PSYCHE: denies depression or anxiety  HEMATOLOGIC: denies hx of anemia, or bruising, denies bleeding  ENDOCRINE: denies thyroid history  ALL/ASTHMA: denies hx of allergy or asthma    Objective:   Patient's last menstrual period was 12/26/2017.  Estimated body mass index is 25.51 kg/m² as calculated from the following:    Height as of this encounter: 5' 3\" (1.6 m).    Weight as of this encounter: 144 lb (65.3 kg).   /80 (BP Location: Left arm, Patient Position: Sitting, Cuff Size: large)   Pulse 89   Ht 5' 3\" (1.6 m)   Wt 144 lb (65.3 kg)   LMP 12/26/2017   SpO2 97%   BMI 25.51 kg/m²      GENERAL: well developed, well nourished, in no apparent distress  SKIN: no rashes, no suspicious lesions  HEENT: atraumatic,  normocephalic, ears and throat are clear  EYES: PERRLA, EOMI, conjunctiva are clear  NECK: supple, no adenopathy, no bruits  CHEST: no chest tenderness  LUNGS: clear to auscultation  CARDIO: RRR without murmur  GI: good BS's, no masses, HSM or tenderness  MUSCULOSKELETAL: back is not tender, FROM of the extremities  EXTREMITIES: no cyanosis, clubbing or edema  NEURO: Oriented times three, cranial nerves are intact, motor and sensory are grossly intact    Assessment & Plan:   There are no diagnoses linked to this encounter.    Markedly elevated transaminases: Viral hepatitis A  Autoimmune r/o   Mildly elevated bilirubin, alk phos   Improving   -pain/nausea-resolved   Added Protonix for gerd sy - stable   Fluids   Diet education eat well   Avoid  pain mediations   Improving   F.u gastro 12/13/24   Labs  monitor      MRI  abdomen MRCP  11/22/24  -discussed     Impression   CONCLUSION:     Severe periportal inflammation and free fluid suggestive of hepatitis.     Severe gallbladder wall thickening, likely reactive from the presumed hepatitis.     Mild peripancreatic inflammation with fluid within the pancreaticoduodenal groove, likely reactive from the presumed hepatitis.  Groove pancreatitis felt less likely.     No ductal dilation.         NM GB hepatobiliary  scan  11/21/24    Impression   CONCLUSION:     There is little if any biliary excretion of radiotracer by 2 hours consistent with hepatocellular dysfunction.  This study is thus nondiagnostic for evaluation of acute cholecystitis or biliary obstruction.       US liver 11/19/24    CONCLUSION:  1. Diffuse hepatic steatosis without focal hepatic lesions.  2. Nonspecific gallbladder wall thickening without cholelithiasis or pericholecystic fluid.  Negative sonographic Rothman sign.  No biliary dilatation.  Normal common duct.  3. No free fluid hepatorenal fossa. No hydronephrosis right kidney.                 Dm2  Keep sugars glycemic control to prevent complications  of DM2  Keep 1800 therese ADA- Diabetic diet   Walking and activity as tolerated   accu-checks   Eye exam and foot exam yearly  Take medications as prescribed  Metformine - not taking now   Improve diet low sugar /carb diet   A1c 7.5   Labs monitor      12/17/24-   jury duty  excused     Directions and side effects of medications discussed w/pt  Pt verbalized understanding and compliance                    No follow-ups on file.

## 2024-12-09 NOTE — TELEPHONE ENCOUNTER
Called Pt to discuss repeat LFTs.  T. Bili down to 5.9 and INR WNL, which is reassuring.  Discussed trending weekly to normal.  Overall, she is well but continues to feel quite fatigued and concerned regarding iron levels.  Plans to follow up with SAUMYA German in GI clinic on 12/13.  Will place labs to be drawn Friday prior to her appointment.  She should be connected with hepatologist for further follow up.    RANDALL Gonzalez.

## 2024-12-12 NOTE — PROGRESS NOTES
[facility] - Gastroenterology                                                                                                               Reason for consult:   Chief Complaint   Patient presents with    Follow - Up       Requesting physician or provider: Bertin Florez MD      HPI:   Emmanuel Mendiola is a 59 year old year-old female who presents for hospital follow up. Was admitted for Acute hepatitis A infection with GI symptom manifestation and acute liver injury, synthetic function intact. No known prior liver disease, US with hepatic steatosis. Elevated ferritin is acute phase reactant. Hep B/C negative, ASMA and AMA negative, ceruloplasmin negative. C diff and GI stool PCR negative. LFTs 1 week post discharge noted , , alk phos 268, t bili 5.9. Presents today for follow up.     Labs completed today , , alk phos 260.  T bili 3.4    Follow up:  -yesterday started to feel better  -was able to walk around [employer]  -appetite has been good  -health department believes she got hep A in [place]   -no diarrhea  -no abdominal pain  -no nausea or vomiting    -has been avoiding hepatotoxic agents    Pertinent Family Hx:  - No known history of esophageal, gastric or colon cancers  - No known IBD  - No known liver pathologies     Social Hx:  - No tobacco use/+ social ETOH (has stopped)  - Denies illicit drug use  - Lives with:   - Occupation: works at law firm      Prior endoscopies:  8/24/2023- Colonoscopy   COLONOSCOPY FINDINGS:    6+ mm sessile colon polyp removed from the mid transverse colon by cold snare polypectomy piecemeal in 2 cuts.  Small internal hemorrhoids        RECOMMENDATIONS:  High fiber diet.  Follow-up above colon polyp pathology results.  Repeat colonoscopy examination in 5-7 years.  No aspirin or NSAID medications for next 5 days to prevent bleeding  Final Diagnosis:      Transverse colon polyp (x1):  Tubular adenoma.      Wt Readings from Last 6 Encounters:   12/13/24 144 lb (65.3 kg)   12/09/24 144 lb (65.3 kg)   11/25/24 160 lb 15 oz (73 kg)   11/11/24 153 lb (69.4 kg)   05/14/24 149 lb 12.8 oz (67.9 kg)   04/23/24 148 lb (67.1 kg)        History, Medications, Allergies, ROS:      Past Medical History:    Anemia    recently dx-taking FeSO4    Back problem    Diabetes (HCC)    Disorder of liver    Divorce    Finalized 2010    DM2 (diabetes mellitus, type 2) (HCC)    controlled with diet    Endometriosis    Laparoscopy-diagnostic    Esophageal reflux    History of blood transfusion    No reaction    History of pregnancy    EAB    Hx of motion sickness    Lipid screening    per NG    Migraines    Nausea and vomiting in adult    Visual impairment    wears glasses      Past Surgical History:   Procedure Laterality Date    Biopsy of uterus lining  6/2011    neg embx    Colonoscopy N/A 1/10/2018    Procedure: COLONOSCOPY;  Surgeon: Humble Hodges MD;  Location: [facility] ENDO    Colonoscopy N/A 8/24/2023    Procedure: COLONOSCOPY;  Surgeon: Humble Hodges MD;  Location: [facility] MAIN OR    Laparoscopy,diagnostic  1996    Diagnostic for endometriosis      Family Hx:   Family History   Problem Relation Age of Onset    Heart Attack Brother         MI-Cause of death    Colon Cancer Paternal Grandmother         Cause of death    Heart Disease Father         CAD    Diabetes Father         Diabetes/CRF/CAD cause of death    Other (Other) Father         CRF    Heart Disease Mother         CAD    Cancer Mother         Lung cancer and CAD cause of death    Heart Disease Brother 41        Premature CAD    Breast Cancer Maternal Aunt 82      Social History:   Social History     Socioeconomic History    Marital status:    Tobacco Use    Smoking status: Former     Current packs/day: 0.00     Average packs/day: 1 pack/day for 5.0 years (5.0 ttl pk-yrs)     Types: Cigarettes     Start date: 1983     Quit date: 1988     Years  since quittin.9     Passive exposure: Past    Smokeless tobacco: Never   Vaping Use    Vaping status: Never Used   Substance and Sexual Activity    Alcohol use: Not Currently     Alcohol/week: 1.0 standard drink of alcohol     Types: 1 Glasses of wine per week     Comment: 3 glasses per month     Drug use: No    Sexual activity: Yes     Partners: Male     Birth control/protection: Condom   Other Topics Concern    Caffeine Concern No    History of tanning Yes    Breast feeding No    Reaction to local anesthetic No    Pt has a pacemaker No    Pt has a defibrillator No     Social Drivers of Health     Financial Resource Strain: Low Risk  (2023)    Financial Resource Strain     Difficulty of Paying Living Expenses: Not very hard     Med Affordability: No   Food Insecurity: No Food Insecurity (2024)    Food Insecurity     Food Insecurity: Never true   Transportation Needs: No Transportation Needs (2024)    Transportation Needs     Lack of Transportation: No   Housing Stability: Low Risk  (2024)    Housing Stability     Housing Instability: No        Medications (Active prior to today's visit):  Current Outpatient Medications   Medication Sig Dispense Refill    hydrOXYzine 25 MG Oral Tab Take 1 tablet (25 mg total) by mouth 3 (three) times daily as needed for Itching. 30 tablet 0    pantoprazole 40 MG Oral Tab EC Take 1 tablet (40 mg total) by mouth daily. 30 tablet 0    ondansetron (ZOFRAN) 4 mg tablet Take 1 tablet (4 mg total) by mouth every 8 (eight) hours as needed for Nausea. 30 tablet 0    ALPRAZolam 0.25 MG Oral Tab Take 1 tablet (0.25 mg total) by mouth daily as needed for Anxiety. 15 tablet 0    Blood Glucose Monitoring Suppl (ONETOUCH ULTRA MINI) w/Device Does not apply Kit Check blood sugars two times daily 1 kit 0       Allergies:  Allergies[1]    ROS:   CONSTITUTIONAL: negative for fevers, chills, sweats and weight loss  EYES Negative for red eyes, yellow eyes, changes in  vision  HEENT: Negative for dysphagia and hoarseness  RESPIRATORY: Negative for cough and shortness of breath  CARDIOVASCULAR: Negative for chest pain, palpitations  GASTROINTESTINAL: See HPI  GENITOURINARY: Negative for dysuria and frequency  MUSCULOSKELETAL: Negative for arthralgias and myalgias  NEUROLOGICAL: Negative for dizziness and headaches  BEHAVIOR/PSYCH: Negative for anxiety and poor appetite    PHYSICAL EXAM:   Blood pressure 126/87, pulse 90, height 5' 3\" (1.6 m), weight 144 lb (65.3 kg), last menstrual period 12/26/2017, not currently breastfeeding.    GEN: WD/WN, NAD  HEENT: Supple symmetrical, trachea midline  CV: RRR, the extremities are warm and well perfused   LUNGS: No increased work of breathing  ABDOMEN: No scars, normal bowel sounds, soft, non-tender, non-distended no rebound or guarding, no masses, no hepatomegaly  MSK: No redness, no warmth, no swelling of joints  SKIN: No jaundice, no erythema, no rashes  HEMATOLOGIC: No bleeding, no bruising  NEURO: Alert and interactive, normal gait    Labs/Imaging/Procedures:     Patient's pertinent labs and imaging were reviewed and discussed with patient today.     Lab Results   Component Value Date    WBC 8.6 11/27/2024    RBC 3.74 (L) 11/27/2024    HGB 11.6 (L) 11/27/2024    HCT 32.9 (L) 11/27/2024    MCV 88.0 11/27/2024    MCH 31.0 11/27/2024    MCHC 35.3 11/27/2024    RDW 14.9 11/27/2024    .0 11/27/2024    MPV 8.3 05/14/2018        Lab Results   Component Value Date     (H) 12/04/2024    BUN 13 12/04/2024    BUNCREA 15.9 12/04/2024    CREATSERUM 0.82 12/04/2024    ANIONGAP 8 12/04/2024    GFRNAA 64 03/14/2022    GFRAA 74 03/14/2022    CA 10.1 12/04/2024    OSMOCALC 288 12/04/2024    ALKPHO 260 (H) 12/13/2024     (H) 12/13/2024     (H) 12/13/2024    ALKPHOS 68 05/29/2015    BILT 3.4 (H) 12/13/2024    TP 8.3 (H) 12/13/2024    ALB 4.4 12/13/2024    GLOBULIN 3.8 (H) 12/04/2024    AGRATIO 1.3 05/29/2015     (L)  12/04/2024    K 4.2 12/04/2024     12/04/2024    CO2 27.0 12/04/2024        XR CHEST AP PORTABLE  (CPT=71045)    Result Date: 11/24/2024  PROCEDURE: XR CHEST AP PORTABLE  (CPT=71045) TIME: 14 20.   COMPARISON: [facility], XR CHEST AP PORTABLE (CPT=71045), 11/19/2024, 1:17 AM.  [facility], XR CHEST PA + LAT CHEST (CPT=71046), 6/27/2024, 10:24 AM.  [facility], XR CHEST PA + LAT  CHEST (CPT=71046), 12/06/2023, 11:38 AM.  INDICATIONS: Dobhoff placement.  TECHNIQUE:   Single view.   FINDINGS:  CARDIAC/MEDIASTINUM: The cardiac silhouette is enlarged and unchanged. There is atherosclerotic calcification of the aortic arch and thoracic aorta.  There is a feeding tube with tip within the proximal stomach and below the diaphragm.  LUNGS/PLEURA: Mild perihilar fullness with bilateral reticulonodular opacities suggestive of mild interstitial edema.  Small right pleural effusion with right lower lobe airspace opacity.  BONES: No acute destructive process. There are degenerative changes within the thoracic spine.         CONCLUSION:   Post placement of a nasogastric tube with tip and side hole within the stomach and below the diaphragm.  Mild interstitial edema, unchanged.  Small right pleural effusion with right lower lobe atelectasis or pneumonia.   Dictated by (CST): Arnold Mahmood MD on 11/24/2024 at 2:55 PM     Finalized by (CST): Arnold Mahmood MD on 11/24/2024 at 2:56 PM          MRI ABDOMEN AND MRCP W/3D (W+W0)(CPT=74183/74839)    Result Date: 11/22/2024  PROCEDURE: MRI ABDOMEN&MRCP W/3D (W+WO)(CPT=74183/16284)  MRCP   COMPARISON: [place], [place] GB HEPATOBILILARY SCAN (CPT=78226), 11/21/2024, 6:48 AM.  [place], [place] GB HEPATOBILILARY SCAN (CPT=78226), 11/21/2024, 6:48 AM.  [facility],  LIVER LIMITED WITH DOPPLER (CPT=76705/01744), 11/19/2024, 8:13 AM.  [facility],  GALLBLADDER  (CPT=76705), 11/19/2024, 3:47 AM.  [facility], CT ABDOMEN + PELVIS (CONTRAST ONLY) (CPT=74177), 11/19/2024, 2:47 AM.  INDICATIONS: abd pain, elevated LFTs, acute hep A. exclude coexistent biliary e tiology  TECHNIQUE: A comprehensive examination was performed utilizing a variety of imaging planes and imaging parameters to optimize visualization of suspected pathology.  Images were obtained before and after intravenous gadolinium infusion.   Magnetic resonance cholangiopancreatography was also performed. Thin sections and 3-D reconstructions were performed and viewed on an independent workstation.    MRCP FINDINGS:  GALLBLADDER:   There is severe gallbladder wall thickening measuring up to 1.2 cm with extensive pericholecystic edema. BILE DUCTS:   No intra or extrahepatic biliary ductal dilation.  No filling defect.  There is extensive periportal edema with fluid and edema surrounding the common bile duct and intrahepatic ducts. PANCREAS:   There is mild inflammatory fat stranding seen within the pancreaticoduodenal groove with fluid interspersed within the pancreatic parenchyma.  No pancreatic ductal dilation.  Pancreatic parenchyma otherwise enhances symmetrically.  OTHER FINDINGS:  LIVER: There is extensive periportal edema and free fluid.  No mass or loculated collection.  0.5 cm cyst within the right lateral hepatic lobe.  Liver otherwise enhances symmetrically.  No enhancing liver mass.  Punctate cyst within the left hepatic lobe. SPLEEN: Normal.  ADRENALS: Normal.  KIDNEYS:   The kidneys enhance symmetrically. There is no mass or hydronephrosis.  AORTA & MAJOR BRANCHES:  No aneurysm or dissection.  VENOUS SYSTEM: Portal vein, IVC, splenic, hepatic, renal veins and mesenteric veins are patent.  LYMPHADENOPATHY:  None.   ASCITES: None.  BONES: There is degenerative disease of the thoracic and lumbar spine. OTHER: Moderate-sized right pleural effusion.          CONCLUSION:   Severe periportal  inflammation and free fluid suggestive of hepatitis.  Severe gallbladder wall thickening, likely reactive from the presumed hepatitis.  Mild peripancreatic inflammation with fluid within the pancreaticoduodenal groove, likely reactive from the presumed hepatitis.  Groove pancreatitis felt less likely.  No ductal dilation.    Dictated by (CST): Arnold Mahmood MD on 11/22/2024 at 10:18 AM     Finalized by (CST): Arnold Mahmood MD on 11/22/2024 at 10:29 AM          NM GB HEPATOBILIARY SCAN  (CPT=78226)    Result Date: 11/21/2024  PROCEDURE: [place] GB HEPATOBILIARY SCAN (CPT=78226)  COMPARISON: [facility], CT ABDOMEN + PELVIS (CONTRAST ONLY) (CPT=74177), 11/19/2024, 2:47 AM.  INDICATIONS: Evaluate for curtis.  Marked transaminitis.  TECHNIQUE: Hepatobiliary imaging was performed after the injection of 9.0 millicuries of Technetium-99m Choletec into the left antecubital vein.   FINDINGS:   LIVER: There is normal homogeneous uptake of radiotracer by the liver on the immediate and 5 minute images.  BILIARY DUCTS: Not well visualized. GALLBLADDER: Not visualized. INTESTINE: Not visualized.         CONCLUSION:   There is little if any biliary excretion of radiotracer by 2 hours consistent with hepatocellular dysfunction.  This study is thus nondiagnostic for evaluation of acute cholecystitis or biliary obstruction.    Dictated by (CST): Carlo De La Garza MD on 11/21/2024 at 9:54 AM     Finalized by (CST): Carlo De La Garza MD on 11/21/2024 at 10:04 AM          US LIVER LIMITED WITH DOPPLER (CPT=76705/50502)    Result Date: 11/19/2024  PROCEDURE: US LIVER LIMITED WITH DOPPLER (CPT=76705/70941)  COMPARISON: None.  INDICATIONS: Elevated transaminases mild elevated bilirubin and alkaline phosphatase.  Upper abdominal pain.  Generalized weakness history GERD mild diarrhea  TECHNIQUE:   Grayscale ultrasound of the liver and gallbladder with Doppler duplex ultrasound of the liver.  GRAYSCALE FINDINGS:  LIVER:   Coarse increased  hepatic echotexture may be seen with fatty infiltration versus hepatocellular disease.  No focal hepatic lesions.  No significant masses.  BILE DUCTS:   No intra or extrahepatic bile duct dilation.  Common bile duct measures 4.5 mm.  GALLBLADDER :  Mild gallbladder wall thickening measuring up to  3.9 mm without cholelithiasis or pericholecystic fluid.  Negative sonographic Rothman sign  LIVER DOPPLER FINDINGS: HEPATIC ARTERY:  Hepatic arterial waveform is present with hepatopetal flow as expected.  Peak systolic velocity is 140 cm/sec.  Resistive index is 0.62. PORTAL VEIN:   Normal size main portal vein with normal hepatopetal flow.   HEPATIC VEINS:   Normal hepatofugal flow and hepatic venous waveforms/phasicity.  SPLENIC VEIN:   Patency OTHER:   No free fluid hepatorenal fossa. No hydronephrosis right kidney.  Suboptimal visualization pancreas partially obscured by bowel         CONCLUSION:  1. Diffuse hepatic steatosis without focal hepatic lesions. 2. Nonspecific gallbladder wall thickening without cholelithiasis or pericholecystic fluid.  Negative sonographic Rothman sign.  No biliary dilatation.  Normal common duct. 3. No free fluid hepatorenal fossa. No hydronephrosis right kidney.    Dictated by (CST): Aníbal Tubbs MD on 11/19/2024 at 9:31 AM     Finalized by (CST): Aníbal Tubbs MD on 11/19/2024 at 9:38 AM          CT ABDOMEN+PELVIS(CONTRAST ONLY)(CPT=74177)    Result Date: 11/19/2024  PROCEDURE: CT ABDOMEN + PELVIS (CONTRAST ONLY) (CPT=74177)  COMPARISON: [facility], CT ABDOMEN & PELVIS W CON, 10/26/2015, 10:03 PM.  [facility],  PELVIS TRANSABDOMINAL AND TRANSVAGINAL (CPT=76856/98209), 4/22/2024, 5:19 PM.  [facility], US GALLBLADDER (CPT=76705), 11/19/2024, 3:47 AM.  [facility], CT ABDOMEN + PELVIS (CONTRAST ONLY) (CPT=74177), 3/14/2022, 12:02 PM.  [facility],  PELVIS W EV  (HYB=17636/23053), 1/23/2023, 5:57 PM.  [facility],  PELVIS W EV (CPT=76856/66668), 5/23/2022, 12:55 PM.  INDICATIONS: Epigastric pain radiating to the left upper quadrant with vomiting, malaise and dark urine.  TECHNIQUE: Multidetector CT images of the abdomen and pelvis were obtained with non-ionic intravenous contrast material. Automated exposure control for dose reduction was used. Adjustment of the mA and/or kV was done based on the patient's size. Iterative reconstruction technique for dose reduction was employed. Dose information was transmitted to the ACR (American College of Radiology) NRDR (National Radiology Data Registry), which includes the Dose Index Registry.  No oral contrast was ingested.  FINDINGS: LUNG BASES: The heart is normal in size. There is no visible pulmonary or pleural disease. LIVER: There is an ovoid circumscribed subcentimeter hypodense lesion in the lateral subcapsular aspect of the right hepatic lobe that is too small to definitively characterize but statistically most likely represents an incidental benign lesion such as a small cyst. BILIARY: There has been development of mild gallbladder wall thickening.  There is pericholecystic edema and mild fluid in the danyel hepatis that tracks inferiorly toward the pancreaticoduodenal groove.  The gallbladder is nondilated without calcified stones.  There is no biliary ductal dilatation. PANCREAS: No lesion, fluid collection, ductal dilatation, or atrophy.  SPLEEN: No enlargement.  ADRENALS:   No defined mass or abnormal enlargement.  KIDNEYS:   Symmetric enhancement is seen without evidence of hydronephrosis or underlying solid masses. GI/MESENTERY:  Evaluation of the gastrointestinal tract is limited without enteric contrast.  There is no bowel obstruction.  The appendix is normal. URINARY BLADDER: No visible calculus or focal wall thickening.  PELVIC NODES: No lymphadenopathy.   PELVIC ORGANS: The uterus is  present.  There is a lobulated homogeneous enhancing right adnexal mass measuring 3.4 x 2.1 x 2.7 cm that corresponds to the finding noted on previous ultrasound studies.  This lesion is probably unchanged from the 04/22/2024  ultrasound and may be further stable dating back to at least the 03/14/2022 CT. VASCULATURE:   There is a grossly stable 1.0 cm peripherally calcified thrombosed splenic artery aneurysm..  There is mild calcific atherosclerosis. RETROPERITONEUM: No mass or lymphadenopathy is apparent.  BONES:   No significant bony lesion or fracture.  There are postoperative changes from an anterior interbody fusion and unilateral right-sided posterior instrumented fusion at L5-S1, new from 03/14/2022. ABDOMINAL WALL: There is transverse scarring in the infraumbilical abdominal wall. OTHER: No free air or fluid is seen in the abdomen or pelvis.          CONCLUSION:  1. Mild gallbladder wall thickening with pericholecystic edema/fluid extending from the danyel hepatis caudally to the pancreaticoduodenal groove.  Differential considerations include acute cholecystitis, duodenitis, groove pancreatitis or hepatitis. 2. No biliary ductal dilatation. 3. Stable 3.4 cm solid right adnexal mass, relatively similar to prior studies dating back to at least 2022 favoring a benign mass such as a broad ligament fibroid, as well as an ovarian fibroma or thecoma. 4. 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 5. Lesser incidental findings as above.   A preliminary report was issued by the [facility] teleradiology service. There are no major discrepancies.  [facility]  Dictated by (CST): James Encarnacion MD on 11/19/2024 at 8:34 AM     Finalized by (CST): James Encarnacion MD on 11/19/2024 at 8:46 AM          US GALLBLADDER (CPT=76705)    Result Date: 11/19/2024  PROCEDURE: US GALLBLADDER (CPT=76705)  COMPARISON: [facility], CT ABDOMEN + PELVIS (CONTRAST  ONLY) (CPT=74177), 3/14/2022, 12:02 PM.  [facility] [place],  ABDOMEN COMPLETE (CPT=76700), 3/26/2021, 7:32 AM.  INDICATIONS: Upper abdominal pain and nausea.  TECHNIQUE:   The gallbladder was evaluated with grayscale ultrasound.   FINDINGS:  GALLBLADDER:   Normal size without evidence of intraluminal calculi.  The gallbladder wall is mildly thickened measuring up to 3.5 mm, new from prior studies.  Thin hypoechogenicity along the gallbladder wall could relate to intramural edema or trace pericholecystic fluid.  According to the technologist performing the study, the sonographic Rothman's sign was not elicited. BILE DUCTS:   Negative for dilatation. The common bile duct measures 4 mmm.  OTHER:   Visualized portions of the hepatic parenchyma are grossly unremarkable. Sagittal survey images of the right kidney reveal no collecting system dilatation.  The visualized aspects of the pancreas are unremarkable.         CONCLUSION:  Development of mild gallbladder wall thickening with mural edema and/or trace pericholecystic fluid.  However, no evidence of cholelithiasis, choledocholithiasis or tenderness over the gallbladder.  These findings are therefore indeterminate for acalculous cholecystitis.  Additional differential considerations include sequela of CHF, fluid overload or hypoalbuminemia.   A preliminary report was issued by the [facility] teleradiology service. There are no major discrepancies.  [facility]  Dictated by (CST): James Encarnacion MD on 11/19/2024 at 5:12 AM     Finalized by (CST): James Encarnacion MD on 11/19/2024 at 5:16 AM          XR CHEST AP PORTABLE  (CPT=71045)    Result Date: 11/19/2024  PROCEDURE: XR CHEST AP PORTABLE  (CPT=71045) TIME: 1:21.   COMPARISON: [facility], XR CHEST PA + LAT CHEST (CPT=71046), 6/27/2024, 10:24 AM.  INDICATIONS: Cough, nausea and vomiting x 3 weeks.  TECHNIQUE:   Single view.   FINDINGS:   CARDIAC/VASC: Normal.  No cardiac silhouette abnormality or cardiomegaly.  Unremarkable pulmonary vasculature.  MEDIAST/INDIRA:   No visible mass or adenopathy. LUNGS/PLEURA: Normal.  No significant pulmonary parenchymal abnormalities.  No effusion or pleural thickening. BONES: No fracture or visible bony lesion. OTHER: Negative.          CONCLUSION:  No acute cardiopulmonary process.   A preliminary report was issued by the [facility] teleradiology service. There are no major discrepancies.  [facility]  Dictated by (CST): James Encarnacion MD on 11/19/2024 at 4:35 AM     Finalized by (CST): James Encarnacion MD on 11/19/2024 at 4:36 AM                  .  ASSESSMENT/PLAN:   Emmanuel Mendiola is a 59 year old year-old female who presents for hospital follow up. Was admitted for Acute hepatitis A infection with GI symptom manifestation and acute liver injury, synthetic function intact. No known prior liver disease, US with hepatic steatosis. Elevated ferritin is acute phase reactant. Hep B/C negative, ASMA and AMA negative, ceruloplasmin negative. C diff and GI stool PCR negative. LFTs 1 week post discharge noted , , alk phos 268, t bili 5.9. Presents today for follow up.     #acute hepatitis A  -patient overall feeling better  -notes energy has come back in the last 24 hours  -appetite is back  -no diarrhea or abdominal pain  -labs today, , , alk phos 260.  T bili 3.4  -discussed plan to recheck in 1 week then likely once a month   -advised to continue to avoid hepatotoxic agents and plan for follow up with hepatology    Recommendations:  - get repeat lab work in 1 week  - if any return of abdominal pain   - contact hepatology for follow up      Orders This Visit:  Orders Placed This Encounter   Procedures    Hepatic Function Panel (7)       Meds This Visit:  Requested Prescriptions      No prescriptions requested or ordered in this encounter       Imaging & Referrals:  HEPATOLOGY -  EXTERNAL      Lisa Varela PA-C   12/13/2024        This note was partially prepared using Dragon [employer] voice recognition dictation software. As a result, errors may occur. When identified, these errors have been corrected. While every attempt is made to correct errors during dictation, discrepancies may still exist.          [1]   Allergies  Allergen Reactions    Sulfa Antibiotics HIVES    Aminoglycosides UNKNOWN     Possibly hives    Compazine [Prochlorperazine] OTHER (SEE COMMENTS)     Convulsions    Morphine OTHER (SEE COMMENTS)     convulsions    Perfumes OTHER (SEE COMMENTS)     sensitivity    Wheat Gluten OTHER (SEE COMMENTS)     inflammation

## 2024-12-13 ENCOUNTER — PATIENT MESSAGE (OUTPATIENT)
Facility: CLINIC | Age: 59
End: 2024-12-13

## 2024-12-13 ENCOUNTER — OFFICE VISIT (OUTPATIENT)
Facility: CLINIC | Age: 59
End: 2024-12-13

## 2024-12-13 ENCOUNTER — LAB ENCOUNTER (OUTPATIENT)
Dept: LAB | Facility: HOSPITAL | Age: 59
End: 2024-12-13
Attending: REGISTERED NURSE
Payer: COMMERCIAL

## 2024-12-13 VITALS
BODY MASS INDEX: 25.52 KG/M2 | HEART RATE: 90 BPM | SYSTOLIC BLOOD PRESSURE: 126 MMHG | HEIGHT: 63 IN | WEIGHT: 144 LBS | DIASTOLIC BLOOD PRESSURE: 87 MMHG

## 2024-12-13 DIAGNOSIS — B15.9 VIRAL HEPATITIS A WITHOUT HEPATIC COMA: Primary | ICD-10-CM

## 2024-12-13 DIAGNOSIS — B15.9 VIRAL HEPATITIS A WITHOUT HEPATIC COMA: ICD-10-CM

## 2024-12-13 LAB
ALBUMIN SERPL-MCNC: 4.4 G/DL (ref 3.2–4.8)
ALP LIVER SERPL-CCNC: 260 U/L
ALT SERPL-CCNC: 215 U/L
AST SERPL-CCNC: 139 U/L (ref ?–34)
BILIRUB DIRECT SERPL-MCNC: 2.1 MG/DL (ref ?–0.3)
BILIRUB SERPL-MCNC: 3.4 MG/DL (ref 0.3–1.2)
IRON SATN MFR SERPL: 41 %
IRON SERPL-MCNC: 140 UG/DL
PROT SERPL-MCNC: 8.3 G/DL (ref 5.7–8.2)
TIBC SERPL-MCNC: 344 UG/DL (ref 250–425)
TRANSFERRIN SERPL-MCNC: 231 MG/DL (ref 250–380)

## 2024-12-13 PROCEDURE — 36415 COLL VENOUS BLD VENIPUNCTURE: CPT

## 2024-12-13 PROCEDURE — 3074F SYST BP LT 130 MM HG: CPT | Performed by: PHYSICIAN ASSISTANT

## 2024-12-13 PROCEDURE — 99214 OFFICE O/P EST MOD 30 MIN: CPT | Performed by: PHYSICIAN ASSISTANT

## 2024-12-13 PROCEDURE — 84466 ASSAY OF TRANSFERRIN: CPT

## 2024-12-13 PROCEDURE — 3079F DIAST BP 80-89 MM HG: CPT | Performed by: PHYSICIAN ASSISTANT

## 2024-12-13 PROCEDURE — 80076 HEPATIC FUNCTION PANEL: CPT

## 2024-12-13 PROCEDURE — 83540 ASSAY OF IRON: CPT

## 2024-12-13 PROCEDURE — 3008F BODY MASS INDEX DOCD: CPT | Performed by: PHYSICIAN ASSISTANT

## 2024-12-13 NOTE — PATIENT INSTRUCTIONS
Recommendations:  - get repeat lab work in 1 week  - if any return of abdominal pain   - contact hepatology for follow up      Please see a hepatologist - I recommend any of the physicians as follows    Dr. Kiran Menezes from the [facility] who comes to [place] twice a month. You can call [phone] to schedule an appointment with him.    [address]  [place], [place] [id number] [phone]    Dr. Nakul Ventura and Dr. Otis Weiner from the [facility] who comes to [place].     Call [phone]    Dr. Fernanda Galloway or Dr. Maritza Quinonez  at [facility] who see patients at the [facility] office as below    [facility]  [address] [place]  [address]  Phone: [phone]  Fax: [phone]

## 2024-12-20 ENCOUNTER — LAB ENCOUNTER (OUTPATIENT)
Dept: LAB | Facility: HOSPITAL | Age: 59
End: 2024-12-20
Attending: PHYSICIAN ASSISTANT
Payer: COMMERCIAL

## 2024-12-20 ENCOUNTER — TELEPHONE (OUTPATIENT)
Facility: CLINIC | Age: 59
End: 2024-12-20

## 2024-12-20 DIAGNOSIS — B15.9 VIRAL HEPATITIS A WITHOUT HEPATIC COMA: ICD-10-CM

## 2024-12-20 LAB
ALBUMIN SERPL-MCNC: 4.5 G/DL (ref 3.2–4.8)
ALP LIVER SERPL-CCNC: 220 U/L
ALT SERPL-CCNC: 166 U/L
AST SERPL-CCNC: 97 U/L (ref ?–34)
BILIRUB DIRECT SERPL-MCNC: 1.6 MG/DL (ref ?–0.3)
BILIRUB SERPL-MCNC: 2.6 MG/DL (ref 0.3–1.2)
PROT SERPL-MCNC: 8.7 G/DL (ref 5.7–8.2)

## 2024-12-20 PROCEDURE — 80076 HEPATIC FUNCTION PANEL: CPT

## 2024-12-20 PROCEDURE — 36415 COLL VENOUS BLD VENIPUNCTURE: CPT

## 2024-12-20 NOTE — TELEPHONE ENCOUNTER
1 month lab recall entered into patient outreach in [facility].  Next due around 1/20/25.

## 2024-12-20 NOTE — TELEPHONE ENCOUNTER
----- Message from Lisa Varela sent at 12/20/2024 10:36 AM CST -----  Please place recall for repeat hepatic panel in 1 month.     Lisa Varela PA-C

## 2025-01-08 ENCOUNTER — NURSE TRIAGE (OUTPATIENT)
Dept: INTERNAL MEDICINE CLINIC | Facility: CLINIC | Age: 60
End: 2025-01-08

## 2025-01-08 ENCOUNTER — TELEPHONE (OUTPATIENT)
Facility: CLINIC | Age: 60
End: 2025-01-08

## 2025-01-08 ENCOUNTER — OFFICE VISIT (OUTPATIENT)
Dept: INTERNAL MEDICINE CLINIC | Facility: CLINIC | Age: 60
End: 2025-01-08

## 2025-01-08 ENCOUNTER — HOSPITAL ENCOUNTER (OUTPATIENT)
Dept: GENERAL RADIOLOGY | Facility: HOSPITAL | Age: 60
Discharge: HOME | End: 2025-01-08
Attending: INTERNAL MEDICINE
Payer: COMMERCIAL

## 2025-01-08 ENCOUNTER — SPINE CENTER NAVIGATION (OUTPATIENT)
Age: 60
End: 2025-01-08

## 2025-01-08 ENCOUNTER — LAB ENCOUNTER (OUTPATIENT)
Dept: LAB | Facility: HOSPITAL | Age: 60
End: 2025-01-08
Attending: INTERNAL MEDICINE
Payer: COMMERCIAL

## 2025-01-08 VITALS
DIASTOLIC BLOOD PRESSURE: 84 MMHG | HEART RATE: 88 BPM | SYSTOLIC BLOOD PRESSURE: 131 MMHG | HEIGHT: 63 IN | BODY MASS INDEX: 26 KG/M2

## 2025-01-08 DIAGNOSIS — M54.50 ACUTE BILATERAL LOW BACK PAIN WITHOUT SCIATICA: ICD-10-CM

## 2025-01-08 DIAGNOSIS — M54.50 ACUTE BILATERAL LOW BACK PAIN WITHOUT SCIATICA: Primary | ICD-10-CM

## 2025-01-08 DIAGNOSIS — E11.9 TYPE 2 DIABETES MELLITUS WITHOUT COMPLICATION, WITHOUT LONG-TERM CURRENT USE OF INSULIN (HCC): ICD-10-CM

## 2025-01-08 DIAGNOSIS — B15.9 VIRAL HEPATITIS A WITHOUT HEPATIC COMA: Primary | ICD-10-CM

## 2025-01-08 LAB
ALBUMIN SERPL-MCNC: 4.8 G/DL (ref 3.2–4.8)
ALBUMIN/GLOB SERPL: 1.2 {RATIO} (ref 1–2)
ALP LIVER SERPL-CCNC: 163 U/L
ALT SERPL-CCNC: 124 U/L
ANION GAP SERPL CALC-SCNC: 7 MMOL/L (ref 0–18)
AST SERPL-CCNC: 72 U/L (ref ?–34)
BILIRUB SERPL-MCNC: 1.2 MG/DL (ref 0.3–1.2)
BUN BLD-MCNC: 11 MG/DL (ref 9–23)
BUN/CREAT SERPL: 12.1 (ref 10–20)
CALCIUM BLD-MCNC: 10.4 MG/DL (ref 8.7–10.4)
CHLORIDE SERPL-SCNC: 106 MMOL/L (ref 98–112)
CO2 SERPL-SCNC: 28 MMOL/L (ref 21–32)
CREAT BLD-MCNC: 0.91 MG/DL
CREAT UR-SCNC: 59.8 MG/DL
EGFRCR SERPLBLD CKD-EPI 2021: 74 ML/MIN/1.73M2 (ref 60–?)
EST. AVERAGE GLUCOSE BLD GHB EST-MCNC: 94 MG/DL (ref 68–126)
FASTING STATUS PATIENT QL REPORTED: NO
GLOBULIN PLAS-MCNC: 3.9 G/DL (ref 2–3.5)
GLUCOSE BLD-MCNC: 91 MG/DL (ref 70–99)
HBA1C MFR BLD: 4.9 % (ref ?–5.7)
MICROALBUMIN UR-MCNC: 0.3 MG/DL
MICROALBUMIN/CREAT 24H UR-RTO: 5 UG/MG (ref ?–30)
OSMOLALITY SERPL CALC.SUM OF ELEC: 291 MOSM/KG (ref 275–295)
POTASSIUM SERPL-SCNC: 4.5 MMOL/L (ref 3.5–5.1)
PROT SERPL-MCNC: 8.7 G/DL (ref 5.7–8.2)
SODIUM SERPL-SCNC: 141 MMOL/L (ref 136–145)
TSI SER-ACNC: 1.79 UIU/ML (ref 0.55–4.78)

## 2025-01-08 PROCEDURE — 3075F SYST BP GE 130 - 139MM HG: CPT | Performed by: INTERNAL MEDICINE

## 2025-01-08 PROCEDURE — 99214 OFFICE O/P EST MOD 30 MIN: CPT | Performed by: INTERNAL MEDICINE

## 2025-01-08 PROCEDURE — 84443 ASSAY THYROID STIM HORMONE: CPT

## 2025-01-08 PROCEDURE — 3079F DIAST BP 80-89 MM HG: CPT | Performed by: INTERNAL MEDICINE

## 2025-01-08 PROCEDURE — 82570 ASSAY OF URINE CREATININE: CPT

## 2025-01-08 PROCEDURE — 80053 COMPREHEN METABOLIC PANEL: CPT

## 2025-01-08 PROCEDURE — 3008F BODY MASS INDEX DOCD: CPT | Performed by: INTERNAL MEDICINE

## 2025-01-08 PROCEDURE — 83036 HEMOGLOBIN GLYCOSYLATED A1C: CPT

## 2025-01-08 PROCEDURE — 82043 UR ALBUMIN QUANTITATIVE: CPT

## 2025-01-08 PROCEDURE — 36415 COLL VENOUS BLD VENIPUNCTURE: CPT

## 2025-01-08 PROCEDURE — 72110 X-RAY EXAM L-2 SPINE 4/>VWS: CPT | Performed by: INTERNAL MEDICINE

## 2025-01-08 RX ORDER — PREDNISONE 10 MG/1
TABLET ORAL
Qty: 20 TABLET | Refills: 0 | Status: SHIPPED | OUTPATIENT
Start: 2025-01-08

## 2025-01-08 NOTE — PROGRESS NOTES
Spine Center Referral Navigation Encounter Note    Referred by: Sallie Jordan MD    Imaging: XR Lumbar Spine   If imaging done at an external facility, instructed patient to bring disc of MRI to appointment.     Previously Seen Spine Care Providers: Kari Dudley DO    Referred to: Kari Dudley DO in Physiatry     Information below is patient reported.     [place] [place]  Are you currently experiencing any of the following symptoms?      No    Is your condition due to an injury that occurred at work or is your care for this condition being coordinated by Worker's Compensation?      No    Are you looking for a second surgical opinion?      No    Have you had surgery on your spine (neck or back) in the last 12 months?      No    In the last several weeks, have you experienced weakness or balance issues that have caused falls or difficulty lifting your legs or feet (lower body) and/or weakness that has caused you to drop items or caused changes in handwriting (upper body)?      No    In the last 3 months, have you had spine injections AND physical therapy for your back or neck that did not improve your symptoms?      No    : Patient needs to be seen by non-surgical team. Does patient require a new visit or established visit?      Established patient visit       No availability for Dr. Kari Dudley until 1/22. Patient was also put on wait list in case a sooner appointment opens up.

## 2025-01-08 NOTE — TELEPHONE ENCOUNTER
Sherice (Lisa out of office)    Patient was advised to avoid hepatotoxic agents at last office visit with Lisa  She is asking what she can take for her back pain.  Can she take tylenol or nsaid in small amounts?    Thank you

## 2025-01-08 NOTE — PROGRESS NOTES
Pelon Mendiola is a 59 year old female.  Chief Complaint   Patient presents with    Back Pain     Denies falls - pain began this morning after carrying a box        HPI:   Urgent visit --here rekha   C/c back pain   C/o was carrying a big box when she felt a shooting pain down her lower back, midline and more to the left   Constant and sharp   Dropped the box , Put ice  Right now pain is 12 out of 10 and she cannot take anything else due to hep A   Goes down left glut but not the leg   Never had this before   Has h/o back surg - saw dr reza coy --she did call them   Not taking anything , all meds on hold     Current Outpatient Medications   Medication Sig Dispense Refill    hydrOXYzine 25 MG Oral Tab Take 1 tablet (25 mg total) by mouth 3 (three) times daily as needed for Itching. (Patient not taking: Reported on 1/8/2025) 30 tablet 0    pantoprazole 40 MG Oral Tab EC Take 1 tablet (40 mg total) by mouth daily. (Patient not taking: Reported on 1/8/2025) 30 tablet 0    ondansetron (ZOFRAN) 4 mg tablet Take 1 tablet (4 mg total) by mouth every 8 (eight) hours as needed for Nausea. (Patient not taking: Reported on 1/8/2025) 30 tablet 0    ALPRAZolam 0.25 MG Oral Tab Take 1 tablet (0.25 mg total) by mouth daily as needed for Anxiety. (Patient not taking: Reported on 1/8/2025) 15 tablet 0    Blood Glucose Monitoring Suppl (ONETOUCH ULTRA MINI) w/Device Does not apply Kit Check blood sugars two times daily (Patient not taking: Reported on 1/8/2025) 1 kit 0      Past Medical History:    Anemia    recently dx-taking FeSO4    Back problem    Diabetes (HCC)    Disorder of liver    Divorce    Finalized 2010    DM2 (diabetes mellitus, type 2) (HCC)    controlled with diet    Endometriosis    Laparoscopy-diagnostic    Esophageal reflux    History of blood transfusion    No reaction    History of pregnancy    EAB    Hx of motion sickness    Lipid screening    per NG    Migraines    Nausea and vomiting in adult    Visual  impairment    wears glasses      Past Surgical History:   Procedure Laterality Date    Biopsy of uterus lining  2011    neg embx    Colonoscopy N/A 1/10/2018    Procedure: COLONOSCOPY;  Surgeon: Humble Hodges MD;  Location: [facility] ENDO    Colonoscopy N/A 2023    Procedure: COLONOSCOPY;  Surgeon: Humble Hodges MD;  Location: [facility] MAIN OR    Laparoscopy,diagnostic      Diagnostic for endometriosis      Social History:  Social History     Socioeconomic History    Marital status:    Tobacco Use    Smoking status: Former     Current packs/day: 0.00     Average packs/day: 1 pack/day for 5.0 years (5.0 ttl pk-yrs)     Types: Cigarettes     Start date:      Quit date:      Years since quittin.0     Passive exposure: Past    Smokeless tobacco: Never   Vaping Use    Vaping status: Never Used   Substance and Sexual Activity    Alcohol use: Not Currently     Alcohol/week: 1.0 standard drink of alcohol     Types: 1 Glasses of wine per week     Comment: 3 glasses per month     Drug use: No    Sexual activity: Yes     Partners: Male     Birth control/protection: Condom   Other Topics Concern    Caffeine Concern No    History of tanning Yes    Breast feeding No    Reaction to local anesthetic No    Pt has a pacemaker No    Pt has a defibrillator No     Social Drivers of Health     Financial Resource Strain: Low Risk  (2023)    Financial Resource Strain     Difficulty of Paying Living Expenses: Not very hard     Med Affordability: No   Food Insecurity: No Food Insecurity (2024)    Food Insecurity     Food Insecurity: Never true   Transportation Needs: No Transportation Needs (2024)    Transportation Needs     Lack of Transportation: No   Housing Stability: Low Risk  (2024)    Housing Stability     Housing Instability: No        REVIEW OF SYSTEMS:   GENERAL HEALTH: No fevers, chills, sweats, fatigue  RESPIRATORY: denies shortness of breath, cough,  wheezing  CARDIOVASCULAR: denies chest pain on exertion, palpitations, swelling in feet  MUS: + back pain, no joint pain or muscle pain  NEURO: denies headaches , anxiety, depression    EXAM:   /84   Pulse 88   Ht 5' 3\" (1.6 m)   LMP 12/26/2017   BMI 25.51 kg/m²   GENERAL: well developed, well nourished, ++ in apparent distress, unable to sit , standing and uncomfortable , slowly pacing   SKIN: no rashes,no suspicious lesions  HEENT: atraumatic, normocephalic  EXTREMITIES: no cyanosis, or edema    ASSESSMENT AND PLAN:   Diagnoses and all orders for this visit:    Acute bilateral low back pain without sciatica  -     XR LUMBAR SPINE (MIN 4 VIEWS) (CPT=72110); Future  -     SPINE CENTER CENTRAL REFERRAL FOR NAVIGATION      Advised to alternate heat and ice and see which works better for her she has been icing it but she can also try the heat  Topical agent such as Bengay Aspercreme IcyHot.  Romel  Pt is hesitant to start meds due to her elevated liver enzymes   Will give steroid taper           The patient indicates understanding of these issues and agrees to the plan.  No follow-ups on file.

## 2025-01-08 NOTE — TELEPHONE ENCOUNTER
Acute hep A.  Recommend:  Avoidance of hepatoxic agents  Rest,  Superficial heat  Contact pcp if condition decline to consider if imaging and/or additional intervention if needed  Needs f/U with hepatology as recommended by Lisa    Please let her know.   Thanks,  Sherice

## 2025-01-08 NOTE — TELEPHONE ENCOUNTER
Patient calling states injured her back, asking what pain medication to take at this time. Please call.

## 2025-01-08 NOTE — TELEPHONE ENCOUNTER
I contacted the pt  She has appt to see hep in February  She says that she spoke with her pcp and was  prescribed prednisone    She says pain is severe.  She will start pred and monitor symptoms.  If upper abd pain, jaundice, confusion, contact office and/or consider er. Plan to repeat lfs in approx 1 week.    She verbalizes understanding and is in agreement with the plan.

## 2025-01-08 NOTE — TELEPHONE ENCOUNTER
Action Requested: Summary for Provider     []  Critical Lab, Recommendations Needed  [] Need Additional Advice  []   FYI    []   Need Orders  [] Need Medications Sent to Pharmacy  []  Other     SUMMARY: pt picked up a box , injured back, concerned about what med can she take since her Liver enzymes are elevated, appt made to be evaluated , used cold therapy , pain was 10, now 8, able to walk, urinated x 1 , no extra pain    Reason for call: Back Pain  Onset: today                    Reason for Disposition   SEVERE back pain (e.g., excruciating, unable to do any normal activities) and not improved after pain medicine and CARE ADVICE    Protocols used: Back Injury-A-OH

## 2025-01-08 NOTE — TELEPHONE ENCOUNTER
Sherice (Lisa out of office)    Asking if ok to take steroids (she is having back pain and can't take Tylenol or NSAIDS per prior message)    Thank you

## 2025-01-08 NOTE — TELEPHONE ENCOUNTER
Patient has additional questions - wanted to know if she can take oral steroids.  Please call.  Thank you.

## 2025-01-09 ENCOUNTER — TELEPHONE (OUTPATIENT)
Dept: INTERNAL MEDICINE CLINIC | Facility: CLINIC | Age: 60
End: 2025-01-09

## 2025-01-09 NOTE — TELEPHONE ENCOUNTER
Patient had xray on 1/8/25 and would like the results. Patient said she is waiting and would like a call back. Please advise

## 2025-01-09 NOTE — TELEPHONE ENCOUNTER
Patient contacted.  REsults are in process.  Delays in radiology.  Should have result in the next 24 hours.  Patient was thankful for the update.

## 2025-01-15 ENCOUNTER — OFFICE VISIT (OUTPATIENT)
Dept: PHYSICAL MEDICINE AND REHAB | Facility: CLINIC | Age: 60
End: 2025-01-15
Payer: COMMERCIAL

## 2025-01-15 VITALS — WEIGHT: 144 LBS | BODY MASS INDEX: 25.52 KG/M2 | HEIGHT: 63 IN

## 2025-01-15 DIAGNOSIS — M51.26 HNP (HERNIATED NUCLEUS PULPOSUS), LUMBAR: Primary | ICD-10-CM

## 2025-01-15 DIAGNOSIS — Z98.1 S/P LAMINECTOMY WITH SPINAL FUSION: ICD-10-CM

## 2025-01-15 PROCEDURE — 3008F BODY MASS INDEX DOCD: CPT | Performed by: PHYSICAL MEDICINE & REHABILITATION

## 2025-01-15 PROCEDURE — 99214 OFFICE O/P EST MOD 30 MIN: CPT | Performed by: PHYSICAL MEDICINE & REHABILITATION

## 2025-01-15 NOTE — PROGRESS NOTES
[facility]  OFFICE FOLLOW UP EVALUATION      HISTORY OF PRESENT ILLNESS:     Chief Complaint   Patient presents with    Follow - Up     LOV 11/15/23. F/U for bilateral low back pain following sharp pain while carrying box on 1/8/25. Pain 4/10, radiating into upper glutes. Denies N/T, weakness but pt walks quite slowly. No pain meds. No recent PT. XR Lumbar spine 1/8/25. Pt had back surgery 12/2023. Pt lost about 20 pounds while in hospital from Hep A in 11/2024       History of Present Illness  A 59-year-old patient with a history of chronic low back pain and a posterior fusion at the L5-S1 level presents for a follow-up evaluation. The patient reports feeling \"amazing\" after the surgery and completed a year's worth of physical therapy. However, a recent incident involving lifting a heavy box resulted in sharp pain in the lower back. The pain was initially severe, rated as a \"twenty\" on a scale of ten, but has since reduced to a \"four\". The patient describes the pain as starting on the left side and radiating across the lower back into the hips. There is no reported numbness, tingling, weakness, or changes in bowel or bladder function. The patient also mentions a recent hospitalization due to Hepatitis A, which resulted in significant weight loss and muscle tone loss. This has limited the patient's ability to take certain medications due to liver enzyme elevation.      PHYSICAL EXAM:   Ht 63\"   Wt 144 lb (65.3 kg)   LMP 12/26/2017   BMI 25.51 kg/m²     LUMBAR SPINE:  Inspection: no erythema, swelling, or obvious deformity.  Their iliac crest and shoulder heights are symmetrical.     Palpation: Non tender to palpation of the spinous process.  Tenderness to palpation of the bilateral lower lumbar paraspinals, bilateral SI joints  ROM: Severely restricted in all planes especially with more flexion with reproduction of pain as well as extension  Strength: 5/5 in  bilateral lower extremities  Sensation: Intact to light touch in all dermatomes of the lower extremities  Reflexes: 2/4 at L4 and S1  Facet Loading: Negative right lower lumbar facet joints  Straight leg raise: negative for radicular pain symptoms  Slump test: positive for pain symptoms for radicular pain symptoms    IMAGING:     X-ray lumbar spine completed recently was reviewed which is notable for previous fusion with no acute osseous abnormality    All imaging results were reviewed and discussed with patient.      ASSESSMENT/PLAN:     1. HNP (herniated nucleus pulposus), lumbar    2. S/P laminectomy with spinal fusion        Assessment & Plan  Posterior Lumbar Fusion  Acute exacerbation of low back pain after lifting a heavy box. No radicular symptoms, no weakness, no changes in bowel or bladder function. X-ray showed intact hardware. Possible muscle strain or disc reherniation.  -Start physical therapy.  -Use over-the-counter lidocaine patches, ice, and heat for pain management.  -Follow up in 4 weeks. If pain persists, consider MRI and possible repeat injection.      The patient verbalized understanding with the plan and was in agreement. All questions/concerns were addressed and there were no barriers to learning.  Please note Dragon dictation software was used to dictate this note and may result in inadvertent typos.    Kari Dudley DO, FAAPMR & CAQSM  Physical Medicine and Rehabilitation  Sports and Spine Medicine    PAST MEDICAL HISTORY:     Past Medical History:    Anemia    recently dx-taking FeSO4    Back problem    Diabetes (HCC)    Disorder of liver    Divorce    Finalized 2010    DM2 (diabetes mellitus, type 2) (HCC)    controlled with diet    Endometriosis    Laparoscopy-diagnostic    Esophageal reflux    History of blood transfusion    No reaction    History of pregnancy    EAB    Hx of motion sickness    Lipid screening    per NG    Migraines    Nausea and vomiting in adult    Visual impairment     wears glasses         PAST SURGICAL HISTORY:     Past Surgical History:   Procedure Laterality Date    Biopsy of uterus lining  6/2011    neg embx    Colonoscopy N/A 1/10/2018    Procedure: COLONOSCOPY;  Surgeon: Humble oHdges MD;  Location: [facility] ENDO    Colonoscopy N/A 8/24/2023    Procedure: COLONOSCOPY;  Surgeon: Humble Hodges MD;  Location: [facility] MAIN OR    Laparoscopy,diagnostic  1996    Diagnostic for endometriosis         CURRENT MEDICATIONS:     Current Outpatient Medications   Medication Sig Dispense Refill    hydrOXYzine 25 MG Oral Tab Take 1 tablet (25 mg total) by mouth 3 (three) times daily as needed for Itching. (Patient not taking: Reported on 1/8/2025) 30 tablet 0    pantoprazole 40 MG Oral Tab EC Take 1 tablet (40 mg total) by mouth daily. (Patient not taking: Reported on 1/8/2025) 30 tablet 0    ondansetron (ZOFRAN) 4 mg tablet Take 1 tablet (4 mg total) by mouth every 8 (eight) hours as needed for Nausea. (Patient not taking: Reported on 1/8/2025) 30 tablet 0    ALPRAZolam 0.25 MG Oral Tab Take 1 tablet (0.25 mg total) by mouth daily as needed for Anxiety. (Patient not taking: Reported on 1/8/2025) 15 tablet 0    Blood Glucose Monitoring Suppl (ONETOUCH ULTRA MINI) w/Device Does not apply Kit Check blood sugars two times daily (Patient not taking: Reported on 1/8/2025) 1 kit 0         ALLERGIES:   Allergies[1]      FAMILY HISTORY:     Family History   Problem Relation Age of Onset    Heart Attack Brother         MI-Cause of death    Colon Cancer Paternal Grandmother         Cause of death    Heart Disease Father         CAD    Diabetes Father         Diabetes/CRF/CAD cause of death    Other (Other) Father         CRF    Heart Disease Mother         CAD    Cancer Mother         Lung cancer and CAD cause of death    Heart Disease Brother 41        Premature CAD    Breast Cancer Maternal Aunt 82          SOCIAL HISTORY:     Social History     Socioeconomic History    Marital  status:    Tobacco Use    Smoking status: Former     Current packs/day: 0.00     Average packs/day: 1 pack/day for 5.0 years (5.0 ttl pk-yrs)     Types: Cigarettes     Start date:      Quit date:      Years since quittin.0     Passive exposure: Past    Smokeless tobacco: Never   Vaping Use    Vaping status: Never Used   Substance and Sexual Activity    Alcohol use: Not Currently     Alcohol/week: 1.0 standard drink of alcohol     Types: 1 Glasses of wine per week     Comment: 3 glasses per month     Drug use: No    Sexual activity: Yes     Partners: Male     Birth control/protection: Condom   Other Topics Concern    Caffeine Concern No    History of tanning Yes    Breast feeding No    Reaction to local anesthetic No    Pt has a pacemaker No    Pt has a defibrillator No     Social Drivers of Health     Financial Resource Strain: Low Risk  (2023)    Financial Resource Strain     Difficulty of Paying Living Expenses: Not very hard     Med Affordability: No   Food Insecurity: No Food Insecurity (2024)    Food Insecurity     Food Insecurity: Never true   Transportation Needs: No Transportation Needs (2024)    Transportation Needs     Lack of Transportation: No   Housing Stability: Low Risk  (2024)    Housing Stability     Housing Instability: No          REVIEW OF SYSTEMS:   A comprehensive 10 point review of systems was completed.  Pertinent positives and negatives noted in the the HPI.      LABS:     Lab Results   Component Value Date    EAG 94 2025    A1C 4.9 2025     Lab Results   Component Value Date    WBC 8.6 2024    RBC 3.74 (L) 2024    HGB 11.6 (L) 2024    HCT 32.9 (L) 2024    MCV 88.0 2024    MCH 31.0 2024    MCHC 35.3 2024    RDW 14.9 2024    .0 2024    MPV 8.3 2018     Lab Results   Component Value Date    GLU 91 2025    BUN 11 2025    BUNCREA 12.1 2025    CREATSERUM  0.91 01/08/2025    ANIONGAP 7 01/08/2025    GFRNAA 64 03/14/2022    GFRAA 74 03/14/2022    CA 10.4 01/08/2025    OSMOCALC 291 01/08/2025    ALKPHO 144 (H) 01/23/2025    AST 83 (H) 01/23/2025     (H) 01/23/2025    ALKPHOS 68 05/29/2015    BILT 0.8 01/23/2025    TP 8.2 01/23/2025    ALB 4.4 01/23/2025    GLOBULIN 3.9 (H) 01/08/2025    AGRATIO 1.3 05/29/2015     01/08/2025    K 4.5 01/08/2025     01/08/2025    CO2 28.0 01/08/2025     Lab Results   Component Value Date    PTP 13.3 12/04/2024    INR 0.95 12/04/2024     Lab Results   Component Value Date    VITD 28.3 (L) 01/21/2020              [1]   Allergies  Allergen Reactions    Sulfa Antibiotics HIVES    Aminoglycosides UNKNOWN     Possibly hives    Compazine [Prochlorperazine] OTHER (SEE COMMENTS)     Convulsions    Morphine OTHER (SEE COMMENTS)     convulsions    Perfumes OTHER (SEE COMMENTS)     sensitivity    Wheat Gluten OTHER (SEE COMMENTS)     inflammation

## 2025-01-15 NOTE — PATIENT INSTRUCTIONS
-Lidocaine patch as needed  Ice and heat as tolerated  Start physical therapy and home exercises  Follow-up in 4 weeks

## 2025-01-22 ENCOUNTER — MED REC SCAN ONLY (OUTPATIENT)
Dept: PHYSICAL MEDICINE AND REHAB | Facility: CLINIC | Age: 60
End: 2025-01-22

## 2025-01-23 ENCOUNTER — LAB ENCOUNTER (OUTPATIENT)
Dept: LAB | Facility: HOSPITAL | Age: 60
End: 2025-01-23
Attending: NURSE PRACTITIONER
Payer: COMMERCIAL

## 2025-01-23 DIAGNOSIS — B15.9 VIRAL HEPATITIS A WITHOUT HEPATIC COMA: ICD-10-CM

## 2025-01-23 LAB
ALBUMIN SERPL-MCNC: 4.4 G/DL (ref 3.2–4.8)
ALP LIVER SERPL-CCNC: 144 U/L
ALT SERPL-CCNC: 157 U/L
AST SERPL-CCNC: 83 U/L (ref ?–34)
BILIRUB DIRECT SERPL-MCNC: 0.4 MG/DL (ref ?–0.3)
BILIRUB SERPL-MCNC: 0.8 MG/DL (ref 0.3–1.2)
PROT SERPL-MCNC: 8.2 G/DL (ref 5.7–8.2)

## 2025-01-23 PROCEDURE — 36415 COLL VENOUS BLD VENIPUNCTURE: CPT

## 2025-01-23 PROCEDURE — 80076 HEPATIC FUNCTION PANEL: CPT

## 2025-02-24 ENCOUNTER — PATIENT MESSAGE (OUTPATIENT)
Dept: PHYSICAL MEDICINE AND REHAB | Facility: CLINIC | Age: 60
End: 2025-02-24

## 2025-02-25 ENCOUNTER — LAB ENCOUNTER (OUTPATIENT)
Dept: LAB | Facility: HOSPITAL | Age: 60
End: 2025-02-25
Attending: INTERNAL MEDICINE
Payer: COMMERCIAL

## 2025-02-25 DIAGNOSIS — R74.8 ELEVATED LIVER ENZYMES: Primary | ICD-10-CM

## 2025-02-25 LAB
ALBUMIN SERPL-MCNC: 4.5 G/DL (ref 3.2–4.8)
ALP LIVER SERPL-CCNC: 120 U/L
ALT SERPL-CCNC: 88 U/L
AST SERPL-CCNC: 51 U/L (ref ?–34)
BILIRUB DIRECT SERPL-MCNC: 0.2 MG/DL (ref ?–0.3)
BILIRUB SERPL-MCNC: 0.5 MG/DL (ref 0.3–1.2)
PROT SERPL-MCNC: 8.4 G/DL (ref 5.7–8.2)

## 2025-02-25 PROCEDURE — 80076 HEPATIC FUNCTION PANEL: CPT

## 2025-02-25 PROCEDURE — 36415 COLL VENOUS BLD VENIPUNCTURE: CPT

## 2025-03-05 ENCOUNTER — MED REC SCAN ONLY (OUTPATIENT)
Dept: PHYSICAL MEDICINE AND REHAB | Facility: CLINIC | Age: 60
End: 2025-03-05

## 2025-03-06 ENCOUNTER — HOSPITAL ENCOUNTER (OUTPATIENT)
Age: 60
Discharge: HOME | End: 2025-03-06
Payer: COMMERCIAL

## 2025-03-06 VITALS
OXYGEN SATURATION: 98 % | RESPIRATION RATE: 18 BRPM | HEART RATE: 80 BPM | SYSTOLIC BLOOD PRESSURE: 192 MMHG | DIASTOLIC BLOOD PRESSURE: 89 MMHG | TEMPERATURE: 98 F

## 2025-03-06 DIAGNOSIS — S01.531A PUNCTURE WOUND OF LIP, INITIAL ENCOUNTER: Primary | ICD-10-CM

## 2025-03-06 DIAGNOSIS — R03.0 ELEVATED BLOOD PRESSURE READING: ICD-10-CM

## 2025-03-06 PROCEDURE — 99213 OFFICE O/P EST LOW 20 MIN: CPT

## 2025-03-06 PROCEDURE — 90471 IMMUNIZATION ADMIN: CPT

## 2025-03-06 RX ORDER — CEPHALEXIN 500 MG/1
500 CAPSULE ORAL 3 TIMES DAILY
Qty: 21 CAPSULE | Refills: 0 | Status: SHIPPED | OUTPATIENT
Start: 2025-03-06 | End: 2025-03-13

## 2025-03-06 NOTE — ED INITIAL ASSESSMENT (HPI)
Patient was using her metal dental pick when it slipped and poked lower lip. States she has been bleeding since 6am. Bleeding controlled with pressure.

## 2025-03-06 NOTE — ED PROVIDER NOTES
Patient Seen in: [facility] [place]    History     Chief Complaint   Patient presents with    Mouth Injury     Stated Complaint: Bleeding    HPI    HPI:     59 year old female who presents with chief complaint of puncture wound to right lower lip.  Onset 0600 today.  Patient states she accidentally punctured her right lower lip with a metal dental pick while cleaning her teeth.  Patient denies other injury, neck injury or pain, decreased range of motion, swelling, ecchymosis, erythema, weakness, paraesthesias, purulent drainage, fever, chills, trismus, drooling.      Past Medical History:    Anemia    recently dx-taking FeSO4    Back problem    Diabetes (HCC)    Disorder of liver    Divorce    Finalized 2010    DM2 (diabetes mellitus, type 2) (HCC)    controlled with diet    Endometriosis    Laparoscopy-diagnostic    Esophageal reflux    History of blood transfusion    No reaction    History of pregnancy    EAB    Hx of motion sickness    Lipid screening    per NG    Migraines    Nausea and vomiting in adult    Visual impairment    wears glasses           Past Surgical History:   Procedure Laterality Date    Biopsy of uterus lining  6/2011    neg embx    Colonoscopy N/A 1/10/2018    Procedure: COLONOSCOPY;  Surgeon: Humble Hodges MD;  Location: [facility] ENDO    Colonoscopy N/A 8/24/2023    Procedure: COLONOSCOPY;  Surgeon: Humble Hodges MD;  Location: [facility] MAIN OR    Laparoscopy,diagnostic  1996    Diagnostic for endometriosis            Family History   Problem Relation Age of Onset    Heart Attack Brother         MI-Cause of death    Colon Cancer Paternal Grandmother         Cause of death    Heart Disease Father         CAD    Diabetes Father         Diabetes/CRF/CAD cause of death    Other (Other) Father         CRF    Heart Disease Mother         CAD    Cancer Mother         Lung cancer and CAD cause of death    Heart Disease Brother 41        Premature CAD    Breast Cancer Maternal  Aunt 82       Social History     Socioeconomic History    Marital status:    Tobacco Use    Smoking status: Former     Current packs/day: 0.00     Average packs/day: 1 pack/day for 5.0 years (5.0 ttl pk-yrs)     Types: Cigarettes     Start date:      Quit date:      Years since quittin.2     Passive exposure: Past    Smokeless tobacco: Never   Vaping Use    Vaping status: Never Used   Substance and Sexual Activity    Alcohol use: Not Currently     Alcohol/week: 1.0 standard drink of alcohol     Types: 1 Glasses of wine per week     Comment: 3 glasses per month     Drug use: No    Sexual activity: Yes     Partners: Male     Birth control/protection: Condom   Other Topics Concern    Caffeine Concern No    History of tanning Yes    Breast feeding No    Reaction to local anesthetic No    Pt has a pacemaker No    Pt has a defibrillator No     Social Drivers of Health     Food Insecurity: No Food Insecurity (2024)    Food Insecurity     Food Insecurity: Never true   Transportation Needs: No Transportation Needs (2024)    Transportation Needs     Lack of Transportation: No   Housing Stability: Low Risk  (2024)    Housing Stability     Housing Instability: No       Review of Systems    Positive for stated complaint: Bleeding  Other systems are as noted in HPI.  Constitutional and vital signs reviewed.      All other systems reviewed and negative except as noted above.    PSFH elements reviewed from today and agreed except as otherwise stated in HPI.    Physical Exam     ED Triage Vitals [25 0810]   BP (!) 192/89   Pulse 80   Resp 18   Temp 98 °F (36.7 °C)   Temp src Oral   SpO2 98 %   O2 Device None (Room air)       Current:BP (!) 192/89   Pulse 80   Temp 98 °F (36.7 °C) (Oral)   Resp 18   LMP 2017   SpO2 98%     PULSE OX within normal limits on room air as interpreted by this provider.    Physical Exam      Constitutional: The patient is cooperative. Appears  well-developed and well-nourished.  No acute distress.  Head: A pinpoint puncture wound is present at the right lower lip.  Bleeding controlled.  No erythema, edema, purulent drainage or warmth.  Head is otherwise normocephalic/atraumatic.  Neck: The neck is supple.  No meningeal signs.  Respiratory: Respiratory effort was normal.  There is no stridor.  Air entry is equal.  Cardiovascular: Regular rate and rhythm.  Capillary refill is brisk.   Genitourinary: Not examined.  Lymphatic: No gross lymphadenopathy noted.  Musculoskeletal: Musculoskeletal system is grossly intact.  There is no obvious deformity.  Neurological: Gross motor movement is intact in all 4 extremities.  Patient exhibits normal speech.  Skin: Skin is normal to inspection, except as documented.  No obvious bruising.  No obvious rash.    ED Course   Labs Reviewed - No data to display  No procedure performed.  MDM     Differential diagnosis including but not limited to puncture wound, infected wound, cellulitis    Physical exam remained stable as previously documented.  Physical exam findings discussed with patient.  Discussed with patient importance of applied pressure if bleeding recurs.  Prophylactic antibiotic prescribed as patient is diabetic.    I have given the patient instructions regarding their diagnoses, expectations, follow up, and ER precautions. I explained to the patient that emergent conditions may arise and to go to the ER for new, worsening or any persistent conditions. I've explained the importance of following up with their doctor as instructed. The patient verbalized understanding of the discharge instructions and plan.    The patient was informed of their elevated blood pressure reading at immediate care.  They were informed of the dangers of undiagnosed and untreated hypertension.  Education regarding lifestyle modifications and the need for appropriate follow-up with their PCP to have their blood pressure re-checked within  24-48 hours was provided.    Disposition and Plan     Clinical Impression:  1. Puncture wound of lip, initial encounter    2. Elevated blood pressure reading        Disposition:  Discharge    Follow-up:  Bertin Florez MD  [address] [place] [address] [phone]    Call in 1 day  For follow-up      Medications Prescribed:  Current Discharge Medication List        START taking these medications    Details   cephALEXin 500 MG Oral Cap Take 1 capsule (500 mg total) by mouth 3 (three) times daily for 7 days.  Qty: 21 capsule, Refills: 0

## 2025-03-19 ENCOUNTER — MED REC SCAN ONLY (OUTPATIENT)
Dept: INTERNAL MEDICINE CLINIC | Facility: CLINIC | Age: 60
End: 2025-03-19

## 2025-03-19 ENCOUNTER — OFFICE VISIT (OUTPATIENT)
Dept: PHYSICAL MEDICINE AND REHAB | Facility: CLINIC | Age: 60
End: 2025-03-19
Payer: COMMERCIAL

## 2025-03-19 VITALS — BODY MASS INDEX: 25.52 KG/M2 | WEIGHT: 144 LBS | HEIGHT: 63 IN

## 2025-03-19 DIAGNOSIS — M54.16 RIGHT LUMBAR RADICULOPATHY: ICD-10-CM

## 2025-03-19 DIAGNOSIS — F41.8 SITUATIONAL ANXIETY: ICD-10-CM

## 2025-03-19 DIAGNOSIS — M51.26 HNP (HERNIATED NUCLEUS PULPOSUS), LUMBAR: Primary | ICD-10-CM

## 2025-03-19 DIAGNOSIS — Z98.1 S/P LAMINECTOMY WITH SPINAL FUSION: ICD-10-CM

## 2025-03-19 PROCEDURE — 99214 OFFICE O/P EST MOD 30 MIN: CPT | Performed by: PHYSICAL MEDICINE & REHABILITATION

## 2025-03-19 PROCEDURE — 3008F BODY MASS INDEX DOCD: CPT | Performed by: PHYSICAL MEDICINE & REHABILITATION

## 2025-03-19 RX ORDER — DIAZEPAM 10 MG/1
10 TABLET ORAL ONCE
Qty: 1 TABLET | Refills: 0 | Status: SHIPPED | OUTPATIENT
Start: 2025-03-19 | End: 2025-03-19

## 2025-03-19 NOTE — PATIENT INSTRUCTIONS
-Lyrica 50mg twice daily to be considered, call office  -Valium for MRI  -MRI of the lumbar spine and follow up after

## 2025-03-19 NOTE — PROGRESS NOTES
[facility]  OFFICE FOLLOW UP EVALUATION      HISTORY OF PRESENT ILLNESS:     Chief Complaint   Patient presents with    Follow - Up     LOV 1/15/25 pt is here for a follow up on back pain. No n/t. Not taking any pain meds or muscle relaxer's. Currently in physical therapy. Pain depends on activity. Pain 3/10       History of Present Illness  The patient, with a history of spinal fusion surgery, presents with persistent lower back pain and bilateral calf pain. The pain, described as sharp, originates in the lower back and radiates laterally. The pain is not associated with radiculopathy, but the patient reports a history of sciatica. The patient has been attending physical therapy and stretching, which provides temporary relief. However, the pain returns, particularly when lying on her side. The patient also reports persistent bilateral calf pain, which she is unable to relieve despite various interventions, including the application of magnesium and stretching. The patient questions whether the pain is muscular or neural in origin. The patient has a history of heavy lifting, which she believes may have contributed to her current symptoms. The patient has not taken any pain medication due to liver issues.      PHYSICAL EXAM:   Ht 63\"   Wt 144 lb (65.3 kg)   LMP 12/26/2017   BMI 25.51 kg/m²     LUMBAR SPINE:  Inspection: no erythema, swelling, or obvious deformity.  Their iliac crest and shoulder heights are symmetrical.     Palpation: Non tender to palpation of the spinous process.  Tenderness to palpation of the bilateral lower lumbar paraspinals, bilateral SI joints  ROM: Severely restricted in all planes especially with more flexion with reproduction of pain as well as extension  Strength: 5/5 in bilateral lower extremities  Sensation: Intact to light touch in all dermatomes of the lower extremities  Reflexes: 2/4 at L4 and S1  Facet Loading: Negative right lower  lumbar facet joints  Straight leg raise: negative for radicular pain symptoms  Slump test: positive for pain symptoms for radicular pain symptoms    IMAGING:     MRI lumbar spine completed on 10/13/2021     CONCLUSION:       1. Multilevel degenerative changes of the lumbar spine as detailed.  Notable levels as follows:       2. L3-L4:  Mild left neural foraminal stenosis, which is related to a left foraminal zone disc protrusion.   3. L5-S1:  Mild left greater than right lateral recess stenosis.        All imaging results were reviewed and discussed with patient.      ASSESSMENT/PLAN:     1. HNP (herniated nucleus pulposus), lumbar    2. S/P laminectomy with spinal fusion    3. Right lumbar radiculopathy    4. Situational anxiety        Assessment & Plan  Chronic low back pain  Persistent pain post-lumbar fusion at L5-S1, unrelieved by physical therapy. Differential includes new herniated disc at L4-5 or facet joint pain. MRI indicated. Discussed Lyrica as alternative to gabapentin.  - Order MRI of the lumbar spine follow-up after.  - Prescribe Valium 10 mg, take half a tablet an hour before MRI, other half if needed.  - Discuss Lyrica 50 mg twice daily, pending liver specialist approval.        The patient verbalized understanding with the plan and was in agreement. All questions/concerns were addressed and there were no barriers to learning.  Please note Dragon dictation software was used to dictate this note and may result in inadvertent typos.    Kari Dudley DO, FAAPMR & CAQSM  Physical Medicine and Rehabilitation  Sports and Spine Medicine    PAST MEDICAL HISTORY:     Past Medical History:    Anemia    recently dx-taking FeSO4    Back problem    Diabetes (HCC)    Disorder of liver    Divorce    Finalized 2010    DM2 (diabetes mellitus, type 2) (HCC)    controlled with diet    Endometriosis    Laparoscopy-diagnostic    Esophageal reflux    History of blood transfusion    No reaction    History of pregnancy     EAB    Hx of motion sickness    Lipid screening    per NG    Migraines    Nausea and vomiting in adult    Visual impairment    wears glasses         PAST SURGICAL HISTORY:     Past Surgical History:   Procedure Laterality Date    Biopsy of uterus lining  6/2011    neg embx    Colonoscopy N/A 1/10/2018    Procedure: COLONOSCOPY;  Surgeon: Humble Hodges MD;  Location: [facility] ENDO    Colonoscopy N/A 8/24/2023    Procedure: COLONOSCOPY;  Surgeon: Humble Hodges MD;  Location: [facility] MAIN OR    Laparoscopy,diagnostic  1996    Diagnostic for endometriosis         CURRENT MEDICATIONS:     Current Outpatient Medications   Medication Sig Dispense Refill    diazePAM 10 MG Oral Tab Take 1 tablet (10 mg total) by mouth one time for 1 dose. 1 tablet 0    hydrOXYzine 25 MG Oral Tab Take 1 tablet (25 mg total) by mouth 3 (three) times daily as needed for Itching. (Patient not taking: Reported on 1/8/2025) 30 tablet 0    pantoprazole 40 MG Oral Tab EC Take 1 tablet (40 mg total) by mouth daily. (Patient not taking: Reported on 1/8/2025) 30 tablet 0    ondansetron (ZOFRAN) 4 mg tablet Take 1 tablet (4 mg total) by mouth every 8 (eight) hours as needed for Nausea. (Patient not taking: Reported on 1/8/2025) 30 tablet 0    ALPRAZolam 0.25 MG Oral Tab Take 1 tablet (0.25 mg total) by mouth daily as needed for Anxiety. (Patient not taking: Reported on 1/8/2025) 15 tablet 0    Blood Glucose Monitoring Suppl (ONETOUCH ULTRA MINI) w/Device Does not apply Kit Check blood sugars two times daily (Patient not taking: Reported on 1/8/2025) 1 kit 0         ALLERGIES:   Allergies[1]      FAMILY HISTORY:     Family History   Problem Relation Age of Onset    Heart Attack Brother         MI-Cause of death    Colon Cancer Paternal Grandmother         Cause of death    Heart Disease Father         CAD    Diabetes Father         Diabetes/CRF/CAD cause of death    Other (Other) Father         CRF    Heart Disease Mother         CAD     Cancer Mother         Lung cancer and CAD cause of death    Heart Disease Brother 41        Premature CAD    Breast Cancer Maternal Aunt 82          SOCIAL HISTORY:     Social History     Socioeconomic History    Marital status:    Tobacco Use    Smoking status: Former     Current packs/day: 0.00     Average packs/day: 1 pack/day for 5.0 years (5.0 ttl pk-yrs)     Types: Cigarettes     Start date:      Quit date:      Years since quittin.2     Passive exposure: Past    Smokeless tobacco: Never   Vaping Use    Vaping status: Never Used   Substance and Sexual Activity    Alcohol use: Not Currently     Alcohol/week: 1.0 standard drink of alcohol     Types: 1 Glasses of wine per week     Comment: 3 glasses per month     Drug use: No    Sexual activity: Yes     Partners: Male     Birth control/protection: Condom   Other Topics Concern    Caffeine Concern No    History of tanning Yes    Breast feeding No    Reaction to local anesthetic No    Pt has a pacemaker No    Pt has a defibrillator No     Social Drivers of Health     Food Insecurity: No Food Insecurity (2024)    Food Insecurity     Food Insecurity: Never true   Transportation Needs: No Transportation Needs (2024)    Transportation Needs     Lack of Transportation: No   Housing Stability: Low Risk  (2024)    Housing Stability     Housing Instability: No          REVIEW OF SYSTEMS:   A comprehensive 10 point review of systems was completed.  Pertinent positives and negatives noted in the the HPI.      LABS:     Lab Results   Component Value Date    EAG 94 2025    A1C 4.9 2025     Lab Results   Component Value Date    WBC 8.6 2024    RBC 3.74 (L) 2024    HGB 11.6 (L) 2024    HCT 32.9 (L) 2024    MCV 88.0 2024    MCH 31.0 2024    MCHC 35.3 2024    RDW 14.9 2024    .0 2024    MPV 8.3 2018     Lab Results   Component Value Date    GLU 91 2025    BUN  11 01/08/2025    BUNCREA 12.1 01/08/2025    CREATSERUM 0.91 01/08/2025    ANIONGAP 7 01/08/2025    GFRNAA 64 03/14/2022    GFRAA 74 03/14/2022    CA 10.4 01/08/2025    OSMOCALC 291 01/08/2025    ALKPHO 120 (H) 02/25/2025    AST 51 (H) 02/25/2025    ALT 88 (H) 02/25/2025    ALKPHOS 68 05/29/2015    BILT 0.5 02/25/2025    TP 8.4 (H) 02/25/2025    ALB 4.5 02/25/2025    GLOBULIN 3.9 (H) 01/08/2025    AGRATIO 1.3 05/29/2015     01/08/2025    K 4.5 01/08/2025     01/08/2025    CO2 28.0 01/08/2025     Lab Results   Component Value Date    PTP 13.3 12/04/2024    INR 0.95 12/04/2024     Lab Results   Component Value Date    VITD 28.3 (L) 01/21/2020            [1]   Allergies  Allergen Reactions    Sulfa Antibiotics HIVES    Aminoglycosides UNKNOWN     Possibly hives    Compazine [Prochlorperazine] OTHER (SEE COMMENTS)     Convulsions    Morphine OTHER (SEE COMMENTS)     convulsions    Perfumes OTHER (SEE COMMENTS)     sensitivity    Wheat Gluten OTHER (SEE COMMENTS)     inflammation

## 2025-03-29 ENCOUNTER — LAB ENCOUNTER (OUTPATIENT)
Dept: LAB | Facility: HOSPITAL | Age: 60
End: 2025-03-29
Attending: PHYSICIAN ASSISTANT
Payer: COMMERCIAL

## 2025-03-29 DIAGNOSIS — B15.9 VIRAL HEPATITIS A WITHOUT HEPATIC COMA: ICD-10-CM

## 2025-03-29 DIAGNOSIS — R74.8 ELEVATED LIVER ENZYMES: ICD-10-CM

## 2025-03-29 LAB
ALBUMIN SERPL-MCNC: 4.4 G/DL (ref 3.2–4.8)
ALP LIVER SERPL-CCNC: 111 U/L
ALT SERPL-CCNC: 77 U/L
AST SERPL-CCNC: 41 U/L (ref ?–34)
BILIRUB DIRECT SERPL-MCNC: 0.2 MG/DL (ref ?–0.3)
BILIRUB SERPL-MCNC: 0.9 MG/DL (ref 0.3–1.2)
PROT SERPL-MCNC: 7.8 G/DL (ref 5.7–8.2)

## 2025-03-29 PROCEDURE — 80076 HEPATIC FUNCTION PANEL: CPT

## 2025-03-29 PROCEDURE — 36415 COLL VENOUS BLD VENIPUNCTURE: CPT

## 2025-03-31 ENCOUNTER — TELEPHONE (OUTPATIENT)
Facility: CLINIC | Age: 60
End: 2025-03-31

## 2025-03-31 NOTE — TELEPHONE ENCOUNTER
----- Message from Lisa Varela sent at 3/31/2025  7:59 AM CDT -----  Please place recall for repeat hepatic panel in 3 months.     Lisa Varela PA-C

## 2025-04-15 ENCOUNTER — NURSE TRIAGE (OUTPATIENT)
Dept: INTERNAL MEDICINE CLINIC | Facility: CLINIC | Age: 60
End: 2025-04-15

## 2025-04-15 NOTE — TELEPHONE ENCOUNTER
LESVIA Whitley (on behalf of Dr Florez )  ==please advise, thanks,.   SENT AS HIGH PRIORITY     Patient is scheduled to do the MRI tomorrow.   Per medication record, she was taking alprazolam as needed,she does not have any medications at home.     Patient is requesting medication to knock her out    Preferred pharmacy on file. .     Future Appointments   Date Time Provider Department Center   4/16/2025  7:30 AM [facility] RM1 (1.5T WIDE) [facility] [place]

## 2025-04-15 NOTE — TELEPHONE ENCOUNTER
Advised patient of Amber Wood's note. Patient verbalized understanding. Video visit made for today at 6:30pm with Gladys Arauz. Went over instructions, copay and that provider will be calling from a restricted/unknown number to make sure able to accept/pickup those calls. Patient agreed.

## 2025-04-15 NOTE — PROGRESS NOTES
Patient ID: Emmanuel Mendiola is a 59 year old female who requests medication for MRI.    This visit is conducted using Telemedicine with live, interactive video and audio.    At the start of this encounter I confirmed the patient's full name, date of birth, and that they are currently in the [facility].    HISTORY OF PRESENT ILLNESS:     Patient of Dr. Kari Dudley - saw him in March, is scheduled for a lumbar spine MRI in the morning (0730 appointment). She went to  the pill that was prescribed by him for claustrophobia from her pharmacy, and was told they didn't have anything for her.    Note reviewed - he prescribed diazepam 10mg 1 tab on 3/19/25.    Review of Systems 10 point review of systems otherwise negative with the exception of HPI and assessment and plan    MEDICAL HISTORY:   Past Medical History[1]    Past Surgical History[2]    Medications - Current[3]    Allergies:Allergies[4]    Social History     Socioeconomic History    Marital status:      Spouse name: Not on file    Number of children: Not on file    Years of education: Not on file    Highest education level: Not on file   Occupational History    Not on file   Tobacco Use    Smoking status: Former     Current packs/day: 0.00     Average packs/day: 1 pack/day for 5.0 years (5.0 ttl pk-yrs)     Types: Cigarettes     Start date:      Quit date:      Years since quittin.3     Passive exposure: Past    Smokeless tobacco: Never   Vaping Use    Vaping status: Never Used   Substance and Sexual Activity    Alcohol use: Not Currently     Alcohol/week: 1.0 standard drink of alcohol     Types: 1 Glasses of wine per week     Comment: 3 glasses per month     Drug use: No    Sexual activity: Yes     Partners: Male     Birth control/protection: Condom   Other Topics Concern     Service Not Asked    Blood Transfusions Not Asked    Caffeine Concern No    Occupational Exposure Not Asked    Hobby Hazards Not Asked    Sleep  Concern Not Asked    Stress Concern Not Asked    Weight Concern Not Asked    Special Diet Not Asked    Back Care Not Asked    Exercise Not Asked    Bike Helmet Not Asked    Seat Belt Not Asked    Self-Exams Not Asked    Grew up on a farm Not Asked    History of tanning Yes    Outdoor occupation Not Asked    Breast feeding No    Reaction to local anesthetic No    Pt has a pacemaker No    Pt has a defibrillator No   Social History Narrative    Not on file     Social Drivers of Health     Food Insecurity: No Food Insecurity (11/19/2024)    Food Insecurity     Food Insecurity: Never true   Transportation Needs: No Transportation Needs (11/19/2024)    Transportation Needs     Lack of Transportation: No     Car Seat: Not on file   Stress: Not on file   Housing Stability: Low Risk  (11/19/2024)    Housing Stability     Housing Instability: No     Housing Instability Emergency: Not on file     Crib or Bassinette: Not on file       PHYSICAL EXAM:   Unable to perform vitals or do physical exam as this is a virtual video visit.  Patient appears alert. No conversational dyspnea or distress.    ASSESSMENT/PLAN:   1. Claustrophobia  - diazePAM 10 MG Oral Tab; Take 1 tablet (10 mg total) by mouth every 6 (six) hours as needed. Take 60 minutes prior to your MRI appointment. You should be driven to and from this appointment.  Dispense: 1 tablet; Refill: 0  - sent 1 pill diazepam to her pharmacy, confirmed this is a 24 hour pharmacy so she can obtain the medication tonight.  - instructed to take 60 minutes prior to her appointment tomorrow morning, advised she can take with a light breakfast. Advised that this will cause drowsiness and that she should not drive to or from her appointment on this medication - she states she already has a ride planned to and from. Advised the effects of this medication will last several hours and she shouldn't drive until at least late afternoon tomorrow for her safety and the safety of others. She  verbalized understanding.    No follow-ups on file.    Time spent on encounter  8 minutes   Video time 3 minutes   Documentation time 5 minutes     Emmanuel Mendiola understands video evaluation is not a substitute for face-to-face examination or emergency care. Patient advised to go to ER or call 911 for worsening symptoms or acute distress.     Telehealth outside of [facility]  Telehealth Verbal Consent   I conducted a telehealth visit with Emmanuel Mendiola today, 04/15/25, which was completed using two-way, real-time interactive audio and video communication.  The patient was made aware of the limitations of the telehealth visit, including treatment limitations as no physical exam could be performed.  The patient was advised to call 911 or to go to the ER in case there was an emergency.  The patient was also advised of the potential privacy & security concerns related to the telehealth platform.   The patient was made aware of where to find [facility]'s notice of privacy practices, telehealth consent form and other related consent forms and documents.  which are located on the [facility] website. The patient verbally agreed to telehealth consent form, related consents and the risks discussed.    Included in this visit, time may have been spent reviewing labs, medications, radiology tests and decision making. Appropriate medical decision-making and tests are ordered as detailed in the plan of care above.  Coding/billing information is submitted for this visit based on complexity of care and/or time spent for the visit.    This note was prepared using Dragon Medical voice recognition dictation software. As a result errors may occur. When identified these errors have been corrected. While every attempt is made to correct errors during dictation discrepancies may still exist.    Gladys Arauz, APRN  4/15/2025       [1]   Past Medical History:   Anemia    recently dx-taking FeSO4    Back problem    Diabetes (HCC)    Disorder of liver     Divorce    Finalized 2010    DM2 (diabetes mellitus, type 2) (HCC)    controlled with diet    Endometriosis    Laparoscopy-diagnostic    Esophageal reflux    History of blood transfusion    No reaction    History of pregnancy    EAB    Hx of motion sickness    Lipid screening    per NG    Migraines    Nausea and vomiting in adult    Visual impairment    wears glasses   [2]   Past Surgical History:  Procedure Laterality Date    Biopsy of uterus lining  6/2011    neg embx    Colonoscopy N/A 1/10/2018    Procedure: COLONOSCOPY;  Surgeon: Humble Hodges MD;  Location: [facility] ENDO    Colonoscopy N/A 8/24/2023    Procedure: COLONOSCOPY;  Surgeon: Humble Hodges MD;  Location: [facility] MAIN OR    Laparoscopy,diagnostic  1996    Diagnostic for endometriosis   [3]   Current Outpatient Medications:     diazePAM 10 MG Oral Tab, Take 1 tablet (10 mg total) by mouth every 6 (six) hours as needed. Take 60 minutes prior to your MRI appointment. You should be driven to and from this appointment., Disp: 1 tablet, Rfl: 0    hydrOXYzine 25 MG Oral Tab, Take 1 tablet (25 mg total) by mouth 3 (three) times daily as needed for Itching. (Patient not taking: Reported on 1/8/2025), Disp: 30 tablet, Rfl: 0    pantoprazole 40 MG Oral Tab EC, Take 1 tablet (40 mg total) by mouth daily. (Patient not taking: Reported on 1/8/2025), Disp: 30 tablet, Rfl: 0    ondansetron (ZOFRAN) 4 mg tablet, Take 1 tablet (4 mg total) by mouth every 8 (eight) hours as needed for Nausea. (Patient not taking: Reported on 1/8/2025), Disp: 30 tablet, Rfl: 0    ALPRAZolam 0.25 MG Oral Tab, Take 1 tablet (0.25 mg total) by mouth daily as needed for Anxiety. (Patient not taking: Reported on 1/8/2025), Disp: 15 tablet, Rfl: 0    Blood Glucose Monitoring Suppl (ONETOUCH ULTRA MINI) w/Device Does not apply Kit, Check blood sugars two times daily (Patient not taking: Reported on 1/8/2025), Disp: 1 kit, Rfl: 0  [4]   Allergies  Allergen Reactions    Sulfa  Antibiotics HIVES    Aminoglycosides UNKNOWN     Possibly hives    Compazine [Prochlorperazine] OTHER (SEE COMMENTS)     Convulsions    Morphine OTHER (SEE COMMENTS)     convulsions    Perfumes OTHER (SEE COMMENTS)     sensitivity    Wheat Gluten OTHER (SEE COMMENTS)     inflammation

## 2025-04-16 ENCOUNTER — HOSPITAL ENCOUNTER (OUTPATIENT)
Dept: MRI IMAGING | Age: 60
Discharge: HOME | End: 2025-04-16
Attending: PHYSICAL MEDICINE & REHABILITATION
Payer: COMMERCIAL

## 2025-04-16 DIAGNOSIS — M51.26 HNP (HERNIATED NUCLEUS PULPOSUS), LUMBAR: ICD-10-CM

## 2025-04-16 DIAGNOSIS — Z98.1 S/P LAMINECTOMY WITH SPINAL FUSION: ICD-10-CM

## 2025-04-16 DIAGNOSIS — M54.16 RIGHT LUMBAR RADICULOPATHY: ICD-10-CM

## 2025-04-16 PROCEDURE — 72148 MRI LUMBAR SPINE W/O DYE: CPT | Performed by: PHYSICAL MEDICINE & REHABILITATION

## 2025-04-21 ENCOUNTER — MED REC SCAN ONLY (OUTPATIENT)
Dept: INTERNAL MEDICINE CLINIC | Facility: CLINIC | Age: 60
End: 2025-04-21

## 2025-04-26 ENCOUNTER — LAB ENCOUNTER (OUTPATIENT)
Dept: LAB | Facility: HOSPITAL | Age: 60
End: 2025-04-26
Attending: INTERNAL MEDICINE
Payer: COMMERCIAL

## 2025-04-26 DIAGNOSIS — R74.8 ELEVATED LIVER ENZYMES: ICD-10-CM

## 2025-04-26 LAB
ALBUMIN SERPL-MCNC: 4.4 G/DL (ref 3.2–4.8)
ALP LIVER SERPL-CCNC: 102 U/L (ref 46–118)
ALT SERPL-CCNC: 55 U/L (ref 10–49)
AST SERPL-CCNC: 31 U/L (ref ?–34)
BILIRUB DIRECT SERPL-MCNC: 0.3 MG/DL (ref ?–0.3)
BILIRUB SERPL-MCNC: 1 MG/DL (ref 0.3–1.2)
PROT SERPL-MCNC: 7.7 G/DL (ref 5.7–8.2)

## 2025-04-26 PROCEDURE — 36415 COLL VENOUS BLD VENIPUNCTURE: CPT

## 2025-04-26 PROCEDURE — 80076 HEPATIC FUNCTION PANEL: CPT

## 2025-05-11 ENCOUNTER — PATIENT MESSAGE (OUTPATIENT)
Dept: INTERNAL MEDICINE CLINIC | Facility: CLINIC | Age: 60
End: 2025-05-11

## 2025-05-13 ENCOUNTER — TELEMEDICINE (OUTPATIENT)
Dept: PHYSICAL MEDICINE AND REHAB | Facility: CLINIC | Age: 60
End: 2025-05-13
Payer: COMMERCIAL

## 2025-05-13 DIAGNOSIS — M51.26 HNP (HERNIATED NUCLEUS PULPOSUS), LUMBAR: Primary | ICD-10-CM

## 2025-05-13 DIAGNOSIS — Z98.1 S/P LAMINECTOMY WITH SPINAL FUSION: ICD-10-CM

## 2025-05-13 DIAGNOSIS — F41.8 SITUATIONAL ANXIETY: ICD-10-CM

## 2025-05-13 DIAGNOSIS — M54.16 RIGHT LUMBAR RADICULOPATHY: ICD-10-CM

## 2025-05-13 PROCEDURE — 98006 SYNCH AUDIO-VIDEO EST MOD 30: CPT | Performed by: PHYSICAL MEDICINE & REHABILITATION

## 2025-05-13 NOTE — PROGRESS NOTES
[facility]    Telemedicine Visit - Follow Up Evaluation    Telehealth Verbal Consent   I conducted a telehealth visit with Emmanuel Mendiola today, 05/13/25, which was completed using two-way, real-time interactive audio and video communication. This has been done in good suzanne to provide continuity of care in the best interest of the provider-patient relationship, due to the COVID -19 public health crisis/national emergency where restrictions of face-to-face office visits are ongoing. Every conscious effort was taken to allow for sufficient and adequate time to complete the visit.  The patient was made aware of the limitations of the telehealth visit, including treatment limitations as no physical exam could be performed.  The patient was advised to call 911 or to go to the ER in case there was an emergency.  The patient was also advised of the potential privacy & security concerns related to the telehealth platform.   The patient was made aware of where to find [facility]'s notice of privacy practices, telehealth consent form and other related consent forms and documents.  which are located on the [facility] website. The patient verbally agreed to telehealth consent form, related consents and the risks discussed.    Lastly, the patient confirmed that they were in [place].   Included in this visit, time may have been spent reviewing labs, medications, radiology tests and decision making. Appropriate medical decision-making and tests are ordered as detailed in the plan of care above.  Coding/billing information is submitted for this visit based on complexity of care and/or time spent for the visit.      HISTORY OF PRESENT ILLNESS:     Patient is following up back pain.  She had MRI imaging of the lumbar spine.  She has a history of L5-S1 fusion.  She continues to have lower back pain with radiation bilateral calves.  She states the pain is affecting her quality of life.  She finds  the PT to be helpful and would like to retrial this.  She has tried gabapentin and Lyrica but did not tolerate these medicines well.  She has found PT to be helpful but can only attend a 6 PM session and is looking for something to accommodate her schedule.  She denies any new weakness, any saddle anesthesia, any changes in bowel bladder.       IMAGING:   MRI lumbar spine completed 4/16/2025 was reviewed which is notable for postoperative changes at L5-S1 with anterior and posterior lumbar fusion with a small left foraminal zone disc protrusion and annular fissure at L3-4 which results in mild left neuroforaminal stenosis.  There is a disc bulge with superimposed broad-based central/bilateral central disc protrusion and annular fissure at L4-5 with mild bilateral facet arthropathy.  There is mild bilateral lateral recess stenosis    All imaging results were reviewed and discussed with patient.      ASSESSMENT:     1. HNP (herniated nucleus pulposus), lumbar    2. S/P laminectomy with spinal fusion    3. Right lumbar radiculopathy    4. Situational anxiety          PLAN:   Emmanuel Mendiola is a 59 year old female following up for persistent chronic low back pain status post lumbar fusion surgery at L5-S1.  She has an annular tear with a disc bulge at L4-5 with minor lateral recess stenosis.  We discussed that we could trial a diagnostic and therapeutic epidural injection but she would like to defer for now.  Recommend that she start PT program again and continue home exercises.  She has failed neuropathic pain medicines and would like to continue managing this with over-the-counter medicine as needed.  Recommend she follow-up with me in 4 to 6 weeks in the office      Follow-up:  6 weeks    We discussed that a telemedicine visit is in place of an office visit; however, this limits the ability to perform a thorough physical examination which may affect objective findings related to a specific condition and can affect  treatment.    The patient verbalized understanding with this plan and was in agreement.  There are no barriers to learning.  All questions were answered.  Please note Dragon dictation software was used to dictate this note which may result in inadvertent typos.    Kari Dudley D.O. FAAPMR & CAQSM  Physical Medicine and Rehabilitation/Sports Medicine    PAST MEDICAL HISTORY:   Past Medical History[1]      PAST SURGICAL HISTORY:   Past Surgical History[2]      CURRENT MEDICATIONS:   Current Medications[3]      ALLERGIES:   Allergies[4]      FAMILY HISTORY:   Family History[5]       SOCIAL HISTORY:   Short Social Hx on File[6]       REVIEW OF SYSTEMS:   As noted in HPI      PHYSICAL EXAM:   General: No immediate distress  Head: Normocephalic/ Atraumatic  Eyes: Extra-occular movements intact  Ears/Nose/Throat:  External appearance identifies normal appearance without obvious deformity  Cardiovascular: No cyanosis, clubbing or edema  Respiratory: Non-labored respirations  Skin: No lesions noted   Neurological: alert & oriented x 3, attentive, able to follow commands, comprehention intact, spontaneous speech intact  Psychiatric: Mood and affect appropriate  Musculoskeletal Exam:  No change since last exam        LABS:     Lab Results   Component Value Date    EAG 94 01/08/2025    A1C 4.9 01/08/2025     Lab Results   Component Value Date    WBC 8.6 11/27/2024    RBC 3.74 (L) 11/27/2024    HGB 11.6 (L) 11/27/2024    HCT 32.9 (L) 11/27/2024    MCV 88.0 11/27/2024    MCH 31.0 11/27/2024    MCHC 35.3 11/27/2024    RDW 14.9 11/27/2024    .0 11/27/2024    MPV 8.3 05/14/2018     Lab Results   Component Value Date    GLU 91 01/08/2025    BUN 11 01/08/2025    BUNCREA 12.1 01/08/2025    CREATSERUM 0.91 01/08/2025    ANIONGAP 7 01/08/2025    GFRNAA 64 03/14/2022    GFRAA 74 03/14/2022    CA 10.4 01/08/2025    OSMOCALC 291 01/08/2025    ALKPHO 102 04/26/2025    AST 31 04/26/2025    ALT 55 (H) 04/26/2025    ALKPHOS 68 05/29/2015     BILT 1.0 04/26/2025    TP 7.7 04/26/2025    ALB 4.4 04/26/2025    GLOBULIN 3.9 (H) 01/08/2025    AGRATIO 1.3 05/29/2015     01/08/2025    K 4.5 01/08/2025     01/08/2025    CO2 28.0 01/08/2025     Lab Results   Component Value Date    PTP 13.3 12/04/2024    INR 0.95 12/04/2024     Lab Results   Component Value Date    VITD 28.3 (L) 01/21/2020              [1]   Past Medical History:   Anemia    recently dx-taking FeSO4    Back problem    Diabetes (HCC)    Disorder of liver    Divorce    Finalized 2010    DM2 (diabetes mellitus, type 2) (HCC)    controlled with diet    Endometriosis    Laparoscopy-diagnostic    Esophageal reflux    History of blood transfusion    No reaction    History of pregnancy    EAB    Hx of motion sickness    Lipid screening    per NG    Migraines    Nausea and vomiting in adult    Visual impairment    wears glasses   [2]   Past Surgical History:  Procedure Laterality Date    Biopsy of uterus lining  6/2011    neg embx    Colonoscopy N/A 1/10/2018    Procedure: COLONOSCOPY;  Surgeon: Humble Hodges MD;  [facility]    Colonoscopy N/A 8/24/2023    Procedure: COLONOSCOPY;  Surgeon: Humble Hodges MD;  Location: [facility] MAIN OR    Laparoscopy,diagnostic  1996    Diagnostic for endometriosis   [3]   Current Outpatient Medications   Medication Sig Dispense Refill    diazePAM 10 MG Oral Tab Take 1 tablet (10 mg total) by mouth every 6 (six) hours as needed. Take 60 minutes prior to your MRI appointment. You should be driven to and from this appointment. 1 tablet 0    hydrOXYzine 25 MG Oral Tab Take 1 tablet (25 mg total) by mouth 3 (three) times daily as needed for Itching. (Patient not taking: Reported on 1/8/2025) 30 tablet 0    pantoprazole 40 MG Oral Tab EC Take 1 tablet (40 mg total) by mouth daily. (Patient not taking: Reported on 1/8/2025) 30 tablet 0    ondansetron (ZOFRAN) 4 mg tablet Take 1 tablet (4 mg total) by mouth every 8 (eight) hours as  needed for Nausea. (Patient not taking: Reported on 2025) 30 tablet 0    ALPRAZolam 0.25 MG Oral Tab Take 1 tablet (0.25 mg total) by mouth daily as needed for Anxiety. (Patient not taking: Reported on 2025) 15 tablet 0    Blood Glucose Monitoring Suppl (ONETOUCH ULTRA MINI) w/Device Does not apply Kit Check blood sugars two times daily (Patient not taking: Reported on 2025) 1 kit 0   [4]   Allergies  Allergen Reactions    Sulfa Antibiotics HIVES    Aminoglycosides UNKNOWN     Possibly hives    Compazine [Prochlorperazine] OTHER (SEE COMMENTS)     Convulsions    Morphine OTHER (SEE COMMENTS)     convulsions    Perfumes OTHER (SEE COMMENTS)     sensitivity    Wheat Gluten OTHER (SEE COMMENTS)     inflammation   [5]   Family History  Problem Relation Age of Onset    Heart Attack Brother         MI-Cause of death    Colon Cancer Paternal Grandmother         Cause of death    Heart Disease Father         CAD    Diabetes Father         Diabetes/CRF/CAD cause of death    Other (Other) Father         CRF    Heart Disease Mother         CAD    Cancer Mother         Lung cancer and CAD cause of death    Heart Disease Brother 41        Premature CAD    Breast Cancer Maternal Aunt 82   [6]   Social History  Socioeconomic History    Marital status:    Tobacco Use    Smoking status: Former     Current packs/day: 0.00     Average packs/day: 1 pack/day for 5.0 years (5.0 ttl pk-yrs)     Types: Cigarettes     Start date:      Quit date:      Years since quittin.3     Passive exposure: Past    Smokeless tobacco: Never   Vaping Use    Vaping status: Never Used   Substance and Sexual Activity    Alcohol use: Not Currently     Alcohol/week: 1.0 standard drink of alcohol     Types: 1 Glasses of wine per week     Comment: 3 glasses per month     Drug use: No    Sexual activity: Yes     Partners: Male     Birth control/protection: Condom   Other Topics Concern    Caffeine Concern No    History of tanning  Yes    Breast feeding No    Reaction to local anesthetic No    Pt has a pacemaker No    Pt has a defibrillator No     Social Drivers of Health     Food Insecurity: No Food Insecurity (11/19/2024)    Food Insecurity     Food Insecurity: Never true   Transportation Needs: No Transportation Needs (11/19/2024)    Transportation Needs     Lack of Transportation: No   Housing Stability: Low Risk  (11/19/2024)    Housing Stability     Housing Instability: No

## 2025-05-31 ENCOUNTER — LAB ENCOUNTER (OUTPATIENT)
Dept: LAB | Facility: HOSPITAL | Age: 60
End: 2025-05-31
Attending: INTERNAL MEDICINE
Payer: COMMERCIAL

## 2025-05-31 DIAGNOSIS — R74.8 ELEVATED LIVER ENZYMES: ICD-10-CM

## 2025-05-31 LAB
ALBUMIN SERPL-MCNC: 4.6 G/DL (ref 3.2–4.8)
ALP LIVER SERPL-CCNC: 101 U/L (ref 46–118)
ALT SERPL-CCNC: 38 U/L (ref 10–49)
AST SERPL-CCNC: 25 U/L (ref ?–34)
BILIRUB DIRECT SERPL-MCNC: 0.2 MG/DL (ref ?–0.3)
BILIRUB SERPL-MCNC: 0.6 MG/DL (ref 0.3–1.2)
PROT SERPL-MCNC: 7.6 G/DL (ref 5.7–8.2)

## 2025-05-31 PROCEDURE — 80076 HEPATIC FUNCTION PANEL: CPT

## 2025-05-31 PROCEDURE — 36415 COLL VENOUS BLD VENIPUNCTURE: CPT

## 2025-06-18 ENCOUNTER — HOSPITAL ENCOUNTER (OUTPATIENT)
Age: 60
Discharge: HOME | End: 2025-06-18
Attending: STUDENT IN AN ORGANIZED HEALTH CARE EDUCATION/TRAINING PROGRAM
Payer: COMMERCIAL

## 2025-06-18 ENCOUNTER — APPOINTMENT (OUTPATIENT)
Dept: GENERAL RADIOLOGY | Age: 60
End: 2025-06-18
Attending: STUDENT IN AN ORGANIZED HEALTH CARE EDUCATION/TRAINING PROGRAM
Payer: COMMERCIAL

## 2025-06-18 VITALS
SYSTOLIC BLOOD PRESSURE: 171 MMHG | DIASTOLIC BLOOD PRESSURE: 72 MMHG | OXYGEN SATURATION: 98 % | TEMPERATURE: 101 F | RESPIRATION RATE: 19 BRPM | HEART RATE: 92 BPM

## 2025-06-18 DIAGNOSIS — R50.9 FEVER, UNSPECIFIED FEVER CAUSE: Primary | ICD-10-CM

## 2025-06-18 DIAGNOSIS — R03.0 ELEVATED BLOOD PRESSURE READING: ICD-10-CM

## 2025-06-18 DIAGNOSIS — J06.9 VIRAL URI WITH COUGH: ICD-10-CM

## 2025-06-18 LAB
S PYO AG THROAT QL IA.RAPID: NEGATIVE
SARS-COV-2 RNA RESP QL NAA+PROBE: NOT DETECTED

## 2025-06-18 PROCEDURE — 99214 OFFICE O/P EST MOD 30 MIN: CPT

## 2025-06-18 PROCEDURE — 71046 X-RAY EXAM CHEST 2 VIEWS: CPT | Performed by: STUDENT IN AN ORGANIZED HEALTH CARE EDUCATION/TRAINING PROGRAM

## 2025-06-18 PROCEDURE — 87651 STREP A DNA AMP PROBE: CPT | Performed by: STUDENT IN AN ORGANIZED HEALTH CARE EDUCATION/TRAINING PROGRAM

## 2025-06-18 NOTE — ED PROVIDER NOTES
Patient Seen in: [facility] [place]        History  Chief Complaint   Patient presents with    Cough/URI     Stated Complaint: sore throat and cold, sinus,    Subjective:   HPI    59-year-old female past medical history of transaminitis in the setting of hepatitis A in November 2024 with resolution on most recent labs and able to resume usual activities per chart review of her Hepatologist's note from February 2025, DM which she states is managed with diet control, and known elevated blood pressure who presents with her significant other with concern for 4 days of not feeling well, yesterday noticed pressure of the B/L sinuses and B/L upper gum pain, greater on the right, no reported trauma while eating, flossing without pain, also notes sore throat, nasal congestion, and mild cough.  She is not sure when the fever started, but is noted have a fever today.  She felt generally unwell for a bout 2 days and yesterday is when symptoms progressed to today's presentation.            Objective:     Past Medical History:    Anemia    recently dx-taking FeSO4    Back problem    Diabetes (HCC)    Disorder of liver    Divorce    Finalized 2010    DM2 (diabetes mellitus, type 2) (HCC)    controlled with diet    Endometriosis    Laparoscopy-diagnostic    Esophageal reflux    History of blood transfusion    No reaction    History of pregnancy    EAB    Hx of motion sickness    Lipid screening    per NG    Migraines    Nausea and vomiting in adult    Visual impairment    wears glasses              Past Surgical History:   Procedure Laterality Date    Biopsy of uterus lining  6/2011    neg embx    Colonoscopy N/A 1/10/2018    Procedure: COLONOSCOPY;  Surgeon: Humble Hodges MD;  Location: [facility] ENDO    Colonoscopy N/A 8/24/2023    Procedure: COLONOSCOPY;  Surgeon: Humble Hodges MD;  Location: [facility] MAIN OR    Laparoscopy,diagnostic  1996    Diagnostic for endometriosis                Social History      Socioeconomic History    Marital status:    Tobacco Use    Smoking status: Former     Current packs/day: 0.00     Average packs/day: 1 pack/day for 5.0 years (5.0 ttl pk-yrs)     Types: Cigarettes     Start date:      Quit date:      Years since quittin.4     Passive exposure: Past    Smokeless tobacco: Never   Vaping Use    Vaping status: Never Used   Substance and Sexual Activity    Alcohol use: Not Currently     Alcohol/week: 1.0 standard drink of alcohol     Types: 1 Glasses of wine per week     Comment: 3 glasses per month     Drug use: No    Sexual activity: Yes     Partners: Male     Birth control/protection: Condom   Other Topics Concern    Caffeine Concern No    History of tanning Yes    Breast feeding No    Reaction to local anesthetic No    Pt has a pacemaker No    Pt has a defibrillator No     Social Drivers of Health     Food Insecurity: No Food Insecurity (2024)    Food Insecurity     Food Insecurity: Never true   Transportation Needs: No Transportation Needs (2024)    Transportation Needs     Lack of Transportation: No   Housing Stability: Low Risk  (2024)    Housing Stability     Housing Instability: No              Review of Systems    Positive for stated complaint: sore throat and cold, sinus,  Other systems are as noted in HPI.  Constitutional and vital signs reviewed.      All other systems reviewed and negative except as noted above.                  Physical Exam    ED Triage Vitals [25 1325]   BP (!) 171/77   Pulse 92   Resp 19   Temp (!) 100.6 °F (38.1 °C)   Temp src Oral   SpO2 98 %   O2 Device None (Room air)       Current Vitals:   Vital Signs  BP: (!) 171/72  Pulse: 92  Resp: 19  Temp: (!) 100.6 °F (38.1 °C)  Temp src: Oral    Oxygen Therapy  SpO2: 98 %  O2 Device: None (Room air)            Physical Exam    General: Awake, alert, comfortable on room air, in no distress, tolerating oral secretions, interactive  Pulmonary: Lungs CTA B, no  wheezing, no conversational dyspnea  Neuro: Symmetrical facial expressions on gross observation  HEENT: No periorbital edema or erythema, nonerythematous and nonedematous intact B/L TMs, no erythema or edema of the B/L ear canals, nasal congestion is present, nonerythematous and nonedematous B/L tonsils, no tonsillar exudates, no peritonsillar edema, mild posterior pharyngeal cobblestoning, uvula midline, tolerating oral secretions, normal speech, no submandibular edema, no palpable abscess along the gumlines with no fluctuance present, no tenderness to percussion of any of the teeth  Neck: No anterior or posterior cervical lymphadenopathy  Psych: Normal mood, normal affect    ED Course  Labs Reviewed   RAPID SARS-COV-2 BY PCR - Normal   RAPID STREP A - Normal     Copy of radiology report of patient's chest x-ray:    Narrative  PROCEDURE: XR CHEST PA + LAT CHEST (CPT=71046)     COMPARISON: [facility], XR CHEST AP PORTABLE (CPT=71045), 11/24/2024, 2:20 PM.     INDICATIONS: cough and fever x1 day.     TECHNIQUE:   Two views.       FINDINGS:  CARDIAC/VASC: Normal.  No cardiac silhouette abnormality or cardiomegaly.  Unremarkable pulmonary vasculature.    MEDIAST/INDIRA: No visible mass or adenopathy.  LUNGS/PLEURA: Normal.  No significant pulmonary parenchymal abnormalities.  No effusion or pleural thickening.    BONES: No fracture or visible bony lesion.  OTHER: Negative.                Impression  CONCLUSION: NACPD             Dictated by (CST): Mine Dudley MD on 6/18/2025 at 2:24 PM      Finalized by (CST): Mine Dudley MD on 6/18/2025 at 2:24 PM              Exam Ended: 06/18/25 14:11 Last Resulted: 06/18/25 14:24     MDM  Patient is awake, alert, comfortable on room air, in no distress, febrile at 100.6 F but is not taking any antipyretics as she is hesitant due to her history of hepatitis A and previous elevated transaminitis although normalization has occurred and hepatologist has  reassured that she can resume her normal activities, blood pressure is elevated at 171/72, patient is aware, no sign of otitis media or otitis externa, does have tonsillitis, but patient reports sore throat and recently returned from [place], therefore will assess for potential strep versus consider potential viral infection, no sing of PTA or of deep space infection, she does have nasal congestion with postnasal drainage present on exam, no sign of apical or periapical abscess with currently no sign of dental infection, given recent travel with progression of symptoms over the last 24 hours and fevers will consider potential pneumonia and will assess with chest x-ray  - Patient's strep and COVID testing resulted as negative, we did discuss potential for other underlying viral infection  - Per my personal review and interpretation of the patient's chest x-ray imaging there is normal lung expansion with no effusions and no consolidations, I compared today's chest x-ray to her chest x-ray from 11/24/2024, but this is when she was admitted for hepatitis, on the previous chest x-ray there is a large right-sided pleural effusion which has since resolved on today's chest x-ray.  This was all discussed with the patient.  - We discussed likely viral infection at this time and discussed symptomatic management with rest and hydration, per chart review of her hepatologist note from 2/25/2025, patient can resume normal activities and per review of most recent LFTs from 5/31/2025 she has had resolution of transaminitis, the hepatologist note from February also noted that she was reassured about regular Tylenol dosing.  This was discussed with the patient, we also discussed that she could take over the counter ibuprofen if needed for pain or fever control.  However, I did encourage her to reach out to her hepatologist to first discuss over-the-counter Tylenol and ibuprofen given her previous history to confirm clearance for the  medications and for reassurance.  - We discussed that we do not have any LFTs available through our immediate care to reassess today for reassurance, if she has any concerns about liver injury, I did recommend immediate assessment through the emergency department, her significant other does not feel her skin tone looks any different, no scleral icterus on my assessment today, but patient is aware of the limitations of the immediate care. Declines ED assessment today.  -We discussed that viral infections can make her susceptible to secondary bacterial infections, and therefore with any new, changing, or progressing signs or symptoms, I did recommend immediate reassessment by her primary care physician.  Given her history as well as elevated blood pressure readings today, I also amended close follow-up with her primary care physician.  - We discussed return to work precautions and work note was provided  - Given her history of hepatitis A, although currently no signs of respiratory stress or PTA or deep space infection, very strict ED precautions were discussed.      Of note, she follows with [facility] hepatologist for her history of Hepatitis A and per most recent documentation from 2/25/2025 via review of Care Everywhere patient's transaminitis was resolving, no concern at that time for underlying NAFLD/NAFLD, and hepatology stated she was reassured about liver function and was okay to resume normal activities/function and habits, no significant alcohol baseline and social alcohol was reportedly okay, she was also reassured about regular Tylenol dosing per chart review.  Patient is very hesitant to take any medications due to the history of LFT abnormality.  We did discuss the above per chart review which she states she is aware of. She reports normalization of her LFTs, and per chart review of her most recent LFTs from 5/31/2025 she had normal AST at 25, normal ALT at 38, normal total bili at 0.6, normal direct  bili at 0.2, normal alk phosphatase at 101.    Medical Decision Making  Amount and/or Complexity of Data Reviewed  Independent Historian:      Details: Significant other at bedside  External Data Reviewed: labs, radiology and notes.     Details: Chest x-ray imaging and report 11/24/2024 reviewed, hepatologist's note from 2/25/2025 reviewed, LFTs from 5/31/2025 reviewed  Labs: ordered.  Radiology: ordered and independent interpretation performed.    Risk  OTC drugs.        Disposition and Plan     Clinical Impression:  1. Fever, unspecified fever cause    2. Viral URI with cough    3. Elevated blood pressure reading         Disposition:  Discharge  6/18/2025  2:28 pm    Follow-up:  Bertin Florez MD  [address] [place] [address] [phone]    In 2 days            Medications Prescribed:  Discharge Medication List as of 6/18/2025  2:38 PM              None

## 2025-06-18 NOTE — ED INITIAL ASSESSMENT (HPI)
Patient with right sided sinus and dental pain starting yesterday.  + body aches, sore throat, cough.  Reports hx of hep a in the fall with elevated liver enzymes.

## 2025-06-18 NOTE — DISCHARGE INSTRUCTIONS
Your COVID and strep testing resulted as negative, your chest x-ray shows no signs of pneumonia at this time.  At this time, your exam and story is consistent with an underlying viral infection, but given your significant history, I do recommend close follow-up with your primary care physician within the next 2 days for reassessment of your clinical course and for further recommendations.  Viral infections can make you susceptible to secondary bacterial infections, and therefore with any new, changing, or progressing signs or symptoms, you should be immediately reassessed by your primary care physician.  Please discuss over-the-counter medication with your hepatologist/GI specialist, given your history of elevated liver function test and hepatitis A, prior to usage.    At this time your exam and evaluation are consistent with a viral infection. Viral infections do not respond to antibiotics and are treated symptomatically. Ensure to rest and maintain hydration. Viral infections can progress and can lead to bacterial infections. If you develop any new, progressing, changing, or worsening signs or symptoms, please present immediately to your primary care physician for reassessment. If you develop any difficulty breathing, chest pain, shortness of breath, difficulty swallowing, drooling, signs or symptoms of dehydration, or any other concerning symptoms, call 911 or present immediately to the emergency department.     Please follow-up with your primary care physician regarding elevated blood pressure reading for reassessment and for further recommendations.

## 2025-06-28 ENCOUNTER — LAB ENCOUNTER (OUTPATIENT)
Dept: LAB | Facility: HOSPITAL | Age: 60
End: 2025-06-28
Attending: INTERNAL MEDICINE
Payer: COMMERCIAL

## 2025-06-28 DIAGNOSIS — R74.8 ELEVATED LIVER ENZYMES: ICD-10-CM

## 2025-06-28 DIAGNOSIS — E11.9 TYPE 2 DIABETES MELLITUS WITHOUT COMPLICATION, WITHOUT LONG-TERM CURRENT USE OF INSULIN (HCC): ICD-10-CM

## 2025-06-28 LAB
ALBUMIN SERPL-MCNC: 4.8 G/DL (ref 3.2–4.8)
ALBUMIN/GLOB SERPL: 1.8 {RATIO} (ref 1–2)
ALP LIVER SERPL-CCNC: 100 U/L (ref 46–118)
ALT SERPL-CCNC: 34 U/L (ref 10–49)
ANION GAP SERPL CALC-SCNC: 9 MMOL/L (ref 0–18)
AST SERPL-CCNC: 21 U/L (ref ?–34)
BASOPHILS # BLD AUTO: 0.05 X10(3) UL (ref 0–0.2)
BASOPHILS NFR BLD AUTO: 0.7 %
BILIRUB DIRECT SERPL-MCNC: 0.2 MG/DL (ref ?–0.3)
BILIRUB SERPL-MCNC: 0.6 MG/DL (ref 0.3–1.2)
BUN BLD-MCNC: 17 MG/DL (ref 9–23)
BUN/CREAT SERPL: 17.7 (ref 10–20)
CALCIUM BLD-MCNC: 9.2 MG/DL (ref 8.7–10.4)
CHLORIDE SERPL-SCNC: 105 MMOL/L (ref 98–112)
CHOLEST SERPL-MCNC: 198 MG/DL (ref ?–200)
CO2 SERPL-SCNC: 27 MMOL/L (ref 21–32)
CREAT BLD-MCNC: 0.96 MG/DL (ref 0.55–1.02)
DEPRECATED RDW RBC AUTO: 39.4 FL (ref 35.1–46.3)
EGFRCR SERPLBLD CKD-EPI 2021: 69 ML/MIN/1.73M2 (ref 60–?)
EOSINOPHIL # BLD AUTO: 0.26 X10(3) UL (ref 0–0.7)
EOSINOPHIL NFR BLD AUTO: 3.6 %
ERYTHROCYTE [DISTWIDTH] IN BLOOD BY AUTOMATED COUNT: 11.7 % (ref 11–15)
EST. AVERAGE GLUCOSE BLD GHB EST-MCNC: 134 MG/DL (ref 68–126)
FASTING PATIENT LIPID ANSWER: YES
FASTING STATUS PATIENT QL REPORTED: YES
GLOBULIN PLAS-MCNC: 2.7 G/DL (ref 2–3.5)
GLUCOSE BLD-MCNC: 127 MG/DL (ref 70–99)
HBA1C MFR BLD: 6.3 % (ref ?–5.7)
HCT VFR BLD AUTO: 40.4 % (ref 35–48)
HDLC SERPL-MCNC: 52 MG/DL (ref 40–59)
HGB BLD-MCNC: 13.8 G/DL (ref 12–16)
IMM GRANULOCYTES # BLD AUTO: 0.02 X10(3) UL (ref 0–1)
IMM GRANULOCYTES NFR BLD: 0.3 %
LDLC SERPL CALC-MCNC: 120 MG/DL (ref ?–100)
LYMPHOCYTES # BLD AUTO: 3.15 X10(3) UL (ref 1–4)
LYMPHOCYTES NFR BLD AUTO: 43.2 %
MCH RBC QN AUTO: 31.2 PG (ref 26–34)
MCHC RBC AUTO-ENTMCNC: 34.2 G/DL (ref 31–37)
MCV RBC AUTO: 91.4 FL (ref 80–100)
MONOCYTES # BLD AUTO: 0.48 X10(3) UL (ref 0.1–1)
MONOCYTES NFR BLD AUTO: 6.6 %
NEUTROPHILS # BLD AUTO: 3.33 X10 (3) UL (ref 1.5–7.7)
NEUTROPHILS # BLD AUTO: 3.33 X10(3) UL (ref 1.5–7.7)
NEUTROPHILS NFR BLD AUTO: 45.6 %
NONHDLC SERPL-MCNC: 146 MG/DL (ref ?–130)
OSMOLALITY SERPL CALC.SUM OF ELEC: 295 MOSM/KG (ref 275–295)
PLATELET # BLD AUTO: 301 10(3)UL (ref 150–450)
POTASSIUM SERPL-SCNC: 4 MMOL/L (ref 3.5–5.1)
PROT SERPL-MCNC: 7.5 G/DL (ref 5.7–8.2)
RBC # BLD AUTO: 4.42 X10(6)UL (ref 3.8–5.3)
SODIUM SERPL-SCNC: 141 MMOL/L (ref 136–145)
TRIGL SERPL-MCNC: 145 MG/DL (ref 30–149)
VLDLC SERPL CALC-MCNC: 26 MG/DL (ref 0–30)
WBC # BLD AUTO: 7.3 X10(3) UL (ref 4–11)

## 2025-06-28 PROCEDURE — 83036 HEMOGLOBIN GLYCOSYLATED A1C: CPT

## 2025-06-28 PROCEDURE — 85025 COMPLETE CBC W/AUTO DIFF WBC: CPT

## 2025-06-28 PROCEDURE — 82248 BILIRUBIN DIRECT: CPT

## 2025-06-28 PROCEDURE — 80061 LIPID PANEL: CPT

## 2025-06-28 PROCEDURE — 80053 COMPREHEN METABOLIC PANEL: CPT

## 2025-06-28 PROCEDURE — 36415 COLL VENOUS BLD VENIPUNCTURE: CPT

## 2025-06-29 ENCOUNTER — PATIENT MESSAGE (OUTPATIENT)
Dept: INTERNAL MEDICINE CLINIC | Facility: CLINIC | Age: 60
End: 2025-06-29

## 2025-06-30 NOTE — TELEPHONE ENCOUNTER
Patient will like to restart Sertraline. She stated that the only reason she stopped it is because she had hep A and you did not want her to take it due to her liver numbers. Her liver is now back to normal so she will like to go back on it. Please advise if she needs a office visit or if you will sent the medication in for her.      RN please sent patient a [employer] message with  message. Thank you

## 2025-06-30 NOTE — TELEPHONE ENCOUNTER
Noted this medication was discontinued on 11/27/24.  Advised an appt to resume.    Future Appointments   Date Time Provider Department Center   8/23/2025  9:15 AM Suman Mcginnis MD [facility]

## 2025-07-01 RX ORDER — SERTRALINE HYDROCHLORIDE 25 MG/1
25 TABLET, FILM COATED ORAL DAILY
Qty: 90 TABLET | Refills: 0 | Status: SHIPPED | OUTPATIENT
Start: 2025-07-01

## 2025-07-01 NOTE — TELEPHONE ENCOUNTER
Left Voicemail to call back our office. Office phone number provided with office     1st attempt

## 2025-07-26 ENCOUNTER — LAB ENCOUNTER (OUTPATIENT)
Dept: LAB | Facility: HOSPITAL | Age: 60
End: 2025-07-26
Attending: INTERNAL MEDICINE
Payer: COMMERCIAL

## 2025-07-26 DIAGNOSIS — R74.8 ELEVATED LIVER ENZYMES: ICD-10-CM

## 2025-07-26 LAB
ALBUMIN SERPL-MCNC: 4.8 G/DL (ref 3.2–4.8)
ALP LIVER SERPL-CCNC: 100 U/L (ref 46–118)
ALT SERPL-CCNC: 33 U/L (ref 10–49)
AST SERPL-CCNC: 23 U/L (ref ?–34)
BILIRUB DIRECT SERPL-MCNC: 0.2 MG/DL (ref ?–0.3)
BILIRUB SERPL-MCNC: 0.8 MG/DL (ref 0.3–1.2)
PROT SERPL-MCNC: 7.5 G/DL (ref 5.7–8.2)

## 2025-07-26 PROCEDURE — 80076 HEPATIC FUNCTION PANEL: CPT

## 2025-07-26 PROCEDURE — 36415 COLL VENOUS BLD VENIPUNCTURE: CPT

## 2025-08-23 ENCOUNTER — OFFICE VISIT (OUTPATIENT)
Dept: ENDOCRINOLOGY CLINIC | Facility: CLINIC | Age: 60
End: 2025-08-23

## 2025-08-23 VITALS
HEART RATE: 78 BPM | BODY MASS INDEX: 28.7 KG/M2 | WEIGHT: 162 LBS | DIASTOLIC BLOOD PRESSURE: 87 MMHG | HEIGHT: 63 IN | SYSTOLIC BLOOD PRESSURE: 137 MMHG

## 2025-08-23 DIAGNOSIS — Z82.49 FAMILY HISTORY OF PREMATURE CAD: ICD-10-CM

## 2025-08-23 DIAGNOSIS — E11.9 CONTROLLED TYPE 2 DIABETES MELLITUS WITHOUT COMPLICATION, WITHOUT LONG-TERM CURRENT USE OF INSULIN (HCC): Primary | ICD-10-CM

## 2025-08-23 DIAGNOSIS — E66.3 OVERWEIGHT (BMI 25.0-29.9): ICD-10-CM

## 2025-08-23 DIAGNOSIS — R73.09 HIGH GLUCOSE LEVEL: ICD-10-CM

## (undated) DEVICE — C-ARMOR C-ARM EQUIPMENT COVERS CLEAR STERILE UNIVERSAL FIT 12 PER CASE: Brand: C-ARMOR

## (undated) DEVICE — KIT HEMSTAT MTRX 8ML PORCINE GEL HUM THROM

## (undated) DEVICE — SPONGE: SPECIALTY PEANUT XR 100/CS: Brand: MEDICAL ACTION INDUSTRIES

## (undated) DEVICE — 3M™ TEGADERM™ TRANSPARENT FILM DRESSING, 1626W, 4 IN X 4-3/4 IN (10 CM X 12 CM), 50 EACH/CARTON, 4 CARTON/CASE: Brand: 3M™ TEGADERM™

## (undated) DEVICE — LAMINECTOMY: Brand: MEDLINE INDUSTRIES, INC.

## (undated) DEVICE — INTENDED USE FOR SURGICAL MARKING ON INTACT SKIN, ALSO PROVIDES A PERMANENT METHOD OF IDENTIFYING OBJECTS IN THE OPERATING ROOM: Brand: WRITESITE® PLUS MINI PREP RESISTANT MARKER

## (undated) DEVICE — DRAPE SHEET LG

## (undated) DEVICE — NEEDLE NRV STIM BVL TIP INSUL PEDCL ACCS SYS

## (undated) DEVICE — SUTURE PDS II SZ 0 L60IN ABSRB VLT L48MM CTX

## (undated) DEVICE — SNAP KOVER: Brand: UNBRANDED

## (undated) DEVICE — GAMMEX® PI HYBRID SIZE 7.5, STERILE POWDER-FREE SURGICAL GLOVE, POLYISOPRENE AND NEOPRENE BLEND: Brand: GAMMEX

## (undated) DEVICE — 3M™ STERI-DRAPE™ U-DRAPE 1015: Brand: STERI-DRAPE™

## (undated) DEVICE — Device: Brand: DEFENDO AIR/WATER/SUCTION AND BIOPSY VALVE

## (undated) DEVICE — KIT SUR ACCS MAXCESS

## (undated) DEVICE — PROXIMATE SKIN STAPLERS (35 WIDE) CONTAINS 35 STAINLESS STEEL STAPLES (FIXED HEAD): Brand: PROXIMATE

## (undated) DEVICE — ELECTRODE ES L2.75IN XLN STD BLDE MOD E-Z CLN

## (undated) DEVICE — SUTURE VCRL SZ 3-0 L27IN ABSRB UD L26MM SH

## (undated) DEVICE — ENDOSCOPY PACK - LOWER: Brand: MEDLINE INDUSTRIES, INC.

## (undated) DEVICE — GOWN SURG L DISP LEV 3 AERO BLU RAGLAN SL ST

## (undated) DEVICE — GOWN SURG XL DISP LEV 3 AERO BLU RAGLAN SL

## (undated) DEVICE — UNDYED BRAIDED (POLYGLACTIN 910), SYNTHETIC ABSORBABLE SUTURE: Brand: COATED VICRYL

## (undated) DEVICE — EXACTO SNARE

## (undated) DEVICE — ANTERIOR POSTERIOR ADD ON PACK: Brand: MEDLINE INDUSTRIES, INC.

## (undated) DEVICE — DRAPE SHEET LAPCHOLE 124X100X7

## (undated) DEVICE — COVER TABLE BACK PADDED HVY DU

## (undated) DEVICE — 3.0MM PRECISION NEURO (MATCH HEAD)

## (undated) DEVICE — NEEDLE SPINL CLR HUB 18GX3 1/2

## (undated) DEVICE — ENDOSCOPY PACK UPPER: Brand: MEDLINE INDUSTRIES, INC.

## (undated) DEVICE — SUTURE PDS II SZ 4-0 L27IN ABSRB VLT SH L26MM

## (undated) DEVICE — TRAY INSTR PWR NUVASIVE

## (undated) DEVICE — SUTURE PERMAHAND SZ 2-0 L30IN 10X30IN TIE

## (undated) DEVICE — MONITORING NEUROPHYSIOLOGICAL

## (undated) DEVICE — SNARE CAPTIFLEX MICRO-OVL OLY

## (undated) DEVICE — 1010 S-DRAPE TOWEL DRAPE 10/BX: Brand: STERI-DRAPE™

## (undated) DEVICE — PAD,NON-ADHERENT,3X8,STERILE,LF,1/PK: Brand: MEDLINE

## (undated) DEVICE — CATHETER,URETHRAL,REDRUBBER,STERILE,22FR: Brand: MEDLINE

## (undated) DEVICE — SUTURE VCRL SZ 2-0 L27IN ABSRB UD L26MM CT-2

## (undated) DEVICE — SUTURE MCRYL SZ 3-0 L18IN ABSRB UD L19MM PS-2

## (undated) DEVICE — E-Z CLEAN, NON-STICK, PTFE COATED, ELECTROSURGICAL BLADE ELECTRODE, MODIFIED EXTENDED INSULATION, 6.5 INCH (16.5 CM): Brand: MEGADYNE

## (undated) DEVICE — SNARE OPTMZ PLPCTM TRP

## (undated) DEVICE — ADHESIVE SKIN TOP FOR WND CLSR DERMBND ADV

## (undated) DEVICE — WRAP COOLING BACK W/NO PILLOW

## (undated) DEVICE — SUTURE VCRL SZ 0 L27IN ABSRB VLT L26MM UR-6

## (undated) DEVICE — SOLUTION IRRIG 1000ML 0.9% NACL USP BTL

## (undated) DEVICE — LIGACLIP MCA MULTIPLE CLIP APPLIERS, 20 MEDIUM CLIPS: Brand: LIGACLIP

## (undated) NOTE — Clinical Note
TCM call completed. Pt declined TCM appt at this time. Pt is FU with the surgeon's office as advised. Thank you.

## (undated) NOTE — LETTER
8/13/2019              [place]        [address]        [facility]         Dear Bandar Ford,    This letter is to inform you that our office has made several attempts to reach you by phone without success.   We were attempting to contact

## (undated) NOTE — LETTER
Date & Time: 7/5/2023, 9:42 AM  Patient: Osvaldo Carcamo  Encounter Provider(s):    LESVIA Perez       To Whom It May Concern: Jimy Jay was seen and treated in our department on 7/5/2023. She  should be excused from work thru 7/6/2023 .     If you have any questions or concerns, please do not hesitate to call.        _____________________________  Physician/APC Signature

## (undated) NOTE — LETTER
12/27/21        Beth Schultz  [address]      Dear Ulices Arzate records indicate that you have outstanding lab work and or testing that was ordered for you and has not yet been completed:  Orders Placed This Encounter

## (undated) NOTE — LETTER
3/13/2019          To Whom It May Concern: Magdalena Otis was seen in the office in the office today and will be off for 3 days. Nancyharriett Braydon will return back to work on 03/18/19. If you require additional information please contact our office.         Sincerely

## (undated) NOTE — LETTER
12/13/2024          To Whom It May Concern:    Emmanuel Mendiola is currently a patient under my medical care.  The patient's medical condition makes serving on jury duty inadvisable at this time.  Please excuse them from serving at this time.   If you require additional information please do not hesitate to contact my office.       If you require additional information please contact our office.      Sincerely,    Lias Varela PA-C          Document generated by:  Lisa Varela PA-C

## (undated) NOTE — LETTER
Date & Time: 6/18/2025, 2:37 PM  Patient: Emmanuel Mendiola  Encounter Provider(s):    Yamini Ortega DO       To Whom It May Concern:    Emmanuel Mendiola was seen and treated in our department on 6/18/2025. She cannot return to work until 6/22/2025 as long as fever free for 24 hours without the use of antifever medication and beginning to improve.        ___ Yamini Ortega DO_________________________  Physician/APC Signature

## (undated) NOTE — LETTER
11/15/23          Vonnie [place]  :  10/7/1965      To Whom It May Concern: This patient was seen in our office on 11/15/23 . Work status:  Remain off work until re-evaluation at scheduled appointment on 2 weeks    May return to work status per above effective today. If this office may be of further assistance, please do not hesitate to contact us. Sincerely,  Jarad Soares.  Derik Diggs

## (undated) NOTE — LETTER
05/20/19        [place]  [address] [facility]      Dear Peña Helm records indicate that you have outstanding lab work and or testing that was ordered for you and has not yet been completed:  Orders Placed This Encounter

## (undated) NOTE — LETTER
1/23/2023          To Whom It May Concern: Judy Pedro is currently under my medical care and may not return to work at this time. Please excuse Jude Part for 3 days. She may return to work on 01/26/2023  Activity is restricted as follows:  none. If you require additional information , please contact our office.         Sincerely,    Crow Camacho MD          Document generated by:  Crow Camacho MD

## (undated) NOTE — LETTER
[address]  Authorization for Surgical Operation and Procedure                                                                                           I hereby authorize Lima Talbot MD, Jus Byrd MD, my physician and his/her assistants (if applicable), which may include medical students, residents, and/or fellows, to perform the following surgical operation/ procedure and administer such anesthesia as may be determined necessary by my physician: Operation/Procedure name (s) L5-S1 anterior lumbar interbody fusion with possible posterior spinal fusion on Charito Gillespie   2. I recognize that during the surgical operation/procedure, unforeseen conditions may necessitate additional or different procedures than those listed above. I, therefore, further authorize and request that the above-named surgeon, assistants, or designees perform such procedures as are, in their judgment, necessary and desirable. 3.   My surgeon/physician has discussed prior to my surgery the potential benefits, risks and side effects of this procedure; the likelihood of achieving goals; and potential problems that might occur during recuperation. They also discussed reasonable alternatives to the procedure, including risks, benefits, and side effects related to the alternatives and risks related to not receiving this procedure. I have had all my questions answered and I acknowledge that no guarantee has been made as to the result that may be obtained. 4.   Should the need arise during my operation/procedure, which includes change of level of care prior to discharge, I also consent to the administration of blood and/or blood products. Further, I understand that despite careful testing and screening of blood or blood products by collecting agencies, I may still be subject to ill effects as a result of receiving a blood transfusion and/or blood products.   The following are some, but not all, of the potential risks that can occur: fever and allergic reactions, hemolytic reactions, transmission of diseases such as Hepatitis, AIDS and Cytomegalovirus (CMV) and fluid overload. In the event that I wish to have an autologous transfusion of my own blood, or a directed donor transfusion, I will discuss this with my physician. Check only if Refusing Blood or Blood Products  I understand refusal of blood or blood products as deemed necessary by my physician may have serious consequences to my condition to include possible death. I hereby assume responsibility for my refusal and release the hospital, its personnel, and my physicians from any responsibility for the consequences of my refusal.    o  Refuse   5. I authorize the use of any specimen, organs, tissues, body parts or foreign objects that may be removed from my body during the operation/procedure for diagnosis, research or teaching purposes and their subsequent disposal by hospital authorities. I also authorize the release of specimen test results and/or written reports to my treating physician on the hospital medical staff or other referring or consulting physicians involved in my care, at the discretion of the Pathologist or my treating physician. 6.   I consent to the photographing or videotaping of the operations or procedures to be performed, including appropriate portions of my body for medical, scientific, or educational purposes, provided my identity is not revealed by the pictures or by descriptive texts accompanying them. If the procedure has been photographed/videotaped, the surgeon will obtain the original picture, image, videotape or CD. The hospital will not be responsible for storage, release or maintenance of the picture, image, tape or CD.    7.   I consent to the presence of a  or observers in the operating room as deemed necessary by my physician or their designees.     8.   I recognize that in the event my procedure results in extended X-Ray/fluoroscopy time, I may develop a skin reaction. 9. If I have a Do Not Attempt Resuscitation (DNAR) order in place, that status will be suspended while in the operating room, procedural suite, and during the recovery period unless otherwise explicitly stated by me (or a person authorized to consent on my behalf). The surgeon or my attending physician will determine when the applicable recovery period ends for purposes of reinstating the DNAR order. 10. Patients having a sterilization procedure: I understand that if the procedure is successful the results will be permanent and it will therefore be impossible for me to inseminate, conceive, or bear children. I also understand that the procedure is intended to result in sterility, although the result has not been guaranteed. 11. I acknowledge that my physician has explained sedation/analgesia administration to me including the risk and benefits I consent to the administration of sedation/analgesia as may be necessary or desirable in the judgment of my physician. I CERTIFY THAT I HAVE READ AND FULLY UNDERSTAND THE ABOVE CONSENT TO OPERATION and/or OTHER PROCEDURE.     _________________________________________ _________________________________     ___________________________________  Signature of Patient     Signature of Responsible Person                   Printed Name of Responsible Person                              _________________________________________ ______________________________        ___________________________________  Signature of Witness         Date  Time         Relationship to Patient    STATEMENT OF PHYSICIAN My signature below affirms that prior to the time of the procedure; I have explained to the patient and/or his/her legal representative, the risks and benefits involved in the proposed treatment and any reasonable alternative to the proposed treatment.  I have also explained the risks and benefits involved in refusal of the proposed treatment and alternatives to the proposed treatment and have answered the patient's questions.  If I have a significant financial interest in a co-management agreement or a significant financial interest in any product or implant, or other significant relationship used in this procedure/surgery, I have disclosed this and had a discussion with my patient.     _______________________________________________________________ _____________________________  Wade Matosor of Physician)                                                                                         (Date)                                   (Time)  Patient Name: Fabi Albarado    : 10/7/1965   Printed: 2023      Medical Record #: [id number]                                              Page 1 of 1

## (undated) NOTE — LETTER
12/9/2024              Emmanuel Mendiola        [address] [facility] [address]         To whom it may concern,    Emmanuel Mendiola is currently a patient under my medical care.  The patient's medical condition makes serving on jury duty inadvisable at this time.  Please excuse them from serving on 12/17/24.  If you require additional information please do not hesitate to contact my office.        Sincerely,        Bertin Florez MD  [place] [address] [facility] [id number] [phone]        Document electronically generated by:  Henry KERR LPN

## (undated) NOTE — LETTER
12/12/2022    Nuris Malone        [address] [facility] [address] [facility]        [facility]            Dear Nuris Malone,      Our records indicate that you are due for an appointment for a Colonoscopy with Rosa Elena Turner MD. Our doctors are booking out about 3-5 months for procedures. Please call our office to schedule a phone screening appointment to plan for the procedure(s). Your medical well-being is important to us. If your insurance requires a referral, please call your primary care office to request one.       Thank you,      The Physicians and Staff at [facility]

## (undated) NOTE — LETTER
4/24/2019              Lincoln Islas        [address]        [facility]         Dear Giancarlo Stewart,    This letter is to inform you that our office has made several attempts to reach you by phone without success.   We were attempting to contact

## (undated) NOTE — LETTER
April 25, 2019    Des Fuentes  [address]      Dear Olga Lidia Pisano:    The following are the results of your recent tests. Please review the list of test results.   Your result is the value on the left; we have also supplied the rang URINALYSIS, ROUTINE   Result Value Ref Range    Urine Color Yellow Yellow    Clarity Urine Clear Clear    Spec Gravity 1.025 1.002 - 1.035    pH Urine 6.0 5.0 - 8.0    Protein Urine Negative Negative mg/dL    Glucose Urine Negative Negative mg/dL    Ketone

## (undated) NOTE — LETTER
[address], [place]  Authorization for Invasive Procedures  1.  I hereby authorize  *** , my physician and whomever may be designated as the doctor's assistant, to perform the following operation and/or procedure:COLONOSC performed for the purposes of advancing medicine, science, and/or education, provided my identity is not revealed. If the procedure has been videotaped, the physician/surgeon will obtain the original videotape.  The hospital will not be responsible for stor My signature below affirms that prior to the time of the procedure, I have explained to the patient and/or her legal representative, the risks and benefits involved in the proposed treatment and any reasonable alternative to the proposed treatment.  I have

## (undated) NOTE — LETTER
No referring provider defined for this encounter. 09/22/20        Patient: Arie Srivastava   YOB: 1965   Date of Visit: 9/22/2020       Dear  Dr. Mario Manley MD,      Thank you for referring Arie Srivastava to my practice.   Please

## (undated) NOTE — LETTER
Date & Time: 5/25/2023, 2:37 PM  Patient: [place] Pulling  Encounter Provider(s):    LESVIA Garcia       To Whom It May Concern: Waqas Degroot was seen and treated in our department on 5/25/2023. She may return  to work  Tuesday May 30, 2023. .    If you have any questions or concerns, please do not hesitate to call.       Manny Valera, PRAVEEN  _____________________________  [id number] Signature

## (undated) NOTE — LETTER
[facility]  Administration of Anesthesia  1. Ramiro Peña, or _________________________________ acting on her behalf, (Patient) (Dependent/Representative) request to receive anesthesia for my pending procedure/operation/treatment.   A infections, high spinal block, spinal bleeding, seizure, cardiac arrest and death. 7. AWARENESS: I understand that it is possible (but unlikely) to have explicit memory of events from the operating room while under general anesthesia.   8. ELECTROCONVULSIV (Date) (Time)                                                                                               (Responsible person in case of minor/ unconscious pt) /Relationship    My signature below affirms that prior to the time of the procedure, I have ex

## (undated) NOTE — LETTER
07/23/20        Mark Marking  [address] [facility]      Dear Leila Pollard records indicate that you have outstanding lab work and or testing that was ordered for you and has not yet been completed:  No orders of the defined types wer

## (undated) NOTE — LETTER
05/13/19        Enedina Riddle  [address] [facility]      Dear Neal Altamirano records indicate that you have outstanding lab work and or testing that was ordered for you and has not yet been completed:  Orders Placed This Encounter      UA/M WITH CULTURE REFLEX [3570]    To provide you with the best possible care, please complete these orders at your earliest convenience. If you have recently completed these orders please disregard this letter. If you have any questions please call the office at Dept: [phone].      Thank you,       Justina Sierra MD

## (undated) NOTE — LETTER
8/5/2019          To Whom It May Concern: Lula Robertson is currently under my medical care. She had a consultation with me on 6/27/2019 and I recommended she wear gym shoes with inserts for 2 weeks.      If you require additional information please con

## (undated) NOTE — LETTER
6/2/2023      To Whom It May Concern: Desiree Waterman is currently under my medical care and may not return to work at this time. Please excuse Moe Elva for her absences since 5/22/2023. She may return to work after she is seen by a specialist on 6/7/2023 and it is determined by the specialist that it is safe for her to return to work. She should obtain clearance from the specialist to return to work. If you require additional information please contact our office. Sincerely,        Fiordaliza Mcmahan MD  [phone]       Document generated by:   Ana Pritchett RN

## (undated) NOTE — Clinical Note
Please schedule patient at the \[facility]\"" C once approved.   Please provide her with physical therapy information with Roya Amaro or [place] [facility] in [place] so she can be scheduled wherever she can get in sooner

## (undated) NOTE — LETTER
1/25/2018              Lizette Crews        [address]        [facility]         Dear Ms Trey Granda,    As we discussed, your stomach endoscopy exam (\"EGD\") at [facility] outpatient surgery center on January 10, 2018 showed

## (undated) NOTE — LETTER
AUTHORIZATION FOR SURGICAL OPERATION OR OTHER PROCEDURE    1.  I hereby authorize Dr. Leticia Malin, and [facility] staff assigned to my case to perform the following operation and/or procedure at the [facility]:    ______________________ Patient signature:  ___________________________________________________             Relationship to Patient:           []  Parent    Responsible person                          []  Spouse  In case of minor or                    [] Other  _____________   In

## (undated) NOTE — LETTER
AUTHORIZATION FOR SURGICAL OPERATION OR OTHER PROCEDURE    1. I hereby authorize  , and [facility] staff assigned to my case to perform the following operation and/or procedure at the [facility] site:    _______________________________________________________________________________________________    Endometrial biopsy  _______________________________________________________________________________________________    2.  My physician has explained the nature and purpose of the operation or other procedure, possible alternative methods of treatment, the risks involved, and the possibility of complication to me.  I acknowledge that no guarantee has been made as to the result that may be obtained.  3.  I recognize that, during the course of this operation, or other procedure, unforseen conditions may necessitate additional or different procedure than those listed above.  I, therefore, further authorize and request that the above named physician, his/her physician assistants or designees perform such procedures as are, in his/her professional opinion, necessary and desirable.  4.  Any tissue or organs removed in the operation or other procedure may be disposed of by and at the discretion of the [facility] and [facility].  5.  I understand that in the event of a medical emergency, I will be transported by local paramedics to [facility] or other hospital emergency department.  6.  I certify that I have read and fully understand the above consent to operation and/or other procedure.    7.  I acknowledge that my physician has explained sedation/analgesia administration to me including the risks and benefits.  I consent to the administration of sedation/analgesia as may be necessary or desirable in the judgement of my physician.    Witness signature: ___________________________________________________ Date:  ______/______/_____                     Time:  ________ A.M.  P.M.       Patient Name:  ______________________________________________________  (please print)      Patient signature:  ___________________________________________________             Relationship to Patient:           []  Parent    Responsible person                          []  Spouse  In case of minor or                    [] Other  _____________   Incompetent name:  __________________________________________________                               (please print)      _____________      Responsible person  In case of minor or  Incompetent signature:  _______________________________________________    Statement of Physician  My signature below affirms that prior to the time of the procedure, I have explained to the patient and/or his/her guardian, the risks and benefits involved in the proposed treatment and any reasonable alternative to the proposed treatment.  I have also explained the risks and benefits involved in the refusal of the proposed treatment and have answered the patient's questions.                        Date:  ______/______/_______  Provider                      Signature:  __________________________________________________________       Time:  ___________ A.M    P.M.

## (undated) NOTE — LETTER
Patient Name: Katelyn Carmona  : 10/7/1965  MRN: [id number]  Patient Address: [place] [address]      Coronavirus Disease 2019 (COVID-19)     [place] [address] is committed to the safety and well-being of our patients, members, carefully. If your symptoms get worse, call your healthcare provider immediately. 3. Get rest and stay hydrated.    4. If you have a medical appointment, call the healthcare provider ahead of time and tell them that you have or may have COVID-19.  5. For m of fever-reducing medications; and  · Improvement in respiratory symptoms (e.g., cough, shortness of breath); and  · At least 10 days have passed since symptoms first appeared OR if asymptomatic patient or date of symptom onset is unclear then use 10 days donors must:    · Have had a confirmed diagnosis of COVID-19  · Be symptom-free for at least 14 days*    *Some people will be required to have a repeat COVID-19 test in order to be eligible to donate.  If you’re instructed by Mamie that a repeat test is r random. Researchers are trying to identify similarities between people with a Post-COVID condition to better understand if there are risk factors. How do I prevent a Post-COVID condition?   The best way to prevent the long-term symptoms of COVID-19 is